# Patient Record
Sex: FEMALE | Race: WHITE | NOT HISPANIC OR LATINO | Employment: OTHER | URBAN - METROPOLITAN AREA
[De-identification: names, ages, dates, MRNs, and addresses within clinical notes are randomized per-mention and may not be internally consistent; named-entity substitution may affect disease eponyms.]

---

## 2021-07-02 ENCOUNTER — HOSPITAL ENCOUNTER (EMERGENCY)
Facility: HOSPITAL | Age: 86
Discharge: HOME | End: 2021-07-02
Attending: EMERGENCY MEDICINE | Admitting: EMERGENCY MEDICINE

## 2021-07-02 ENCOUNTER — APPOINTMENT (OUTPATIENT)
Dept: GENERAL RADIOLOGY | Facility: HOSPITAL | Age: 86
End: 2021-07-02

## 2021-07-02 VITALS
HEART RATE: 76 BPM | TEMPERATURE: 97.3 F | RESPIRATION RATE: 18 BRPM | SYSTOLIC BLOOD PRESSURE: 128 MMHG | DIASTOLIC BLOOD PRESSURE: 68 MMHG | OXYGEN SATURATION: 100 %

## 2021-07-02 DIAGNOSIS — Z79.01 CHRONIC ANTICOAGULATION: ICD-10-CM

## 2021-07-02 DIAGNOSIS — I48.91 ATRIAL FIBRILLATION, UNSPECIFIED TYPE (HCC): ICD-10-CM

## 2021-07-02 DIAGNOSIS — R07.89 ACUTE CHEST WALL PAIN: Primary | ICD-10-CM

## 2021-07-02 LAB
ALBUMIN SERPL-MCNC: 4.3 G/DL (ref 3.5–5.2)
ALBUMIN/GLOB SERPL: 1.2 G/DL
ALP SERPL-CCNC: 85 U/L (ref 39–117)
ALT SERPL W P-5'-P-CCNC: 26 U/L (ref 1–33)
ANION GAP SERPL CALCULATED.3IONS-SCNC: 13.3 MMOL/L (ref 5–15)
AST SERPL-CCNC: 26 U/L (ref 1–32)
BASOPHILS # BLD AUTO: 0.06 10*3/MM3 (ref 0–0.2)
BASOPHILS NFR BLD AUTO: 0.5 % (ref 0–1.5)
BILIRUB SERPL-MCNC: 0.4 MG/DL (ref 0–1.2)
BUN SERPL-MCNC: 20 MG/DL (ref 8–23)
BUN/CREAT SERPL: 18.2 (ref 7–25)
CALCIUM SPEC-SCNC: 10 MG/DL (ref 8.6–10.5)
CHLORIDE SERPL-SCNC: 99 MMOL/L (ref 98–107)
CO2 SERPL-SCNC: 28.7 MMOL/L (ref 22–29)
CREAT SERPL-MCNC: 1.1 MG/DL (ref 0.57–1)
DEPRECATED RDW RBC AUTO: 43.9 FL (ref 37–54)
DIGOXIN SERPL-MCNC: 1.3 NG/ML (ref 0.6–1.2)
EOSINOPHIL # BLD AUTO: 0.15 10*3/MM3 (ref 0–0.4)
EOSINOPHIL NFR BLD AUTO: 1.2 % (ref 0.3–6.2)
ERYTHROCYTE [DISTWIDTH] IN BLOOD BY AUTOMATED COUNT: 13 % (ref 12.3–15.4)
GFR SERPL CREATININE-BSD FRML MDRD: 47 ML/MIN/1.73
GLOBULIN UR ELPH-MCNC: 3.6 GM/DL
GLUCOSE SERPL-MCNC: 178 MG/DL (ref 65–99)
HCT VFR BLD AUTO: 42.7 % (ref 34–46.6)
HGB BLD-MCNC: 14.7 G/DL (ref 12–15.9)
IMM GRANULOCYTES # BLD AUTO: 0.09 10*3/MM3 (ref 0–0.05)
IMM GRANULOCYTES NFR BLD AUTO: 0.7 % (ref 0–0.5)
INR PPP: 2.33 (ref 0.9–1.1)
LYMPHOCYTES # BLD AUTO: 5.97 10*3/MM3 (ref 0.7–3.1)
LYMPHOCYTES NFR BLD AUTO: 49.3 % (ref 19.6–45.3)
MCH RBC QN AUTO: 32.3 PG (ref 26.6–33)
MCHC RBC AUTO-ENTMCNC: 34.4 G/DL (ref 31.5–35.7)
MCV RBC AUTO: 93.8 FL (ref 79–97)
MONOCYTES # BLD AUTO: 0.91 10*3/MM3 (ref 0.1–0.9)
MONOCYTES NFR BLD AUTO: 7.5 % (ref 5–12)
NEUTROPHILS NFR BLD AUTO: 4.94 10*3/MM3 (ref 1.7–7)
NEUTROPHILS NFR BLD AUTO: 40.8 % (ref 42.7–76)
NRBC BLD AUTO-RTO: 0 /100 WBC (ref 0–0.2)
NT-PROBNP SERPL-MCNC: 1504 PG/ML (ref 0–1800)
PLATELET # BLD AUTO: 270 10*3/MM3 (ref 140–450)
PMV BLD AUTO: 9.2 FL (ref 6–12)
POTASSIUM SERPL-SCNC: 4.1 MMOL/L (ref 3.5–5.2)
PROT SERPL-MCNC: 7.9 G/DL (ref 6–8.5)
PROTHROMBIN TIME: 25.3 SECONDS (ref 11.7–14.2)
RBC # BLD AUTO: 4.55 10*6/MM3 (ref 3.77–5.28)
SODIUM SERPL-SCNC: 141 MMOL/L (ref 136–145)
TROPONIN T SERPL-MCNC: <0.01 NG/ML (ref 0–0.03)
TROPONIN T SERPL-MCNC: <0.01 NG/ML (ref 0–0.03)
WBC # BLD AUTO: 12.12 10*3/MM3 (ref 3.4–10.8)

## 2021-07-02 PROCEDURE — 85610 PROTHROMBIN TIME: CPT | Performed by: EMERGENCY MEDICINE

## 2021-07-02 PROCEDURE — 85025 COMPLETE CBC W/AUTO DIFF WBC: CPT | Performed by: EMERGENCY MEDICINE

## 2021-07-02 PROCEDURE — 93005 ELECTROCARDIOGRAM TRACING: CPT

## 2021-07-02 PROCEDURE — 80053 COMPREHEN METABOLIC PANEL: CPT | Performed by: EMERGENCY MEDICINE

## 2021-07-02 PROCEDURE — 83880 ASSAY OF NATRIURETIC PEPTIDE: CPT | Performed by: EMERGENCY MEDICINE

## 2021-07-02 PROCEDURE — 80162 ASSAY OF DIGOXIN TOTAL: CPT | Performed by: EMERGENCY MEDICINE

## 2021-07-02 PROCEDURE — 71045 X-RAY EXAM CHEST 1 VIEW: CPT

## 2021-07-02 PROCEDURE — 93005 ELECTROCARDIOGRAM TRACING: CPT | Performed by: EMERGENCY MEDICINE

## 2021-07-02 PROCEDURE — 93010 ELECTROCARDIOGRAM REPORT: CPT | Performed by: INTERNAL MEDICINE

## 2021-07-02 PROCEDURE — 84484 ASSAY OF TROPONIN QUANT: CPT | Performed by: EMERGENCY MEDICINE

## 2021-07-02 PROCEDURE — 99284 EMERGENCY DEPT VISIT MOD MDM: CPT

## 2021-07-02 RX ORDER — LIDOCAINE 50 MG/G
1 PATCH TOPICAL EVERY 24 HOURS
Qty: 10 PATCH | Refills: 0 | Status: SHIPPED | OUTPATIENT
Start: 2021-07-02 | End: 2021-12-22 | Stop reason: HOSPADM

## 2021-07-02 RX ORDER — ACETAMINOPHEN 500 MG
1000 TABLET ORAL ONCE
Status: COMPLETED | OUTPATIENT
Start: 2021-07-02 | End: 2021-07-02

## 2021-07-02 RX ORDER — SODIUM CHLORIDE 0.9 % (FLUSH) 0.9 %
10 SYRINGE (ML) INJECTION AS NEEDED
Status: DISCONTINUED | OUTPATIENT
Start: 2021-07-02 | End: 2021-07-03 | Stop reason: HOSPADM

## 2021-07-02 RX ADMIN — ACETAMINOPHEN 1000 MG: 500 TABLET, FILM COATED ORAL at 21:14

## 2021-07-02 RX ADMIN — METOPROLOL TARTRATE 25 MG: 25 TABLET, FILM COATED ORAL at 21:14

## 2021-07-02 NOTE — ED NOTES
Pt arrived via EMS from home with c/o chest pain that started around 1800 tonight when pt was sitting outside. Pt denies any radiating pain, N&V, SOB , or diaphoresis. Pt took 2 nitro prior to EMS arrival, upon EMS arrival pt was denying pain but came back en route. EMS gave one nitro and 324 ASA. Pt states pain is a 5 right now. Pt has afib, CAD, SVT for hx.     Pt placed in mask, this RN in mask.       Tatum Dalal, RN  07/02/21 1914

## 2021-07-03 LAB — QT INTERVAL: 312 MS

## 2021-07-03 NOTE — ED PROVIDER NOTES
EMERGENCY DEPARTMENT ENCOUNTER    Room Number:  10/10  Date seen:  7/2/2021  PCP: Provider, No Known  Historian: Patient, family      HPI:  Chief Complaint: Chest pain  A complete HPI/ROS/PMH/PSH/SH/FH are unobtainable due to: Nothing  Context: Mi Rosales is a 89 y.o. female who presents to the ED c/o left-sided chest pain onset today around 1 PM.  Patient reportedly went outside for a period of time and was not complaining of anything and then she came back inside she was complaining again of intermittent left-sided chest pain.  Patient reports that it hits her from time to time in the left side of the chest.  She denies any radiation of the pain.  She denies associated nausea, vomiting, diaphoresis, shortness of air.  She does have a history of atrial fibrillation for which she takes digoxin, diltiazem, warfarin.  She has no reported history of coronary artery disease.  She has had no fever or chills.  Her left chest wall is tender to touch.  She denies any recent injury or trauma.            PAST MEDICAL HISTORY  Active Ambulatory Problems     Diagnosis Date Noted   • No Active Ambulatory Problems     Resolved Ambulatory Problems     Diagnosis Date Noted   • No Resolved Ambulatory Problems     Past Medical History:   Diagnosis Date   • Atrial fibrillation (CMS/HCC)    • Hypertension          PAST SURGICAL HISTORY  History reviewed. No pertinent surgical history.      FAMILY HISTORY  History reviewed. No pertinent family history.      SOCIAL HISTORY  Social History     Socioeconomic History   • Marital status:      Spouse name: Not on file   • Number of children: Not on file   • Years of education: Not on file   • Highest education level: Not on file         ALLERGIES  Bee venom, Atorvastatin, Gemfibrozil, Haemophilus influenzae, Influenza virus vaccine split, Isosorbide, Metformin, Propoxyphene, and Simvastatin        REVIEW OF SYSTEMS  Review of Systems   Review of all 14 systems is negative other  than stated in the HPI above.      PHYSICAL EXAM  ED Triage Vitals   Temp Heart Rate Resp BP SpO2   07/02/21 1915 07/02/21 1915 07/02/21 1915 07/02/21 1915 07/02/21 1915   97.3 °F (36.3 °C) (!) 136 25 169/71 94 %      Temp src Heart Rate Source Patient Position BP Location FiO2 (%)   07/02/21 1915 -- 07/02/21 1958 07/02/21 1958 --   Tympanic  Lying Right arm          GENERAL: Awake and alert, no acute distress, baseline hearing impairment.  HENT: nares patent  EYES: no scleral icterus, EOMI, pupils 3 mm reactive bilaterally  CV: irregular rhythm, mild tachycardia  RESPIRATORY: normal effort, lungs clear to auscultation bilaterally  ABDOMEN: soft, nondistended, nontender throughout  MUSCULOSKELETAL: no deformity.  The right chest wall is nontender to touch.  The left chest wall is tender to touch with no crepitance.  NEURO: alert, moves all extremities, follows commands  PSYCH:  calm, cooperative  SKIN: warm, dry.  The left chest wall and left side of the back are normal to inspection with no rash, no bruising or ecchymosis.    Vital signs and nursing notes reviewed.          LAB RESULTS  Recent Results (from the past 24 hour(s))   ECG 12 Lead    Collection Time: 07/02/21  7:23 PM   Result Value Ref Range    QT Interval 312 ms   Comprehensive Metabolic Panel    Collection Time: 07/02/21  7:51 PM    Specimen: Blood   Result Value Ref Range    Glucose 178 (H) 65 - 99 mg/dL    BUN 20 8 - 23 mg/dL    Creatinine 1.10 (H) 0.57 - 1.00 mg/dL    Sodium 141 136 - 145 mmol/L    Potassium 4.1 3.5 - 5.2 mmol/L    Chloride 99 98 - 107 mmol/L    CO2 28.7 22.0 - 29.0 mmol/L    Calcium 10.0 8.6 - 10.5 mg/dL    Total Protein 7.9 6.0 - 8.5 g/dL    Albumin 4.30 3.50 - 5.20 g/dL    ALT (SGPT) 26 1 - 33 U/L    AST (SGOT) 26 1 - 32 U/L    Alkaline Phosphatase 85 39 - 117 U/L    Total Bilirubin 0.4 0.0 - 1.2 mg/dL    eGFR Non African Amer 47 (L) >60 mL/min/1.73    Globulin 3.6 gm/dL    A/G Ratio 1.2 g/dL    BUN/Creatinine Ratio 18.2 7.0 -  25.0    Anion Gap 13.3 5.0 - 15.0 mmol/L   BNP    Collection Time: 07/02/21  7:51 PM    Specimen: Blood   Result Value Ref Range    proBNP 1,504.0 0.0-1,800.0 pg/mL   Troponin    Collection Time: 07/02/21  7:51 PM    Specimen: Blood   Result Value Ref Range    Troponin T <0.010 0.000 - 0.030 ng/mL   CBC Auto Differential    Collection Time: 07/02/21  7:51 PM    Specimen: Blood   Result Value Ref Range    WBC 12.12 (H) 3.40 - 10.80 10*3/mm3    RBC 4.55 3.77 - 5.28 10*6/mm3    Hemoglobin 14.7 12.0 - 15.9 g/dL    Hematocrit 42.7 34.0 - 46.6 %    MCV 93.8 79.0 - 97.0 fL    MCH 32.3 26.6 - 33.0 pg    MCHC 34.4 31.5 - 35.7 g/dL    RDW 13.0 12.3 - 15.4 %    RDW-SD 43.9 37.0 - 54.0 fl    MPV 9.2 6.0 - 12.0 fL    Platelets 270 140 - 450 10*3/mm3    Neutrophil % 40.8 (L) 42.7 - 76.0 %    Lymphocyte % 49.3 (H) 19.6 - 45.3 %    Monocyte % 7.5 5.0 - 12.0 %    Eosinophil % 1.2 0.3 - 6.2 %    Basophil % 0.5 0.0 - 1.5 %    Immature Grans % 0.7 (H) 0.0 - 0.5 %    Neutrophils, Absolute 4.94 1.70 - 7.00 10*3/mm3    Lymphocytes, Absolute 5.97 (H) 0.70 - 3.10 10*3/mm3    Monocytes, Absolute 0.91 (H) 0.10 - 0.90 10*3/mm3    Eosinophils, Absolute 0.15 0.00 - 0.40 10*3/mm3    Basophils, Absolute 0.06 0.00 - 0.20 10*3/mm3    Immature Grans, Absolute 0.09 (H) 0.00 - 0.05 10*3/mm3    nRBC 0.0 0.0 - 0.2 /100 WBC   Digoxin Level    Collection Time: 07/02/21  7:51 PM    Specimen: Blood   Result Value Ref Range    Digoxin 1.30 (H) 0.60 - 1.20 ng/mL   Protime-INR    Collection Time: 07/02/21  9:25 PM    Specimen: Blood   Result Value Ref Range    Protime 25.3 (H) 11.7 - 14.2 Seconds    INR 2.33 (H) 0.90 - 1.10   Troponin    Collection Time: 07/02/21  9:25 PM    Specimen: Blood   Result Value Ref Range    Troponin T <0.010 0.000 - 0.030 ng/mL       Ordered the above labs and reviewed the results.        RADIOLOGY  XR Chest 1 View    Result Date: 7/2/2021  XR CHEST 1 VW-  HISTORY: Female who is 89 years-old,  chest pain  TECHNIQUE: Frontal view of  the chest  COMPARISON: None available  FINDINGS: Heart, mediastinum and pulmonary vasculature are unremarkable. No focal pulmonary consolidation, pleural effusion, or pneumothorax. No acute osseous process.      No evidence for acute pulmonary process. Follow-up as clinical indications persist.  This report was finalized on 7/2/2021 8:13 PM by Dr. Jorge Frye M.D.        Ordered the above noted radiological studies. Reviewed by me in PACS.            PROCEDURES  Procedures            MEDICATIONS GIVEN IN ER  Medications   sodium chloride 0.9 % flush 10 mL (has no administration in time range)   acetaminophen (TYLENOL) tablet 1,000 mg (1,000 mg Oral Given 7/2/21 2114)   metoprolol tartrate (LOPRESSOR) tablet 25 mg (25 mg Oral Given 7/2/21 2114)                   MEDICAL DECISION MAKING, PROGRESS, and CONSULTS    All labs have been independently reviewed by me.  All radiology studies have been reviewed by me and discussed with radiologist dictating the report.   EKG's independently viewed and interpreted by me.  Discussion below represents my analysis of pertinent findings related to patient's condition, differential diagnosis, treatment plan and final disposition.      Differential diagnosis includes but is not limited to:  Costochondritis  Intercostal strain  Herpes zoster  Pericarditis  Acute coronary syndrome  Pneumothorax      ED Course as of Jul 02 2215 Fri Jul 02, 2021 2037 Creatinine(!): 1.10 [JR]   2037 Sodium: 141 [JR]   2037 Potassium: 4.1 [JR]   2037 Troponin T: <0.010 [JR]   2037 proBNP: 1,504.0 [JR]   2037 WBC(!): 12.12 [JR]   2037 Hemoglobin: 14.7 [JR]   2204 Troponin T: <0.010 [JR]   2204 Digoxin(!): 1.30 [JR]   2204 INR(!): 2.33 [JR]   2204 EKG          EKG time: 1923  Rhythm/Rate: A. fib, 98  P waves and CO: N/A  QRS, axis: Normal axis  ST and T waves: Slight inferior lateral ST depression    Interpreted Contemporaneously by me, independently viewed            [JR]   2205 Initial and  repeat troponin are both normal.  Pain is reproducible with palpation suggesting chest wall pathology.  There is no evidence of displaced rib fracture on chest x-ray, no pneumothorax.  There is no bruising of the anterior chest wall on examination nor is there rash to suggest herpes zoster.  She is anticoagulated due to history of atrial fibrillation therefore low risk for developing pulmonary embolus.  She was given Tylenol for pain.  She was mildly tachycardic here, given metoprolol for further rate reduction.  EKG with slight inferior lateral ST depressions that is likely secondary to digoxin therapy.  Patient appropriate for discharge home with continued Tylenol as needed for pain, trial of Lidoderm patches as well.  Follow-up with PCP.    [JR]   2215 Patient reassessed.  Heart rate has come down to the 70s, blood pressure has come down to the 120 systolic.  Her pain has resolved.  I discussed plan for discharge home with Tylenol, Lidoderm patches as needed for pain, PCP follow-up.    [JR]      ED Course User Index  [JR] Frantz Moralez MD              I wore a mask, face shield, and gloves during this patient encounter.  Patient also wearing a surgical mask.  Hand hygeine performed before and after seeing the patient.    DIAGNOSIS  Final diagnoses:   Acute chest wall pain   Atrial fibrillation, unspecified type (CMS/HCC)   Chronic anticoagulation         DISPOSITION  DISCHARGE    Patient discharged in stable condition.    Reviewed implications of results, diagnosis, meds, responsibility to follow up, warning signs and symptoms of possible worsening, potential complications and reasons to return to ER.    Patient/Family voiced understanding of above instructions.    Discussed plan for discharge, as there is no emergent indication for admission. Patient referred to primary care provider for BP management due to today's BP. Pt/family is agreeable and understands need for follow up and repeat testing.  Pt is  aware that discharge does not mean that nothing is wrong but it indicates no emergency is present that requires admission and they must continue care with follow-up as given below or physician of their choice.     FOLLOW-UP  PATIENT CONNECTION - [place] [address]  [phone]  Call in 1 day           Medication List      New Prescriptions    lidocaine 5 %  Commonly known as: LIDODERM  Place 1 patch on the skin as directed by provider Daily. Remove & Discard patch within 12 hours or as directed by MD           Where to Get Your Medications      These medications were sent to [address] AT [employer] & ([facility] - [phone] [facility] [phone]   [address], [facility] [address]    Phone: [phone]   · lidocaine 5 %                   Latest Documented Vital Signs:  As of 22:15 EDT  BP- (!) 186/91 HR- 103 Temp- 97.3 °F (36.3 °C) (Tympanic) O2 sat- 98%        --    Please note that portions of this were completed with a voice recognition program.          Frantz Moralez MD  07/02/21 8669

## 2021-12-19 ENCOUNTER — HOSPITAL ENCOUNTER (INPATIENT)
Facility: HOSPITAL | Age: 86
LOS: 1 days | Discharge: HOME | End: 2021-12-22
Attending: EMERGENCY MEDICINE | Admitting: HOSPITALIST

## 2021-12-19 ENCOUNTER — APPOINTMENT (OUTPATIENT)
Dept: GENERAL RADIOLOGY | Facility: HOSPITAL | Age: 86
End: 2021-12-19

## 2021-12-19 DIAGNOSIS — I48.91 ATRIAL FIBRILLATION WITH RVR (HCC): ICD-10-CM

## 2021-12-19 DIAGNOSIS — R07.9 CHEST PAIN, UNSPECIFIED TYPE: Primary | ICD-10-CM

## 2021-12-19 LAB
DEPRECATED RDW RBC AUTO: 46.1 FL (ref 37–54)
ERYTHROCYTE [DISTWIDTH] IN BLOOD BY AUTOMATED COUNT: 13 % (ref 12.3–15.4)
HCT VFR BLD AUTO: 45.2 % (ref 34–46.6)
HGB BLD-MCNC: 15.6 G/DL (ref 12–15.9)
MCH RBC QN AUTO: 33.4 PG (ref 26.6–33)
MCHC RBC AUTO-ENTMCNC: 34.5 G/DL (ref 31.5–35.7)
MCV RBC AUTO: 96.8 FL (ref 79–97)
PLATELET # BLD AUTO: 278 10*3/MM3 (ref 140–450)
PMV BLD AUTO: 9.3 FL (ref 6–12)
RBC # BLD AUTO: 4.67 10*6/MM3 (ref 3.77–5.28)
WBC NRBC COR # BLD: 13.23 10*3/MM3 (ref 3.4–10.8)

## 2021-12-19 PROCEDURE — 93010 ELECTROCARDIOGRAM REPORT: CPT | Performed by: INTERNAL MEDICINE

## 2021-12-19 PROCEDURE — 84484 ASSAY OF TROPONIN QUANT: CPT | Performed by: EMERGENCY MEDICINE

## 2021-12-19 PROCEDURE — 83880 ASSAY OF NATRIURETIC PEPTIDE: CPT | Performed by: EMERGENCY MEDICINE

## 2021-12-19 PROCEDURE — 93005 ELECTROCARDIOGRAM TRACING: CPT | Performed by: EMERGENCY MEDICINE

## 2021-12-19 PROCEDURE — 71045 X-RAY EXAM CHEST 1 VIEW: CPT

## 2021-12-19 PROCEDURE — 85007 BL SMEAR W/DIFF WBC COUNT: CPT | Performed by: EMERGENCY MEDICINE

## 2021-12-19 PROCEDURE — 80053 COMPREHEN METABOLIC PANEL: CPT | Performed by: EMERGENCY MEDICINE

## 2021-12-19 PROCEDURE — 85025 COMPLETE CBC W/AUTO DIFF WBC: CPT | Performed by: EMERGENCY MEDICINE

## 2021-12-19 PROCEDURE — 80162 ASSAY OF DIGOXIN TOTAL: CPT | Performed by: EMERGENCY MEDICINE

## 2021-12-19 PROCEDURE — 99285 EMERGENCY DEPT VISIT HI MDM: CPT

## 2021-12-19 PROCEDURE — 85610 PROTHROMBIN TIME: CPT | Performed by: EMERGENCY MEDICINE

## 2021-12-19 RX ADMIN — SODIUM CHLORIDE 1000 ML: 9 INJECTION, SOLUTION INTRAVENOUS at 23:45

## 2021-12-19 RX ADMIN — METOPROLOL TARTRATE 5 MG: 1 INJECTION, SOLUTION INTRAVENOUS at 23:52

## 2021-12-20 ENCOUNTER — APPOINTMENT (OUTPATIENT)
Dept: CARDIOLOGY | Facility: HOSPITAL | Age: 86
End: 2021-12-20

## 2021-12-20 PROBLEM — R07.9 CHEST PAIN: Status: ACTIVE | Noted: 2021-12-20

## 2021-12-20 LAB
ALBUMIN SERPL-MCNC: 4.3 G/DL (ref 3.5–5.2)
ALBUMIN/GLOB SERPL: 1.1 G/DL
ALP SERPL-CCNC: 85 U/L (ref 39–117)
ALT SERPL W P-5'-P-CCNC: 21 U/L (ref 1–33)
ANION GAP SERPL CALCULATED.3IONS-SCNC: 13.1 MMOL/L (ref 5–15)
ANION GAP SERPL CALCULATED.3IONS-SCNC: 8.9 MMOL/L (ref 5–15)
AORTIC DIMENSIONLESS INDEX: 0.4 (DI)
AST SERPL-CCNC: 23 U/L (ref 1–32)
BASOPHILS # BLD AUTO: 0.06 10*3/MM3 (ref 0–0.2)
BASOPHILS NFR BLD AUTO: 0.6 % (ref 0–1.5)
BH CV ECHO MEAS - AI DEC SLOPE: 556.4 CM/SEC^2
BH CV ECHO MEAS - AI MAX PG: 67.7 MMHG
BH CV ECHO MEAS - AI MAX VEL: 411.5 CM/SEC
BH CV ECHO MEAS - AI P1/2T: 216.6 MSEC
BH CV ECHO MEAS - AO MAX PG (FULL): 9.8 MMHG
BH CV ECHO MEAS - AO MAX PG: 11.3 MMHG
BH CV ECHO MEAS - AO MEAN PG (FULL): 4.6 MMHG
BH CV ECHO MEAS - AO MEAN PG: 5.5 MMHG
BH CV ECHO MEAS - AO V2 MAX: 167.7 CM/SEC
BH CV ECHO MEAS - AO V2 MEAN: 110.4 CM/SEC
BH CV ECHO MEAS - AO V2 VTI: 30.9 CM
BH CV ECHO MEAS - AVA(I,A): 0.97 CM^2
BH CV ECHO MEAS - AVA(I,D): 0.97 CM^2
BH CV ECHO MEAS - AVA(V,A): 0.85 CM^2
BH CV ECHO MEAS - AVA(V,D): 0.85 CM^2
BH CV ECHO MEAS - BSA(HAYCOCK): 1.6 M^2
BH CV ECHO MEAS - BSA: 1.6 M^2
BH CV ECHO MEAS - BZI_BMI: 22.7 KILOGRAMS/M^2
BH CV ECHO MEAS - BZI_METRIC_HEIGHT: 157.5 CM
BH CV ECHO MEAS - BZI_METRIC_WEIGHT: 56.2 KG
BH CV ECHO MEAS - EDV(CUBED): 59.3 ML
BH CV ECHO MEAS - EDV(MOD-SP2): 64 ML
BH CV ECHO MEAS - EDV(MOD-SP4): 61 ML
BH CV ECHO MEAS - EDV(TEICH): 65.9 ML
BH CV ECHO MEAS - EF(MOD-BP): 58.8 %
BH CV ECHO MEAS - EF(MOD-SP2): 60.9 %
BH CV ECHO MEAS - EF(MOD-SP4): 54.1 %
BH CV ECHO MEAS - ESV(MOD-SP2): 25 ML
BH CV ECHO MEAS - ESV(MOD-SP4): 28 ML
BH CV ECHO MEAS - IVS/LVPW: 0.92
BH CV ECHO MEAS - IVSD: 1.1 CM
BH CV ECHO MEAS - LAT PEAK E' VEL: 7.3 CM/SEC
BH CV ECHO MEAS - LV DIASTOLIC VOL/BSA (35-75): 39.1 ML/M^2
BH CV ECHO MEAS - LV MASS(C)D: 149.5 GRAMS
BH CV ECHO MEAS - LV MASS(C)DI: 95.9 GRAMS/M^2
BH CV ECHO MEAS - LV MAX PG: 1.4 MMHG
BH CV ECHO MEAS - LV MEAN PG: 0.91 MMHG
BH CV ECHO MEAS - LV SYSTOLIC VOL/BSA (12-30): 17.9 ML/M^2
BH CV ECHO MEAS - LV V1 MAX: 59.6 CM/SEC
BH CV ECHO MEAS - LV V1 MEAN: 45.7 CM/SEC
BH CV ECHO MEAS - LV V1 VTI: 12.6 CM
BH CV ECHO MEAS - LVIDD: 3.9 CM
BH CV ECHO MEAS - LVLD AP2: 6.5 CM
BH CV ECHO MEAS - LVLD AP4: 6.7 CM
BH CV ECHO MEAS - LVLS AP2: 5.3 CM
BH CV ECHO MEAS - LVLS AP4: 5.3 CM
BH CV ECHO MEAS - LVOT AREA (M): 2.3 CM^2
BH CV ECHO MEAS - LVOT AREA: 2.4 CM^2
BH CV ECHO MEAS - LVOT DIAM: 1.7 CM
BH CV ECHO MEAS - LVPWD: 1.2 CM
BH CV ECHO MEAS - MED PEAK E' VEL: 7.2 CM/SEC
BH CV ECHO MEAS - MR MAX PG: 83.2 MMHG
BH CV ECHO MEAS - MR MAX VEL: 456.1 CM/SEC
BH CV ECHO MEAS - MR MEAN PG: 60.4 MMHG
BH CV ECHO MEAS - MR MEAN VEL: 381.3 CM/SEC
BH CV ECHO MEAS - MR VTI: 147 CM
BH CV ECHO MEAS - MV DEC SLOPE: 541.1 CM/SEC^2
BH CV ECHO MEAS - MV DEC TIME: 130 SEC
BH CV ECHO MEAS - MV E MAX VEL: 94.8 CM/SEC
BH CV ECHO MEAS - MV MAX PG: 7.2 MMHG
BH CV ECHO MEAS - MV MEAN PG: 2.6 MMHG
BH CV ECHO MEAS - MV P1/2T MAX VEL: 139.8 CM/SEC
BH CV ECHO MEAS - MV P1/2T: 75.7 MSEC
BH CV ECHO MEAS - MV V2 MAX: 134.4 CM/SEC
BH CV ECHO MEAS - MV V2 MEAN: 74.4 CM/SEC
BH CV ECHO MEAS - MV V2 VTI: 25.3 CM
BH CV ECHO MEAS - MVA P1/2T LCG: 1.6 CM^2
BH CV ECHO MEAS - MVA(P1/2T): 2.9 CM^2
BH CV ECHO MEAS - MVA(VTI): 1.2 CM^2
BH CV ECHO MEAS - RAP SYSTOLE: 8 MMHG
BH CV ECHO MEAS - RVSP: 34.1 MMHG
BH CV ECHO MEAS - SI(LVOT): 19.2 ML/M^2
BH CV ECHO MEAS - SI(MOD-SP2): 25 ML/M^2
BH CV ECHO MEAS - SI(MOD-SP4): 21.2 ML/M^2
BH CV ECHO MEAS - SV(LVOT): 30 ML
BH CV ECHO MEAS - SV(MOD-SP2): 39 ML
BH CV ECHO MEAS - SV(MOD-SP4): 33 ML
BH CV ECHO MEAS - TAPSE (>1.6): 0.8 CM
BH CV ECHO MEAS - TR MAX VEL: 255.3 CM/SEC
BH CV ECHO MEASUREMENTS AVERAGE E/E' RATIO: 13.08
BH CV VAS BP RIGHT ARM: NORMAL MMHG
BH CV XLRA - RV BASE: 2.8 CM
BH CV XLRA - RV LENGTH: 6.6 CM
BH CV XLRA - RV MID: 1.7 CM
BH CV XLRA - TDI S': 6.1 CM/SEC
BILIRUB SERPL-MCNC: 0.2 MG/DL (ref 0–1.2)
BUN SERPL-MCNC: 17 MG/DL (ref 8–23)
BUN SERPL-MCNC: 19 MG/DL (ref 8–23)
BUN/CREAT SERPL: 18.1 (ref 7–25)
BUN/CREAT SERPL: 20.7 (ref 7–25)
CALCIUM SPEC-SCNC: 9.5 MG/DL (ref 8.6–10.5)
CALCIUM SPEC-SCNC: 9.7 MG/DL (ref 8.6–10.5)
CHLORIDE SERPL-SCNC: 102 MMOL/L (ref 98–107)
CHLORIDE SERPL-SCNC: 95 MMOL/L (ref 98–107)
CO2 SERPL-SCNC: 28.9 MMOL/L (ref 22–29)
CO2 SERPL-SCNC: 30.1 MMOL/L (ref 22–29)
CREAT SERPL-MCNC: 0.82 MG/DL (ref 0.57–1)
CREAT SERPL-MCNC: 1.05 MG/DL (ref 0.57–1)
DEPRECATED RDW RBC AUTO: 47.5 FL (ref 37–54)
DIGOXIN SERPL-MCNC: 0.9 NG/ML (ref 0.6–1.2)
EOSINOPHIL # BLD AUTO: 0.08 10*3/MM3 (ref 0–0.4)
EOSINOPHIL NFR BLD AUTO: 0.8 % (ref 0.3–6.2)
ERYTHROCYTE [DISTWIDTH] IN BLOOD BY AUTOMATED COUNT: 13.1 % (ref 12.3–15.4)
GFR SERPL CREATININE-BSD FRML MDRD: 49 ML/MIN/1.73
GFR SERPL CREATININE-BSD FRML MDRD: 66 ML/MIN/1.73
GLOBULIN UR ELPH-MCNC: 3.8 GM/DL
GLUCOSE BLDC GLUCOMTR-MCNC: 184 MG/DL (ref 70–130)
GLUCOSE BLDC GLUCOMTR-MCNC: 186 MG/DL (ref 70–130)
GLUCOSE BLDC GLUCOMTR-MCNC: 186 MG/DL (ref 70–130)
GLUCOSE BLDC GLUCOMTR-MCNC: 202 MG/DL (ref 70–130)
GLUCOSE BLDC GLUCOMTR-MCNC: 207 MG/DL (ref 70–130)
GLUCOSE SERPL-MCNC: 186 MG/DL (ref 65–99)
GLUCOSE SERPL-MCNC: 208 MG/DL (ref 65–99)
HCT VFR BLD AUTO: 44.4 % (ref 34–46.6)
HGB BLD-MCNC: 14.6 G/DL (ref 12–15.9)
IMM GRANULOCYTES # BLD AUTO: 0.09 10*3/MM3 (ref 0–0.05)
IMM GRANULOCYTES NFR BLD AUTO: 0.9 % (ref 0–0.5)
INR PPP: 2.14 (ref 0.9–1.1)
INR PPP: 2.17 (ref 0.9–1.1)
LEFT ATRIUM VOLUME INDEX: 36 ML/M2
LYMPHOCYTES # BLD AUTO: 3.63 10*3/MM3 (ref 0.7–3.1)
LYMPHOCYTES # BLD MANUAL: 7.41 10*3/MM3 (ref 0.7–3.1)
LYMPHOCYTES NFR BLD AUTO: 35.1 % (ref 19.6–45.3)
LYMPHOCYTES NFR BLD MANUAL: 6 % (ref 5–12)
MCH RBC QN AUTO: 32.6 PG (ref 26.6–33)
MCHC RBC AUTO-ENTMCNC: 32.9 G/DL (ref 31.5–35.7)
MCV RBC AUTO: 99.1 FL (ref 79–97)
MONOCYTES # BLD AUTO: 0.89 10*3/MM3 (ref 0.1–0.9)
MONOCYTES # BLD: 0.79 10*3/MM3 (ref 0.1–0.9)
MONOCYTES NFR BLD AUTO: 8.6 % (ref 5–12)
NEUTROPHILS # BLD AUTO: 5.03 10*3/MM3 (ref 1.7–7)
NEUTROPHILS NFR BLD AUTO: 5.59 10*3/MM3 (ref 1.7–7)
NEUTROPHILS NFR BLD AUTO: 54 % (ref 42.7–76)
NEUTROPHILS NFR BLD MANUAL: 38 % (ref 42.7–76)
NRBC BLD AUTO-RTO: 0 /100 WBC (ref 0–0.2)
NT-PROBNP SERPL-MCNC: 1309 PG/ML (ref 0–1800)
PLAT MORPH BLD: NORMAL
PLATELET # BLD AUTO: 248 10*3/MM3 (ref 140–450)
PMV BLD AUTO: 9.4 FL (ref 6–12)
POTASSIUM SERPL-SCNC: 3.9 MMOL/L (ref 3.5–5.2)
POTASSIUM SERPL-SCNC: 4.1 MMOL/L (ref 3.5–5.2)
PROT SERPL-MCNC: 8.1 G/DL (ref 6–8.5)
PROTHROMBIN TIME: 23.7 SECONDS (ref 11.7–14.2)
PROTHROMBIN TIME: 23.9 SECONDS (ref 11.7–14.2)
QT INTERVAL: 368 MS
RBC # BLD AUTO: 4.48 10*6/MM3 (ref 3.77–5.28)
RBC MORPH BLD: NORMAL
SARS-COV-2 ORF1AB RESP QL NAA+PROBE: NOT DETECTED
SMUDGE CELLS BLD QL SMEAR: ABNORMAL
SODIUM SERPL-SCNC: 137 MMOL/L (ref 136–145)
SODIUM SERPL-SCNC: 141 MMOL/L (ref 136–145)
TROPONIN T SERPL-MCNC: <0.01 NG/ML (ref 0–0.03)
VARIANT LYMPHS NFR BLD MANUAL: 56 % (ref 19.6–45.3)
WBC NRBC COR # BLD: 10.34 10*3/MM3 (ref 3.4–10.8)

## 2021-12-20 PROCEDURE — G0378 HOSPITAL OBSERVATION PER HR: HCPCS

## 2021-12-20 PROCEDURE — 93010 ELECTROCARDIOGRAM REPORT: CPT | Performed by: INTERNAL MEDICINE

## 2021-12-20 PROCEDURE — 85610 PROTHROMBIN TIME: CPT | Performed by: HOSPITALIST

## 2021-12-20 PROCEDURE — 85025 COMPLETE CBC W/AUTO DIFF WBC: CPT | Performed by: HOSPITALIST

## 2021-12-20 PROCEDURE — 82962 GLUCOSE BLOOD TEST: CPT

## 2021-12-20 PROCEDURE — U0004 COV-19 TEST NON-CDC HGH THRU: HCPCS | Performed by: EMERGENCY MEDICINE

## 2021-12-20 PROCEDURE — 93306 TTE W/DOPPLER COMPLETE: CPT | Performed by: INTERNAL MEDICINE

## 2021-12-20 PROCEDURE — 63710000001 INSULIN LISPRO (HUMAN) PER 5 UNITS: Performed by: HOSPITALIST

## 2021-12-20 PROCEDURE — 63710000001 INSULIN GLARGINE PER 5 UNITS: Performed by: HOSPITALIST

## 2021-12-20 PROCEDURE — 99204 OFFICE O/P NEW MOD 45 MIN: CPT | Performed by: INTERNAL MEDICINE

## 2021-12-20 PROCEDURE — U0005 INFEC AGEN DETEC AMPLI PROBE: HCPCS | Performed by: EMERGENCY MEDICINE

## 2021-12-20 PROCEDURE — 80048 BASIC METABOLIC PNL TOTAL CA: CPT | Performed by: HOSPITALIST

## 2021-12-20 PROCEDURE — 93306 TTE W/DOPPLER COMPLETE: CPT

## 2021-12-20 PROCEDURE — 93005 ELECTROCARDIOGRAM TRACING: CPT | Performed by: INTERNAL MEDICINE

## 2021-12-20 PROCEDURE — 93356 MYOCRD STRAIN IMG SPCKL TRCK: CPT | Performed by: INTERNAL MEDICINE

## 2021-12-20 PROCEDURE — 93356 MYOCRD STRAIN IMG SPCKL TRCK: CPT

## 2021-12-20 RX ORDER — EZETIMIBE 10 MG/1
1 TABLET ORAL
COMMUNITY
Start: 2021-11-12 | End: 2021-12-22 | Stop reason: HOSPADM

## 2021-12-20 RX ORDER — WARFARIN SODIUM 2.5 MG/1
5 TABLET ORAL ONCE
Status: DISCONTINUED | OUTPATIENT
Start: 2021-12-20 | End: 2021-12-20

## 2021-12-20 RX ORDER — LEVOTHYROXINE SODIUM 0.1 MG/1
1 TABLET ORAL
COMMUNITY
Start: 2021-08-05 | End: 2021-12-22 | Stop reason: HOSPADM

## 2021-12-20 RX ORDER — PANTOPRAZOLE SODIUM 40 MG/1
40 TABLET, DELAYED RELEASE ORAL DAILY
Status: DISCONTINUED | OUTPATIENT
Start: 2021-12-20 | End: 2021-12-22 | Stop reason: HOSPADM

## 2021-12-20 RX ORDER — WARFARIN SODIUM 3 MG/1
4.5 TABLET ORAL NIGHTLY
COMMUNITY
Start: 2021-07-07 | End: 2022-12-13

## 2021-12-20 RX ORDER — CASTOR OIL AND BALSAM, PERU 788; 87 MG/G; MG/G
1 OINTMENT TOPICAL EVERY 12 HOURS SCHEDULED
Status: DISCONTINUED | OUTPATIENT
Start: 2021-12-20 | End: 2021-12-22 | Stop reason: HOSPADM

## 2021-12-20 RX ORDER — DIGOXIN 125 MCG
1 TABLET ORAL DAILY
COMMUNITY
Start: 2021-11-12 | End: 2021-12-22 | Stop reason: HOSPADM

## 2021-12-20 RX ORDER — NITROGLYCERIN 0.4 MG/1
0.4 TABLET SUBLINGUAL
Status: DISCONTINUED | OUTPATIENT
Start: 2021-12-20 | End: 2021-12-22 | Stop reason: HOSPADM

## 2021-12-20 RX ORDER — ACETAMINOPHEN 500 MG
500 TABLET ORAL EVERY 6 HOURS PRN
COMMUNITY
End: 2022-07-27

## 2021-12-20 RX ORDER — DILTIAZEM HYDROCHLORIDE 120 MG/1
120 CAPSULE, COATED, EXTENDED RELEASE ORAL
Status: DISCONTINUED | OUTPATIENT
Start: 2021-12-20 | End: 2021-12-22 | Stop reason: HOSPADM

## 2021-12-20 RX ORDER — PREGABALIN 100 MG/1
100 CAPSULE ORAL EVERY EVENING
Status: DISCONTINUED | OUTPATIENT
Start: 2021-12-20 | End: 2021-12-22 | Stop reason: HOSPADM

## 2021-12-20 RX ORDER — NICOTINE POLACRILEX 4 MG
15 LOZENGE BUCCAL
Status: DISCONTINUED | OUTPATIENT
Start: 2021-12-20 | End: 2021-12-20

## 2021-12-20 RX ORDER — DILTIAZEM HYDROCHLORIDE 120 MG/1
1 CAPSULE, EXTENDED RELEASE ORAL DAILY
COMMUNITY
Start: 2021-11-12 | End: 2021-12-22 | Stop reason: HOSPADM

## 2021-12-20 RX ORDER — GLIPIZIDE 10 MG/1
1 TABLET, FILM COATED, EXTENDED RELEASE ORAL EVERY MORNING
COMMUNITY
Start: 2021-09-15 | End: 2022-12-20 | Stop reason: HOSPADM

## 2021-12-20 RX ORDER — DEXTROSE MONOHYDRATE 25 G/50ML
25 INJECTION, SOLUTION INTRAVENOUS
Status: DISCONTINUED | OUTPATIENT
Start: 2021-12-20 | End: 2021-12-20

## 2021-12-20 RX ORDER — INSULIN LISPRO 100 [IU]/ML
0-7 INJECTION, SOLUTION INTRAVENOUS; SUBCUTANEOUS
Status: DISCONTINUED | OUTPATIENT
Start: 2021-12-20 | End: 2021-12-22 | Stop reason: HOSPADM

## 2021-12-20 RX ORDER — ASPIRIN 81 MG/1
81 TABLET, CHEWABLE ORAL DAILY
Status: DISCONTINUED | OUTPATIENT
Start: 2021-12-20 | End: 2021-12-22 | Stop reason: HOSPADM

## 2021-12-20 RX ORDER — DIGOXIN 125 MCG
125 TABLET ORAL
Status: DISCONTINUED | OUTPATIENT
Start: 2021-12-20 | End: 2021-12-21

## 2021-12-20 RX ORDER — ICOSAPENT ETHYL 1000 MG/1
2 CAPSULE ORAL 2 TIMES DAILY
COMMUNITY
Start: 2021-11-12

## 2021-12-20 RX ORDER — PREGABALIN 100 MG/1
100 CAPSULE ORAL EVERY EVENING
COMMUNITY
Start: 2021-04-27 | End: 2022-12-20 | Stop reason: HOSPADM

## 2021-12-20 RX ORDER — WARFARIN SODIUM 5 MG/1
5 TABLET ORAL
Status: COMPLETED | OUTPATIENT
Start: 2021-12-20 | End: 2021-12-20

## 2021-12-20 RX ORDER — FUROSEMIDE 20 MG/1
1 TABLET ORAL DAILY
COMMUNITY
Start: 2021-11-12 | End: 2022-12-20 | Stop reason: HOSPADM

## 2021-12-20 RX ORDER — ICOSAPENT ETHYL 1000 MG/1
2 CAPSULE ORAL 2 TIMES DAILY
Status: DISCONTINUED | OUTPATIENT
Start: 2021-12-20 | End: 2021-12-22 | Stop reason: HOSPADM

## 2021-12-20 RX ORDER — LEVOTHYROXINE SODIUM 0.1 MG/1
100 TABLET ORAL
Status: DISCONTINUED | OUTPATIENT
Start: 2021-12-20 | End: 2021-12-21

## 2021-12-20 RX ORDER — INSULIN LISPRO 100 [IU]/ML
0-7 INJECTION, SOLUTION INTRAVENOUS; SUBCUTANEOUS
Status: DISCONTINUED | OUTPATIENT
Start: 2021-12-20 | End: 2021-12-20

## 2021-12-20 RX ADMIN — PANTOPRAZOLE SODIUM 40 MG: 40 TABLET, DELAYED RELEASE ORAL at 06:57

## 2021-12-20 RX ADMIN — WARFARIN 5 MG: 2.5 TABLET ORAL at 22:34

## 2021-12-20 RX ADMIN — METOPROLOL TARTRATE 12.5 MG: 25 TABLET, FILM COATED ORAL at 22:34

## 2021-12-20 RX ADMIN — CASTOR OIL AND BALSAM, PERU 1 APPLICATION: 788; 87 OINTMENT TOPICAL at 12:32

## 2021-12-20 RX ADMIN — METOPROLOL TARTRATE 12.5 MG: 25 TABLET, FILM COATED ORAL at 09:05

## 2021-12-20 RX ADMIN — DIGOXIN 125 MCG: 125 TABLET ORAL at 17:00

## 2021-12-20 RX ADMIN — PREGABALIN 100 MG: 100 CAPSULE ORAL at 16:59

## 2021-12-20 RX ADMIN — INSULIN LISPRO 3 UNITS: 100 INJECTION, SOLUTION INTRAVENOUS; SUBCUTANEOUS at 09:05

## 2021-12-20 RX ADMIN — LEVOTHYROXINE SODIUM 100 MCG: 0.1 TABLET ORAL at 12:32

## 2021-12-20 RX ADMIN — INSULIN LISPRO 2 UNITS: 100 INJECTION, SOLUTION INTRAVENOUS; SUBCUTANEOUS at 17:00

## 2021-12-20 RX ADMIN — DILTIAZEM HYDROCHLORIDE 120 MG: 120 CAPSULE, COATED, EXTENDED RELEASE ORAL at 09:05

## 2021-12-20 RX ADMIN — CASTOR OIL AND BALSAM, PERU 1 APPLICATION: 788; 87 OINTMENT TOPICAL at 22:34

## 2021-12-20 RX ADMIN — INSULIN LISPRO 3 UNITS: 100 INJECTION, SOLUTION INTRAVENOUS; SUBCUTANEOUS at 12:32

## 2021-12-20 RX ADMIN — METOPROLOL TARTRATE 25 MG: 25 TABLET, FILM COATED ORAL at 00:02

## 2021-12-20 RX ADMIN — ASPIRIN 81 MG: 81 TABLET, CHEWABLE ORAL at 09:05

## 2021-12-20 NOTE — PROGRESS NOTES
Clinical Pharmacy Consult: Warfarin Dosing/Monitoring    Bessie Molina is a 89 y.o. female, estimated creatinine clearance is 32.4 mL/min (A) (by C-G formula based on SCr of 1.05 mg/dL (H)). weighing 56.5 kg (124 lb 9 oz).    Results from last 7 days   Lab Units 12/19/21  2344   INR  2.14*   HEMOGLOBIN g/dL 15.6   HEMATOCRIT % 45.2   PLATELETS 10*3/mm3 278     Prior to admission anticoagulation: [facility] manages- last INR 11/2/21 with the schedule of 4.5mg daily.     Hospital Anticoagulation:  Consulting provider: Dr. Mcrae  Start date: 12/20/21  Indication:Afib  Target INR: 2-3  Expected duration: Indefinite  Bridge Therapy: No      Date 12/20/21            INR 2.14            Dose 5mg              Potential food or drug interactions:     Education complete?/Date: No; plan for follow up TBD    Assessment/Plan:  1. Dose: 5mg once today- has been subtherapeutic last couple of INR levels PTA according to care everywhere.   2. Monitor for any signs or symptoms of bleeding  3. Follow up daily INRs and dose adjustments    Pharmacy will continue to follow until discharge or discontinuation of warfarin.     Shanice Newell, [facility]  Clinical Pharmacist  12/20/2021

## 2021-12-20 NOTE — PLAN OF CARE
Goal Outcome Evaluation:  Plan of Care Reviewed With: patient        Progress: improving     VSS on RA. Oriented to self only. [place]. No chest pain this shift. Afib on monitor, rate controlled. Ambulating to bathroom with one assist. EKG and echo completed this shift, see results. Daughter at bedside most of the day. Will CTM.

## 2021-12-20 NOTE — ED NOTES
Nursing report ED to floor  Bessie Molina  89 y.o.  female    HPI :   Chief Complaint   Patient presents with   • Chest Pain       Admitting doctor:   zUiel Mcrae MD    Admitting diagnosis:   The primary encounter diagnosis was Chest pain, unspecified type. A diagnosis of Atrial fibrillation with RVR (HCC) was also pertinent to this visit.    Code status:   Current Code Status     Date Active Code Status Order ID Comments User Context       Not on file    Advance Care Planning Activity          Allergies:   Bee venom, Atorvastatin, Gemfibrozil, Haemophilus influenzae, Influenza virus vaccine split, Isosorbide, Metformin, Propoxyphene, and Simvastatin    Intake and Output    Intake/Output Summary (Last 24 hours) at 12/20/2021 0107  Last data filed at 12/19/2021 2345  Gross per 24 hour   Intake 1000 ml   Output --   Net 1000 ml       Weight:       12/19/21  2336   Weight: 58.1 kg (128 lb)       Most recent vitals:   Vitals:    12/20/21 0011 12/20/21 0015 12/20/21 0029 12/20/21 0041   BP: (!) 150/102   (!) 148/104   BP Location:       Patient Position:       Pulse: 101 106 93 94   Resp:    16   Temp:       TempSrc:       SpO2: 97% 94% 95% 96%   Weight:       Height:           Active LDAs/IV Access:   Lines, Drains & Airways     Active LDAs     Name Placement date Placement time Site Days    Peripheral IV 12/19/21 2335 Right Antecubital 12/19/21 2335  Antecubital  less than 1    External Urinary Catheter 12/19/21 2345  --  less than 1                Labs (abnormal labs have a star):   Labs Reviewed   PROTIME-INR - Abnormal; Notable for the following components:       Result Value    Protime 23.7 (*)     INR 2.14 (*)     All other components within normal limits   CBC WITH AUTO DIFFERENTIAL - Abnormal; Notable for the following components:    WBC 13.23 (*)     MCH 33.4 (*)     All other components within normal limits   MANUAL DIFFERENTIAL - Abnormal; Notable for the following components:    Neutrophil % 38.0 (*)      Lymphocyte % 56.0 (*)     Lymphocytes Absolute 7.41 (*)     All other components within normal limits    Narrative:     Manual diff performed with albumin slide due to smudge cells   TROPONIN (IN-HOUSE) - Normal    Narrative:     Troponin T Reference Range:  <= 0.03 ng/mL-   Negative for AMI  >0.03 ng/mL-     Abnormal for myocardial necrosis.  Clinicians would have to utilize clinical acumen, EKG, Troponin and serial changes to determine if it is an Acute Myocardial Infarction or myocardial injury due to an underlying chronic condition.       Results may be falsely decreased if patient taking Biotin.     BNP (IN-HOUSE) - Normal    Narrative:     Among patients with dyspnea, NT-proBNP is highly sensitive for the detection of acute congestive heart failure. In addition NT-proBNP of <300 pg/ml effectively rules out acute congestive heart failure with 99% negative predictive value.    Results may be falsely decreased if patient taking Biotin.     DIGOXIN LEVEL - Normal   COVID PRE-OP / PRE-PROCEDURE SCREENING ORDER (NO ISOLATION)    Narrative:     The following orders were created for panel order COVID PRE-OP / PRE-PROCEDURE SCREENING ORDER (NO ISOLATION) - Swab, Nasopharynx.  Procedure                               Abnormality         Status                     ---------                               -----------         ------                     COVID-19,APTIMA PANTHER(...[[id number]]                      In process                   Please view results for these tests on the individual orders.   COVID-19,APTIMA PANTHER (DERRELL) MATT/ ESTRELLITA, NP/OP SWAB IN UTM/VTM/SALINE TRANSPORT MEDIA,24 HR TAT   COMPREHENSIVE METABOLIC PANEL   CBC AND DIFFERENTIAL    Narrative:     The following orders were created for panel order CBC & Differential.  Procedure                               Abnormality         Status                     ---------                               -----------         ------                     CBC Auto  Differential[[id number]]        Abnormal            Final result                 Please view results for these tests on the individual orders.       EKG:   ECG 12 Lead    (Results Pending)       Meds given in ED:   Medications   sodium chloride 0.9 % bolus 1,000 mL (1,000 mL Intravenous New Bag 12/19/21 8115)   metoprolol tartrate (LOPRESSOR) injection 5 mg (5 mg Intravenous Given 12/19/21 2352)   metoprolol tartrate (LOPRESSOR) tablet 25 mg (25 mg Oral Given 12/20/21 0002)       Imaging results:  XR Chest 1 View    Result Date: 12/20/2021  Electronically signed by Erika Connell MD on 12-20-21 at 0058      Ambulatory status:   - bedrest    Social issues:   Social History     Socioeconomic History   • Marital status:        NIH Stroke Scale:        Nursing report ED to floor:     Jeimy Black RN  12/20/21 3929

## 2021-12-20 NOTE — ED PROVIDER NOTES
EMERGENCY DEPARTMENT ENCOUNTER    Room Number:  24/24  PCP: Provider, No Known  Historian: EMS  History Limited By: Confusion      HPI  Chief Complaint: Atrial fibrillation  Context: Bessie Molina is a 89 y.o. female who presents to the ED c/o atrial fibrillation. Patient reportedly complained of chest pain and EMS arrived to find patient in atrial fibrillation. Patient here has no complaints. Does not know why she is here. Denies chest pain or shortness of breath. No abdominal pain. Patient has been seen here 1 time before for chest pain and was in A. fib at that point. Patient has had no lower extremity edema. Patient cannot explain any of the chest pain she had prior to arrival        MEDICAL RECORD REVIEW    Patient was here for chest pain in the past          PAST MEDICAL HISTORY  Active Ambulatory Problems     Diagnosis Date Noted   • No Active Ambulatory Problems     Resolved Ambulatory Problems     Diagnosis Date Noted   • No Resolved Ambulatory Problems     Past Medical History:   Diagnosis Date   • Atrial fibrillation (CMS/HCC)    • Hypertension          PAST SURGICAL HISTORY  No past surgical history on file.      FAMILY HISTORY  No family history on file.      SOCIAL HISTORY  Social History     Socioeconomic History   • Marital status:          ALLERGIES  Bee venom, Atorvastatin, Gemfibrozil, Haemophilus influenzae, Influenza virus vaccine split, Isosorbide, Metformin, Propoxyphene, and Simvastatin        REVIEW OF SYSTEMS  Review of Systems   Unable to perform ROS: Dementia            PHYSICAL EXAM  ED Triage Vitals   Temp Heart Rate Resp BP SpO2   -- 12/19/21 2337 12/19/21 2337 12/19/21 2337 12/19/21 2336    (!) 181 18 (!) 188/132 96 %      Temp src Heart Rate Source Patient Position BP Location FiO2 (%)   -- 12/19/21 2337 12/19/21 2337 12/19/21 2337 --    Monitor Lying Right arm        Physical Exam  Vitals and nursing note reviewed.   Constitutional:       General: She is not in acute  distress.  HENT:      Head: Normocephalic and atraumatic.   Eyes:      Pupils: Pupils are equal, round, and reactive to light.   Cardiovascular:      Rate and Rhythm: Tachycardia present. Rhythm irregular.      Heart sounds: Normal heart sounds.   Pulmonary:      Effort: Pulmonary effort is normal. No respiratory distress.      Breath sounds: Normal breath sounds.   Abdominal:      Palpations: Abdomen is soft.      Tenderness: There is no abdominal tenderness. There is no guarding or rebound.   Musculoskeletal:         General: Normal range of motion.      Cervical back: Normal range of motion and neck supple.      Right lower leg: No edema.      Left lower leg: No edema.   Skin:     General: Skin is warm and dry.      Findings: No rash.   Neurological:      General: No focal deficit present.      Mental Status: She is alert. She is disoriented.      Sensory: Sensation is intact.      Motor: No weakness.   Psychiatric:         Mood and Affect: Mood and affect normal.       Patient was wearing a face mask when I entered the room and they continued to wear a mask throughout their stay in the ED.  I wore PPE, including gloves, face mask with shield or face mask with goggles whenever I was in the room with patient.       LAB RESULTS  Recent Results (from the past 24 hour(s))   Protime-INR    Collection Time: 12/19/21 11:44 PM    Specimen: Blood   Result Value Ref Range    Protime 23.7 (H) 11.7 - 14.2 Seconds    INR 2.14 (H) 0.90 - 1.10   Troponin    Collection Time: 12/19/21 11:44 PM    Specimen: Blood   Result Value Ref Range    Troponin T <0.010 0.000 - 0.030 ng/mL   BNP    Collection Time: 12/19/21 11:44 PM    Specimen: Blood   Result Value Ref Range    proBNP 1,309.0 0.0-1,800.0 pg/mL   Digoxin Level    Collection Time: 12/19/21 11:44 PM    Specimen: Blood   Result Value Ref Range    Digoxin 0.90 0.60 - 1.20 ng/mL   CBC Auto Differential    Collection Time: 12/19/21 11:44 PM    Specimen: Blood   Result Value Ref  Range    WBC 13.23 (H) 3.40 - 10.80 10*3/mm3    RBC 4.67 3.77 - 5.28 10*6/mm3    Hemoglobin 15.6 12.0 - 15.9 g/dL    Hematocrit 45.2 34.0 - 46.6 %    MCV 96.8 79.0 - 97.0 fL    MCH 33.4 (H) 26.6 - 33.0 pg    MCHC 34.5 31.5 - 35.7 g/dL    RDW 13.0 12.3 - 15.4 %    RDW-SD 46.1 37.0 - 54.0 fl    MPV 9.3 6.0 - 12.0 fL    Platelets 278 140 - 450 10*3/mm3   Manual Differential    Collection Time: 12/19/21 11:44 PM    Specimen: Blood   Result Value Ref Range    Neutrophil % 38.0 (L) 42.7 - 76.0 %    Lymphocyte % 56.0 (H) 19.6 - 45.3 %    Monocyte % 6.0 5.0 - 12.0 %    Neutrophils Absolute 5.03 1.70 - 7.00 10*3/mm3    Lymphocytes Absolute 7.41 (H) 0.70 - 3.10 10*3/mm3    Monocytes Absolute 0.79 0.10 - 0.90 10*3/mm3    RBC Morphology Normal Normal    Smudge Cells Mod/2+ None Seen    Platelet Morphology Normal Normal       Ordered the above labs and reviewed the results.        RADIOLOGY  XR Chest 1 View    (Results Pending)        Ordered the above noted radiological studies. Reviewed by me in PACS.         PROCEDURES  Critical Care  Performed by: Martin Franklin MD  Authorized by: Uziel Mcrae MD     Critical care provider statement:     Critical care time (minutes):  30    Critical care time was exclusive of:  Separately billable procedures and treating other patients    Critical care was necessary to treat or prevent imminent or life-threatening deterioration of the following conditions:  Circulatory failure and cardiac failure    Critical care was time spent personally by me on the following activities:  Discussions with primary provider, ordering and review of radiographic studies, ordering and review of laboratory studies and ordering and performing treatments and interventions          EKG:          EKG time: 2341  Rhythm/Rate: Atrial fibrillation rate 173  P waves and NH: No P waves   QRS, axis: Normal QRS  ST and T waves: Repolarization abnormality rate related    Interpreted Contemporaneously by me,  independently viewed  No prior        MEDICATIONS GIVEN IN ER  Medications   sodium chloride 0.9 % bolus 1,000 mL (1,000 mL Intravenous New Bag 12/19/21 3215)   metoprolol tartrate (LOPRESSOR) injection 5 mg (5 mg Intravenous Given 12/19/21 2352)   metoprolol tartrate (LOPRESSOR) tablet 25 mg (25 mg Oral Given 12/20/21 0002)             PROGRESS AND CONSULTS  ED Course as of 12/20/21 0052   Mon Dec 20, 2021   0036 00:37 EST  Patient presents for chest pain and rapid A. fib.  Patient was discussed with daughter who states patient has been on diltiazem and metoprolol in the past however they have taken her off this because of low blood pressures.  Patient developed chest pain and rapid heartbeat this evening.  Patient states she feels better now that her heart rate is improved.  Was given oral and IV metoprolol.  Patient is scheduled to see Dr. Brink.  Discussed with Dr. Mcrae who will admit patient to the hospital [SL]   0051 00:51 EST  Discussed with lab and patient's blood is lipemic however potassium sodium and creatinine are all normal [SL]      ED Course User Index  [SL] Martin Franklin MD           MEDICAL DECISION MAKING      MDM  Number of Diagnoses or Management Options     Amount and/or Complexity of Data Reviewed  Clinical lab tests: reviewed and ordered (Wbc 13.23)  Tests in the radiology section of CPT®: reviewed and ordered (negative)  Discuss the patient with other providers: yes (Discussed with Dr. Mcrae and will admit)               DIAGNOSIS  Final diagnoses:   Chest pain, unspecified type   Atrial fibrillation with RVR (HCC)           DISPOSITION  admit        Latest Documented Vital Signs:  As of 00:52 EST  BP- (!) 150/102 HR- 93 Temp- 98.6 °F (37 °C) (Oral) O2 sat- 95%                         Martin Franklin MD  12/20/21 0052

## 2021-12-20 NOTE — CONSULTS
Date of Hospital Visit: 2021  Date of consult: 21  Encounter Provider: Jag Farias MD  Place of Service: [facility] CARDIOLOGY  Patient Name: Bessie Molina  :1932  Referral Provider: Uziel Mcrae MD    Chief complaint: Chest pain/Afib    Reason for consult: atrial fibrillation    History of Present Illness:  This is an 89 year old female patient with a history of chronic atrial fibrillation anticoagulated on coumadin, hypertension, hyperlipidemia and mitral valve insufficiency. She follows with Dr. Griffin, cardiologist, at [facility] in [place], [place]. She was last seen in his office in 2020 and there were no changes made to her medications. At the time, she was rate controlled on digoxin and metoprolol.     She presented to the [facility] ED on 2021 with complaints of chest pain and atrial fibrillation. EKG performed in the ER (but not uploaded into Epic) indicated afib with a rate of 173. Troponin negative, proBNP 1309.0, INR 2.14, CXR unremarkable. She was given fluid bolus, 5 mg IV Lopressor and 25 mg PO metoprolol and rate improved and chest pain resolved. She has been admitted for further evaluation.     We are being asked to see the patient due to her atrial fibrillation.     No EKG/telemetry available for review; however, documentation from patient's cardiologist in [place] indicates history of atrial fibrillation. Last EKG on 2021 showed a rate of 104. Prior to that, she was seen in our ED on 2021 with afib, rate in the low 100's.     Patient's daughter reports that the patient was taken off her digoxin and metoprolol due to bradycardia.     This morning, she is rate controlled in the 80's. /77.         Previous cardiac testing:     Echocardiogram 10/13/2020 ([facility]):  1. Left ventricle: The cavity size was normal. Wall thickness was      normal. Systolic function was normal. The estimated  ejection      fraction was in the range of 55% to 60%. Wall motion was normal;      there were no regional wall motion abnormalities. Unable to      assess diastolic function.   2. Aortic valve: There was mild regurgitation.   3. Mitral valve: There was mild to moderate regurgitation.   4. Right ventricle: The cavity size was normal. Systolic function      was mildly to moderately reduced.   5. Pulmonary arteries: Estimated PA peak pressure is 17mm Hg (S).   6. Pericardium, extracardiac: There was no pericardial effusion.         Past Medical History:   Diagnosis Date   • Atrial fibrillation (HCC)    • Hypertension        History reviewed. No pertinent surgical history.    Medications Prior to Admission   Medication Sig Dispense Refill Last Dose   • acetaminophen (TYLENOL) 500 MG tablet Take 500 mg by mouth Every 6 (Six) Hours As Needed.      • digoxin (LANOXIN) 125 MCG tablet Take 1 tablet by mouth Daily.      • dilTIAZem (TIAZAC) 120 MG 24 hr capsule Take 1 capsule by mouth Daily.      • ezetimibe (ZETIA) 10 MG tablet Take 1 tablet by mouth every night at bedtime.      • furosemide (LASIX) 20 MG tablet Take 1 tablet by mouth Daily.      • glipizide (GLUCOTROL XL) 10 MG 24 hr tablet Take 1 tablet by mouth Every Morning.      • icosapent ethyl (Vascepa) 1 g capsule capsule Take 2 capsules by mouth 2 (Two) Times a Day.      • levothyroxine (SYNTHROID, LEVOTHROID) 100 MCG tablet Take 1 tablet by mouth Every Morning Before Breakfast.      • pregabalin (LYRICA) 100 MG capsule Take 100 mg by mouth Every Evening.      • SITagliptin (JANUVIA) 100 MG tablet Take 100 mg by mouth Every Evening.      • warfarin (COUMADIN) 3 MG tablet Take 4.5 mg by mouth Every Night.   12/19/2021 at 2200   • lidocaine (LIDODERM) 5 % Place 1 patch on the skin as directed by provider Daily. Remove & Discard patch within 12 hours or as directed by MD 10 patch 0        Current Meds  Scheduled Meds:aspirin, 81 mg, Oral, Daily  castor oil-balsam  "peru, 1 application, Topical, Q12H  digoxin, 125 mcg, Oral, Daily  dilTIAZem CD, 120 mg, Oral, Q24H  icosapent ethyl, 2 g, Oral, BID  insulin glargine, 10 Units, Subcutaneous, Nightly  insulin lispro, 0-7 Units, Subcutaneous, TID AC  levothyroxine, 100 mcg, Oral, QAM AC  metoprolol tartrate, 12.5 mg, Oral, BID  pantoprazole, 40 mg, Oral, Daily  pregabalin, 100 mg, Oral, Q PM  warfarin, 5 mg, Oral, Once      Continuous Infusions:Pharmacy to dose warfarin,       PRN Meds:.nitroglycerin  •  Pharmacy to dose warfarin    Allergies as of 12/19/2021 - Reviewed 12/19/2021   Allergen Reaction Noted   • Bee venom Anaphylaxis 07/02/2021   • Atorvastatin Nausea Only 07/02/2021   • Gemfibrozil Nausea Only 07/02/2021   • Haemophilus influenzae Unknown - High Severity 07/02/2021   • Influenza virus vaccine split Unknown - High Severity 07/02/2021   • Isosorbide Unknown - High Severity 07/02/2021   • Metformin Unknown - High Severity 07/02/2021   • Propoxyphene Unknown - High Severity 07/02/2021   • Simvastatin Nausea Only 07/02/2021       Social History     Socioeconomic History   • Marital status:    Tobacco Use   • Smoking status: Never Smoker   • Smokeless tobacco: Never Used   Vaping Use   • Vaping Use: Never used       History reviewed. No pertinent family history.    REVIEW OF SYSTEMS:   All systems reviewed and negative except as noted in HPI.       Objective:   Temp:  [97.4 °F (36.3 °C)-98.6 °F (37 °C)] 97.4 °F (36.3 °C)  Heart Rate:  [] 99  Resp:  [16-18] 16  BP: (148-203)/() 153/77  Body mass index is 22.78 kg/m².  Flowsheet Rows      First Filed Value   Admission Height 165.1 cm (65\") Documented at 12/19/2021 2336   Admission Weight 58.1 kg (128 lb) Documented at 12/19/2021 2336        Vitals:    12/20/21 0708   BP: 153/77   Pulse: 99   Resp: 16   Temp: 97.4 °F (36.3 °C)   SpO2: 95%       General Appearance:    Alert, cooperative, in no acute distress   Head:    Normocephalic, without obvious " abnormality, atraumatic   Eyes:            Lids and lashes normal, conjunctivae and sclerae normal, no   icterus, no pallor, corneas clear, PERRLA   Ears:    Ears appear intact with no abnormalities noted   Throat:   No oral lesions, no thrush, oral mucosa moist   Neck:   No adenopathy, supple, trachea midline, no thyromegaly, no   carotid bruit, no JVD   Back:     No kyphosis present, no scoliosis present, no skin lesions, erythema or scars, no tenderness to percussion or palpation, range of motion normal   Lungs:     Clear to auscultation,respirations regular, even and unlabored    Heart:   Irregularly irregular, normal S1 and S2, no murmur, no gallop, no rub, no click   Chest Wall:    No abnormalities observed   Abdomen:     Normal bowel sounds, no masses, no organomegaly, soft nontender, nondistended, no guarding, no rebound  tenderness   Extremities:   Moves all extremities well, no edema, no cyanosis, no redness   Pulses:   Pulses palpable and equal bilaterally. Normal radial, carotid, femoral, dorsalis pedis and posterior tibial pulses bilaterally. Normal abdominal aorta   Skin:  Neurology:   Psychiatric:   No bleeding, bruising or rash   Normal speech and cranial nerve exam, no focal deficit   Alert and oriented x 3, normal mood and affect                 Review of Data:      Results from last 7 days   Lab Units 12/19/21  2344   SODIUM mmol/L 137   POTASSIUM mmol/L 3.9   CHLORIDE mmol/L 95*   CO2 mmol/L 28.9   BUN mg/dL 19   CREATININE mg/dL 1.05*   CALCIUM mg/dL 9.7   BILIRUBIN mg/dL 0.2   ALK PHOS U/L 85   ALT (SGPT) U/L 21   AST (SGOT) U/L 23   GLUCOSE mg/dL 208*     Results from last 7 days   Lab Units 12/19/21  2344   TROPONIN T ng/mL <0.010     @LABRCNTbnp@  Results from last 7 days   Lab Units 12/19/21  2344   WBC 10*3/mm3 13.23*   HEMOGLOBIN g/dL 15.6   HEMATOCRIT % 45.2   PLATELETS 10*3/mm3 278     Results from last 7 days   Lab Units 12/19/21  2344   INR  2.14*          @LABRCNTIP(chol,trig,hdl,ldl)    Previous EKG 07/02/2021:        I personally viewed and interpreted the patient's EKG/Telemetry data  )  Patient Active Problem List   Diagnosis   • Chest pain     Assessment and Plan:    1.  Persistent atrial fibrillation with RVR-currently on digoxin, diltiazem and metoprolol.  Heart rate control is acceptable at rest  On Coumadin-INR therapeutic  2.  Chest pain likely related to A. fib with RVR/uncontrolled blood pressure-negative troponin and no definite ischemic changes reported  Prior history of intolerance to beta-blocker with bradycardia and her current home medications were digoxin and diltiazem.  She has been on metoprolol 12.5 mg p.o. twice daily  3.  Hypertension-BP on the higher side    Patient denies chest pain or palpitations at time of exam.  Repeat EKG and echocardiogram.  Not sure if her A. fib is paroxysmal persistent.  I will adjust her medications depending findings of echocardiogram.  Recommended PT/OT     Thank you for consulting with cardiology and I will follow patient along    Jag Farias MD  12/20/21  11:20 EST.  Time spent in reviewing chart, discussion and examination:

## 2021-12-20 NOTE — H&P
"History and physical    Primary care physician  Dr. CORONADO    Chief complaint  Chest pain    History of present illness  89-year-old white female with multiple medical problem including atrial fibrillation hypertension hyperlipidemia hypothyroidism diabetes mellitus restless leg syndrome and gastroesophageal disease brought to the emergency room with chest discomfort started yesterday.  Patient denies any shortness of breath palpitation.  Patient denies any fever cough abdominal pain nausea vomiting diarrhea.  Patient evaluated in ER found to be in rapid ventricular rate with history of atrial fibrillation admit for management.    PAST MEDICAL HISTORY  • Paroxysmal atrial fibrillation      • Hypertension  Hyperlipidemia  Hypothyroidism  Diabetes mellitus  Restless leg syndrome  Gastroesophageal disease        PAST SURGICAL HISTORY     No past surgical history on file.     FAMILY HISTORY  No family history on file.     SOCIAL HISTORY                Socioeconomic History   • Marital status:          ALLERGIES  Bee venom, Atorvastatin, Gemfibrozil, Haemophilus influenzae, Influenza virus vaccine split, Isosorbide, Metformin, Propoxyphene, and Simvastatin  Home medications reviewed     REVIEW OF SYSTEMS  Unable to perform      PHYSICAL EXAM  Blood pressure 153/77, pulse 99, temperature 97.4 °F (36.3 °C), temperature source Oral, resp. rate 16, height 157.5 cm (62\"), weight 56.5 kg (124 lb 9 oz), SpO2 95 %.    Constitutional:       General: She is not in acute distress.  HENT:      Head: Normocephalic and atraumatic.   Eyes:      Pupils: Pupils are equal, round, and reactive to light.   Cardiovascular:      Rate and Rhythm: Tachycardia present. Rhythm irregular.      Heart sounds: Normal heart sounds.   Pulmonary:      Effort: Pulmonary effort is normal. No respiratory distress.      Breath sounds: Normal breath sounds.   Abdominal:      Palpations: Abdomen is soft.      Tenderness: There is no abdominal tenderness. " There is no guarding or rebound.   Musculoskeletal:         General: Normal range of motion.      Cervical back: Normal range of motion and neck supple.      Right lower leg: No edema.      Left lower leg: No edema.   Skin:     General: Skin is warm and dry.      Findings: No rash.   Neurological:      General: No focal deficit present.      Mental Status: She is alert. She is disoriented.      Sensory: Sensation is intact.      Motor: No weakness.   Psychiatric:         Mood and Affect: Mood and affect normal.      LAB RESULTS  Lab Results (last 24 hours)     Procedure Component Value Units Date/Time    Basic Metabolic Panel [[id number]]  (Abnormal) Collected: 12/20/21 1106    Specimen: Blood Updated: 12/20/21 1154     Glucose 186 mg/dL      BUN 17 mg/dL      Creatinine 0.82 mg/dL      Sodium 141 mmol/L      Potassium 4.1 mmol/L      Chloride 102 mmol/L      CO2 30.1 mmol/L      Calcium 9.5 mg/dL      eGFR Non African Amer 66 mL/min/1.73      BUN/Creatinine Ratio 20.7     Anion Gap 8.9 mmol/L     Narrative:      GFR Normal >60  Chronic Kidney Disease <60  Kidney Failure <15      Protime-INR [[id number]]  (Abnormal) Collected: 12/20/21 1106    Specimen: Blood Updated: 12/20/21 1142     Protime 23.9 Seconds      INR 2.17    CBC & Differential [[id number]]  (Abnormal) Collected: 12/20/21 1106    Specimen: Blood Updated: 12/20/21 1128    Narrative:      The following orders were created for panel order CBC & Differential.  Procedure                               Abnormality         Status                     ---------                               -----------         ------                     CBC Auto Differential[[id number]]        Abnormal            Final result                 Please view results for these tests on the individual orders.    CBC Auto Differential [[id number]]  (Abnormal) Collected: 12/20/21 1106    Specimen: Blood Updated: 12/20/21 1128     WBC 10.34 10*3/mm3      RBC 4.48 10*6/mm3      Hemoglobin 14.6  g/dL      Hematocrit 44.4 %      MCV 99.1 fL      MCH 32.6 pg      MCHC 32.9 g/dL      RDW 13.1 %      RDW-SD 47.5 fl      MPV 9.4 fL      Platelets 248 10*3/mm3      Neutrophil % 54.0 %      Lymphocyte % 35.1 %      Monocyte % 8.6 %      Eosinophil % 0.8 %      Basophil % 0.6 %      Immature Grans % 0.9 %      Neutrophils, Absolute 5.59 10*3/mm3      Lymphocytes, Absolute 3.63 10*3/mm3      Monocytes, Absolute 0.89 10*3/mm3      Eosinophils, Absolute 0.08 10*3/mm3      Basophils, Absolute 0.06 10*3/mm3      Immature Grans, Absolute 0.09 10*3/mm3      nRBC 0.0 /100 WBC     POC Glucose Once [[id number]]  (Abnormal) Collected: 12/20/21 1050    Specimen: Blood Updated: 12/20/21 1057     Glucose 207 mg/dL      Comment: Meter: [id number] : [id number] Lora HER       POC Glucose Once [[id number]]  (Abnormal) Collected: 12/20/21 0303    Specimen: Blood Updated: 12/20/21 0308     Glucose 202 mg/dL      Comment: Meter: [id number] : [id number] Claudio Ortiz RN       Comprehensive Metabolic Panel [[id number]]  (Abnormal) Collected: 12/19/21 2344    Specimen: Blood Updated: 12/20/21 0133     Glucose 208 mg/dL      BUN 19 mg/dL      Creatinine 1.05 mg/dL      Sodium 137 mmol/L      Potassium 3.9 mmol/L      Comment: Slight hemolysis detected by analyzer. Results may be affected.        Chloride 95 mmol/L      CO2 28.9 mmol/L      Calcium 9.7 mg/dL      Total Protein 8.1 g/dL      Albumin 4.30 g/dL      ALT (SGPT) 21 U/L      Comment: Notified Dahlia Espinoza RN of corrected result.  Corrected result. Previous result was 26 U/L on 12/20/2021 at 0115 EST.        AST (SGOT) 23 U/L      Comment: Slight hemolysis detected by analyzer. Results may be affected. Notified Dahlia Espinoza RN of corrected result.  Corrected result. Previous result was 33 U/L on 12/20/2021 at 0115 EST.        Alkaline Phosphatase 85 U/L      Total Bilirubin 0.2 mg/dL      eGFR Non African Amer 49 mL/min/1.73      Globulin 3.8 gm/dL       A/G Ratio 1.1 g/dL      BUN/Creatinine Ratio 18.1     Anion Gap 13.1 mmol/L     Narrative:      GFR Normal >60  Chronic Kidney Disease <60  Kidney Failure <15      COVID PRE-OP / PRE-PROCEDURE SCREENING ORDER (NO ISOLATION) - Swab, Nasopharynx [[id number]] Collected: 12/20/21 0042    Specimen: Swab from Nasopharynx Updated: 12/20/21 0046    Narrative:      The following orders were created for panel order COVID PRE-OP / PRE-PROCEDURE SCREENING ORDER (NO ISOLATION) - Swab, Nasopharynx.  Procedure                               Abnormality         Status                     ---------                               -----------         ------                     COVID-19,APTIMA PANTHER(...[[id number]]                      In process                   Please view results for these tests on the individual orders.    COVID-19,APTIMA PANTHER(DERRELL),[facility], NP/OP SWAB IN UTM/VTM/SALINE TRANSPORT MEDIA,24 HR TAT - Swab, Nasopharynx [[id number]] Collected: 12/20/21 0042    Specimen: Swab from Nasopharynx Updated: 12/20/21 0046    Manual Differential [[id number]]  (Abnormal) Collected: 12/19/21 2344    Specimen: Blood Updated: 12/20/21 0035     Neutrophil % 38.0 %      Lymphocyte % 56.0 %      Monocyte % 6.0 %      Neutrophils Absolute 5.03 10*3/mm3      Lymphocytes Absolute 7.41 10*3/mm3      Monocytes Absolute 0.79 10*3/mm3      RBC Morphology Normal     Smudge Cells Mod/2+     Platelet Morphology Normal    Narrative:      Manual diff performed with albumin slide due to smudge cells    Digoxin Level [[id number]]  (Normal) Collected: 12/19/21 2344    Specimen: Blood Updated: 12/20/21 0025     Digoxin 0.90 ng/mL     Troponin [[id number]]  (Normal) Collected: 12/19/21 2344    Specimen: Blood Updated: 12/20/21 0014     Troponin T <0.010 ng/mL     Narrative:      Troponin T Reference Range:  <= 0.03 ng/mL-   Negative for AMI  >0.03 ng/mL-     Abnormal for myocardial necrosis.  Clinicians would have to utilize clinical acumen,  EKG, Troponin and serial changes to determine if it is an Acute Myocardial Infarction or myocardial injury due to an underlying chronic condition.       Results may be falsely decreased if patient taking Biotin.      BNP [[id number]]  (Normal) Collected: 12/19/21 2344    Specimen: Blood Updated: 12/20/21 0012     proBNP 1,309.0 pg/mL     Narrative:      Among patients with dyspnea, NT-proBNP is highly sensitive for the detection of acute congestive heart failure. In addition NT-proBNP of <300 pg/ml effectively rules out acute congestive heart failure with 99% negative predictive value.    Results may be falsely decreased if patient taking Biotin.      Protime-INR [[id number]]  (Abnormal) Collected: 12/19/21 2344    Specimen: Blood Updated: 12/20/21 0006     Protime 23.7 Seconds      INR 2.14    CBC & Differential [[id number]]  (Abnormal) Collected: 12/19/21 2344    Specimen: Blood Updated: 12/19/21 2358    Narrative:      The following orders were created for panel order CBC & Differential.  Procedure                               Abnormality         Status                     ---------                               -----------         ------                     CBC Auto Differential[[id number]]        Abnormal            Final result                 Please view results for these tests on the individual orders.    CBC Auto Differential [[id number]]  (Abnormal) Collected: 12/19/21 2344    Specimen: Blood Updated: 12/19/21 2358     WBC 13.23 10*3/mm3      RBC 4.67 10*6/mm3      Hemoglobin 15.6 g/dL      Hematocrit 45.2 %      MCV 96.8 fL      MCH 33.4 pg      MCHC 34.5 g/dL      RDW 13.0 %      RDW-SD 46.1 fl      MPV 9.3 fL      Platelets 278 10*3/mm3         Imaging Results (Last 24 Hours)     Procedure Component Value Units Date/Time    XR Chest 1 View [[id number]] Collected: 12/20/21 0058     Updated: 12/20/21 0058    Narrative:        Patient: RUKHSANA LUNA  Time Out: 00:58  Exam(s): FILM CXR 1 VIEW     EXAM:    XR  Chest, 1 View    CLINICAL HISTORY:     Reason for exam: short of breath.    TECHNIQUE:    Frontal view of the chest.    COMPARISON:    Chest x-ray of 7 2 2021    FINDINGS:    Lungs:  The lungs are slightly lower in volume.    Pleural space:  Unremarkable.  No pneumothorax.    Heart:  Heart and mediastinal contours remain unremarkable.    Mediastinum:  Unremarkable.    Bones joints:  No acute or aggressive bony lesions.    IMPRESSION:         Hypoventilatory chest showing no acute cardiopulmonary process.      Impression:          Electronically signed by Erika Connell MD on 12-20-21 at 0058        ECG 12 Lead  Component   Ref Range & Units 12/20/21 1257 7/2/21 1923   QT Interval   ms 368 P  312              HEART RATE= 95  bpm  RR Interval= 634  ms  MT Interval=   ms  P Horizontal Axis=   deg  P Front Axis=   deg  QRSD Interval= 74  ms  QT Interval= 368  ms  QRS Axis= 81  deg  T Wave Axis= -55  deg  - ABNORMAL ECG -  Atrial fibrillation  Probable anterior infarct, age indeterminate  Abnormal T, consider ischemia, inferior leads               Current Facility-Administered Medications:   •  aspirin chewable tablet 81 mg, 81 mg, Oral, Daily, Uziel Mcrae MD, 81 mg at 12/20/21 0905  •  castor oil-balsam peru (VENELEX) ointment 1 application, 1 application, Topical, Q12H, Uziel Mcrae MD, 1 application at 12/20/21 1232  •  digoxin (LANOXIN) tablet 125 mcg, 125 mcg, Oral, Daily, Uziel Mcrae MD  •  dilTIAZem CD (CARDIZEM CD) 24 hr capsule 120 mg, 120 mg, Oral, Q24H, Uziel Mcrae MD, 120 mg at 12/20/21 0905  •  icosapent ethyl (VASCEPA) capsule 2 g, 2 g, Oral, BID, Uziel Mcrae MD  •  insulin glargine (LANTUS, SEMGLEE) injection 10 Units, 10 Units, Subcutaneous, Nightly, Uziel Mcrae MD  •  insulin lispro (ADMELOG) injection 0-7 Units, 0-7 Units, Subcutaneous, TID Clementina COLINDRES Aftab, MD, 3 Units at 12/20/21 1232  •  levothyroxine (SYNTHROID, LEVOTHROID) tablet 100 mcg, 100 mcg, Oral, QAM AC, Uziel Mcrae MD, 100  mcg at 12/20/21 1232  •  metoprolol tartrate (LOPRESSOR) tablet 12.5 mg, 12.5 mg, Oral, BID, Daisha Mrcae MD, 12.5 mg at 12/20/21 0905  •  nitroglycerin (NITROSTAT) SL tablet 0.4 mg, 0.4 mg, Sublingual, Q5 Min PRN, Daisha Mcrae MD  •  pantoprazole (PROTONIX) EC tablet 40 mg, 40 mg, Oral, Daily, Daisha Mcrae MD, 40 mg at 12/20/21 0657  •  Pharmacy to dose warfarin, , Does not apply, Continuous PRN, Daisha Mcrae MD  •  pregabalin (LYRICA) capsule 100 mg, 100 mg, Oral, Q PM, Daisha Mcrae MD  •  warfarin (COUMADIN) tablet 5 mg, 5 mg, Oral, Once, Daisha Mcrae MD     ASSESSMENT  Chronic atrial fibrillation with rapid ventricular rate  Hypertension  Hyperlipidemia  Hypothyroidism  Diabetes mellitus  Restless leg syndrome  Gastroesophageal reflux disease    PLAN  Admit  Controlled heart rate  Anticoagulation  Accu-Chek with sliding scale insulin  Serial cardiac enzymes EKG  Check 2D echo  Cardiology consult  Adjust home medications  Stress ulcer DVT prophylaxis  Supportive care  DNR  Discussed with family and nursing staff  Follow closely further recommendation according to hospital course    DAISHA MCRAE MD

## 2021-12-20 NOTE — PROGRESS NOTES
"Adult Nutrition  Assessment/PES    Patient Name:  Bessie Molina  YOB: 1932  MRN: [id number]  Admit Date:  12/19/2021    Assessment Date:  12/20/2021    Comments:  Nutrition assessment completed for skin issues. Pt with Left great toe PI DTI. Diet Regular Consistent Carb diet. Will monitor intake and offer snacks/supplements as needed to meet nutritional needs.        Reason for Assessment     Row Name 12/20/21 1430          Reason for Assessment    Reason For Assessment identified at risk by screening criteria     Diagnosis --  atrial fibrillation hypertension hyperlipidemia hypothyroidism diabetes mellitus restless leg syndrome and gastroesophageal disease brought to the emergency room with chest discomfort     Identified At Risk by Screening Criteria large or nonhealing wound, burn or pressure injury                  Anthropometrics     Row Name 12/20/21 1431          Anthropometrics    Height 157.5 cm (62.01\")     Weight 56.5 kg (124 lb 9 oz)  not weighed by RD            Ideal Body Weight (IBW)    Ideal Body Weight (IBW) (kg) 50.45     % Ideal Body Weight 111.99            Body Mass Index (BMI)    BMI (kg/m2) 22.82     BMI Assessment BMI 18.5-24.9: normal                Labs/Tests/Procedures/Meds     Row Name 12/20/21 1432          Labs/Procedures/Meds    Lab Results Reviewed reviewed     Lab Results Comments glu            Diagnostic Tests/Procedures    Diagnostic Test/Procedure Reviewed reviewed            Medications    Pertinent Medications Reviewed reviewed     Pertinent Medications Comments insulin, synthroid, protonix                Physical Findings     Row Name 12/20/21 1432          Physical Findings    Skin pressure injury  Left great toe PI DTI                Estimated/Assessed Needs     Row Name 12/20/21 1433 12/20/21 1431       Calculation Measurements    Weight Used For Calculations 56.5 kg (124 lb 9 oz) --    Height -- 157.5 cm (62.01\")       Estimated/Assessed Needs    " Additional Documentation KCAL/KG (Group); Fluid Requirements (Group); Protein Requirements (Group) --       KCAL/KG    KCAL/KG 30 Kcal/Kg (kcal) --    30 Kcal/Kg (kcal) 1695 --       Protein Requirements    Weight Used For Protein Calculations 56.5 kg (124 lb 9 oz) --    Est Protein Requirement Amount (gms/kg) 1.2 gm protein --    Estimated Protein Requirements (gms/day) 67.8 --       Fluid Requirements    Fluid Requirements (mL/day) 1695 --    RDA Method (mL) 1695 --               Nutrition Prescription Ordered     Row Name 12/20/21 1433          Nutrition Prescription PO    Current PO Diet Regular     Common Modifiers Consistent Carbohydrate                       Problem/Interventions:   Problem 1     Row Name 12/20/21 1433          Nutrition Diagnoses Problem 1    Problem 1 Increased Nutrient Needs     Etiology (related to) Medical Diagnosis                      Intervention Goal     Row Name 12/20/21 1434          Intervention Goal    General Maintain nutrition; Disease management/therapy; Reduce/improve symptoms     PO PO intake (%)     PO Intake % 75 %     Weight Maintain weight                Nutrition Intervention     Row Name 12/20/21 1434          Nutrition Intervention    RD/Tech Action Follow Tx progress; Care plan reviewd                  Education/Evaluation     Row Name 12/20/21 1434          Education    Education Will Instruct as appropriate            Monitor/Evaluation    Monitor Per protocol                 Electronically signed by:  Sylvia Handy RD  12/20/21 14:34 EST

## 2021-12-20 NOTE — PLAN OF CARE
Goal Outcome Evaluation:  Plan of Care Reviewed With: patient        Progress: no change  Outcome Summary: Patient A&O to person and place.  Admitted to the floor due to patient reporting chest pain and upon ED visit found in Afib RVR.  MIRIS.  STAR

## 2021-12-21 LAB
ALBUMIN SERPL-MCNC: 3.9 G/DL (ref 3.5–5.2)
ALBUMIN/GLOB SERPL: 1.2 G/DL
ALP SERPL-CCNC: 77 U/L (ref 39–117)
ALT SERPL W P-5'-P-CCNC: 31 U/L (ref 1–33)
ANION GAP SERPL CALCULATED.3IONS-SCNC: 10.2 MMOL/L (ref 5–15)
AST SERPL-CCNC: 50 U/L (ref 1–32)
BILIRUB SERPL-MCNC: 0.4 MG/DL (ref 0–1.2)
BUN SERPL-MCNC: 21 MG/DL (ref 8–23)
BUN/CREAT SERPL: 17.6 (ref 7–25)
CALCIUM SPEC-SCNC: 9.7 MG/DL (ref 8.6–10.5)
CHLORIDE SERPL-SCNC: 99 MMOL/L (ref 98–107)
CHOLEST SERPL-MCNC: 195 MG/DL (ref 0–200)
CO2 SERPL-SCNC: 26.8 MMOL/L (ref 22–29)
CREAT SERPL-MCNC: 1.19 MG/DL (ref 0.57–1)
DEPRECATED RDW RBC AUTO: 45.5 FL (ref 37–54)
ERYTHROCYTE [DISTWIDTH] IN BLOOD BY AUTOMATED COUNT: 12.7 % (ref 12.3–15.4)
GFR SERPL CREATININE-BSD FRML MDRD: 43 ML/MIN/1.73
GLOBULIN UR ELPH-MCNC: 3.3 GM/DL
GLUCOSE BLDC GLUCOMTR-MCNC: 146 MG/DL (ref 70–130)
GLUCOSE BLDC GLUCOMTR-MCNC: 166 MG/DL (ref 70–130)
GLUCOSE BLDC GLUCOMTR-MCNC: 220 MG/DL (ref 70–130)
GLUCOSE BLDC GLUCOMTR-MCNC: 284 MG/DL (ref 70–130)
GLUCOSE SERPL-MCNC: 187 MG/DL (ref 65–99)
HBA1C MFR BLD: 8.2 % (ref 4.8–5.6)
HCT VFR BLD AUTO: 42.9 % (ref 34–46.6)
HDLC SERPL-MCNC: 27 MG/DL (ref 40–60)
HGB BLD-MCNC: 14.7 G/DL (ref 12–15.9)
INR PPP: 1.98 (ref 0.9–1.1)
LDLC SERPL CALC-MCNC: 77 MG/DL (ref 0–100)
LDLC/HDLC SERPL: 1.96 {RATIO}
LYMPHOCYTES # BLD MANUAL: 4.16 10*3/MM3 (ref 0.7–3.1)
LYMPHOCYTES NFR BLD MANUAL: 6.2 % (ref 5–12)
MCH RBC QN AUTO: 33.3 PG (ref 26.6–33)
MCHC RBC AUTO-ENTMCNC: 34.3 G/DL (ref 31.5–35.7)
MCV RBC AUTO: 97.1 FL (ref 79–97)
MONOCYTES # BLD: 0.83 10*3/MM3 (ref 0.1–0.9)
NEUTROPHILS # BLD AUTO: 8.46 10*3/MM3 (ref 1.7–7)
NEUTROPHILS NFR BLD MANUAL: 62.9 % (ref 42.7–76)
PLAT MORPH BLD: NORMAL
PLATELET # BLD AUTO: 264 10*3/MM3 (ref 140–450)
PMV BLD AUTO: 9.4 FL (ref 6–12)
POTASSIUM SERPL-SCNC: 3.6 MMOL/L (ref 3.5–5.2)
PROT SERPL-MCNC: 7.2 G/DL (ref 6–8.5)
PROTHROMBIN TIME: 22.2 SECONDS (ref 11.7–14.2)
QT INTERVAL: 436 MS
RBC # BLD AUTO: 4.42 10*6/MM3 (ref 3.77–5.28)
RBC MORPH BLD: NORMAL
SODIUM SERPL-SCNC: 136 MMOL/L (ref 136–145)
T4 FREE SERPL-MCNC: 1.09 NG/DL (ref 0.93–1.7)
TRIGL SERPL-MCNC: 576 MG/DL (ref 0–150)
TROPONIN T SERPL-MCNC: 0.44 NG/ML (ref 0–0.03)
TSH SERPL DL<=0.05 MIU/L-ACNC: 9.12 UIU/ML (ref 0.27–4.2)
VARIANT LYMPHS NFR BLD MANUAL: 30.9 % (ref 19.6–45.3)
VLDLC SERPL-MCNC: 91 MG/DL (ref 5–40)
WBC MORPH BLD: NORMAL
WBC NRBC COR # BLD: 13.45 10*3/MM3 (ref 3.4–10.8)

## 2021-12-21 PROCEDURE — 84443 ASSAY THYROID STIM HORMONE: CPT | Performed by: HOSPITALIST

## 2021-12-21 PROCEDURE — 63710000001 INSULIN GLARGINE PER 5 UNITS: Performed by: HOSPITALIST

## 2021-12-21 PROCEDURE — 85007 BL SMEAR W/DIFF WBC COUNT: CPT | Performed by: HOSPITALIST

## 2021-12-21 PROCEDURE — 85610 PROTHROMBIN TIME: CPT | Performed by: HOSPITALIST

## 2021-12-21 PROCEDURE — 99232 SBSQ HOSP IP/OBS MODERATE 35: CPT | Performed by: INTERNAL MEDICINE

## 2021-12-21 PROCEDURE — 97165 OT EVAL LOW COMPLEX 30 MIN: CPT | Performed by: OCCUPATIONAL THERAPIST

## 2021-12-21 PROCEDURE — 84484 ASSAY OF TROPONIN QUANT: CPT | Performed by: HOSPITALIST

## 2021-12-21 PROCEDURE — 84439 ASSAY OF FREE THYROXINE: CPT | Performed by: HOSPITALIST

## 2021-12-21 PROCEDURE — 97535 SELF CARE MNGMENT TRAINING: CPT | Performed by: OCCUPATIONAL THERAPIST

## 2021-12-21 PROCEDURE — 93005 ELECTROCARDIOGRAM TRACING: CPT | Performed by: INTERNAL MEDICINE

## 2021-12-21 PROCEDURE — 83036 HEMOGLOBIN GLYCOSYLATED A1C: CPT | Performed by: HOSPITALIST

## 2021-12-21 PROCEDURE — 80053 COMPREHEN METABOLIC PANEL: CPT | Performed by: HOSPITALIST

## 2021-12-21 PROCEDURE — 82962 GLUCOSE BLOOD TEST: CPT

## 2021-12-21 PROCEDURE — 63710000001 INSULIN LISPRO (HUMAN) PER 5 UNITS: Performed by: HOSPITALIST

## 2021-12-21 PROCEDURE — 93010 ELECTROCARDIOGRAM REPORT: CPT | Performed by: INTERNAL MEDICINE

## 2021-12-21 PROCEDURE — 85025 COMPLETE CBC W/AUTO DIFF WBC: CPT | Performed by: HOSPITALIST

## 2021-12-21 PROCEDURE — 80061 LIPID PANEL: CPT | Performed by: HOSPITALIST

## 2021-12-21 RX ORDER — LEVOTHYROXINE SODIUM 0.12 MG/1
125 TABLET ORAL
Status: DISCONTINUED | OUTPATIENT
Start: 2021-12-22 | End: 2021-12-22 | Stop reason: HOSPADM

## 2021-12-21 RX ORDER — FUROSEMIDE 20 MG/1
20 TABLET ORAL DAILY
Status: DISCONTINUED | OUTPATIENT
Start: 2021-12-21 | End: 2021-12-22 | Stop reason: HOSPADM

## 2021-12-21 RX ORDER — WARFARIN SODIUM 6 MG/1
6 TABLET ORAL
Status: COMPLETED | OUTPATIENT
Start: 2021-12-21 | End: 2021-12-21

## 2021-12-21 RX ORDER — SPIRONOLACTONE 25 MG/1
25 TABLET ORAL DAILY
Status: DISCONTINUED | OUTPATIENT
Start: 2021-12-21 | End: 2021-12-22 | Stop reason: HOSPADM

## 2021-12-21 RX ADMIN — SPIRONOLACTONE 25 MG: 25 TABLET ORAL at 14:00

## 2021-12-21 RX ADMIN — DILTIAZEM HYDROCHLORIDE 120 MG: 120 CAPSULE, COATED, EXTENDED RELEASE ORAL at 12:24

## 2021-12-21 RX ADMIN — CASTOR OIL AND BALSAM, PERU 1 APPLICATION: 788; 87 OINTMENT TOPICAL at 21:03

## 2021-12-21 RX ADMIN — ASPIRIN 81 MG: 81 TABLET, CHEWABLE ORAL at 12:25

## 2021-12-21 RX ADMIN — FUROSEMIDE 20 MG: 20 TABLET ORAL at 14:00

## 2021-12-21 RX ADMIN — INSULIN GLARGINE 15 UNITS: 100 INJECTION, SOLUTION SUBCUTANEOUS at 21:03

## 2021-12-21 RX ADMIN — WARFARIN 6 MG: 6 TABLET ORAL at 17:15

## 2021-12-21 RX ADMIN — INSULIN LISPRO 4 UNITS: 100 INJECTION, SOLUTION INTRAVENOUS; SUBCUTANEOUS at 12:26

## 2021-12-21 RX ADMIN — PANTOPRAZOLE SODIUM 40 MG: 40 TABLET, DELAYED RELEASE ORAL at 12:24

## 2021-12-21 RX ADMIN — LEVOTHYROXINE SODIUM 100 MCG: 0.1 TABLET ORAL at 12:24

## 2021-12-21 RX ADMIN — PREGABALIN 100 MG: 100 CAPSULE ORAL at 17:15

## 2021-12-21 RX ADMIN — INSULIN LISPRO 3 UNITS: 100 INJECTION, SOLUTION INTRAVENOUS; SUBCUTANEOUS at 21:02

## 2021-12-21 RX ADMIN — CASTOR OIL AND BALSAM, PERU 1 APPLICATION: 788; 87 OINTMENT TOPICAL at 12:25

## 2021-12-21 NOTE — PROGRESS NOTES
"Daily progress note    Chief complaint  Doing same  Denies chest pain shortness of breath palpitation  Feeling very weak  No other complaints  Family at bedside    History of present illness  89-year-old white female with multiple medical problem including atrial fibrillation hypertension hyperlipidemia hypothyroidism diabetes mellitus restless leg syndrome and gastroesophageal disease brought to the emergency room with chest discomfort started yesterday.  Patient denies any shortness of breath palpitation.  Patient denies any fever cough abdominal pain nausea vomiting diarrhea.  Patient evaluated in ER found to be in rapid ventricular rate with history of atrial fibrillation admit for management.     REVIEW OF SYSTEMS  Unable to perform      PHYSICAL EXAM  Blood pressure 147/99, pulse 66, temperature 97.8 °F (36.6 °C), temperature source Oral, resp. rate 18, height 157.5 cm (62\"), weight 56.2 kg (124 lb), SpO2 92 %.    Constitutional:       General: She is not in acute distress.  HENT:      Head: Normocephalic and atraumatic.   Eyes:      Pupils: Pupils are equal, round, and reactive to light.   Cardiovascular:      Rate and Rhythm: Tachycardia present. Rhythm irregular.      Heart sounds: Normal heart sounds.   Pulmonary:      Effort: Pulmonary effort is normal. No respiratory distress.      Breath sounds: Normal breath sounds.   Abdominal:      Palpations: Abdomen is soft.      Tenderness: There is no abdominal tenderness. There is no guarding or rebound.   Musculoskeletal:         General: Normal range of motion.      Cervical back: Normal range of motion and neck supple.      Right lower leg: No edema.      Left lower leg: No edema.   Skin:     General: Skin is warm and dry.      Findings: No rash.   Neurological:      General: No focal deficit present.      Mental Status: She is alert. She is disoriented.      Sensory: Sensation is intact.      Motor: No weakness.   Psychiatric:         Mood and Affect: Mood " and affect normal.      LAB RESULTS  Lab Results (last 24 hours)     Procedure Component Value Units Date/Time    POC Glucose Once [[id number]]  (Abnormal) Collected: 12/21/21 1059    Specimen: Blood Updated: 12/21/21 1115     Glucose 284 mg/dL      Comment: Meter: [id number] : [id number] Lora HER       Troponin [[id number]]  (Abnormal) Collected: 12/21/21 0655    Specimen: Blood Updated: 12/21/21 0843     Troponin T 0.436 ng/mL     Narrative:      Troponin T Reference Range:  <= 0.03 ng/mL-   Negative for AMI  >0.03 ng/mL-     Abnormal for myocardial necrosis.  Clinicians would have to utilize clinical acumen, EKG, Troponin and serial changes to determine if it is an Acute Myocardial Infarction or myocardial injury due to an underlying chronic condition.       Results may be falsely decreased if patient taking Biotin.      Manual Differential [[id number]]  (Abnormal) Collected: 12/21/21 0655    Specimen: Blood Updated: 12/21/21 0832     Neutrophil % 62.9 %      Lymphocyte % 30.9 %      Monocyte % 6.2 %      Neutrophils Absolute 8.46 10*3/mm3      Lymphocytes Absolute 4.16 10*3/mm3      Monocytes Absolute 0.83 10*3/mm3      RBC Morphology Normal     WBC Morphology Normal     Platelet Morphology Normal    CBC & Differential [[id number]]  (Abnormal) Collected: 12/21/21 0655    Specimen: Blood Updated: 12/21/21 0832    Narrative:      The following orders were created for panel order CBC & Differential.  Procedure                               Abnormality         Status                     ---------                               -----------         ------                     CBC Auto Differential[[id number]]        Abnormal            Final result                 Please view results for these tests on the individual orders.    CBC Auto Differential [[id number]]  (Abnormal) Collected: 12/21/21 0655    Specimen: Blood Updated: 12/21/21 0832     WBC 13.45 10*3/mm3      RBC 4.42 10*6/mm3      Hemoglobin 14.7  g/dL      Hematocrit 42.9 %      MCV 97.1 fL      MCH 33.3 pg      MCHC 34.3 g/dL      RDW 12.7 %      RDW-SD 45.5 fl      MPV 9.4 fL      Platelets 264 10*3/mm3     TSH [[id number]]  (Abnormal) Collected: 12/21/21 0655    Specimen: Blood Updated: 12/21/21 0826     TSH 9.120 uIU/mL     Comprehensive Metabolic Panel [[id number]]  (Abnormal) Collected: 12/21/21 0655    Specimen: Blood Updated: 12/21/21 0822     Glucose 187 mg/dL      BUN 21 mg/dL      Creatinine 1.19 mg/dL      Sodium 136 mmol/L      Potassium 3.6 mmol/L      Chloride 99 mmol/L      CO2 26.8 mmol/L      Calcium 9.7 mg/dL      Total Protein 7.2 g/dL      Albumin 3.90 g/dL      ALT (SGPT) 31 U/L      AST (SGOT) 50 U/L      Alkaline Phosphatase 77 U/L      Total Bilirubin 0.4 mg/dL      eGFR Non African Amer 43 mL/min/1.73      Globulin 3.3 gm/dL      A/G Ratio 1.2 g/dL      BUN/Creatinine Ratio 17.6     Anion Gap 10.2 mmol/L     Narrative:      GFR Normal >60  Chronic Kidney Disease <60  Kidney Failure <15      Lipid Panel [[id number]]  (Abnormal) Collected: 12/21/21 0655    Specimen: Blood Updated: 12/21/21 0822     Total Cholesterol 195 mg/dL      Triglycerides 576 mg/dL      HDL Cholesterol 27 mg/dL      LDL Cholesterol  77 mg/dL      VLDL Cholesterol 91 mg/dL      LDL/HDL Ratio 1.96    Narrative:      Cholesterol Reference Ranges  (U.S. Department of Health and Human Services ATP III Classifications)    Desirable          <200 mg/dL  Borderline High    200-239 mg/dL  High Risk          >240 mg/dL      Triglyceride Reference Ranges  (U.S. Department of Health and Human Services ATP III Classifications)    Normal           <150 mg/dL  Borderline High  150-199 mg/dL  High             200-499 mg/dL  Very High        >500 mg/dL    HDL Reference Ranges  (U.S. Department of Health and Human Services ATP III Classifcations)    Low     <40 mg/dl (major risk factor for CHD)  High    >60 mg/dl ('negative' risk factor for CHD)        LDL Reference Ranges  (U.S.  Department of Health and Human Services ATP III Classifcations)    Optimal          <100 mg/dL  Near Optimal     100-129 mg/dL  Borderline High  130-159 mg/dL  High             160-189 mg/dL  Very High        >189 mg/dL    Hemoglobin A1c [[id number]]  (Abnormal) Collected: 12/21/21 0655    Specimen: Blood Updated: 12/21/21 0817     Hemoglobin A1C 8.20 %     Narrative:      Hemoglobin A1C Ranges:    Increased Risk for Diabetes  5.7% to 6.4%  Diabetes                     >= 6.5%  Diabetic Goal                < 7.0%    Protime-INR [[id number]]  (Abnormal) Collected: 12/21/21 0655    Specimen: Blood Updated: 12/21/21 0754     Protime 22.2 Seconds      INR 1.98    POC Glucose Once [[id number]]  (Abnormal) Collected: 12/21/21 0544    Specimen: Blood Updated: 12/21/21 0545     Glucose 166 mg/dL      Comment: Meter: [id number] : [id number] Claire Zarate RN       POC Glucose Once [[id number]]  (Abnormal) Collected: 12/20/21 2220    Specimen: Blood Updated: 12/20/21 2221     Glucose 186 mg/dL      Comment: Meter: [id number] : [id number] Mike RUANOA       POC Glucose Once [[id number]]  (Abnormal) Collected: 12/20/21 2045    Specimen: Blood Updated: 12/20/21 2046     Glucose 186 mg/dL      Comment: Meter: [id number] : [id number] Mike Montoya CNA           Imaging Results (Last 24 Hours)     ** No results found for the last 24 hours. **        ECG 12 Lead  Component   Ref Range & Units 12/20/21 1257 7/2/21 1923   QT Interval   ms 368 P  312              HEART RATE= 95  bpm  RR Interval= 634  ms  NV Interval=   ms  P Horizontal Axis=   deg  P Front Axis=   deg  QRSD Interval= 74  ms  QT Interval= 368  ms  QRS Axis= 81  deg  T Wave Axis= -55  deg  - ABNORMAL ECG -  Atrial fibrillation  Probable anterior infarct, age indeterminate  Abnormal T, consider ischemia, inferior leads               Current Facility-Administered Medications:   •  aspirin chewable tablet 81 mg, 81 mg, Oral, Daily, Uziel Mcrae MD, 81 mg at  12/21/21 1225  •  castor oil-balsam peru (VENELEX) ointment 1 application, 1 application, Topical, Q12H, Uziel Mcrae MD, 1 application at 12/21/21 1225  •  dilTIAZem CD (CARDIZEM CD) 24 hr capsule 120 mg, 120 mg, Oral, Q24H, Uziel Mcrae MD, 120 mg at 12/21/21 1224  •  furosemide (LASIX) tablet 20 mg, 20 mg, Oral, Daily, Jag Farias MD, 20 mg at 12/21/21 1400  •  icosapent ethyl (VASCEPA) capsule 2 g, 2 g, Oral, BID, Uziel Mcrae MD  •  insulin glargine (LANTUS, SEMGLEE) injection 10 Units, 10 Units, Subcutaneous, Nightly, Uziel Mcrae MD  •  insulin lispro (ADMELOG) injection 0-7 Units, 0-7 Units, Subcutaneous, 4x Daily With Meals & Nightly, Uziel Mcrae MD, 4 Units at 12/21/21 1226  •  levothyroxine (SYNTHROID, LEVOTHROID) tablet 100 mcg, 100 mcg, Oral, QAM AC, Uziel Mcrae MD, 100 mcg at 12/21/21 1224  •  nitroglycerin (NITROSTAT) SL tablet 0.4 mg, 0.4 mg, Sublingual, Q5 Min PRN, Uziel Mcrae MD  •  pantoprazole (PROTONIX) EC tablet 40 mg, 40 mg, Oral, Daily, Uziel Mcrae MD, 40 mg at 12/21/21 1224  •  Pharmacy to dose warfarin, , Does not apply, Continuous PRN, Uziel Mcrae MD  •  pregabalin (LYRICA) capsule 100 mg, 100 mg, Oral, Q PM, Uziel Mcrae MD, 100 mg at 12/20/21 1659  •  spironolactone (ALDACTONE) tablet 25 mg, 25 mg, Oral, Daily, Jag Farias MD, 25 mg at 12/21/21 1400  •  warfarin (COUMADIN) tablet 6 mg, 6 mg, Oral, Once, Uziel Mcrae MD     ASSESSMENT  Elevated troponin per cardiology  Chronic atrial fibrillation with rapid ventricular rate  Hypertension  Hyperlipidemia  Hypothyroidism with elevated TSH  Diabetes mellitus  Restless leg syndrome  Gastroesophageal reflux disease    PLAN  CPM  Control heart rate  Anticoagulation  Adjust Synthroid dose and check free T4  Accu-Chek with sliding scale insulin  Cardiology consult appreciated  Adjust home medications  Stress ulcer DVT prophylaxis  Supportive care  DNR  Discussed with family and nursing staff  Follow closely  further recommendation according to hospital course    DAISHA CARLSON MD

## 2021-12-21 NOTE — PLAN OF CARE
Goal Outcome Evaluation:  Plan of Care Reviewed With: patient, daughter        Progress: improving  Outcome Summary: pt is an 89 yr old female admitted from her [facility] home where she has been living, but has her own place in [place]. Per [facility] report she is independent for the most part, but has someone with her at all times or within view of her, also has a monitor when they are in the garage or another room to get something but can still see pt. Pt was independent this date w bed mobility, SBA w tsf and uses grab bar w tsf to toilet, toileting skills SBA, grooming skills SBA. pt has good ROM and normal weakness for her age. Pt appears to be at baseline and per [facility] report is dc tomorrow. OT signing off.  OT wore all PPE, washed hands before/after. Pt not wearing a mask.

## 2021-12-21 NOTE — DISCHARGE PLACEMENT REQUEST
"Rukhsana Luna (89 y.o. Female)             Date of Birth Social Security Number Address Home Phone MRN    01/22/1932  [address] [facility] [phone] [id number]    [employer] Marital Status             None        Admission Date Admission Type Admitting Provider Attending Provider Department, Room/Bed    12/19/21 Emergency Uziel Mcrae MD Ahmed, Aftab, MD [address], [address]    Discharge Date Discharge Disposition Discharge Destination                         Attending Provider: Uziel Mcrae MD    Allergies: Bee Venom, Atorvastatin, Gemfibrozil, Haemophilus Influenzae, Influenza Virus Vaccine Split, Isosorbide, Metformin, Propoxyphene, Simvastatin    Isolation: None   Infection: None   Code Status: No CPR   Advance Care Planning Activity    Ht: 157.5 cm (62\")   Wt: 56.2 kg (124 lb)    Admission Cmt: None   Principal Problem: None                Active Insurance as of 12/19/2021     Primary Coverage     Payor Plan Insurance Group Employer/Plan Group    MEDICARE MEDICARE A & B      Payor Plan Address Payor Plan Phone Number Payor Plan Fax Number Effective Dates    [address] [phone]  1/1/1997 - None Entered    [place] [id number]       Subscriber Name Subscriber Birth Date Member ID       RUKHSANA LUNA 1/22/1932 [id number]                 Emergency Contacts      (Rel.) Home Phone Work Phone Mobile Phone    RADHAMES (POA)ELI (Daughter) [phone] -- [phone]    RADHAMES(INLAW) VALENCIA (Relative) [phone] -- [phone]            "

## 2021-12-21 NOTE — THERAPY DISCHARGE NOTE
Inpatient Rehabilitation - Occupational Therapy Discharge  [facility]    Patient Name: Bessie Molina  : 1932    MRN: [id number]                              Today's Date: 2021       Admit Date: 2021    Visit Dx:     ICD-10-CM ICD-9-CM   1. Chest pain, unspecified type  R07.9 786.50   2. Atrial fibrillation with RVR (HCC)  I48.91 427.31     Patient Active Problem List   Diagnosis   • Chest pain     Past Medical History:   Diagnosis Date   • Atrial fibrillation (HCC)    • Hypertension      History reviewed. No pertinent surgical history.   General Information     Row Name 21 1525          OT Time and Intention    Document Type discharge evaluation/summary  -     Mode of Treatment individual therapy; occupational therapy  -     Row Name 21 1525          General Information    Patient Profile Reviewed yes  -     Prior Level of Function independent:  -     Existing Precautions/Restrictions fall  -     Barriers to Rehab none identified  -     Row Name 21 1525          Living Environment    Lives With child(veena), adult  -     Row Name 21 1525          Cognition    Orientation Status (Cognition) oriented x 3  -     Row Name 21 1525          Safety Issues, Functional Mobility    Impairments Affecting Function (Mobility) strength  -           User Key  (r) = Recorded By, (t) = Taken By, (c) = Cosigned By    Initials Name Provider Type     Liz Peng, OTR Occupational Therapist               Mobility/ADL's     Row Name 21 1525          Bed Mobility    Bed Mobility bed mobility (all) activities  -     All Activities, [place] (Bed Mobility) independent  -     Row Name 21 1525          Transfers    Transfers sit-stand transfer; toilet transfer  -     Sit-Stand [place] (Transfers) set up; standby assist  -     [place] Level (Toilet Transfer) set up; standby assist  -     Assistive Device (Toilet Transfer)  grab bars/safety frame  -     Row Name 12/21/21 1525          Toilet Transfer    Type (Toilet Transfer) sit-stand; stand-sit  -[facility] Name 12/21/21 1525          Functional Mobility    Functional Mobility- Ind. Level supervision required; contact guard assist  -     Functional Mobility- Comment holds onto OT hand, uses a cane at home. if her cane was available she would be at baseline it appears. and dght agrees  -     Row Name 12/21/21 1525          Activities of Daily Living    BADL Assessment/Intervention toileting; grooming  -     Row Name 12/21/21 1525          Toileting Assessment/Training    [place] Level (Toileting) toileting skills; standby assist  -     Position (Toileting) supported standing; supported sitting  -[facility] Name 12/21/21 1525          Grooming Assessment/Training    [place] Level (Grooming) grooming skills; wash face, hands; standby assist; set up  -     Position (Grooming) sink side  -           User Key  (r) = Recorded By, (t) = Taken By, (c) = Cosigned By    Initials Name Provider Type    Liz Pritchard OTR Occupational Therapist               Obj/Interventions     Row Name 12/21/21 1526          Sensory Assessment (Somatosensory)    Sensory Assessment (Somatosensory) sensation intact  -[facility] Name 12/21/21 1526          Vision Assessment/Intervention    Visual Impairment/Limitations WFL  -     Row Name 12/21/21 1526          Range of Motion Comprehensive    General Range of Motion no range of motion deficits identified  -     Comment, General Range of Motion B UE 8/8  -[facility] Name 12/21/21 1526          Strength Comprehensive (MMT)    General Manual Muscle Testing (MMT) Assessment upper extremity strength deficits identified  -     Comment, General Manual Muscle Testing (MMT) Assessment generalized weakness  -     Row Name 12/21/21 1526          Motor Skills    Motor Skills coordination; functional endurance  -     Coordination  WFL  -KP     Functional Endurance fair +, tired this pm  -KP     Row Name 12/21/21 1526          Balance    Balance Assessment sitting static balance; standing static balance  -KP     Static Sitting Balance WFL  -KP     Static Standing Balance WFL  -KP     Balance Interventions sitting; standing; sit to stand; supported; static; occupation based/functional task  -KP           User Key  (r) = Recorded By, (t) = Taken By, (c) = Cosigned By    Initials Name Provider Type    Liz Pritchard OTR Occupational Therapist               Goals/Plan     Row Name 12/21/21 1529          Transfer Goal 1 (OT)    Activity/Assistive Device (Transfer Goal 1, OT) sit-to-stand/stand-to-sit; toilet  -KP     [place] Level/Cues Needed (Transfer Goal 1, OT) set-up required; standby assist  -KP     Time Frame (Transfer Goal 1, OT) short term goal (STG); 1 day  -KP     Progress/Outcome (Transfer Goal 1, OT) goal met  -KP           User Key  (r) = Recorded By, (t) = Taken By, (c) = Cosigned By    Initials Name Provider Type    Liz Pritchard OTR Occupational Therapist               Clinical Impression     Row Name 12/21/21 1527          Pain Assessment    Additional Documentation Pain Scale: Numbers Pre/Post-Treatment (Group)  -KP     Row Name 12/21/21 1527          Pain Scale: Numbers Pre/Post-Treatment    Pretreatment Pain Rating 0/10 - no pain  -KP     Posttreatment Pain Rating 0/10 - no pain  -KP     Row Name 12/21/21 1527          Plan of Care Review    Plan of Care Reviewed With patient; daughter  -     Progress improving  -     Outcome Summary pt is an 89 yr old female admitted from her [facility] home where she has been living, but has her own place in [place]. Per [facility] report she is independent for the most part, but has someone with her at all times or within view of her, also has a monitor when they are in the garage or another room to get something but can still see pt. Pt was independent this date w bed  mobility, SBA w tsf and uses grab bar w tsf to toilet, toileting skills SBA, grooming skills SBA. pt has good ROM and normal weakness for her age. Pt appears to be at baseline and per [facility] report is dc tomorrow. OT signing off.  -     Row Name 12/21/21 1527          Therapy Assessment/Plan (OT)    Criteria for Skilled Therapeutic Interventions Met (OT) no problems identified which require skilled intervention  -     Therapy Frequency (OT) evaluation only  -     Row Name 12/21/21 1527          Therapy Plan Review/Discharge Plan (OT)    Anticipated Discharge Disposition (OT) home with assist  -     Row Name 12/21/21 1527          Positioning and Restraints    Pre-Treatment Position in bed  -     Post Treatment Position bed  -KP     In Bed supine; call light within reach; encouraged to call for assist; exit alarm on; with family/caregiver  -           User Key  (r) = Recorded By, (t) = Taken By, (c) = Cosigned By    Initials Name Provider Type    Liz Pritchard OTR Occupational Therapist               Outcome Measures     Row Name 12/21/21 1529          How much help from another is currently needed...    Putting on and taking off regular lower body clothing? 3  -KP     Bathing (including washing, rinsing, and drying) 3  -KP     Toileting (which includes using toilet bed pan or urinal) 3  -KP     Putting on and taking off regular upper body clothing 4  -KP     Taking care of personal grooming (such as brushing teeth) 4  -KP     Eating meals 4  -KP     AM-PAC 6 Clicks Score (OT) 21  -     Row Name 12/21/21 1529          Functional Assessment    Outcome Measure Options AM-PAC 6 Clicks Daily Activity (OT)  -           User Key  (r) = Recorded By, (t) = Taken By, (c) = Cosigned By    Initials Name Provider Type    Liz Pritchard OTR Occupational Therapist              Occupational Therapy Education                 Title: PT OT SLP Therapies (Resolved)     Topic: Occupational Therapy  (Resolved)     Point: ADL training (Resolved)     Description:   Instruct learner(s) on proper safety adaptation and remediation techniques during self care or transfers.   Instruct in proper use of assistive devices.              Learning Progress Summary           Patient Acceptance, TB,E,D, DU,VU by  at 12/21/2021 1530    Comment: ed pt and family on role of OT. benefit of therapy, POC w. OT. ed on safety w ADL and tsf. pt appears to be at baseline, family agrees and can help her at home if needed. Pt has AE at home for ADL and tsf. pt has visual defecit on R peripheral   Family Acceptance, TB,E,D, DU,VU by  at 12/21/2021 1530    Comment: ed pt and family on role of OT. benefit of therapy, POC w. OT. ed on safety w ADL and tsf. pt appears to be at baseline, family agrees and can help her at home if needed. Pt has AE at home for ADL and tsf. pt has visual defecit on R peripheral                   Point: Home exercise program (Resolved)     Description:   Instruct learner(s) on appropriate technique for monitoring, assisting and/or progressing therapeutic exercises/activities.              Learning Progress Summary           Patient Acceptance, TB,E,D, DU,VU by  at 12/21/2021 1530    Comment: ed pt and family on role of OT. benefit of therapy, POC w. OT. ed on safety w ADL and tsf. pt appears to be at baseline, family agrees and can help her at home if needed. Pt has AE at home for ADL and tsf. pt has visual defecit on R peripheral   Family Acceptance, TB,E,D, DU,VU by  at 12/21/2021 1530    Comment: ed pt and family on role of OT. benefit of therapy, POC w. OT. ed on safety w ADL and tsf. pt appears to be at baseline, family agrees and can help her at home if needed. Pt has AE at home for ADL and tsf. pt has visual defecit on R peripheral                   Point: Precautions (Resolved)     Description:   Instruct learner(s) on prescribed precautions during self-care and functional transfers.               Learning Progress Summary           Patient Acceptance, TB,E,D, DU,VU by  at 12/21/2021 1530    Comment: ed pt and family on role of OT. benefit of therapy, POC w. OT. ed on safety w ADL and tsf. pt appears to be at baseline, family agrees and can help her at home if needed. Pt has AE at home for ADL and tsf. pt has visual defecit on R peripheral   Family Acceptance, TB,E,D, DU,VU by  at 12/21/2021 1530    Comment: ed pt and family on role of OT. benefit of therapy, POC w. OT. ed on safety w ADL and tsf. pt appears to be at baseline, family agrees and can help her at home if needed. Pt has AE at home for ADL and tsf. pt has visual defecit on R peripheral                   Point: Body mechanics (Resolved)     Description:   Instruct learner(s) on proper positioning and spine alignment during self-care, functional mobility activities and/or exercises.              Learning Progress Summary           Patient Acceptance, TB,E,D, DU,VU by  at 12/21/2021 1530    Comment: ed pt and family on role of OT. benefit of therapy, POC w. OT. ed on safety w ADL and tsf. pt appears to be at baseline, family agrees and can help her at home if needed. Pt has AE at home for ADL and tsf. pt has visual defecit on R peripheral   Family Acceptance, TB,E,D, DU,VU by  at 12/21/2021 1530    Comment: ed pt and family on role of OT. benefit of therapy, POC w. OT. ed on safety w ADL and tsf. pt appears to be at baseline, family agrees and can help her at home if needed. Pt has AE at home for ADL and tsf. pt has visual defecit on R peripheral                               User Key     Initials Effective Dates Name Provider Type Discipline     06/16/21 -  Liz Peng, OTR Occupational Therapist OT              OT Recommendation and Plan  Retired Outcome Summary/Treatment Plan (OT)  Anticipated Discharge Disposition (OT): home with assist  Therapy Frequency (OT): evaluation only  Plan of Care Review  Plan of Care Reviewed With:  patient, daughter  Progress: improving  Outcome Summary: pt is an 89 yr old female admitted from her [facility] home where she has been living, but has her own place in [place]. Per dght report she is independent for the most part, but has someone with her at all times or within view of her, also has a monitor when they are in the garage or another room to get something but can still see pt. Pt was independent this date w bed mobility, SBA w tsf and uses grab bar w tsf to toilet, toileting skills SBA, grooming skills SBA. pt has good ROM and normal weakness for her age. Pt appears to be at baseline and per [facility] report is dc tomorrow. OT signing off.  Plan of Care Reviewed With: patient, daughter  Outcome Summary: pt is an 89 yr old female admitted from her [facility] home where she has been living, but has her own place in [place]. Per dght report she is independent for the most part, but has someone with her at all times or within view of her, also has a monitor when they are in the garage or another room to get something but can still see pt. Pt was independent this date w bed mobility, SBA w tsf and uses grab bar w tsf to toilet, toileting skills SBA, grooming skills SBA. pt has good ROM and normal weakness for her age. Pt appears to be at baseline and per ht report is dc tomorrow. OT signing off.     Time Calculation:    Time Calculation- OT     Row Name 12/21/21 1531             Time Calculation- OT    OT Start Time 1446  -KP      OT Stop Time 1502  -KP      OT Time Calculation (min) 16 min  -KP      Total Timed Code Minutes- OT 8 minute(s)  -KP      OT Received On 12/21/21  -KP              Timed Charges    67513 - OT Self Care/Mgmt Minutes 8  -KP              Untimed Charges    OT Eval/Re-eval Minutes 8  -KP              Total Minutes    Timed Charges Total Minutes 8  -KP      Untimed Charges Total Minutes 8  -KP       Total Minutes 16  -KP            User Key  (r) = Recorded By, (t) = Taken By, (c) = Cosigned By    Initials  Name Provider Type     Liz Peng, RENETTA Occupational Therapist              Therapy Charges for Today     Code Description Service Date Service Provider Modifiers Qty    79402746260 HC OT SELF CARE/MGMT/TRAIN EA 15 MIN 12/21/2021 Liz Peng OTR GO 1    [id number]  OT EVAL LOW COMPLEXITY 2 12/21/2021 Liz Peng OTR GO 1               RENETTA Patton  12/21/2021

## 2021-12-21 NOTE — CASE MANAGEMENT/SOCIAL WORK
Continued Stay Note  [facility]     Patient Name: Bessie Molina  MRN: [id number]  Today's Date: 12/21/2021    Admit Date: 12/19/2021     Discharge Plan     Row Name 12/21/21 1434       Plan    Plan Home with dtr and family with referral to [facility]    Provided Post Acute Provider List? Yes    Post Acute Provider List DME Supplier; Home Health    Provided Post Acute Provider Quality & Resource List? Refused    Delivered To Support Person    Method of Delivery Telephone    Patient/Family in Agreement with Plan yes    Plan Comments CCP spoke with pts daughter Tuyet Wallis (POA) [phone] as pt is documented confused. Introduced self and explained CCP role. Verified facesheet and confirmed local pharmacy is [facility]. They deny any problems with cost of medications. Offered meds to beds and she is agreeable.  Pt has POA but not on file, encouraged dtr to bring a copy to hospital. Pt is not current with PCP in [place], was to have a new pt appointment today but pt in the hospital. Dtr states they rescheduled appointment for Monday 12/27/21. Pt mailing address is [employer] [place] address listed but pt staying at daughters home [address] [place] [address]. At that residence pt has 3 steps to enter. Pt staying with daughter and and son in law. Pt has a cane and rollator. No RT devices. Pt has been to SNF in [place] but none in [place]. CCP discussed dc planning and they plan on pt returning back home with them. Dtr is interested in HH, however aware care may not start until pt sees new PCP Dr. Myers on Monday. CCP made referral to [facility]. CCP to continue to follow for additional dc needs. Zelda AVALOS/CCP               Discharge Codes    No documentation.               Expected Discharge Date and Time     Expected Discharge Date Expected Discharge Time    Dec 21, 2021             Aylin Lopez RN

## 2021-12-21 NOTE — PROGRESS NOTES
Clinical Pharmacy Consult: Warfarin Dosing/Monitoring    Bessie Molina is a 89 y.o. female, estimated creatinine clearance is 41.3 mL/min (by C-G formula based on SCr of 0.82 mg/dL). weighing 56.2 kg (124 lb).    Results from last 7 days   Lab Units 12/21/21  0655 12/20/21  1106 12/19/21  2344   INR  1.98* 2.17* 2.14*   HEMOGLOBIN g/dL  --  14.6 15.6   HEMATOCRIT %  --  44.4 45.2   PLATELETS 10*3/mm3  --  248 278     Prior to admission anticoagulation: [facility] manages- last INR 11/2/21 with the schedule of 4.5mg daily.     Hospital Anticoagulation:  Consulting provider: Dr. Mcrae  Start date: 12/20/21  Indication: Afib  Target INR: 2-3  Expected duration: TBD   Bridge Therapy: No      Date 12/20/21 12/21           INR 2.14 1.98           Dose 5mg 6mg             Potential food or drug interactions:     Education complete?/Date: No; plan for follow up TBD    Assessment/Plan:  Dose: 6mg once today- slightly subtherapeutic. May require increased weekly dose at discharge according to recent PTA INRs.   Monitor for any signs or symptoms of bleeding  Follow up daily INRs and dose adjustments    Pharmacy will continue to follow until discharge or discontinuation of warfarin.     Shanice Newell, [facility]  Clinical Pharmacist  12/21/2021

## 2021-12-21 NOTE — NURSING NOTE
Disoriented to time and situation, [place], room air, controlled a-fib on monitor, daughter at bedside most of shift, VSS, will CTM

## 2021-12-21 NOTE — PROGRESS NOTES
"CC: Atrial fibrillation    Interval History: No new acute events overnight      Vital Signs  Temp:  [97.8 °F (36.6 °C)-98.6 °F (37 °C)] 97.8 °F (36.6 °C)  Heart Rate:  [65-90] 65  Resp:  [18] 18  BP: (145-155)/(69-93) 145/69  No intake or output data in the 24 hours ending 12/21/21 0914  Flowsheet Rows      First Filed Value   Admission Height 165.1 cm (65\") Documented at 12/19/2021 2336   Admission Weight 58.1 kg (128 lb) Documented at 12/19/2021 2336          PHYSICAL EXAM:  General: No acute distress, frail looking elderly  Resp:NL Rate, symmetric chest expansion,unlabored, clear  CV: Irregularly irregular, NL PMI, NL S1 and S2, no Murmur, no gallop, no rub, No JVD.   ABD:Nl sounds, no masses or tenderness, nondistended, no guarding or rebound  Neuro: alert,cooperative and oriented  Extr:Normal pedal pulses, No edema or cyanosis, moves all extremities      Results Review:    Results from last 7 days   Lab Units 12/21/21  0655   SODIUM mmol/L 136   POTASSIUM mmol/L 3.6   CHLORIDE mmol/L 99   CO2 mmol/L 26.8   BUN mg/dL 21   CREATININE mg/dL 1.19*   GLUCOSE mg/dL 187*   CALCIUM mg/dL 9.7     Results from last 7 days   Lab Units 12/21/21  0655 12/19/21  2344   TROPONIN T ng/mL 0.436* <0.010     Results from last 7 days   Lab Units 12/21/21  0655   WBC 10*3/mm3 13.45*   HEMOGLOBIN g/dL 14.7   HEMATOCRIT % 42.9   PLATELETS 10*3/mm3 264     Results from last 7 days   Lab Units 12/21/21  0655 12/20/21  1106 12/19/21  2344   INR  1.98* 2.17* 2.14*     Results from last 7 days   Lab Units 12/21/21  0655   CHOLESTEROL mg/dL 195         Results from last 7 days   Lab Units 12/21/21  0655   CHOLESTEROL mg/dL 195   TRIGLYCERIDES mg/dL 576*   HDL CHOL mg/dL 27*   LDL CHOL mg/dL 77     I reviewed the patient's new clinical results.  I personally viewed and interpreted the patient's EKG/Telemetry data        Medication Review:   Meds reviewed    Pharmacy to dose warfarin,     · Left ventricular ejection fraction appears to be 51 " - 55%. Left ventricular systolic function is low normal.  · Left ventricular diastolic function is consistent with (grade II w/high LAP) pseudonormalization. Elevated left atrial filling pressure.  · Mildly reduced right ventricular systolic function noted.  · Moderate mitral valve regurgitation is present.  · Estimated right ventricular systolic pressure from tricuspid regurgitation is mildly elevated (35-45 mmHg).  · Mild pulmonary hypertension is present.  · Atrial fibrillation was the predominant rhythm observed during the procedure.      Assessment/Plan    1.  Persistent atrial fibrillation with RVR-currently on digoxin, diltiazem and metoprolol.  On Coumadin-INR therapeutic  Echocardiogram on 12/20/2021 revealed preserved ventricular ejection fraction, moderate mitral valve regurgitation and mildly reduced right ventricular function with left ventricular grade 2 diastolic dysfunction with elevated left atrial filling pressure.  Mild pulmonary hypertension noted.    2.  Chest pain likely related to A. fib with RVR/uncontrolled blood pressure-  Negative initial troponin but the one check this morning positive-likely related to tachycardia and uncontrolled blood pressure-patient denies any significant chest pain.  3.  Hypertension-BP on the higher side  4.  Elevated troponin-EKG with repolarization abnormalities-patient denies any significant chest pain  5.  Hypercholesterolemia with history of intolerance to statin-currently on Vascepa and Zetia  6.  Type II DM-2 per primary team    I have talked to her daughter who was at bedside today.  Patient has history of sensitivity to beta-blockers that were discontinued.  Her daughter stopped diltiazem few weeks ago because of worsening fatigue and few days later she started to have tachycardia.  I think patient is well responsive to diltiazem and agreed to continue and adjust dose as needed.  She would probably not need digoxin or beta-blocker.  Restart home Lasix with  aldactone- she has diastolic dysfunction with elevated left atrial filling pressure.  Encourage PT/OT  Continue 24-hour telemetry monitoring  Consider DC tomorrow if stable.       Jag Farias MD  12/21/21  09:14 EST

## 2021-12-21 NOTE — PLAN OF CARE
Goal Outcome Evaluation:  Plan of Care Reviewed With: patient        Progress: improving  Outcome Summary: No change. Will CTM

## 2021-12-22 ENCOUNTER — APPOINTMENT (OUTPATIENT)
Dept: CARDIOLOGY | Facility: HOSPITAL | Age: 86
End: 2021-12-22

## 2021-12-22 VITALS
RESPIRATION RATE: 16 BRPM | HEIGHT: 62 IN | DIASTOLIC BLOOD PRESSURE: 79 MMHG | TEMPERATURE: 97.5 F | HEART RATE: 63 BPM | SYSTOLIC BLOOD PRESSURE: 114 MMHG | BODY MASS INDEX: 20.87 KG/M2 | OXYGEN SATURATION: 98 % | WEIGHT: 113.4 LBS

## 2021-12-22 LAB
ANION GAP SERPL CALCULATED.3IONS-SCNC: 13.1 MMOL/L (ref 5–15)
BASOPHILS # BLD AUTO: 0.06 10*3/MM3 (ref 0–0.2)
BASOPHILS NFR BLD AUTO: 0.5 % (ref 0–1.5)
BUN SERPL-MCNC: 27 MG/DL (ref 8–23)
BUN/CREAT SERPL: 22.1 (ref 7–25)
CALCIUM SPEC-SCNC: 9.4 MG/DL (ref 8.6–10.5)
CHLORIDE SERPL-SCNC: 102 MMOL/L (ref 98–107)
CO2 SERPL-SCNC: 23.9 MMOL/L (ref 22–29)
CREAT SERPL-MCNC: 1.22 MG/DL (ref 0.57–1)
DEPRECATED RDW RBC AUTO: 45.1 FL (ref 37–54)
EOSINOPHIL # BLD AUTO: 0.14 10*3/MM3 (ref 0–0.4)
EOSINOPHIL NFR BLD AUTO: 1.1 % (ref 0.3–6.2)
ERYTHROCYTE [DISTWIDTH] IN BLOOD BY AUTOMATED COUNT: 12.8 % (ref 12.3–15.4)
GFR SERPL CREATININE-BSD FRML MDRD: 42 ML/MIN/1.73
GLUCOSE BLDC GLUCOMTR-MCNC: 145 MG/DL (ref 70–130)
GLUCOSE BLDC GLUCOMTR-MCNC: 152 MG/DL (ref 70–130)
GLUCOSE SERPL-MCNC: 140 MG/DL (ref 65–99)
HCT VFR BLD AUTO: 40.9 % (ref 34–46.6)
HGB BLD-MCNC: 13.7 G/DL (ref 12–15.9)
IMM GRANULOCYTES # BLD AUTO: 0.14 10*3/MM3 (ref 0–0.05)
IMM GRANULOCYTES NFR BLD AUTO: 1.1 % (ref 0–0.5)
INR PPP: 2.33 (ref 0.9–1.1)
LYMPHOCYTES # BLD AUTO: 5.33 10*3/MM3 (ref 0.7–3.1)
LYMPHOCYTES NFR BLD AUTO: 40.1 % (ref 19.6–45.3)
MCH RBC QN AUTO: 32.6 PG (ref 26.6–33)
MCHC RBC AUTO-ENTMCNC: 33.5 G/DL (ref 31.5–35.7)
MCV RBC AUTO: 97.4 FL (ref 79–97)
MONOCYTES # BLD AUTO: 1.1 10*3/MM3 (ref 0.1–0.9)
MONOCYTES NFR BLD AUTO: 8.3 % (ref 5–12)
NEUTROPHILS NFR BLD AUTO: 48.9 % (ref 42.7–76)
NEUTROPHILS NFR BLD AUTO: 6.51 10*3/MM3 (ref 1.7–7)
NRBC BLD AUTO-RTO: 0 /100 WBC (ref 0–0.2)
PLATELET # BLD AUTO: 254 10*3/MM3 (ref 140–450)
PMV BLD AUTO: 9.5 FL (ref 6–12)
POTASSIUM SERPL-SCNC: 3.8 MMOL/L (ref 3.5–5.2)
PROTHROMBIN TIME: 25.3 SECONDS (ref 11.7–14.2)
RBC # BLD AUTO: 4.2 10*6/MM3 (ref 3.77–5.28)
SODIUM SERPL-SCNC: 139 MMOL/L (ref 136–145)
WBC NRBC COR # BLD: 13.28 10*3/MM3 (ref 3.4–10.8)

## 2021-12-22 PROCEDURE — 93246 EXT ECG>7D<15D RECORDING: CPT

## 2021-12-22 PROCEDURE — 82962 GLUCOSE BLOOD TEST: CPT

## 2021-12-22 PROCEDURE — 80048 BASIC METABOLIC PNL TOTAL CA: CPT | Performed by: HOSPITALIST

## 2021-12-22 PROCEDURE — 99232 SBSQ HOSP IP/OBS MODERATE 35: CPT | Performed by: INTERNAL MEDICINE

## 2021-12-22 PROCEDURE — 85610 PROTHROMBIN TIME: CPT | Performed by: HOSPITALIST

## 2021-12-22 PROCEDURE — 85025 COMPLETE CBC W/AUTO DIFF WBC: CPT | Performed by: HOSPITALIST

## 2021-12-22 RX ORDER — WARFARIN SODIUM 5 MG/1
5 TABLET ORAL
Status: DISCONTINUED | OUTPATIENT
Start: 2021-12-22 | End: 2021-12-22 | Stop reason: HOSPADM

## 2021-12-22 RX ORDER — PANTOPRAZOLE SODIUM 40 MG/1
40 TABLET, DELAYED RELEASE ORAL DAILY
Qty: 30 TABLET | Refills: 0 | Status: SHIPPED | OUTPATIENT
Start: 2021-12-23 | End: 2022-01-22

## 2021-12-22 RX ORDER — ASPIRIN 81 MG/1
81 TABLET, CHEWABLE ORAL DAILY
Qty: 5 TABLET | Refills: 0 | Status: SHIPPED | OUTPATIENT
Start: 2021-12-23 | End: 2021-12-28

## 2021-12-22 RX ORDER — LEVOTHYROXINE SODIUM 0.12 MG/1
125 TABLET ORAL
Qty: 30 TABLET | Refills: 0 | Status: SHIPPED | OUTPATIENT
Start: 2021-12-23 | End: 2022-12-13

## 2021-12-22 RX ORDER — DILTIAZEM HYDROCHLORIDE 120 MG/1
120 CAPSULE, COATED, EXTENDED RELEASE ORAL
Qty: 30 CAPSULE | Refills: 0 | Status: SHIPPED | OUTPATIENT
Start: 2021-12-23 | End: 2022-07-27

## 2021-12-22 RX ORDER — SPIRONOLACTONE 25 MG/1
25 TABLET ORAL DAILY
Qty: 30 TABLET | Refills: 0 | Status: SHIPPED | OUTPATIENT
Start: 2021-12-23 | End: 2022-01-25

## 2021-12-22 RX ADMIN — SPIRONOLACTONE 25 MG: 25 TABLET ORAL at 13:15

## 2021-12-22 RX ADMIN — PANTOPRAZOLE SODIUM 40 MG: 40 TABLET, DELAYED RELEASE ORAL at 13:15

## 2021-12-22 RX ADMIN — FUROSEMIDE 20 MG: 20 TABLET ORAL at 13:14

## 2021-12-22 RX ADMIN — ASPIRIN 81 MG: 81 TABLET, CHEWABLE ORAL at 13:13

## 2021-12-22 RX ADMIN — CASTOR OIL AND BALSAM, PERU 1 APPLICATION: 788; 87 OINTMENT TOPICAL at 13:14

## 2021-12-22 RX ADMIN — LEVOTHYROXINE SODIUM 125 MCG: 0.12 TABLET ORAL at 06:37

## 2021-12-22 RX ADMIN — DILTIAZEM HYDROCHLORIDE 120 MG: 120 CAPSULE, COATED, EXTENDED RELEASE ORAL at 13:14

## 2021-12-22 NOTE — PROGRESS NOTES
Clinical Pharmacy Consult: Warfarin Dosing/Monitoring    Bessie Molina is a 89 y.o. female, estimated creatinine clearance is 25.4 mL/min (A) (by C-G formula based on SCr of 1.22 mg/dL (H)). weighing 51.4 kg (113 lb 6.4 oz).    Results from last 7 days   Lab Units 12/22/21  0604 12/21/21  0655 12/20/21  1106 12/19/21  2344   INR  2.33* 1.98* 2.17* 2.14*   HEMOGLOBIN g/dL 13.7 14.7 14.6 15.6   HEMATOCRIT % 40.9 42.9 44.4 45.2   PLATELETS 10*3/mm3 254 264 248 278     Prior to admission anticoagulation: [facility] manages- last INR 11/2/21 with the schedule of 4.5mg daily.     Hospital Anticoagulation:  Consulting provider: Dr. Mcrae  Start date: 12/20/21  Indication: Afib  Target INR: 2-3  Expected duration: TBD   Bridge Therapy: No      Date 12/20/21 12/21 12/22          INR 2.14 1.98 2.33          Dose 5mg 6mg 5mg            Potential food or drug interactions:     Education complete?/Date: No; plan for follow up TBD    Assessment/Plan:  Dose: 5mg once today. May require increased weekly dose at discharge according to recent PTA INRs.   Monitor for any signs or symptoms of bleeding  Follow up daily INRs and dose adjustments    Pharmacy will continue to follow until discharge or discontinuation of warfarin.     Shanice Newell, [facility]  Clinical Pharmacist  12/22/2021

## 2021-12-22 NOTE — PROGRESS NOTES
"Daily progress note    Chief complaint  Doing better  No new complaints  Denies chest pain shortness of breath palpitation  Wants to go home  Family at bedside    History of present illness  89-year-old white female with multiple medical problem including atrial fibrillation hypertension hyperlipidemia hypothyroidism diabetes mellitus restless leg syndrome and gastroesophageal disease brought to the emergency room with chest discomfort started yesterday.  Patient denies any shortness of breath palpitation.  Patient denies any fever cough abdominal pain nausea vomiting diarrhea.  Patient evaluated in ER found to be in rapid ventricular rate with history of atrial fibrillation admit for management.     REVIEW OF SYSTEMS  Unremarkable     PHYSICAL EXAM  Blood pressure 114/79, pulse 63, temperature 97.5 °F (36.4 °C), temperature source Oral, resp. rate 16, height 157.5 cm (62\"), weight 51.4 kg (113 lb 6.4 oz), SpO2 98 %.    Constitutional:       General: She is not in acute distress.  HENT:      Head: Normocephalic and atraumatic.   Eyes:      Pupils: Pupils are equal, round, and reactive to light.   Cardiovascular:      Rate and Rhythm: Tachycardia present. Rhythm irregular.      Heart sounds: Normal heart sounds.   Pulmonary:      Effort: Pulmonary effort is normal. No respiratory distress.      Breath sounds: Normal breath sounds.   Abdominal:      Palpations: Abdomen is soft.      Tenderness: There is no abdominal tenderness. There is no guarding or rebound.   Musculoskeletal:         General: Normal range of motion.      Cervical back: Normal range of motion and neck supple.      Right lower leg: No edema.      Left lower leg: No edema.   Skin:     General: Skin is warm and dry.      Findings: No rash.   Neurological:      General: No focal deficit present.      Mental Status: She is alert. She is disoriented.      Sensory: Sensation is intact.      Motor: No weakness.   Psychiatric:         Mood and Affect: Mood " and affect normal.      LAB RESULTS  Lab Results (last 24 hours)     Procedure Component Value Units Date/Time    POC Glucose Once [[id number]]  (Abnormal) Collected: 12/22/21 1053    Specimen: Blood Updated: 12/22/21 1054     Glucose 145 mg/dL      Comment: Meter: [id number] : [id number] Gabe HER       Basic Metabolic Panel [[id number]]  (Abnormal) Collected: 12/22/21 0604    Specimen: Blood Updated: 12/22/21 0745     Glucose 140 mg/dL      BUN 27 mg/dL      Creatinine 1.22 mg/dL      Sodium 139 mmol/L      Potassium 3.8 mmol/L      Comment: Slight hemolysis detected by analyzer. Results may be affected.        Chloride 102 mmol/L      CO2 23.9 mmol/L      Calcium 9.4 mg/dL      eGFR Non African Amer 42 mL/min/1.73      BUN/Creatinine Ratio 22.1     Anion Gap 13.1 mmol/L     Narrative:      GFR Normal >60  Chronic Kidney Disease <60  Kidney Failure <15      Protime-INR [[id number]]  (Abnormal) Collected: 12/22/21 0604    Specimen: Blood Updated: 12/22/21 0733     Protime 25.3 Seconds      INR 2.33    CBC & Differential [[id number]]  (Abnormal) Collected: 12/22/21 0604    Specimen: Blood Updated: 12/22/21 0716    Narrative:      The following orders were created for panel order CBC & Differential.  Procedure                               Abnormality         Status                     ---------                               -----------         ------                     CBC Auto Differential[[id number]]        Abnormal            Final result                 Please view results for these tests on the individual orders.    CBC Auto Differential [[id number]]  (Abnormal) Collected: 12/22/21 0604    Specimen: Blood Updated: 12/22/21 0716     WBC 13.28 10*3/mm3      RBC 4.20 10*6/mm3      Hemoglobin 13.7 g/dL      Hematocrit 40.9 %      MCV 97.4 fL      MCH 32.6 pg      MCHC 33.5 g/dL      RDW 12.8 %      RDW-SD 45.1 fl      MPV 9.5 fL      Platelets 254 10*3/mm3      Neutrophil % 48.9 %      Lymphocyte % 40.1 %       Monocyte % 8.3 %      Eosinophil % 1.1 %      Basophil % 0.5 %      Immature Grans % 1.1 %      Neutrophils, Absolute 6.51 10*3/mm3      Lymphocytes, Absolute 5.33 10*3/mm3      Monocytes, Absolute 1.10 10*3/mm3      Eosinophils, Absolute 0.14 10*3/mm3      Basophils, Absolute 0.06 10*3/mm3      Immature Grans, Absolute 0.14 10*3/mm3      nRBC 0.0 /100 WBC     POC Glucose Once [[id number]]  (Abnormal) Collected: 12/22/21 0644    Specimen: Blood Updated: 12/22/21 0645     Glucose 152 mg/dL      Comment: Meter: [id number] : [id number] Hang Ritasneem NA       POC Glucose Once [[id number]]  (Abnormal) Collected: 12/21/21 2047    Specimen: Blood Updated: 12/21/21 2049     Glucose 220 mg/dL      Comment: Meter: [id number] : [id number] Hang Kirkpatrick NA       T4, Free [[id number]]  (Normal) Collected: 12/21/21 1652    Specimen: Blood Updated: 12/21/21 1746     Free T4 1.09 ng/dL     Narrative:      Results may be falsely increased if patient taking Biotin.      POC Glucose Once [[id number]]  (Abnormal) Collected: 12/21/21 1600    Specimen: Blood Updated: 12/21/21 1602     Glucose 146 mg/dL      Comment: Meter: [id number] : [id number] Anoopimtiaz Hooper ARDEN           Imaging Results (Last 24 Hours)     ** No results found for the last 24 hours. **        ECG 12 Lead  Component   Ref Range & Units 12/20/21 1257 7/2/21 1923   QT Interval   ms 368 P  312              HEART RATE= 95  bpm  RR Interval= 634  ms  TN Interval=   ms  P Horizontal Axis=   deg  P Front Axis=   deg  QRSD Interval= 74  ms  QT Interval= 368  ms  QRS Axis= 81  deg  T Wave Axis= -55  deg  - ABNORMAL ECG -  Atrial fibrillation  Probable anterior infarct, age indeterminate  Abnormal T, consider ischemia, inferior leads               Current Facility-Administered Medications:   •  aspirin chewable tablet 81 mg, 81 mg, Oral, Daily, Uziel Mcrae MD, 81 mg at 12/22/21 1313  •  castor oil-balsam peru (VENELEX) ointment 1 application, 1 application,  Topical, Q12H, Daisha Mcrae MD, 1 application at 12/22/21 1314  •  dilTIAZem CD (CARDIZEM CD) 24 hr capsule 120 mg, 120 mg, Oral, Q24H, Daisha Mcrae MD, 120 mg at 12/22/21 1314  •  furosemide (LASIX) tablet 20 mg, 20 mg, Oral, Daily, Jag Farias MD, 20 mg at 12/22/21 1314  •  icosapent ethyl (VASCEPA) capsule 2 g, 2 g, Oral, BID, Daisha Mcrae MD  •  insulin glargine (LANTUS, SEMGLEE) injection 15 Units, 15 Units, Subcutaneous, Nightly, Daisha Mcrae MD, 15 Units at 12/21/21 2103  •  insulin lispro (ADMELOG) injection 0-7 Units, 0-7 Units, Subcutaneous, 4x Daily With Meals & Nightly, Daisha Mcrae MD, 3 Units at 12/21/21 2102  •  levothyroxine (SYNTHROID, LEVOTHROID) tablet 125 mcg, 125 mcg, Oral, QAM AC, Daisha Mcrae MD, 125 mcg at 12/22/21 0637  •  nitroglycerin (NITROSTAT) SL tablet 0.4 mg, 0.4 mg, Sublingual, Q5 Min PRN, Daisha Mcrae MD  •  pantoprazole (PROTONIX) EC tablet 40 mg, 40 mg, Oral, Daily, Daisha Mcrae MD, 40 mg at 12/22/21 1315  •  Pharmacy to dose warfarin, , Does not apply, Continuous PRN, Daisha Mcrae MD  •  pregabalin (LYRICA) capsule 100 mg, 100 mg, Oral, Q PM, Daisha Mcrae MD, 100 mg at 12/21/21 1715  •  spironolactone (ALDACTONE) tablet 25 mg, 25 mg, Oral, Daily, aJg Farias MD, 25 mg at 12/22/21 1315  •  warfarin (COUMADIN) tablet 5 mg, 5 mg, Oral, Once, Daisha Mcrae MD     ASSESSMENT  Elevated troponin per cardiology  Chronic atrial fibrillation with rapid ventricular rate  Hypertension  Hyperlipidemia  Hypothyroidism   Diabetes mellitus  Restless leg syndrome  Gastroesophageal reflux disease    PLAN  Discharge home   Discharge summary dictated    DAISHA MCRAE MD

## 2021-12-22 NOTE — CASE MANAGEMENT/SOCIAL WORK
Continued Stay Note  [facility]     Patient Name: Bessie Molina  MRN: [id number]  Today's Date: 12/22/2021    Admit Date: 12/19/2021     Discharge Plan     Row Name 12/22/21 1308       Plan    Plan Home with daughter    Plan Comments CCP spoke with [facility]; unable to accept due to patient not being established with a PCP yet. CCP spoke with patient's daughter, Tuyet [phone]; discussed home health not being able to accept due to patient not being established with her PCP yet. Patient's daughter verbalized understanding. Patient's daughter states they have an appointment on Monday. CCP advised patient's daughter if they feel like home health is still needed at the time, her PCP can arrange home health. Patient's daughter inquired about shower chair. CCP discussed insurance does not cover shower chair. CCP offered to arrange for shower chair but patient's daughter declined. CCP discussed utilizing [employer] or [employer] for possible lower prices. Patient's daughter confirmed plan is to return home and she will provide transportation. Ileana DUCKWORTH               Discharge Codes    No documentation.               Expected Discharge Date and Time     Expected Discharge Date Expected Discharge Time    Dec 21, 2021             DONITA Fuentes

## 2021-12-22 NOTE — DISCHARGE SUMMARY
Discharge summary    Date of admission 12/19/2021  Date of discharge 12/22/2021    Final diagnosis  Elevated troponin likely demand induced myocardial injury from tachycardia and hypertension  Persistent atrial fibrillation with rapid ventricular rate  Hypertension  Hyperlipidemia  Hypothyroidism   Diabetes mellitus  Restless leg syndrome  Gastroesophageal reflux disease    Discharge medications    Current Facility-Administered Medications:   •  aspirin chewable tablet 81 mg, 81 mg, Oral, Daily, Uziel Mcrae MD, 81 mg at 12/22/21 1313  •  castor oil-balsam peru (VENELEX) ointment 1 application, 1 application, Topical, Q12H, Uziel Mcrae MD, 1 application at 12/22/21 1314  •  dilTIAZem CD (CARDIZEM CD) 24 hr capsule 120 mg, 120 mg, Oral, Q24H, Uziel Mcrae MD, 120 mg at 12/22/21 1314  •  furosemide (LASIX) tablet 20 mg, 20 mg, Oral, Daily, Jag Farias MD, 20 mg at 12/22/21 1314  •  icosapent ethyl (VASCEPA) capsule 2 g, 2 g, Oral, BID, Uziel Mcrae MD  •  insulin glargine (LANTUS, SEMGLEE) injection 15 Units, 15 Units, Subcutaneous, Nightly, Uziel Mcrae MD, 15 Units at 12/21/21 2103  •  insulin lispro (ADMELOG) injection 0-7 Units, 0-7 Units, Subcutaneous, 4x Daily With Meals & Nightly, Uziel Mcrae MD, 3 Units at 12/21/21 2102  •  levothyroxine (SYNTHROID, LEVOTHROID) tablet 125 mcg, 125 mcg, Oral, QAM AC, Uziel Mcrae MD, 125 mcg at 12/22/21 0637  •  nitroglycerin (NITROSTAT) SL tablet 0.4 mg, 0.4 mg, Sublingual, Q5 Min PRN, Uziel Mcrae MD  •  pantoprazole (PROTONIX) EC tablet 40 mg, 40 mg, Oral, Daily, Uziel Mcrae MD, 40 mg at 12/22/21 1315  •  Pharmacy to dose warfarin, , Does not apply, Continuous PRN, Uziel Mcrae MD  •  pregabalin (LYRICA) capsule 100 mg, 100 mg, Oral, Q PM, Uziel Mcrae MD, 100 mg at 12/21/21 1715  •  spironolactone (ALDACTONE) tablet 25 mg, 25 mg, Oral, Daily, Jag Farias MD, 25 mg at 12/22/21 1315  •  warfarin (COUMADIN) tablet 5 mg, 5 mg, Oral, Once,  Daisha Mcrae MD     Consults obtained  Cardiology    Procedures  2D echo showed ejection fraction 55%    Hospital course  89-year white female with history of persistent atrial fibrillation hypertension hyperlipidemia hypothyroidism and diabetes mellitus admit to emergency room with chest pain.  Patient evaluated in ER found to be in rapid ventricular rate with history of atrial fibrillation admit for management.  Patient initial troponin was negative and repeated troponin was high and cardiology recommend no further work-up as this is demand induced myocardial injury secondary to tachycardia and hypertension.  Patient medications adjusted by cardiology.  Patient Synthroid dose also adjusted and she is pain-free clear for discharge.  Patient remain on anticoagulation and her INR is therapeutic.  Patient going home with family support.  Patient remained DNR throughout hospital    Discharge diet regular    Activity as tolerated    Medication as above    Follow-up with primary doctor in 1 week and follow with cardiology per the instruction and take medication as directed    DAISHA MCRAE MD

## 2021-12-22 NOTE — PROGRESS NOTES
"CC: afib    Interval History: No new acute events overnight      Vital Signs  Temp:  [97.5 °F (36.4 °C)-98.8 °F (37.1 °C)] 97.5 °F (36.4 °C)  Heart Rate:  [44-93] 44  Resp:  [16-18] 16  BP: (118-162)/(61-99) 118/61    Intake/Output Summary (Last 24 hours) at 12/22/2021 0750  Last data filed at 12/21/2021 0900  Gross per 24 hour   Intake 120 ml   Output --   Net 120 ml     Flowsheet Rows      First Filed Value   Admission Height 165.1 cm (65\") Documented at 12/19/2021 2336   Admission Weight 58.1 kg (128 lb) Documented at 12/19/2021 2336          PHYSICAL EXAM:  General: No acute distress  Resp:NL Rate, symmetric chest expansion,unlabored, clear  CV: Irregularly irregular, NL PMI, NL S1 and S2, no Murmur, no gallop, no rub, No JVD.   ABD:Nl sounds, no masses or tenderness, nondistended, no guarding or rebound  Neuro: alert,cooperative and oriented  Extr:Normal pedal pulses, No edema or cyanosis, moves all extremities      Results Review:    Results from last 7 days   Lab Units 12/22/21  0604   SODIUM mmol/L 139   POTASSIUM mmol/L 3.8   CHLORIDE mmol/L 102   CO2 mmol/L 23.9   BUN mg/dL 27*   CREATININE mg/dL 1.22*   GLUCOSE mg/dL 140*   CALCIUM mg/dL 9.4     Results from last 7 days   Lab Units 12/21/21  0655 12/19/21  2344   TROPONIN T ng/mL 0.436* <0.010     Results from last 7 days   Lab Units 12/22/21  0604   WBC 10*3/mm3 13.28*   HEMOGLOBIN g/dL 13.7   HEMATOCRIT % 40.9   PLATELETS 10*3/mm3 254     Results from last 7 days   Lab Units 12/22/21  0604 12/21/21  0655 12/20/21  1106   INR  2.33* 1.98* 2.17*     Results from last 7 days   Lab Units 12/21/21  0655   CHOLESTEROL mg/dL 195         Results from last 7 days   Lab Units 12/21/21  0655   CHOLESTEROL mg/dL 195   TRIGLYCERIDES mg/dL 576*   HDL CHOL mg/dL 27*   LDL CHOL mg/dL 77     I reviewed the patient's new clinical results.  I personally viewed and interpreted the patient's EKG/Telemetry data        Medication Review:   Meds reviewed    Pharmacy to dose " warfarin,         Assessment/Plan    1.  Persistent atrial fibrillation with RVR- improved HR control   Echocardiogram on 12/20/2021 revealed preserved left ventricular ejection fraction, moderate mitral valve regurgitation and mildly reduced right ventricular function with left ventricular grade 2 diastolic dysfunction with elevated left atrial filling pressure.  Mild pulmonary hypertension noted.    2.  Chest pain likely related to A. fib with RVR/uncontrolled blood pressure-  Negative initial troponin but the one check this morning positive-likely related to tachycardia and uncontrolled blood pressure-patient denies any significant chest pain.  3.  Hypertension-  4.  Elevated troponin-EKG with repolarization abnormalities-patient denies any significant chest pain  Likely related demand induced myocardial injury from tachycardia and hypertension   5.  Hypercholesterolemia with history of intolerance to statin-currently on Vascepa and Zetia  6.  Type II DM-2 per primary team]  7. RODRICK- mild       Digoxin and BB dc yesterday. Pt noted to be bradycardic with HR in the 30's and 40's during sleep. BP is holding. Continue diltiazem and pt will go home on Zio.   OK to dc pt from cardiology standpoint.   I will see her in clinic in 1 month   Thank you for consulting with cardiology       Jag Farias MD  12/22/21  07:50 EST

## 2021-12-22 NOTE — PLAN OF CARE
Alert x 1. Plans to DC home with daughter today. Assist x 1 with ambulation. [place]. Afib per monitor.     Problem: Fall Injury Risk  Goal: Absence of Fall and Fall-Related Injury  Intervention: Promote Injury-Free Environment  Recent Flowsheet Documentation  Taken 12/22/2021 0347 by Sarah Goldsmith RN  Safety Promotion/Fall Prevention: safety round/check completed  Taken 12/22/2021 0151 by Sarah Goldsmith RN  Safety Promotion/Fall Prevention: safety round/check completed  Taken 12/21/2021 2348 by Sarah Goldsmith RN  Safety Promotion/Fall Prevention: safety round/check completed  Taken 12/21/2021 2215 by Sarah Goldsmith RN  Safety Promotion/Fall Prevention: safety round/check completed  Taken 12/21/2021 1954 by Sarah Goldsmith RN  Safety Promotion/Fall Prevention: safety round/check completed   Goal Outcome Evaluation:  Plan of Care Reviewed With: patient

## 2021-12-23 NOTE — CASE MANAGEMENT/SOCIAL WORK
Case Management Discharge Note      Final Note: Home with family and PCP f/u on Monday. peter rn/ccp    Provided Post Acute Provider List?: Yes  Post Acute Provider List: DME Supplier, Home Health  Provided Post Acute Provider Quality & Resource List?: Refused  Delivered To: Support Person  Method of Delivery: Telephone    Selected Continued Care - Discharged on 12/22/2021 Admission date: 12/19/2021 - Discharge disposition: Home or Self Care    Destination    No services have been selected for the patient.              Durable Medical Equipment    No services have been selected for the patient.              Dialysis/Infusion    No services have been selected for the patient.              Home Medical Care    No services have been selected for the patient.              Therapy    No services have been selected for the patient.              Community Resources    No services have been selected for the patient.              Community & DME    No services have been selected for the patient.                       Final Discharge Disposition Code: 01 - home or self-care

## 2021-12-25 LAB — QT INTERVAL: 269 MS

## 2021-12-27 ENCOUNTER — TELEPHONE (OUTPATIENT)
Dept: CARDIOLOGY | Facility: CLINIC | Age: 86
End: 2021-12-27

## 2021-12-27 NOTE — TELEPHONE ENCOUNTER
Dr. Olivas Pt:  Out of office: post call   Returnin/28  Please advise.  Covering APC: 'Judith Guadalupe     Pt's daughter called and would like parameters for pt's bp.  Pt was recently d/c from the hospital with an added medication (Aldactone 25 mg daily).  She states that pt's bp has been ranging 100 - 135 sys and 50-70 edith hr 60-80 (mostly in the 100/50 range)  She reports that pt is tired most of the time with all of her current bp medications (Diltiazem 120 mg daily and furosemide 20 mg daily).   She has not given the Aldactone since being discharged due to the low bp.  She would like to know if she needs to start pt on the Aldactone with the low bp.

## 2021-12-27 NOTE — TELEPHONE ENCOUNTER
I would not start spironolactone due to low blood pressure.  However, would continue diltiazem and furosemide.  Have them call if her blood pressure starts to reach 90 systolic too often otherwise this range is stable.  Have them bring a blood pressure log to the upcoming hospital follow-up visit as well.

## 2022-01-12 LAB
MAXIMAL PREDICTED HEART RATE: 131 BPM
STRESS TARGET HR: 111 BPM

## 2022-01-12 PROCEDURE — 93248 EXT ECG>7D<15D REV&INTERPJ: CPT | Performed by: INTERNAL MEDICINE

## 2022-01-13 ENCOUNTER — TELEPHONE (OUTPATIENT)
Dept: CARDIOLOGY | Facility: CLINIC | Age: 87
End: 2022-01-13

## 2022-01-13 NOTE — TELEPHONE ENCOUNTER
----- Message from Jag Farias MD sent at 1/13/2022  9:18 AM EST -----  Please notify patient that she continues to be in A. fib but good news is her heart rate is well controlled.  Continue same care and I will see her in clinic at the end of this month.  Thank you

## 2022-01-14 NOTE — TELEPHONE ENCOUNTER
Patient notified of results and recommendations and verbalized understanding  Sharona Marcelino RN  Triage nurse

## 2022-01-25 ENCOUNTER — OFFICE VISIT (OUTPATIENT)
Dept: CARDIOLOGY | Facility: CLINIC | Age: 87
End: 2022-01-25

## 2022-01-25 ENCOUNTER — HOSPITAL ENCOUNTER (OUTPATIENT)
Dept: CARDIOLOGY | Facility: HOSPITAL | Age: 87
Discharge: HOME | End: 2022-01-25
Admitting: INTERNAL MEDICINE

## 2022-01-25 VITALS
HEART RATE: 115 BPM | SYSTOLIC BLOOD PRESSURE: 140 MMHG | BODY MASS INDEX: 22.67 KG/M2 | HEIGHT: 62 IN | DIASTOLIC BLOOD PRESSURE: 64 MMHG | WEIGHT: 123.2 LBS

## 2022-01-25 DIAGNOSIS — I48.19 ATRIAL FIBRILLATION, PERSISTENT: Primary | ICD-10-CM

## 2022-01-25 DIAGNOSIS — E78.1 HYPERTRIGLYCERIDEMIA: ICD-10-CM

## 2022-01-25 DIAGNOSIS — I48.19 ATRIAL FIBRILLATION, PERSISTENT: ICD-10-CM

## 2022-01-25 DIAGNOSIS — Z79.01 CHRONIC ANTICOAGULATION: ICD-10-CM

## 2022-01-25 LAB
ANION GAP SERPL CALCULATED.3IONS-SCNC: 13 MMOL/L (ref 5–15)
BUN SERPL-MCNC: 38 MG/DL (ref 8–23)
BUN/CREAT SERPL: 25.3 (ref 7–25)
CALCIUM SPEC-SCNC: 9.6 MG/DL (ref 8.2–9.6)
CHLORIDE SERPL-SCNC: 101 MMOL/L (ref 98–107)
CO2 SERPL-SCNC: 27 MMOL/L (ref 22–29)
CREAT SERPL-MCNC: 1.5 MG/DL (ref 0.57–1)
GFR SERPL CREATININE-BSD FRML MDRD: 33 ML/MIN/1.73
GLUCOSE SERPL-MCNC: 134 MG/DL (ref 65–99)
INR PPP: 1.9 (ref 0.9–1.1)
POTASSIUM SERPL-SCNC: 3.5 MMOL/L (ref 3.5–5.2)
PROTHROMBIN TIME: 21.5 SECONDS (ref 11.7–14.2)
SODIUM SERPL-SCNC: 141 MMOL/L (ref 136–145)

## 2022-01-25 PROCEDURE — 99214 OFFICE O/P EST MOD 30 MIN: CPT | Performed by: INTERNAL MEDICINE

## 2022-01-25 PROCEDURE — 80048 BASIC METABOLIC PNL TOTAL CA: CPT | Performed by: INTERNAL MEDICINE

## 2022-01-25 PROCEDURE — 93000 ELECTROCARDIOGRAM COMPLETE: CPT | Performed by: INTERNAL MEDICINE

## 2022-01-25 PROCEDURE — 85610 PROTHROMBIN TIME: CPT | Performed by: INTERNAL MEDICINE

## 2022-01-25 PROCEDURE — 36415 COLL VENOUS BLD VENIPUNCTURE: CPT

## 2022-01-25 RX ORDER — MULTIVIT-MIN/IRON/FOLIC ACID/K 18-600-40
1 CAPSULE ORAL DAILY
COMMUNITY

## 2022-01-25 NOTE — PROGRESS NOTES
PATIENTINFORMATION    Date of Office Visit: 2022  Encounter Provider: Jag Farias MD  Place of Service: [facility] CARDIOLOGY  Patient Name: Bessie Molina  : 1932    Subjective:     Encounter Date:2022      Patient ID: Bessie Molina is a 89 y.o. female.    Chief Complaint   Patient presents with   • 1 month BHE f/u     HPI  Ms. Molina is a pleasant 89 years old very came to cardiology clinic for post hospital discharge follow-up.  She was accompanied by her daughter.  She has past medical history significant for persistent atrial fibrillation and diastolic dysfunction.  She was admitted in 2021 with chest pain/elevated troponin and was also noted to be in A. fib with RVR.  Echocardiogram revealed preserved left ventricular ejection fraction.  Rate control medications were adjusted.  She could not tolerate addition of digoxin and beta-blocker and released on diltiazem p.o. daily.  Subsequent Holter monitor revealed satisfactory heart rate control at home.  She denies any significant shortness of breath or heart failure symptoms today.  She does not exercise regularly but she does some house chores with her daughter.  Ambulates using a walker and cane.  No bleeding complications on Coumadin.      ROS   All systems reviewed and negative except as noted in HPI.    Past Medical History:   Diagnosis Date   • Atrial fibrillation (HCC)    • Hypertension        History reviewed. No pertinent surgical history.    Social History     Socioeconomic History   • Marital status:    Tobacco Use   • Smoking status: Never Smoker   • Smokeless tobacco: Never Used   Vaping Use   • Vaping Use: Never used   Substance and Sexual Activity   • Alcohol use: Yes     Comment: rare/caffeine use        History reviewed. No pertinent family history.        ECG 12 Lead    Date/Time: 2022 2:26 PM  Performed by: Jag Farias MD  Authorized by: Jag Farias,  "MD   Comparison: compared with previous ECG from 12/21/2021  Comparison to previous ECG: HR not controlled on this tracing   Rhythm: atrial fibrillation  Rate: normal  Conduction: conduction normal  ST Segments: ST segments normal  T Waves: T waves normal  QRS axis: normal  Other: no other findings    Clinical impression: abnormal EKG               Objective:     /64 (BP Location: Left arm, Patient Position: Sitting)   Pulse 115   Ht 157.5 cm (62\")   Wt 55.9 kg (123 lb 3.2 oz)   BMI 22.53 kg/m²  Body mass index is 22.53 kg/m².     Constitutional:       General: Not in acute distress.     Appearance: Well-developed. Not diaphoretic.   Eyes:      Pupils: Pupils are equal, round, and reactive to light.   HENT:      Head: Normocephalic and atraumatic.   Neck:      Thyroid: No thyromegaly.   Pulmonary:      Effort: Pulmonary effort is normal. No respiratory distress.      Breath sounds: Normal breath sounds. No wheezing. No rales.   Chest:      Chest wall: Not tender to palpatation.   Cardiovascular:      Normal rate. Irregularly irregular rhythm.      No gallop.   Pulses:     Intact distal pulses.   Edema:     Peripheral edema absent.   Abdominal:      General: Bowel sounds are normal. There is no distension.      Palpations: Abdomen is soft.      Tenderness: There is no guarding.   Musculoskeletal: Normal range of motion.         General: No deformity.      Cervical back: Normal range of motion and neck supple. Skin:     General: Skin is warm and dry.      Findings: No rash.   Neurological:      Mental Status: Alert and oriented to person, place, and time.      Cranial Nerves: No cranial nerve deficit.      Deep Tendon Reflexes: Reflexes are normal and symmetric.   Psychiatric:         Judgment: Judgment normal.         Review Of Data:       Assessment/Plan:       1.  Persistent atrial fibrillation with RVR-  Holter with controlled heart rate.  Her daughter reports reasonable heart rate control at home  Today " in office was 115 on arrival but improved with resting   Echocardiogram on 12/20/2021 revealed preserved left ventricular ejection fraction, moderate mitral valve regurgitation and mildly reduced right ventricular function with left ventricular grade 2 diastolic dysfunction with elevated left atrial filling pressure.  Mild pulmonary hypertension noted.    2.  Chest pain likely related to A. fib with RVR/uncontrolled blood pressure-  No recurrence  3.  Hypertension-well-controlled during office visit today  5. Mixed hyperlipidemia/hypertriglyceridemia-significantly abnormal triglyceride level-on Vascepa and Zetia   History of intolerance with a statin  She follows up with her PCP.  6.  Type II DM-2 per primary team]  7. RODRICK- mild -check BMP today    Check BMP and mag level today as she is on Lasix.  Check INR  Referred to Coumadin clinic    Return to clinic in 6 months or sooner with any concerning symptoms    Diagnosis and plan of care discussed with patient and verbalized understanding.           Jag Farias MD  01/25/22  14:44 EST

## 2022-01-26 ENCOUNTER — ANTICOAGULATION VISIT (OUTPATIENT)
Dept: PHARMACY | Facility: HOSPITAL | Age: 87
End: 2022-01-26

## 2022-01-26 DIAGNOSIS — Z79.01 CHRONIC ANTICOAGULATION: Primary | ICD-10-CM

## 2022-03-16 ENCOUNTER — TELEPHONE (OUTPATIENT)
Dept: PHARMACY | Facility: HOSPITAL | Age: 87
End: 2022-03-16

## 2022-03-16 NOTE — TELEPHONE ENCOUNTER
Called patients daughter (Tuyet) and left a voicemail to determine need to continue to monitor INR. Left voicemail to see if the physician in [place] had taken over or if we needed to schedule the patient within clinic

## 2022-04-01 ENCOUNTER — TELEPHONE (OUTPATIENT)
Dept: PHARMACY | Facility: HOSPITAL | Age: 87
End: 2022-04-01

## 2022-04-01 NOTE — TELEPHONE ENCOUNTER
Called Tuyet (patients daughter), to clarify if patient is being managed by Dr. La, since we were under the impression that Dr. La had taken over management. Per Tuyet, patient had her INR checked this week and Dr. La provided recommendations for dose adjustment and scheduled a follow up. Tuyet states she will double check next Friday, at time of follow up, to confirm Dr. La is taking over management of warfarin.   Explained that we are happy to continue to manage warfarin if needed, however, we do not want to cause confusion by having multiple providers providing recommendations.   Tuyet expressed understanding and had no further questions at this time.     Will plan to follow up next Friday.    Meliza Brush, PharmD  PGY2 Ambulatory Care Pharmacy Resident

## 2022-04-11 ENCOUNTER — TELEPHONE (OUTPATIENT)
Dept: PHARMACY | Facility: HOSPITAL | Age: 87
End: 2022-04-11

## 2022-04-11 NOTE — TELEPHONE ENCOUNTER
LVM to confirm PCP has taken over management of warfarin. Will continue to follow up. Per previous phone call, PCP had provided dosing instructions and asked for INR check on 4/8

## 2022-04-15 ENCOUNTER — TELEPHONE (OUTPATIENT)
Dept: PHARMACY | Facility: HOSPITAL | Age: 87
End: 2022-04-15

## 2022-04-19 ENCOUNTER — ANTICOAGULATION VISIT (OUTPATIENT)
Dept: PHARMACY | Facility: HOSPITAL | Age: 87
End: 2022-04-19

## 2022-04-19 DIAGNOSIS — Z79.01 CHRONIC ANTICOAGULATION: Primary | ICD-10-CM

## 2022-04-19 NOTE — PROGRESS NOTES
Spoke with patients Tuyet degroot(POA).  Confirmed patient is now being managed by PCP Dr. Myers.   No longer following in the [facility]. It has been a pleasure being part of her care.

## 2022-07-27 ENCOUNTER — HOSPITAL ENCOUNTER (OUTPATIENT)
Dept: CARDIOLOGY | Facility: HOSPITAL | Age: 87
Discharge: HOME | End: 2022-07-27
Admitting: NURSE PRACTITIONER

## 2022-07-27 ENCOUNTER — OFFICE VISIT (OUTPATIENT)
Dept: CARDIOLOGY | Facility: CLINIC | Age: 87
End: 2022-07-27

## 2022-07-27 VITALS
DIASTOLIC BLOOD PRESSURE: 80 MMHG | BODY MASS INDEX: 24.66 KG/M2 | OXYGEN SATURATION: 100 % | WEIGHT: 134 LBS | HEIGHT: 62 IN | HEART RATE: 103 BPM | SYSTOLIC BLOOD PRESSURE: 137 MMHG

## 2022-07-27 DIAGNOSIS — Z79.01 CHRONIC ANTICOAGULATION: ICD-10-CM

## 2022-07-27 DIAGNOSIS — I48.19 ATRIAL FIBRILLATION, PERSISTENT: ICD-10-CM

## 2022-07-27 DIAGNOSIS — I48.19 ATRIAL FIBRILLATION, PERSISTENT: Primary | ICD-10-CM

## 2022-07-27 DIAGNOSIS — R06.02 SHORTNESS OF BREATH: ICD-10-CM

## 2022-07-27 DIAGNOSIS — E78.1 HYPERTRIGLYCERIDEMIA: ICD-10-CM

## 2022-07-27 LAB
ANION GAP SERPL CALCULATED.3IONS-SCNC: 14.3 MMOL/L (ref 5–15)
BUN SERPL-MCNC: 17 MG/DL (ref 8–23)
BUN/CREAT SERPL: 14.7 (ref 7–25)
CALCIUM SPEC-SCNC: 9.6 MG/DL (ref 8.2–9.6)
CHLORIDE SERPL-SCNC: 101 MMOL/L (ref 98–107)
CO2 SERPL-SCNC: 27.7 MMOL/L (ref 22–29)
CREAT SERPL-MCNC: 1.16 MG/DL (ref 0.57–1)
EGFRCR SERPLBLD CKD-EPI 2021: 44.9 ML/MIN/1.73
GLUCOSE SERPL-MCNC: 121 MG/DL (ref 65–99)
NT-PROBNP SERPL-MCNC: 1225 PG/ML (ref 0–1800)
POTASSIUM SERPL-SCNC: 3.7 MMOL/L (ref 3.5–5.2)
SODIUM SERPL-SCNC: 143 MMOL/L (ref 136–145)

## 2022-07-27 PROCEDURE — 80048 BASIC METABOLIC PNL TOTAL CA: CPT | Performed by: NURSE PRACTITIONER

## 2022-07-27 PROCEDURE — 99214 OFFICE O/P EST MOD 30 MIN: CPT | Performed by: NURSE PRACTITIONER

## 2022-07-27 PROCEDURE — 36415 COLL VENOUS BLD VENIPUNCTURE: CPT

## 2022-07-27 PROCEDURE — 93000 ELECTROCARDIOGRAM COMPLETE: CPT | Performed by: NURSE PRACTITIONER

## 2022-07-27 PROCEDURE — 83880 ASSAY OF NATRIURETIC PEPTIDE: CPT | Performed by: NURSE PRACTITIONER

## 2022-07-27 RX ORDER — OMEGA-3/DHA/EPA/FISH OIL 300-1000MG
1 CAPSULE ORAL DAILY
COMMUNITY

## 2022-07-27 RX ORDER — EZETIMIBE 10 MG/1
10 TABLET ORAL DAILY
COMMUNITY
Start: 2022-05-23

## 2022-07-27 RX ORDER — LEVOTHYROXINE SODIUM 175 UG/1
125 TABLET ORAL DAILY
COMMUNITY
End: 2022-12-13

## 2022-07-27 RX ORDER — ACETAMINOPHEN 500 MG
1000 TABLET ORAL EVERY 4 HOURS PRN
COMMUNITY
End: 2022-12-20 | Stop reason: HOSPADM

## 2022-07-27 RX ORDER — PREGABALIN 75 MG/1
75 CAPSULE ORAL
COMMUNITY
End: 2022-12-13

## 2022-07-27 RX ORDER — DILTIAZEM HYDROCHLORIDE 180 MG/1
180 CAPSULE, COATED, EXTENDED RELEASE ORAL
Qty: 30 CAPSULE | Refills: 11 | Status: SHIPPED | OUTPATIENT
Start: 2022-07-27 | End: 2022-12-13

## 2022-07-27 RX ORDER — BLOOD-GLUCOSE METER
KIT MISCELLANEOUS
COMMUNITY
Start: 2022-07-11 | End: 2022-12-13

## 2022-07-27 RX ORDER — DIPHENOXYLATE HYDROCHLORIDE AND ATROPINE SULFATE 2.5; .025 MG/1; MG/1
1 TABLET ORAL DAILY
COMMUNITY

## 2022-07-27 NOTE — PROGRESS NOTES
Date of Office Visit: 2022  Encounter Provider: CLAUDETTE Dawson  Place of Service: [facility] CARDIOLOGY  Patient Name: Bessie Molina  :1932    Chief Complaint   Patient presents with   • Atrial Fibrillation   :     HPI: Bessie Molina is a 89 y.o. female who is a patient of  Dr. Farias and is new to me today.  We first saw her in the hospital in December she had chest pain and elevated troponin as well as A. fib with RVR echo revealed preserved LV function she was put on rate control.  She ended up on diltiazem p.o. daily.  She had good heart rate control on the monitor.  She was last in the office in January and was doing well.  She was put on Coumadin for anticoagulation.  She is here for routine follow-up.    Her daughter comes in with her today.  She is her caregiver.  She said that she has been a little weak and fatigued and has seemed short of breath with exertional activity.  She also thinks her abdomen is enlarging.  She has had about a 10 pound weight gain since January and 20 pounds since December.  She however is not complaining of anything but is a little confused from some baseline dementia.  She has had a couple episodes where her heart rate was in the 60s but for the most part 90s to 100s.  Previous testing and notes have been reviewed by me.   Past Medical History:   Diagnosis Date   • Atrial fibrillation (HCC)    • Hypertension        History reviewed. No pertinent surgical history.    Social History     Socioeconomic History   • Marital status:    Tobacco Use   • Smoking status: Never Smoker   • Smokeless tobacco: Never Used   Vaping Use   • Vaping Use: Never used   Substance and Sexual Activity   • Alcohol use: Yes     Comment: rare/caffeine use        History reviewed. No pertinent family history.    Review of Systems   Constitutional: Negative for diaphoresis and malaise/fatigue.   Cardiovascular: Positive for dyspnea on  exertion. Negative for chest pain, claudication, irregular heartbeat, leg swelling, near-syncope, orthopnea, palpitations, paroxysmal nocturnal dyspnea and syncope.   Respiratory: Negative for cough, shortness of breath and sleep disturbances due to breathing.    Musculoskeletal: Negative for falls.   Neurological: Negative for dizziness and weakness.   Psychiatric/Behavioral: Negative for altered mental status and substance abuse.       Allergies   Allergen Reactions   • Bee Venom Anaphylaxis   • Atorvastatin Nausea Only   • Gemfibrozil Nausea Only   • Haemophilus Influenzae Unknown - High Severity   • Influenza Virus Vaccine Split Unknown - High Severity   • Isosorbide Unknown - High Severity   • Metformin Unknown - High Severity   • Propoxyphene Unknown - High Severity   • Simvastatin Nausea Only         Current Outpatient Medications:   •  acetaminophen (TYLENOL) 500 MG tablet, Take  by mouth., Disp: , Rfl:   •  Ascorbic Acid (Vitamin C) 500 MG capsule, , Disp: , Rfl:   •  B Complex Vitamins (VITAMIN B COMPLEX PO), , Disp: , Rfl:   •  Calcium-Magnesium (ELI-MAG PO), , Disp: , Rfl:   •  empagliflozin (JARDIANCE) 10 MG tablet tablet, , Disp: , Rfl:   •  ezetimibe (ZETIA) 10 MG tablet, , Disp: , Rfl:   •  fish oil-omega-3 fatty acids 1000 MG capsule, Take  by mouth., Disp: , Rfl:   •  FREESTYLE LITE test strip, , Disp: , Rfl:   •  furosemide (LASIX) 20 MG tablet, Take 1 tablet by mouth Daily., Disp: , Rfl:   •  glipizide (GLUCOTROL XL) 10 MG 24 hr tablet, Take 1 tablet by mouth Every Morning., Disp: , Rfl:   •  icosapent ethyl (VASCEPA) 1 g capsule capsule, Take 2 capsules by mouth 2 (Two) Times a Day., Disp: , Rfl:   •  levothyroxine (SYNTHROID, LEVOTHROID) 175 MCG tablet, Take 125 mcg by mouth Daily., Disp: , Rfl:   •  multivitamin (THERAGRAN) tablet tablet, Take  by mouth., Disp: , Rfl:   •  PEDIATRIC MULTIVIT-MINERALS-C PO, Take  by mouth., Disp: , Rfl:   •  pregabalin (LYRICA) 75 MG capsule, Take 75 mg by  mouth., Disp: , Rfl:   •  VITAMIN D PO, , Disp: , Rfl:   •  warfarin (COUMADIN) 3 MG tablet, Take 4.5 mg by mouth Every Night., Disp: , Rfl:   •  dilTIAZem CD (CARDIZEM CD) 120 MG 24 hr capsule, Take 1 capsule by mouth Daily for 30 days., Disp: 30 capsule, Rfl: 0  •  levothyroxine (SYNTHROID, LEVOTHROID) 125 MCG tablet, Take 1 tablet by mouth Every Morning Before Breakfast for 30 days., Disp: 30 tablet, Rfl: 0  •  pregabalin (LYRICA) 100 MG capsule, Take 100 mg by mouth Every Evening., Disp: , Rfl:   •  SITagliptin (JANUVIA) 100 MG tablet, Take 100 mg by mouth Every Evening., Disp: , Rfl:       Objective:     There were no vitals filed for this visit.  There is no height or weight on file to calculate BMI.    PHYSICAL EXAM:    Constitutional:       General: Not in acute distress.     Appearance: Normal appearance. Well-developed.   Eyes:      Pupils: Pupils are equal, round, and reactive to light.   HENT:      Head: Normocephalic.   Neck:      Vascular: No carotid bruit or JVD.   Pulmonary:      Effort: Pulmonary effort is normal. No tachypnea.      Breath sounds: Normal breath sounds. No wheezing. No rales.   Cardiovascular:      Normal rate. Irregularly irregular rhythm.      No gallop.   Pulses:     Intact distal pulses.   Edema:     Peripheral edema absent.   Abdominal:      General: Bowel sounds are normal.      Palpations: Abdomen is soft.      Tenderness: There is no abdominal tenderness.   Musculoskeletal: Normal range of motion.      Cervical back: Normal range of motion and neck supple. No edema. Skin:     General: Skin is warm and dry.   Neurological:      Mental Status: Alert and oriented to person, place, and time.           ECG 12 Lead    Date/Time: 7/27/2022 1:19 PM  Performed by: Nicole Landry APRN  Authorized by: Nicole Landry APRN   Comparison: compared with previous ECG   Rhythm: atrial fibrillation  Rate: tachycardic  BPM: 101  QRS axis: normal  Other findings: non-specific ST-T wave  changes    Clinical impression: abnormal EKG              Assessment:       Diagnosis Plan   1. Atrial fibrillation, persistent (HCC)     2. Hypertriglyceridemia     3. Chronic anticoagulation       No orders of the defined types were placed in this encounter.         Plan:       I think her heart rate could be running a little high with activities some good increase of Cardizem 280 mg a day.  Will have her come back in 3 months.  Clinically she does not look fluid overloaded but I am getting get some labs to follow-up.         Your medication list          Accurate as of July 27, 2022  1:12 PM. If you have any questions, ask your nurse or doctor.            CHANGE how you take these medications      Instructions Last Dose Given Next Dose Due   acetaminophen 500 MG tablet  Commonly known as: TYLENOL  What changed: Another medication with the same name was removed. Continue taking this medication, and follow the directions you see here.  Changed by: CLAUDETTE Dawson      Take  by mouth.          CONTINUE taking these medications      Instructions Last Dose Given Next Dose Due   ELI-MAG PO           dilTIAZem  MG 24 hr capsule  Commonly known as: CARDIZEM CD      Take 1 capsule by mouth Daily for 30 days.       empagliflozin 10 MG tablet tablet  Commonly known as: JARDIANCE           ezetimibe 10 MG tablet  Commonly known as: ZETIA           fish oil-omega-3 fatty acids 1000 MG capsule      Take  by mouth.       FREESTYLE LITE test strip  Generic drug: glucose blood           furosemide 20 MG tablet  Commonly known as: LASIX      Take 1 tablet by mouth Daily.       glipizide 10 MG 24 hr tablet  Commonly known as: GLUCOTROL XL      Take 1 tablet by mouth Every Morning.       icosapent ethyl 1 g capsule capsule  Commonly known as: VASCEPA      Take 2 capsules by mouth 2 (Two) Times a Day.       levothyroxine 175 MCG tablet  Commonly known as: SYNTHROID, LEVOTHROID      Take 125 mcg by mouth Daily.        levothyroxine 125 MCG tablet  Commonly known as: SYNTHROID, LEVOTHROID      Take 1 tablet by mouth Every Morning Before Breakfast for 30 days.       multivitamin tablet tablet      Take  by mouth.       PEDIATRIC MULTIVIT-MINERALS-C PO      Take  by mouth.       pregabalin 75 MG capsule  Commonly known as: LYRICA      Take 75 mg by mouth.       pregabalin 100 MG capsule  Commonly known as: LYRICA      Take 100 mg by mouth Every Evening.       SITagliptin 100 MG tablet  Commonly known as: JANUVIA      Take 100 mg by mouth Every Evening.       VITAMIN B COMPLEX PO           Vitamin C 500 MG capsule           VITAMIN D PO           warfarin 3 MG tablet  Commonly known as: COUMADIN      Take 4.5 mg by mouth Every Night.                As always, it has been a pleasure to participate in your patient's care.      Sincerely,     Nicole WILKINS

## 2022-07-28 ENCOUNTER — TELEPHONE (OUTPATIENT)
Dept: CARDIOLOGY | Facility: CLINIC | Age: 87
End: 2022-07-28

## 2022-11-01 ENCOUNTER — OFFICE VISIT (OUTPATIENT)
Dept: CARDIOLOGY | Facility: CLINIC | Age: 87
End: 2022-11-01

## 2022-11-01 ENCOUNTER — HOSPITAL ENCOUNTER (OUTPATIENT)
Dept: CARDIOLOGY | Facility: HOSPITAL | Age: 87
Discharge: HOME | End: 2022-11-01
Admitting: INTERNAL MEDICINE

## 2022-11-01 VITALS
SYSTOLIC BLOOD PRESSURE: 118 MMHG | HEART RATE: 103 BPM | WEIGHT: 140 LBS | HEIGHT: 62 IN | DIASTOLIC BLOOD PRESSURE: 70 MMHG | BODY MASS INDEX: 25.76 KG/M2

## 2022-11-01 DIAGNOSIS — R06.02 SHORTNESS OF BREATH: ICD-10-CM

## 2022-11-01 DIAGNOSIS — I48.19 ATRIAL FIBRILLATION, PERSISTENT: Primary | ICD-10-CM

## 2022-11-01 LAB
ANION GAP SERPL CALCULATED.3IONS-SCNC: 12.3 MMOL/L (ref 5–15)
BUN SERPL-MCNC: 16 MG/DL (ref 8–23)
BUN/CREAT SERPL: 13.2 (ref 7–25)
CALCIUM SPEC-SCNC: 9.1 MG/DL (ref 8.2–9.6)
CHLORIDE SERPL-SCNC: 101 MMOL/L (ref 98–107)
CO2 SERPL-SCNC: 27.7 MMOL/L (ref 22–29)
CREAT SERPL-MCNC: 1.21 MG/DL (ref 0.57–1)
EGFRCR SERPLBLD CKD-EPI 2021: 42.7 ML/MIN/1.73
GLUCOSE SERPL-MCNC: 129 MG/DL (ref 65–99)
NT-PROBNP SERPL-MCNC: 1327 PG/ML (ref 0–1800)
POTASSIUM SERPL-SCNC: 3.4 MMOL/L (ref 3.5–5.2)
SODIUM SERPL-SCNC: 141 MMOL/L (ref 136–145)

## 2022-11-01 PROCEDURE — 80048 BASIC METABOLIC PNL TOTAL CA: CPT | Performed by: INTERNAL MEDICINE

## 2022-11-01 PROCEDURE — 93000 ELECTROCARDIOGRAM COMPLETE: CPT | Performed by: INTERNAL MEDICINE

## 2022-11-01 PROCEDURE — 83880 ASSAY OF NATRIURETIC PEPTIDE: CPT | Performed by: INTERNAL MEDICINE

## 2022-11-01 PROCEDURE — 36415 COLL VENOUS BLD VENIPUNCTURE: CPT

## 2022-11-01 PROCEDURE — 99214 OFFICE O/P EST MOD 30 MIN: CPT | Performed by: INTERNAL MEDICINE

## 2022-11-01 RX ORDER — WARFARIN SODIUM 3 MG/1
6 TABLET ORAL SEE ADMIN INSTRUCTIONS
COMMUNITY
End: 2023-02-01 | Stop reason: SDUPTHER

## 2022-11-01 RX ORDER — LEVOTHYROXINE SODIUM 0.1 MG/1
100 TABLET ORAL DAILY
COMMUNITY
End: 2023-02-01 | Stop reason: SDUPTHER

## 2022-11-01 RX ORDER — PANTOPRAZOLE SODIUM 20 MG/1
40 TABLET, DELAYED RELEASE ORAL DAILY
Qty: 90 TABLET | Refills: 3 | Status: SHIPPED | OUTPATIENT
Start: 2022-11-01 | End: 2023-02-01 | Stop reason: SDUPTHER

## 2022-11-01 NOTE — PROGRESS NOTES
PATIENTINFORMATION    Date of Office Visit: 2022  Encounter Provider: Jag Farias MD  Place of Service: [facility] CARDIOLOGY  Patient Name: Bessie Molina  : 1932    Subjective:     Encounter Date:2022      Patient ID: Bessie Molina is a 89 y.o. female.    Chief Complaint   Patient presents with   • Follow-up   • Palpitations     HPI  Ms. Molina is a pleasant 89 years old lady who came to cardiology clinic for follow-up visit.  She was accompanied by her daughter Tuyet.  She had left lower extremity arterial intervention with angioplasty and stent placement for nonhealing fourth toe wound on 2022 by Dr. Combs.  No perioperative complications.  She has a started to walk carefully with plan to make it 30 minutes every day.  She denies any significant change in her breathing, orthopnea, PND, palpitations, presyncope or syncope or significant lower extremity swelling.  She denies chest pain.  No bleeding complications on Coumadin and aspirin.  She takes Lasix 20 mg p.o. daily with good urine output.  No ER visits or hospitalization for cardiac related issues.      ROS  All systems reviewed and negative except as noted in HPI.    Past Medical History:   Diagnosis Date   • Atrial fibrillation (HCC)    • Hypertension        No past surgical history on file.    Social History     Socioeconomic History   • Marital status:    Tobacco Use   • Smoking status: Never   • Smokeless tobacco: Never   Vaping Use   • Vaping Use: Never used   Substance and Sexual Activity   • Alcohol use: Yes     Comment: rare/caffeine use        History reviewed. No pertinent family history.        ECG 12 Lead    Date/Time: 2022 2:44 PM  Performed by: Jag Farias MD  Authorized by: Jag Farias MD   Comparison: compared with previous ECG from 2022  Similar to previous ECG  Rhythm: atrial fibrillation  Rate: normal  Conduction: conduction  "normal  ST Segments: ST segments normal  T Waves: T waves normal  QRS axis: normal  Other: no other findings    Clinical impression: normal ECG               Objective:     /70 (BP Location: Left arm, Patient Position: Sitting)   Pulse 103   Ht 157.5 cm (62\")   Wt 63.5 kg (140 lb)   BMI 25.61 kg/m²  Body mass index is 25.61 kg/m².     Constitutional:       General: Not in acute distress.     Appearance: Well-developed. Not diaphoretic.   Eyes:      Pupils: Pupils are equal, round, and reactive to light.   HENT:      Head: Normocephalic and atraumatic.   Neck:      Thyroid: No thyromegaly.   Pulmonary:      Effort: Pulmonary effort is normal. No respiratory distress.      Breath sounds: Normal breath sounds. No wheezing. No rales.   Chest:      Chest wall: Not tender to palpatation.   Cardiovascular:      Normal rate. Irregularly irregular rhythm.      No gallop.   Pulses:     Intact distal pulses.   Edema:     Peripheral edema absent.   Abdominal:      General: Bowel sounds are normal. There is no distension.      Palpations: Abdomen is soft.      Tenderness: There is no guarding.   Musculoskeletal: Normal range of motion.         General: No deformity.      Cervical back: Normal range of motion and neck supple. Skin:     General: Skin is warm and dry.      Findings: No rash.   Neurological:      Mental Status: Alert and oriented to person, place, and time.      Cranial Nerves: No cranial nerve deficit.      Deep Tendon Reflexes: Reflexes are normal and symmetric.   Psychiatric:         Judgment: Judgment normal.         Review Of Data: I have reviewed pertinent recent labs, images and documents and pertinent findings included in HPI or assessment below.    Lipid Panel    Lipid Panel 12/21/21   Total Cholesterol 195   Triglycerides 576 (A)   HDL Cholesterol 27 (A)   VLDL Cholesterol 91 (A)   LDL Cholesterol  77   LDL/HDL Ratio 1.96   (A) Abnormal value                Assessment/Plan:         1.  Persistent " atrial fibrillation with RVR-EKG today with heart rate of 103 and home readings show most heart rates above 100 bpm.  Diltiazem increased to 180 mg p.o. daily during recent visit in clinic.  Blood pressures running normal  Echocardiogram on 12/20/2021 revealed preserved left ventricular ejection fraction, moderate mitral valve regurgitation and mildly reduced right ventricular function with left ventricular grade 2 diastolic dysfunction with elevated left atrial filling pressure.  Mild pulmonary hypertension noted.    2.  History of chest pain likely related to A. fib with RVR/uncontrolled blood pressure-  No recurrence  3.  Hypertension-well-controlled   5. Mixed hyperlipidemia/hypertriglyceridemia-significantly abnormal triglyceride level-on Vascepa and Zetia   History of intolerance with a statin    6.  Type II DM-  7.  CKD: Relatively stable creatinine before recent left lower extremity intervention.  Follows up with nephrologist    Get 24-hour Holter to see average heart rate before I increase diltiazem dose.  Check BMP  Prior history of intolerance to digoxin and beta-blockers  I will add Protonix to decrease risk of GI bleed.  She will start to walk more regularly.  Return to clinic in 3 months or sooner with any concerning symptoms.    Diagnosis and plan of care discussed with patient and verbalized understanding.           No diagnosis found.    Diagnosis and plan of care discussed with patient and verbalized understanding.            Your medication list          Accurate as of November 1, 2022  2:43 PM. If you have any questions, ask your nurse or doctor.            CONTINUE taking these medications      Instructions Last Dose Given Next Dose Due   acetaminophen 500 MG tablet  Commonly known as: TYLENOL      Take  by mouth.       ELI-MAG PO           dilTIAZem  MG 24 hr capsule  Commonly known as: CARDIZEM CD      Take 1 capsule by mouth Daily for 60 days.       empagliflozin 10 MG tablet  tablet  Commonly known as: JARDIANCE           ezetimibe 10 MG tablet  Commonly known as: ZETIA           fish oil-omega-3 fatty acids 1000 MG capsule      Take  by mouth.       FREESTYLE LITE test strip  Generic drug: glucose blood           furosemide 20 MG tablet  Commonly known as: LASIX      Take 1 tablet by mouth Daily.       glipizide 10 MG 24 hr tablet  Commonly known as: GLUCOTROL XL      Take 1 tablet by mouth Every Morning.       icosapent ethyl 1 g capsule capsule  Commonly known as: VASCEPA      Take 2 capsules by mouth 2 (Two) Times a Day.       levothyroxine 175 MCG tablet  Commonly known as: SYNTHROID, LEVOTHROID      Take 125 mcg by mouth Daily.       levothyroxine 100 MCG tablet  Commonly known as: SYNTHROID, LEVOTHROID      Take 1 tablet by mouth Daily.       levothyroxine 125 MCG tablet  Commonly known as: SYNTHROID, LEVOTHROID      Take 1 tablet by mouth Every Morning Before Breakfast for 30 days.       multivitamin tablet tablet      Take  by mouth.       PEDIATRIC MULTIVIT-MINERALS-C PO      Take  by mouth.       pregabalin 75 MG capsule  Commonly known as: LYRICA      Take 75 mg by mouth.       pregabalin 100 MG capsule  Commonly known as: LYRICA      Take 100 mg by mouth Every Evening.       SITagliptin 100 MG tablet  Commonly known as: JANUVIA      Take 100 mg by mouth Every Evening.       VITAMIN B COMPLEX PO           Vitamin C 500 MG capsule           VITAMIN D PO           warfarin 3 MG tablet  Commonly known as: COUMADIN      3mg,QD 4.5mg except 6mg Mon/Fri       warfarin 3 MG tablet  Commonly known as: COUMADIN      Take 4.5 mg by mouth Every Night.                  Jag Farias MD  11/01/22  14:43 EDT

## 2022-11-02 RX ORDER — POTASSIUM CHLORIDE 750 MG/1
10 TABLET, FILM COATED, EXTENDED RELEASE ORAL DAILY
Qty: 90 TABLET | Refills: 3 | Status: SHIPPED | OUTPATIENT
Start: 2022-11-02

## 2022-11-02 NOTE — PROGRESS NOTES
Please advise Ms. Molina  or daughter that her kidney function is stable.  She has mildly low potassium levels and I have called in prescription for potassium pills that she needs to take daily with Lasix.  Let me know if she or her daughter has  questions.  She can have follow-up kidney function and electrolyte check when she visits with her PCP.  Thank you

## 2022-12-12 ENCOUNTER — TELEPHONE (OUTPATIENT)
Dept: CARDIOLOGY | Facility: CLINIC | Age: 87
End: 2022-12-12

## 2022-12-12 DIAGNOSIS — I48.19 ATRIAL FIBRILLATION, PERSISTENT: Primary | ICD-10-CM

## 2022-12-12 NOTE — TELEPHONE ENCOUNTER
Pt's daughter called reporting uncontrolled HR with her AF.    Per daughter, pt has PTCA on her RLE on Friday.  She's been taking her 180 mg diltiazem as prescribed.  Today, pt's HR has been very labile, jumping from 60-70s, up to the 110s, 130s, and sometimes 160s.  Some recent VS as below:    /85   /103     Per daughter, they're using a pulse oximeter to watch her HR.  Pt experiencing associated symptoms of SOA and palpitations.    I called Dr. Farias to further discuss patient status.  He ordered metoprolol 50 mg BID and wants to have pt wear a holter monitor for 24 hours on Thursday or Friday of this week.    I encouraged daughter to call us if pt continues to feel poorly later tomorrow after being started on the new medication so that we can fit her in for an appt to see MD this week.  Otherwise, if she tolerates the medication and her symptoms improve, we can follow up once we get holter monitor results.  She verbalized understanding and is agreeable to plan of care.    Scheduling,  Can you please contact pt to get this holter monitor scheduled for Thursday or Friday?    Thank you,    Mirta Henao RN  Triage [facility]  12/12/22 16:43 EST

## 2022-12-13 ENCOUNTER — APPOINTMENT (OUTPATIENT)
Dept: GENERAL RADIOLOGY | Facility: HOSPITAL | Age: 87
DRG: 308 | End: 2022-12-13
Payer: MEDICARE

## 2022-12-13 ENCOUNTER — HOSPITAL ENCOUNTER (INPATIENT)
Facility: HOSPITAL | Age: 87
LOS: 7 days | Discharge: NURSING, REHAB OR LONG-TERM CARE | DRG: 308 | End: 2022-12-20
Attending: EMERGENCY MEDICINE | Admitting: INTERNAL MEDICINE
Payer: MEDICARE

## 2022-12-13 DIAGNOSIS — R09.02 HYPOXIA: ICD-10-CM

## 2022-12-13 DIAGNOSIS — I50.9 ACUTE ON CHRONIC CONGESTIVE HEART FAILURE, UNSPECIFIED HEART FAILURE TYPE: ICD-10-CM

## 2022-12-13 DIAGNOSIS — E87.20 LACTIC ACIDOSIS: ICD-10-CM

## 2022-12-13 DIAGNOSIS — J18.9 PNEUMONIA OF BOTH LUNGS DUE TO INFECTIOUS ORGANISM, UNSPECIFIED PART OF LUNG: Primary | ICD-10-CM

## 2022-12-13 DIAGNOSIS — R77.8 ELEVATED TROPONIN: ICD-10-CM

## 2022-12-13 DIAGNOSIS — I48.91 ATRIAL FIBRILLATION WITH RVR: ICD-10-CM

## 2022-12-13 LAB
ALBUMIN SERPL-MCNC: 3.9 G/DL (ref 3.5–5.2)
ALBUMIN/GLOB SERPL: 1.1 G/DL
ALP SERPL-CCNC: 106 U/L (ref 39–117)
ALT SERPL W P-5'-P-CCNC: 39 U/L (ref 1–33)
ANION GAP SERPL CALCULATED.3IONS-SCNC: 13.6 MMOL/L (ref 5–15)
AST SERPL-CCNC: 38 U/L (ref 1–32)
B PARAPERT DNA SPEC QL NAA+PROBE: NOT DETECTED
B PERT DNA SPEC QL NAA+PROBE: NOT DETECTED
BASOPHILS # BLD AUTO: 0.05 10*3/MM3 (ref 0–0.2)
BASOPHILS NFR BLD AUTO: 0.4 % (ref 0–1.5)
BILIRUB SERPL-MCNC: 0.8 MG/DL (ref 0–1.2)
BUN SERPL-MCNC: 28 MG/DL (ref 8–23)
BUN/CREAT SERPL: 19.4 (ref 7–25)
C PNEUM DNA NPH QL NAA+NON-PROBE: NOT DETECTED
CALCIUM SPEC-SCNC: 9.4 MG/DL (ref 8.2–9.6)
CHLORIDE SERPL-SCNC: 100 MMOL/L (ref 98–107)
CO2 SERPL-SCNC: 23.4 MMOL/L (ref 22–29)
CREAT SERPL-MCNC: 1.44 MG/DL (ref 0.57–1)
D-LACTATE SERPL-SCNC: 2.8 MMOL/L (ref 0.5–2)
DEPRECATED RDW RBC AUTO: 46.6 FL (ref 37–54)
EGFRCR SERPLBLD CKD-EPI 2021: 34.6 ML/MIN/1.73
EOSINOPHIL # BLD AUTO: 0 10*3/MM3 (ref 0–0.4)
EOSINOPHIL NFR BLD AUTO: 0 % (ref 0.3–6.2)
ERYTHROCYTE [DISTWIDTH] IN BLOOD BY AUTOMATED COUNT: 12.3 % (ref 12.3–15.4)
FLUAV SUBTYP SPEC NAA+PROBE: NOT DETECTED
FLUBV RNA ISLT QL NAA+PROBE: NOT DETECTED
GLOBULIN UR ELPH-MCNC: 3.4 GM/DL
GLUCOSE BLDC GLUCOMTR-MCNC: 251 MG/DL (ref 70–130)
GLUCOSE BLDC GLUCOMTR-MCNC: 321 MG/DL (ref 70–130)
GLUCOSE SERPL-MCNC: 263 MG/DL (ref 65–99)
HADV DNA SPEC NAA+PROBE: NOT DETECTED
HCOV 229E RNA SPEC QL NAA+PROBE: NOT DETECTED
HCOV HKU1 RNA SPEC QL NAA+PROBE: NOT DETECTED
HCOV NL63 RNA SPEC QL NAA+PROBE: NOT DETECTED
HCOV OC43 RNA SPEC QL NAA+PROBE: NOT DETECTED
HCT VFR BLD AUTO: 37.5 % (ref 34–46.6)
HGB BLD-MCNC: 12.2 G/DL (ref 12–15.9)
HMPV RNA NPH QL NAA+NON-PROBE: NOT DETECTED
HPIV1 RNA ISLT QL NAA+PROBE: NOT DETECTED
HPIV2 RNA SPEC QL NAA+PROBE: NOT DETECTED
HPIV3 RNA NPH QL NAA+PROBE: NOT DETECTED
HPIV4 P GENE NPH QL NAA+PROBE: NOT DETECTED
IMM GRANULOCYTES # BLD AUTO: 0.07 10*3/MM3 (ref 0–0.05)
IMM GRANULOCYTES NFR BLD AUTO: 0.5 % (ref 0–0.5)
INR PPP: 1.86 (ref 0.9–1.1)
LYMPHOCYTES # BLD AUTO: 2 10*3/MM3 (ref 0.7–3.1)
LYMPHOCYTES NFR BLD AUTO: 15.3 % (ref 19.6–45.3)
M PNEUMO IGG SER IA-ACNC: NOT DETECTED
MCH RBC QN AUTO: 33.3 PG (ref 26.6–33)
MCHC RBC AUTO-ENTMCNC: 32.5 G/DL (ref 31.5–35.7)
MCV RBC AUTO: 102.5 FL (ref 79–97)
MONOCYTES # BLD AUTO: 0.84 10*3/MM3 (ref 0.1–0.9)
MONOCYTES NFR BLD AUTO: 6.4 % (ref 5–12)
NEUTROPHILS NFR BLD AUTO: 10.09 10*3/MM3 (ref 1.7–7)
NEUTROPHILS NFR BLD AUTO: 77.4 % (ref 42.7–76)
NRBC BLD AUTO-RTO: 0 /100 WBC (ref 0–0.2)
NT-PROBNP SERPL-MCNC: 5065 PG/ML (ref 0–1800)
PLATELET # BLD AUTO: 236 10*3/MM3 (ref 140–450)
PMV BLD AUTO: 9.7 FL (ref 6–12)
POTASSIUM SERPL-SCNC: 4.4 MMOL/L (ref 3.5–5.2)
PROCALCITONIN SERPL-MCNC: 0.08 NG/ML (ref 0–0.25)
PROT SERPL-MCNC: 7.3 G/DL (ref 6–8.5)
PROTHROMBIN TIME: 21.7 SECONDS (ref 11.7–14.2)
RBC # BLD AUTO: 3.66 10*6/MM3 (ref 3.77–5.28)
RHINOVIRUS RNA SPEC NAA+PROBE: NOT DETECTED
RSV RNA NPH QL NAA+NON-PROBE: NOT DETECTED
SARS-COV-2 RNA NPH QL NAA+NON-PROBE: NOT DETECTED
SODIUM SERPL-SCNC: 137 MMOL/L (ref 136–145)
TROPONIN T SERPL-MCNC: 0.1 NG/ML (ref 0–0.03)
WBC NRBC COR # BLD: 13.05 10*3/MM3 (ref 3.4–10.8)

## 2022-12-13 PROCEDURE — 94799 UNLISTED PULMONARY SVC/PX: CPT

## 2022-12-13 PROCEDURE — 71045 X-RAY EXAM CHEST 1 VIEW: CPT

## 2022-12-13 PROCEDURE — 85610 PROTHROMBIN TIME: CPT | Performed by: PHYSICIAN ASSISTANT

## 2022-12-13 PROCEDURE — 94664 DEMO&/EVAL PT USE INHALER: CPT

## 2022-12-13 PROCEDURE — 93010 ELECTROCARDIOGRAM REPORT: CPT | Performed by: INTERNAL MEDICINE

## 2022-12-13 PROCEDURE — 93005 ELECTROCARDIOGRAM TRACING: CPT | Performed by: HOSPITALIST

## 2022-12-13 PROCEDURE — 25010000002 CEFTRIAXONE PER 250 MG: Performed by: PHYSICIAN ASSISTANT

## 2022-12-13 PROCEDURE — 85025 COMPLETE CBC W/AUTO DIFF WBC: CPT | Performed by: PHYSICIAN ASSISTANT

## 2022-12-13 PROCEDURE — 87040 BLOOD CULTURE FOR BACTERIA: CPT | Performed by: PHYSICIAN ASSISTANT

## 2022-12-13 PROCEDURE — 94761 N-INVAS EAR/PLS OXIMETRY MLT: CPT

## 2022-12-13 PROCEDURE — 83605 ASSAY OF LACTIC ACID: CPT | Performed by: PHYSICIAN ASSISTANT

## 2022-12-13 PROCEDURE — 25010000002 ONDANSETRON PER 1 MG: Performed by: INTERNAL MEDICINE

## 2022-12-13 PROCEDURE — 84484 ASSAY OF TROPONIN QUANT: CPT | Performed by: PHYSICIAN ASSISTANT

## 2022-12-13 PROCEDURE — 25010000002 AZITHROMYCIN PER 500 MG: Performed by: PHYSICIAN ASSISTANT

## 2022-12-13 PROCEDURE — 83880 ASSAY OF NATRIURETIC PEPTIDE: CPT | Performed by: PHYSICIAN ASSISTANT

## 2022-12-13 PROCEDURE — 94640 AIRWAY INHALATION TREATMENT: CPT

## 2022-12-13 PROCEDURE — 99285 EMERGENCY DEPT VISIT HI MDM: CPT

## 2022-12-13 PROCEDURE — 82962 GLUCOSE BLOOD TEST: CPT

## 2022-12-13 PROCEDURE — 99284 EMERGENCY DEPT VISIT MOD MDM: CPT

## 2022-12-13 PROCEDURE — 25010000002 FUROSEMIDE PER 20 MG: Performed by: PHYSICIAN ASSISTANT

## 2022-12-13 PROCEDURE — 0202U NFCT DS 22 TRGT SARS-COV-2: CPT | Performed by: PHYSICIAN ASSISTANT

## 2022-12-13 PROCEDURE — 80053 COMPREHEN METABOLIC PANEL: CPT | Performed by: PHYSICIAN ASSISTANT

## 2022-12-13 PROCEDURE — 36415 COLL VENOUS BLD VENIPUNCTURE: CPT

## 2022-12-13 PROCEDURE — 84145 PROCALCITONIN (PCT): CPT | Performed by: PHYSICIAN ASSISTANT

## 2022-12-13 RX ORDER — POTASSIUM CHLORIDE 750 MG/1
10 TABLET, FILM COATED, EXTENDED RELEASE ORAL DAILY
Status: DISCONTINUED | OUTPATIENT
Start: 2022-12-14 | End: 2022-12-18

## 2022-12-13 RX ORDER — NITROGLYCERIN 0.4 MG/1
0.4 TABLET SUBLINGUAL
Status: DISCONTINUED | OUTPATIENT
Start: 2022-12-13 | End: 2022-12-20 | Stop reason: HOSPADM

## 2022-12-13 RX ORDER — ACETAMINOPHEN 500 MG
1000 TABLET ORAL ONCE
Status: COMPLETED | OUTPATIENT
Start: 2022-12-13 | End: 2022-12-13

## 2022-12-13 RX ORDER — ACETAMINOPHEN 325 MG/1
650 TABLET ORAL EVERY 4 HOURS PRN
Status: DISCONTINUED | OUTPATIENT
Start: 2022-12-13 | End: 2022-12-20 | Stop reason: HOSPADM

## 2022-12-13 RX ORDER — FUROSEMIDE 20 MG/1
20 TABLET ORAL DAILY
Status: DISCONTINUED | OUTPATIENT
Start: 2022-12-14 | End: 2022-12-15

## 2022-12-13 RX ORDER — SODIUM CHLORIDE 0.9 % (FLUSH) 0.9 %
10 SYRINGE (ML) INJECTION AS NEEDED
Status: DISCONTINUED | OUTPATIENT
Start: 2022-12-13 | End: 2022-12-20 | Stop reason: HOSPADM

## 2022-12-13 RX ORDER — PANTOPRAZOLE SODIUM 40 MG/1
40 TABLET, DELAYED RELEASE ORAL
Status: DISCONTINUED | OUTPATIENT
Start: 2022-12-14 | End: 2022-12-20 | Stop reason: HOSPADM

## 2022-12-13 RX ORDER — ACETAMINOPHEN 160 MG
2000 TABLET,DISINTEGRATING ORAL DAILY
Status: DISCONTINUED | OUTPATIENT
Start: 2022-12-14 | End: 2022-12-13 | Stop reason: CLARIF

## 2022-12-13 RX ORDER — DILTIAZEM HYDROCHLORIDE 180 MG/1
180 CAPSULE, COATED, EXTENDED RELEASE ORAL
Status: DISCONTINUED | OUTPATIENT
Start: 2022-12-14 | End: 2022-12-20 | Stop reason: HOSPADM

## 2022-12-13 RX ORDER — ASCORBIC ACID 500 MG
500 TABLET ORAL DAILY
Status: DISCONTINUED | OUTPATIENT
Start: 2022-12-14 | End: 2022-12-20 | Stop reason: HOSPADM

## 2022-12-13 RX ORDER — ONDANSETRON 2 MG/ML
4 INJECTION INTRAMUSCULAR; INTRAVENOUS EVERY 6 HOURS PRN
Status: DISCONTINUED | OUTPATIENT
Start: 2022-12-13 | End: 2022-12-20 | Stop reason: HOSPADM

## 2022-12-13 RX ORDER — IPRATROPIUM BROMIDE AND ALBUTEROL SULFATE 2.5; .5 MG/3ML; MG/3ML
3 SOLUTION RESPIRATORY (INHALATION) ONCE
Status: COMPLETED | OUTPATIENT
Start: 2022-12-13 | End: 2022-12-13

## 2022-12-13 RX ORDER — DEXTROSE MONOHYDRATE 25 G/50ML
25 INJECTION, SOLUTION INTRAVENOUS
Status: DISCONTINUED | OUTPATIENT
Start: 2022-12-13 | End: 2022-12-20 | Stop reason: HOSPADM

## 2022-12-13 RX ORDER — ACETAMINOPHEN 160 MG
2000 TABLET,DISINTEGRATING ORAL DAILY
COMMUNITY

## 2022-12-13 RX ORDER — MECLIZINE HCL 12.5 MG/1
12.5 TABLET ORAL 3 TIMES DAILY PRN
COMMUNITY
End: 2022-12-20 | Stop reason: HOSPADM

## 2022-12-13 RX ORDER — TRAMADOL HYDROCHLORIDE 50 MG/1
50 TABLET ORAL EVERY 6 HOURS PRN
COMMUNITY
End: 2022-12-20 | Stop reason: HOSPADM

## 2022-12-13 RX ORDER — ASPIRIN 81 MG/1
81 TABLET, CHEWABLE ORAL DAILY
Status: DISCONTINUED | OUTPATIENT
Start: 2022-12-14 | End: 2022-12-20 | Stop reason: HOSPADM

## 2022-12-13 RX ORDER — LEVOTHYROXINE SODIUM 0.1 MG/1
100 TABLET ORAL EVERY MORNING
Status: DISCONTINUED | OUTPATIENT
Start: 2022-12-14 | End: 2022-12-20 | Stop reason: HOSPADM

## 2022-12-13 RX ORDER — NICOTINE POLACRILEX 4 MG
15 LOZENGE BUCCAL
Status: DISCONTINUED | OUTPATIENT
Start: 2022-12-13 | End: 2022-12-20 | Stop reason: HOSPADM

## 2022-12-13 RX ORDER — ONDANSETRON 4 MG/1
4 TABLET, FILM COATED ORAL EVERY 6 HOURS PRN
Status: DISCONTINUED | OUTPATIENT
Start: 2022-12-13 | End: 2022-12-20 | Stop reason: HOSPADM

## 2022-12-13 RX ORDER — DIPHENOXYLATE HYDROCHLORIDE AND ATROPINE SULFATE 2.5; .025 MG/1; MG/1
1 TABLET ORAL DAILY
Status: DISCONTINUED | OUTPATIENT
Start: 2022-12-14 | End: 2022-12-20 | Stop reason: HOSPADM

## 2022-12-13 RX ORDER — ASPIRIN 81 MG/1
81 TABLET, CHEWABLE ORAL DAILY
COMMUNITY

## 2022-12-13 RX ORDER — FUROSEMIDE 10 MG/ML
80 INJECTION INTRAMUSCULAR; INTRAVENOUS ONCE
Status: COMPLETED | OUTPATIENT
Start: 2022-12-13 | End: 2022-12-13

## 2022-12-13 RX ORDER — WARFARIN SODIUM 6 MG/1
6 TABLET ORAL SEE ADMIN INSTRUCTIONS
Status: DISCONTINUED | OUTPATIENT
Start: 2022-12-13 | End: 2022-12-13 | Stop reason: DRUGHIGH

## 2022-12-13 RX ORDER — HYDROMORPHONE HYDROCHLORIDE 1 MG/ML
0.25 INJECTION, SOLUTION INTRAMUSCULAR; INTRAVENOUS; SUBCUTANEOUS EVERY 4 HOURS PRN
Status: DISCONTINUED | OUTPATIENT
Start: 2022-12-13 | End: 2022-12-20 | Stop reason: HOSPADM

## 2022-12-13 RX ORDER — PREGABALIN 100 MG/1
100 CAPSULE ORAL NIGHTLY
Status: DISCONTINUED | OUTPATIENT
Start: 2022-12-13 | End: 2022-12-20 | Stop reason: HOSPADM

## 2022-12-13 RX ORDER — WARFARIN SODIUM 3 MG/1
4.5 TABLET ORAL SEE ADMIN INSTRUCTIONS
COMMUNITY
End: 2023-02-01 | Stop reason: SDUPTHER

## 2022-12-13 RX ORDER — ICOSAPENT ETHYL 1000 MG/1
2 CAPSULE ORAL 2 TIMES DAILY
Status: DISCONTINUED | OUTPATIENT
Start: 2022-12-14 | End: 2022-12-20 | Stop reason: HOSPADM

## 2022-12-13 RX ORDER — WARFARIN SODIUM 6 MG/1
6 TABLET ORAL
Status: COMPLETED | OUTPATIENT
Start: 2022-12-13 | End: 2022-12-14

## 2022-12-13 RX ORDER — MELATONIN
2000 DAILY
Status: DISCONTINUED | OUTPATIENT
Start: 2022-12-14 | End: 2022-12-20 | Stop reason: HOSPADM

## 2022-12-13 RX ORDER — INSULIN LISPRO 100 [IU]/ML
0-9 INJECTION, SOLUTION INTRAVENOUS; SUBCUTANEOUS
Status: DISCONTINUED | OUTPATIENT
Start: 2022-12-14 | End: 2022-12-20 | Stop reason: HOSPADM

## 2022-12-13 RX ORDER — UREA 10 %
3 LOTION (ML) TOPICAL NIGHTLY PRN
Status: DISCONTINUED | OUTPATIENT
Start: 2022-12-13 | End: 2022-12-20 | Stop reason: HOSPADM

## 2022-12-13 RX ADMIN — FUROSEMIDE 80 MG: 10 INJECTION, SOLUTION INTRAMUSCULAR; INTRAVENOUS at 17:41

## 2022-12-13 RX ADMIN — PREGABALIN 100 MG: 100 CAPSULE ORAL at 22:59

## 2022-12-13 RX ADMIN — CEFTRIAXONE SODIUM 1 G: 1 INJECTION, POWDER, FOR SOLUTION INTRAMUSCULAR; INTRAVENOUS at 14:22

## 2022-12-13 RX ADMIN — METOPROLOL TARTRATE 25 MG: 25 TABLET, FILM COATED ORAL at 12:59

## 2022-12-13 RX ADMIN — SODIUM CHLORIDE 500 MG: 9 INJECTION, SOLUTION INTRAVENOUS at 16:54

## 2022-12-13 RX ADMIN — IPRATROPIUM BROMIDE AND ALBUTEROL SULFATE 3 ML: .5; 3 SOLUTION RESPIRATORY (INHALATION) at 14:50

## 2022-12-13 RX ADMIN — ONDANSETRON 4 MG: 2 INJECTION INTRAMUSCULAR; INTRAVENOUS at 17:45

## 2022-12-13 RX ADMIN — METOPROLOL TARTRATE 5 MG: 1 INJECTION, SOLUTION INTRAVENOUS at 13:02

## 2022-12-13 RX ADMIN — SODIUM CHLORIDE 500 ML: 9 INJECTION, SOLUTION INTRAVENOUS at 14:22

## 2022-12-13 RX ADMIN — ACETAMINOPHEN 1000 MG: 500 TABLET ORAL at 12:59

## 2022-12-13 RX ADMIN — METOPROLOL TARTRATE 25 MG: 25 TABLET, FILM COATED ORAL at 22:58

## 2022-12-13 NOTE — ED PROVIDER NOTES
MD ATTESTATION NOTE    The [facility] and I have discussed this patient's history, physical exam, and treatment plan.  I have reviewed the documentation and personally had a face to face interaction with the patient. I affirm the documentation and agree with the treatment and plan.  The attached note describes my personal findings.      I provided a substantive portion of the care of the patient.  I personally performed the physical exam in its entirety, and below are my findings.  For this patient encounter, the patient wore surgical mask, I wore full protective PPE including N95 and eye protection.      Brief HPI: Patient presents for evaluation of shortness of breath.  Patient has had increasing shortness of breath for 3 days.  Has a history of atrial fibrillation.  Patient has had some cough as well.    PHYSICAL EXAM  ED Triage Vitals   Temp Heart Rate Resp BP SpO2   12/13/22 1212 12/13/22 1151 12/13/22 1210 12/13/22 1206 12/13/22 1151   99.9 °F (37.7 °C) (!) 126 26 145/89 (!) 89 %      Temp src Heart Rate Source Patient Position BP Location FiO2 (%)   12/13/22 1212 12/13/22 1151 -- -- --   Oral Monitor            GENERAL: no acute distress  HENT: nares patent  EYES: no scleral icterus  CV: Rapid and irregular  RESPIRATORY: normal effort  ABDOMEN: soft  MUSCULOSKELETAL: no deformity  NEURO: alert, moves all extremities, follows commands  PSYCH:  calm, cooperative  SKIN: warm, dry    Vital signs and nursing notes reviewed.        Plan: Patient with elements of congestive heart failure and pneumonia.  Patient will be admitted.       Martin Franklin MD  12/13/22 0334

## 2022-12-13 NOTE — ED PROVIDER NOTES
EMERGENCY DEPARTMENT ENCOUNTER    Room Number:  01/01  Date of encounter:  12/13/2022  PCP: Mireille La MD  Historian: Patient, family      I used full protective equipment while examining this patient.  This includes face mask, gloves and protective eyewear.  I washed my hands before entering the room and immediately upon leaving the room      HPI:  Chief Complaint: Weakness, shortness of breath  A complete HPI/ROS/PMH/PSH/SH/FH are unobtainable due to: Nothing    Context: Bessie Molina is a 89 y.o. female who presents to the ED c/o Deja day history of weakness and shortness of breath.  Patient symptoms started approximately 3 days ago.  Patient has had exertional shortness of breath, weakness, audible wheezing.  Patient does have a history of atrial fibrillation, diabetes.  They deny any overt fever.  She denies any abdominal pain or chest pain.  She denies any pedal edema.  Patient is a diabetic patient had an outpatient arthroplasty of her right lower extremity done last week and was doing well at that time.    Review of Medical Records  I reviewed echocardiogram from 12/20/2021.  This showed an EF of 51-55%.    PAST MEDICAL HISTORY  Active Ambulatory Problems     Diagnosis Date Noted   • Chest pain 12/20/2021   • Atrial fibrillation, persistent (HCC) 01/25/2022   • Chronic anticoagulation 01/25/2022   • Hypertriglyceridemia 01/25/2022     Resolved Ambulatory Problems     Diagnosis Date Noted   • No Resolved Ambulatory Problems     Past Medical History:   Diagnosis Date   • Atrial fibrillation (HCC)    • Hypertension          PAST SURGICAL HISTORY  No past surgical history on file.      FAMILY HISTORY  No family history on file.      SOCIAL HISTORY  Social History     Socioeconomic History   • Marital status:    Tobacco Use   • Smoking status: Never   • Smokeless tobacco: Never   Vaping Use   • Vaping Use: Never used   Substance and Sexual Activity   • Alcohol use: Yes     Comment:  rare/caffeine use          ALLERGIES  Bee venom, Atorvastatin, Gemfibrozil, Haemophilus influenzae, Influenza vac split quad, Isosorbide, Metformin, Propoxyphene, and Simvastatin        REVIEW OF SYSTEMS  All systems reviewed and negative except for those discussed in HPI.       PHYSICAL EXAM    I have reviewed the triage vital signs and nursing notes.    ED Triage Vitals   Temp Heart Rate Resp BP SpO2   12/13/22 1212 12/13/22 1151 12/13/22 1210 12/13/22 1206 12/13/22 1151   99.9 °F (37.7 °C) (!) 126 26 145/89 (!) 89 %      Temp src Heart Rate Source Patient Position BP Location FiO2 (%)   12/13/22 1212 12/13/22 1151 -- -- --   Oral Monitor          Physical Exam  GENERAL: Alert, oriented, elderly, mild respiratory distress   HENT: head atraumatic, no nuchal rigidity  EYES: no scleral icterus, EOMI  CV: Irregular rhythm, tachycardic rate, no murmur  RESPIRATORY: Mild distress, audible rhonchi and wheezing  ABDOMEN: soft, nontender  MUSCULOSKELETAL: no deformity, FROM, no calf swelling or tenderness  NEURO: alert, moves all extremities, follows commands  SKIN: warm, dry        LAB RESULTS  Recent Results (from the past 24 hour(s))   Comprehensive Metabolic Panel    Collection Time: 12/13/22 12:16 PM    Specimen: Blood   Result Value Ref Range    Glucose 263 (H) 65 - 99 mg/dL    BUN 28 (H) 8 - 23 mg/dL    Creatinine 1.44 (H) 0.57 - 1.00 mg/dL    Sodium 137 136 - 145 mmol/L    Potassium 4.4 3.5 - 5.2 mmol/L    Chloride 100 98 - 107 mmol/L    CO2 23.4 22.0 - 29.0 mmol/L    Calcium 9.4 8.2 - 9.6 mg/dL    Total Protein 7.3 6.0 - 8.5 g/dL    Albumin 3.90 3.50 - 5.20 g/dL    ALT (SGPT) 39 (H) 1 - 33 U/L    AST (SGOT) 38 (H) 1 - 32 U/L    Alkaline Phosphatase 106 39 - 117 U/L    Total Bilirubin 0.8 0.0 - 1.2 mg/dL    Globulin 3.4 gm/dL    A/G Ratio 1.1 g/dL    BUN/Creatinine Ratio 19.4 7.0 - 25.0    Anion Gap 13.6 5.0 - 15.0 mmol/L    eGFR 34.6 (L) >60.0 mL/min/1.73   BNP    Collection Time: 12/13/22 12:16 PM    Specimen:  Blood   Result Value Ref Range    proBNP 5,065.0 (H) 0.0 - 1,800.0 pg/mL   Troponin    Collection Time: 12/13/22 12:16 PM    Specimen: Blood   Result Value Ref Range    Troponin T 0.098 (C) 0.000 - 0.030 ng/mL   Protime-INR    Collection Time: 12/13/22 12:16 PM    Specimen: Blood   Result Value Ref Range    Protime 21.7 (H) 11.7 - 14.2 Seconds    INR 1.86 (H) 0.90 - 1.10   CBC Auto Differential    Collection Time: 12/13/22 12:16 PM    Specimen: Blood   Result Value Ref Range    WBC 13.05 (H) 3.40 - 10.80 10*3/mm3    RBC 3.66 (L) 3.77 - 5.28 10*6/mm3    Hemoglobin 12.2 12.0 - 15.9 g/dL    Hematocrit 37.5 34.0 - 46.6 %    .5 (H) 79.0 - 97.0 fL    MCH 33.3 (H) 26.6 - 33.0 pg    MCHC 32.5 31.5 - 35.7 g/dL    RDW 12.3 12.3 - 15.4 %    RDW-SD 46.6 37.0 - 54.0 fl    MPV 9.7 6.0 - 12.0 fL    Platelets 236 140 - 450 10*3/mm3    Neutrophil % 77.4 (H) 42.7 - 76.0 %    Lymphocyte % 15.3 (L) 19.6 - 45.3 %    Monocyte % 6.4 5.0 - 12.0 %    Eosinophil % 0.0 (L) 0.3 - 6.2 %    Basophil % 0.4 0.0 - 1.5 %    Immature Grans % 0.5 0.0 - 0.5 %    Neutrophils, Absolute 10.09 (H) 1.70 - 7.00 10*3/mm3    Lymphocytes, Absolute 2.00 0.70 - 3.10 10*3/mm3    Monocytes, Absolute 0.84 0.10 - 0.90 10*3/mm3    Eosinophils, Absolute 0.00 0.00 - 0.40 10*3/mm3    Basophils, Absolute 0.05 0.00 - 0.20 10*3/mm3    Immature Grans, Absolute 0.07 (H) 0.00 - 0.05 10*3/mm3    nRBC 0.0 0.0 - 0.2 /100 WBC   Procalcitonin    Collection Time: 12/13/22 12:16 PM    Specimen: Blood   Result Value Ref Range    Procalcitonin 0.08 0.00 - 0.25 ng/mL   Respiratory Panel PCR w/COVID-19(SARS-CoV-2) MATT/ROBYN/ROBIN/PAD/COR/MAD/MARK In-House, NP Swab in UTM/VTM, 3-4 HR TAT - Swab, Nasopharynx    Collection Time: 12/13/22  1:03 PM    Specimen: Nasopharynx; Swab   Result Value Ref Range    ADENOVIRUS, PCR Not Detected Not Detected    Coronavirus 229E Not Detected Not Detected    Coronavirus HKU1 Not Detected Not Detected    Coronavirus NL63 Not Detected Not Detected     Coronavirus OC43 Not Detected Not Detected    COVID19 Not Detected Not Detected - Ref. Range    Human Metapneumovirus Not Detected Not Detected    Human Rhinovirus/Enterovirus Not Detected Not Detected    Influenza A PCR Not Detected Not Detected    Influenza B PCR Not Detected Not Detected    Parainfluenza Virus 1 Not Detected Not Detected    Parainfluenza Virus 2 Not Detected Not Detected    Parainfluenza Virus 3 Not Detected Not Detected    Parainfluenza Virus 4 Not Detected Not Detected    RSV, PCR Not Detected Not Detected    Bordetella pertussis pcr Not Detected Not Detected    Bordetella parapertussis PCR Not Detected Not Detected    Chlamydophila pneumoniae PCR Not Detected Not Detected    Mycoplasma pneumo by PCR Not Detected Not Detected   Lactic Acid, Plasma    Collection Time: 12/13/22  1:16 PM    Specimen: Blood   Result Value Ref Range    Lactate 2.8 (C) 0.5 - 2.0 mmol/L       Ordered the above labs and independently reviewed the results.        RADIOLOGY  XR Chest 1 View    Result Date: 12/13/2022  XR CHEST 1 VW-  HISTORY: Female who is 89 years-old,  short of breath  TECHNIQUE: Frontal view of the chest  COMPARISON: 12/20/2021  FINDINGS: The heart size is borderline. Aorta is calcified. Pulmonary vasculature is unremarkable. Patchy densities in the lower lungs may be developing edema or pneumonia, follow-up suggested. No pleural effusion, or pneumothorax. No acute osseous process.      Patchy densities in the lower lungs, follow-up suggested.  This report was finalized on 12/13/2022 12:44 PM by Dr. Jorge Frye M.D.        I ordered the above noted radiological studies. Reviewed by me and discussed with radiologist.  See dictation for official radiology interpretation.      MEDICATIONS GIVEN IN ER    Medications   sodium chloride 0.9 % flush 10 mL (has no administration in time range)   azithromycin (ZITHROMAX) 500 mg in sodium chloride 0.9 % 250 mL IVPB-VTB (has no administration in time  range)   furosemide (LASIX) injection 80 mg (has no administration in time range)   metoprolol tartrate (LOPRESSOR) injection 5 mg (5 mg Intravenous Given 12/13/22 1302)   metoprolol tartrate (LOPRESSOR) tablet 25 mg (25 mg Oral Given 12/13/22 1259)   acetaminophen (TYLENOL) tablet 1,000 mg (1,000 mg Oral Given 12/13/22 1259)   sodium chloride 0.9 % bolus 500 mL (500 mL Intravenous New Bag 12/13/22 1422)   cefTRIAXone (ROCEPHIN) 1 g in sodium chloride 0.9 % 100 mL IVPB-VTB (0 g Intravenous Stopped 12/13/22 1456)   ipratropium-albuterol (DUO-NEB) nebulizer solution 3 mL (3 mL Nebulization Given 12/13/22 1450)     Critical Care  Performed by: Matthew Olmstead PA  Authorized by: Martin Franklin MD     Critical care provider statement:     Critical care time (minutes):  32    Critical care time was exclusive of:  Separately billable procedures and treating other patients    Critical care was necessary to treat or prevent imminent or life-threatening deterioration of the following conditions:  Respiratory failure, sepsis and cardiac failure    Critical care was time spent personally by me on the following activities:  Blood draw for specimens, development of treatment plan with patient or surrogate, discussions with primary provider, evaluation of patient's response to treatment, examination of patient, interpretation of cardiac output measurements, obtaining history from patient or surrogate, ordering and performing treatments and interventions, ordering and review of laboratory studies, ordering and review of radiographic studies, pulse oximetry, re-evaluation of patient's condition and review of old charts          PROGRESS, DATA ANALYSIS, CONSULTS, AND MEDICAL DECISION MAKING    All labs have been independently reviewed by me.  All radiology studies have been reviewed by me and discussed with radiologist dictating the report.   EKG's independently viewed and interpreted by me.  Discussion below represents my  analysis of pertinent findings related to patient's condition, differential diagnosis, treatment plan and final disposition.    I have discussed case with Dr. Franklin, emergency room physician.  He has performed his own bedside examination and agrees with treatment plan.    ED Course as of 12/13/22 1531   Tue Dec 13, 2022   1338 Patient presents with several day history of shortness of breath, wheezing, weakness.  Differential diagnoses include but not limited to pneumonia, viral URI, CHF exacerbation. [EE]   1411 WBC(!): 13.05 [EE]   1411 Creatinine(!): 1.44 [EE]   1411 proBNP(!): 5,065.0 [EE]   1411 Troponin T(!!): 0.098  Patient has no chest pain.  I suspect this is secondary to renal impairment and CHF. [EE]   1411 Lactate(!!): 2.8 [EE]   1412 Chest x-ray interpreted myself shows bilateral patchy infiltrates [EE]   1412 Patient appears to have pneumonia in combination with CHF.  We will treat with diuretics, antibiotics and admit.  Patient and family in agreement with treatment plan. [EE]   1500 I discussed case with Dr. Leyva, [facility].  She agrees to admit. [EE]   1514 EKG interpreted myself.  Time is 1206.  Atrial fibrillation, rate 126.  QRS normal with normal axis.  No significant ST abnormalities.  EKG similar comparison prior EKG from 12/21/2021. [EE]   1522 Updated patient and family on work-up.  They understand plan for admission.  Patient's heart rate in the 90s, breathing improved after breathing treatment [EE]      ED Course User Index  [EE] Matthew Olmstead PA       AS OF 15:30 EST VITALS:    BP - 130/82  HR - 95  TEMP - 99.9 °F (37.7 °C) (Oral)  O2 SATS - 91%        DIAGNOSIS  Final diagnoses:   Pneumonia of both lungs due to infectious organism, unspecified part of lung   Acute on chronic congestive heart failure, unspecified heart failure type (HCC)   Atrial fibrillation with RVR (HCC)   Lactic acidosis   Elevated troponin   Hypoxia         DISPOSITION  Admitted      Dictated utilizing Dragon  Matthew Shelby PA  12/13/22 1535

## 2022-12-13 NOTE — ED NOTES
Nursing report ED to floor  Bessie Molina  89 y.o.  female    HPI :   Chief Complaint   Patient presents with    Shortness of Breath       Admitting doctor:   Cristiane Leyva MD    Admitting diagnosis:   The primary encounter diagnosis was Pneumonia of both lungs due to infectious organism, unspecified part of lung. Diagnoses of Acute on chronic congestive heart failure, unspecified heart failure type (HCC), Atrial fibrillation with RVR (HCC), Lactic acidosis, and Elevated troponin were also pertinent to this visit.    Code status:   Current Code Status       Date Active Code Status Order ID Comments User Context       Prior            Allergies:   Bee venom, Atorvastatin, Gemfibrozil, Haemophilus influenzae, Influenza vac split quad, Isosorbide, Metformin, Propoxyphene, and Simvastatin    Isolation:   No active isolations    Intake and Output  No intake or output data in the 24 hours ending 12/13/22 1525    Weight:       12/13/22  1213   Weight: 63.5 kg (140 lb)       Most recent vitals:   Vitals:    12/13/22 1213 12/13/22 1235 12/13/22 1406 12/13/22 1450   BP:   130/82    Pulse:  (!) 130 103 95   Resp:    20   Temp:       TempSrc:       SpO2:  93% 95% 91%   Weight: 63.5 kg (140 lb)      Height: 157.5 cm (62\")          Active LDAs/IV Access:   Lines, Drains & Airways       Active LDAs       Name Placement date Placement time Site Days    Peripheral IV 12/13/22 1245 Right Antecubital 12/13/22  1245  Antecubital  less than 1    External Urinary Catheter 12/19/21  2345  --  358                    Labs (abnormal labs have a star):   Labs Reviewed   COMPREHENSIVE METABOLIC PANEL - Abnormal; Notable for the following components:       Result Value    Glucose 263 (*)     BUN 28 (*)     Creatinine 1.44 (*)     ALT (SGPT) 39 (*)     AST (SGOT) 38 (*)     eGFR 34.6 (*)     All other components within normal limits    Narrative:     GFR Normal >60  Chronic Kidney Disease <60  Kidney Failure <15    The GFR formula is  only valid for adults with stable renal function between ages 18 and 70.   BNP (IN-HOUSE) - Abnormal; Notable for the following components:    proBNP 5,065.0 (*)     All other components within normal limits    Narrative:     Among patients with dyspnea, NT-proBNP is highly sensitive for the detection of acute congestive heart failure. In addition NT-proBNP of <300 pg/ml effectively rules out acute congestive heart failure with 99% negative predictive value.    Results may be falsely decreased if patient taking Biotin.     TROPONIN (IN-HOUSE) - Abnormal; Notable for the following components:    Troponin T 0.098 (*)     All other components within normal limits    Narrative:     Troponin T Reference Range:  <= 0.03 ng/mL-   Negative for AMI  >0.03 ng/mL-     Abnormal for myocardial necrosis.  Clinicians would have to utilize clinical acumen, EKG, Troponin and serial changes to determine if it is an Acute Myocardial Infarction or myocardial injury due to an underlying chronic condition.       Results may be falsely decreased if patient taking Biotin.     PROTIME-INR - Abnormal; Notable for the following components:    Protime 21.7 (*)     INR 1.86 (*)     All other components within normal limits   CBC WITH AUTO DIFFERENTIAL - Abnormal; Notable for the following components:    WBC 13.05 (*)     RBC 3.66 (*)     .5 (*)     MCH 33.3 (*)     Neutrophil % 77.4 (*)     Lymphocyte % 15.3 (*)     Eosinophil % 0.0 (*)     Neutrophils, Absolute 10.09 (*)     Immature Grans, Absolute 0.07 (*)     All other components within normal limits   LACTIC ACID, PLASMA - Abnormal; Notable for the following components:    Lactate 2.8 (*)     All other components within normal limits   RESPIRATORY PANEL PCR W/ COVID-19 (SARS-COV-2) MATT/ROBYN/ROBIN/PAD/COR/MAD/MARK IN-HOUSE, NP SWAB IN [facility], 3-4 HR TAT - Normal    Narrative:     In the setting of a positive respiratory panel with a viral infection PLUS a negative procalcitonin without  other underlying concern for bacterial infection, consider observing off antibiotics or discontinuation of antibiotics and continue supportive care. If the respiratory panel is positive for atypical bacterial infection (Bordetella pertussis, Chlamydophila pneumoniae, or Mycoplasma pneumoniae), consider antibiotic de-escalation to target atypical bacterial infection.   PROCALCITONIN - Normal    Narrative:     As a Marker for Sepsis (Non-Neonates):    1. <0.5 ng/mL represents a low risk of severe sepsis and/or septic shock.  2. >2 ng/mL represents a high risk of severe sepsis and/or septic shock.    As a Marker for Lower Respiratory Tract Infections that require antibiotic therapy:    PCT on Admission    Antibiotic Therapy       6-12 Hrs later    >0.5                Strongly Recommended  >0.25 - <0.5        Recommended   0.1 - 0.25          Discouraged              Remeasure/reassess PCT  <0.1                Strongly Discouraged     Remeasure/reassess PCT    As 28 day mortality risk marker: \"Change in Procalcitonin Result\" (>80% or <=80%) if Day 0 (or Day 1) and Day 4 values are available. Refer to http://www.PROSimitys-pct-calculator.com    Change in PCT <=80%  A decrease of PCT levels below or equal to 80% defines a positive change in PCT test result representing a higher risk for 28-day all-cause mortality of patients diagnosed with severe sepsis for septic shock.    Change in PCT >80%  A decrease of PCT levels of more than 80% defines a negative change in PCT result representing a lower risk for 28-day all-cause mortality of patients diagnosed with severe sepsis or septic shock.      BLOOD CULTURE   BLOOD CULTURE   LACTIC ACID, REFLEX   CBC AND DIFFERENTIAL    Narrative:     The following orders were created for panel order CBC & Differential.  Procedure                               Abnormality         Status                     ---------                               -----------         ------                      CBC Auto Differential[[id number]]        Abnormal            Final result                 Please view results for these tests on the individual orders.       EKG:   ECG 12 Lead Chest Pain    (Results Pending)       Meds given in ED:   Medications   sodium chloride 0.9 % flush 10 mL (has no administration in time range)   azithromycin (ZITHROMAX) 500 mg in sodium chloride 0.9 % 250 mL IVPB-VTB (has no administration in time range)   furosemide (LASIX) injection 80 mg (has no administration in time range)   metoprolol tartrate (LOPRESSOR) injection 5 mg (5 mg Intravenous Given 12/13/22 1302)   metoprolol tartrate (LOPRESSOR) tablet 25 mg (25 mg Oral Given 12/13/22 1259)   acetaminophen (TYLENOL) tablet 1,000 mg (1,000 mg Oral Given 12/13/22 1259)   sodium chloride 0.9 % bolus 500 mL (500 mL Intravenous New Bag 12/13/22 1422)   cefTRIAXone (ROCEPHIN) 1 g in sodium chloride 0.9 % 100 mL IVPB-VTB (0 g Intravenous Stopped 12/13/22 1456)   ipratropium-albuterol (DUO-NEB) nebulizer solution 3 mL (3 mL Nebulization Given 12/13/22 1450)       Imaging results:  XR Chest 1 View    Result Date: 12/13/2022  Patchy densities in the lower lungs, follow-up suggested.  This report was finalized on 12/13/2022 12:44 PM by Dr. Jorge Frye M.D.       Ambulatory status:   - bed rest    Social issues:   Social History     Socioeconomic History    Marital status:    Tobacco Use    Smoking status: Never    Smokeless tobacco: Never   Vaping Use    Vaping Use: Never used   Substance and Sexual Activity    Alcohol use: Yes     Comment: rare/caffeine use        NIH Stroke Scale:         Moira Kilgore RN  12/13/22 15:25 EST

## 2022-12-13 NOTE — PROGRESS NOTES
Clinical Pharmacy Services: Medication History    Bessie Molina is a 89 y.o. female presenting to [facility] for   Chief Complaint   Patient presents with   • Shortness of Breath       She  has a past medical history of Atrial fibrillation (HCC) and Hypertension.    Allergies as of 12/13/2022 - Reviewed 12/13/2022   Allergen Reaction Noted   • Bee venom Anaphylaxis 07/02/2021   • Atorvastatin Nausea Only 07/02/2021   • Gemfibrozil Nausea Only 07/02/2021   • Haemophilus influenzae Unknown - High Severity 07/02/2021   • Influenza vac split quad Unknown - High Severity 07/02/2021   • Isosorbide Unknown - High Severity 07/02/2021   • Metformin Unknown - High Severity 07/02/2021   • Propoxyphene Unknown - High Severity 07/02/2021   • Simvastatin Nausea Only 07/02/2021       Medication information was obtained from: Daughter  Pharmacy and Phone Number:     Prior to Admission Medications     Prescriptions Last Dose Informant Patient Reported? Taking?    acetaminophen (TYLENOL) 500 MG tablet  Child Yes Yes    Take 1,000 mg by mouth Every 4 (Four) Hours As Needed.    Ascorbic Acid (Vitamin C) 500 MG capsule 12/12/2022 Self Yes Yes    Take 1 tablet by mouth Daily.    aspirin 81 MG chewable tablet 12/12/2022 Child Yes Yes    Chew 81 mg Daily.    B Complex Vitamins (VITAMIN B COMPLEX PO) 12/12/2022 Self Yes Yes    Take 1 tablet by mouth Daily.    Cholecalciferol (Vitamin D3) 50 MCG (2000 UT) capsule 12/12/2022 Child Yes Yes    Take 2,000 Units by mouth Daily.    dilTIAZem CD (CARDIZEM CD) 180 MG 24 hr capsule 12/12/2022 Child No Yes    Take 1 capsule by mouth Daily for 60 days.    ezetimibe (ZETIA) 10 MG tablet 12/12/2022 Child Yes Yes    Take 10 mg by mouth Daily.    fish oil-omega-3 fatty acids 1000 MG capsule 12/12/2022 Child Yes Yes    Take 1 g by mouth Daily.    furosemide (LASIX) 20 MG tablet 12/13/2022 Child Yes Yes    Take 1 tablet by mouth Daily.    glipizide (GLUCOTROL XL) 10 MG 24 hr tablet  12/12/2022 Child Yes Yes    Take 1 tablet by mouth Every Morning.    icosapent ethyl (VASCEPA) 1 g capsule capsule 12/12/2022 Child Yes Yes    Take 2 capsules by mouth 2 (Two) Times a Day.    levothyroxine (SYNTHROID, LEVOTHROID) 100 MCG tablet 12/13/2022 Child Yes Yes    Take 1 tablet by mouth Daily.    meclizine (ANTIVERT) 12.5 MG tablet  Child Yes Yes    Take 12.5 mg by mouth 3 (Three) Times a Day As Needed for Dizziness.    metoprolol tartrate (LOPRESSOR) 25 MG tablet 12/13/2022 Child No Yes    Take 1 tablet by mouth 2 (Two) Times a Day.    multivitamin (THERAGRAN) tablet tablet 12/12/2022 Child Yes Yes    Take 1 tablet by mouth Daily.    pantoprazole (PROTONIX) 20 MG EC tablet 12/12/2022 Child No Yes    Take 2 tablets by mouth Daily.    potassium chloride 10 MEQ CR tablet 12/12/2022 Child No Yes    Take 1 tablet by mouth Daily.    pregabalin (LYRICA) 100 MG capsule 12/12/2022 Child Yes Yes    Take 100 mg by mouth Every Evening.    SITagliptin (JANUVIA) 100 MG tablet 12/12/2022 Child Yes Yes    Take 100 mg by mouth Every Evening.    traMADol (ULTRAM) 50 MG tablet  Child Yes Yes    Take 50 mg by mouth Every 6 (Six) Hours As Needed for Moderate Pain.    warfarin (COUMADIN) 3 MG tablet 12/12/2022 Child Yes Yes    Take 6 mg by mouth See Admin Instructions. 6 mg on Monday and Friday    warfarin (COUMADIN) 3 MG tablet Past Week Child Yes Yes    Take 4.5 mg by mouth See Admin Instructions. 4.5 mg on Tuesday, Wednesday, Thursday, Saturday and Sunday            Medication notes:     This medication list is complete to the best of my knowledge as of 12/13/2022    Please call if questions.    Leonardo Juan  Medication History Technician  [phone]     12/13/2022 15:34 EST

## 2022-12-14 ENCOUNTER — APPOINTMENT (OUTPATIENT)
Dept: CARDIOLOGY | Facility: HOSPITAL | Age: 87
DRG: 308 | End: 2022-12-14
Payer: MEDICARE

## 2022-12-14 ENCOUNTER — APPOINTMENT (OUTPATIENT)
Dept: CT IMAGING | Facility: HOSPITAL | Age: 87
DRG: 308 | End: 2022-12-14
Payer: MEDICARE

## 2022-12-14 PROBLEM — F03.90 DEMENTIA: Status: ACTIVE | Noted: 2022-12-14

## 2022-12-14 PROBLEM — I50.33 ACUTE ON CHRONIC DIASTOLIC HEART FAILURE: Status: ACTIVE | Noted: 2022-12-14

## 2022-12-14 PROBLEM — E78.1 HYPERTRIGLYCERIDEMIA: Status: RESOLVED | Noted: 2022-01-25 | Resolved: 2022-12-14

## 2022-12-14 PROBLEM — R07.9 CHEST PAIN: Status: RESOLVED | Noted: 2021-12-20 | Resolved: 2022-12-14

## 2022-12-14 PROBLEM — R10.9 ABDOMINAL PAIN: Status: ACTIVE | Noted: 2022-12-14

## 2022-12-14 PROBLEM — J96.01 ACUTE RESPIRATORY FAILURE WITH HYPOXIA: Status: ACTIVE | Noted: 2022-12-14

## 2022-12-14 LAB
ANION GAP SERPL CALCULATED.3IONS-SCNC: 12.5 MMOL/L (ref 5–15)
BH CV ECHO MEAS - ACS: 1.5 CM
BH CV ECHO MEAS - AI P1/2T: 249.7 MSEC
BH CV ECHO MEAS - AO MAX PG: 10.8 MMHG
BH CV ECHO MEAS - AO MEAN PG: 7 MMHG
BH CV ECHO MEAS - AO ROOT DIAM: 2.8 CM
BH CV ECHO MEAS - AO V2 MAX: 164 CM/SEC
BH CV ECHO MEAS - AO V2 VTI: 38.3 CM
BH CV ECHO MEAS - AVA(I,D): 1.18 CM2
BH CV ECHO MEAS - EDV(CUBED): 50.7 ML
BH CV ECHO MEAS - EDV(MOD-SP2): 47 ML
BH CV ECHO MEAS - EDV(MOD-SP4): 75 ML
BH CV ECHO MEAS - EF(MOD-BP): 58.1 %
BH CV ECHO MEAS - EF(MOD-SP2): 59.6 %
BH CV ECHO MEAS - EF(MOD-SP4): 54.7 %
BH CV ECHO MEAS - ESV(CUBED): 35.9 ML
BH CV ECHO MEAS - ESV(MOD-SP2): 19 ML
BH CV ECHO MEAS - ESV(MOD-SP4): 34 ML
BH CV ECHO MEAS - FS: 10.8 %
BH CV ECHO MEAS - IVS/LVPW: 0.91 CM
BH CV ECHO MEAS - IVSD: 1 CM
BH CV ECHO MEAS - LA DIMENSION: 2.8 CM
BH CV ECHO MEAS - LV DIASTOLIC VOL/BSA (35-75): 45.4 CM2
BH CV ECHO MEAS - LV MASS(C)D: 120.8 GRAMS
BH CV ECHO MEAS - LV MAX PG: 1.77 MMHG
BH CV ECHO MEAS - LV MEAN PG: 1 MMHG
BH CV ECHO MEAS - LV SYSTOLIC VOL/BSA (12-30): 20.6 CM2
BH CV ECHO MEAS - LV V1 MAX: 66.6 CM/SEC
BH CV ECHO MEAS - LV V1 VTI: 15.9 CM
BH CV ECHO MEAS - LVIDD: 3.7 CM
BH CV ECHO MEAS - LVIDS: 3.3 CM
BH CV ECHO MEAS - LVOT AREA: 2.8 CM2
BH CV ECHO MEAS - LVOT DIAM: 1.9 CM
BH CV ECHO MEAS - LVPWD: 1.1 CM
BH CV ECHO MEAS - MR MAX PG: 100 MMHG
BH CV ECHO MEAS - MR MAX VEL: 500 CM/SEC
BH CV ECHO MEAS - MR MEAN PG: 60 MMHG
BH CV ECHO MEAS - MR MEAN VEL: 363 CM/SEC
BH CV ECHO MEAS - MR VTI: 137 CM
BH CV ECHO MEAS - MV DEC SLOPE: 469 CM/SEC2
BH CV ECHO MEAS - MV DEC TIME: 158 MSEC
BH CV ECHO MEAS - MV E MAX VEL: 116 CM/SEC
BH CV ECHO MEAS - MV MAX PG: 5.4 MMHG
BH CV ECHO MEAS - MV MEAN PG: 2 MMHG
BH CV ECHO MEAS - MV P1/2T: 81.2 MSEC
BH CV ECHO MEAS - MV V2 VTI: 21.7 CM
BH CV ECHO MEAS - MVA(P1/2T): 2.7 CM2
BH CV ECHO MEAS - MVA(VTI): 2.08 CM2
BH CV ECHO MEAS - PA ACC TIME: 0.11 SEC
BH CV ECHO MEAS - PA PR(ACCEL): 31.3 MMHG
BH CV ECHO MEAS - PA V2 MAX: 80.1 CM/SEC
BH CV ECHO MEAS - PI END-D VEL: 103 CM/SEC
BH CV ECHO MEAS - QP/QS: 0.72
BH CV ECHO MEAS - RAP SYSTOLE: 3 MMHG
BH CV ECHO MEAS - RV MAX PG: 0.91 MMHG
BH CV ECHO MEAS - RV V1 MAX: 47.6 CM/SEC
BH CV ECHO MEAS - RV V1 VTI: 8.5 CM
BH CV ECHO MEAS - RVOT DIAM: 2.2 CM
BH CV ECHO MEAS - RVSP: 38.5 MMHG
BH CV ECHO MEAS - SI(MOD-SP2): 16.9 ML/M2
BH CV ECHO MEAS - SI(MOD-SP4): 24.8 ML/M2
BH CV ECHO MEAS - SV(LVOT): 45.1 ML
BH CV ECHO MEAS - SV(MOD-SP2): 28 ML
BH CV ECHO MEAS - SV(MOD-SP4): 41 ML
BH CV ECHO MEAS - SV(RVOT): 32.3 ML
BH CV ECHO MEAS - TAPSE (>1.6): 1.34 CM
BH CV ECHO MEAS - TR MAX PG: 35.5 MMHG
BH CV ECHO MEAS - TR MAX VEL: 298 CM/SEC
BH CV XLRA - RV BASE: 2.39 CM
BH CV XLRA - RV LENGTH: 6.3 CM
BH CV XLRA - RV MID: 1.68 CM
BUN SERPL-MCNC: 30 MG/DL (ref 8–23)
BUN/CREAT SERPL: 20.4 (ref 7–25)
CALCIUM SPEC-SCNC: 8.9 MG/DL (ref 8.2–9.6)
CHLORIDE SERPL-SCNC: 104 MMOL/L (ref 98–107)
CO2 SERPL-SCNC: 26.5 MMOL/L (ref 22–29)
CREAT SERPL-MCNC: 1.47 MG/DL (ref 0.57–1)
DEPRECATED RDW RBC AUTO: 44.7 FL (ref 37–54)
EGFRCR SERPLBLD CKD-EPI 2021: 33.8 ML/MIN/1.73
ERYTHROCYTE [DISTWIDTH] IN BLOOD BY AUTOMATED COUNT: 12.2 % (ref 12.3–15.4)
GLUCOSE BLDC GLUCOMTR-MCNC: 109 MG/DL (ref 70–130)
GLUCOSE BLDC GLUCOMTR-MCNC: 155 MG/DL (ref 70–130)
GLUCOSE BLDC GLUCOMTR-MCNC: 160 MG/DL (ref 70–130)
GLUCOSE BLDC GLUCOMTR-MCNC: 238 MG/DL (ref 70–130)
GLUCOSE SERPL-MCNC: 125 MG/DL (ref 65–99)
HBA1C MFR BLD: 7.2 % (ref 4.8–5.6)
HCT VFR BLD AUTO: 31.9 % (ref 34–46.6)
HGB BLD-MCNC: 10.4 G/DL (ref 12–15.9)
INR PPP: 2.29 (ref 0.9–1.1)
LEFT ATRIUM VOLUME INDEX: 32.3 ML/M2
MAXIMAL PREDICTED HEART RATE: 130 BPM
MCH RBC QN AUTO: 32.6 PG (ref 26.6–33)
MCHC RBC AUTO-ENTMCNC: 32.6 G/DL (ref 31.5–35.7)
MCV RBC AUTO: 100 FL (ref 79–97)
PLATELET # BLD AUTO: 211 10*3/MM3 (ref 140–450)
PMV BLD AUTO: 9.6 FL (ref 6–12)
POTASSIUM SERPL-SCNC: 4 MMOL/L (ref 3.5–5.2)
PROTHROMBIN TIME: 25.5 SECONDS (ref 11.7–14.2)
RBC # BLD AUTO: 3.19 10*6/MM3 (ref 3.77–5.28)
SINUS: 3.1 CM
SODIUM SERPL-SCNC: 143 MMOL/L (ref 136–145)
STJ: 2.37 CM
STRESS TARGET HR: 111 BPM
WBC NRBC COR # BLD: 10.74 10*3/MM3 (ref 3.4–10.8)

## 2022-12-14 PROCEDURE — 93306 TTE W/DOPPLER COMPLETE: CPT | Performed by: INTERNAL MEDICINE

## 2022-12-14 PROCEDURE — 63710000001 INSULIN LISPRO (HUMAN) PER 5 UNITS: Performed by: INTERNAL MEDICINE

## 2022-12-14 PROCEDURE — 85610 PROTHROMBIN TIME: CPT | Performed by: INTERNAL MEDICINE

## 2022-12-14 PROCEDURE — 93306 TTE W/DOPPLER COMPLETE: CPT

## 2022-12-14 PROCEDURE — 25010000002 CEFTRIAXONE PER 250 MG: Performed by: INTERNAL MEDICINE

## 2022-12-14 PROCEDURE — 80048 BASIC METABOLIC PNL TOTAL CA: CPT | Performed by: INTERNAL MEDICINE

## 2022-12-14 PROCEDURE — 74176 CT ABD & PELVIS W/O CONTRAST: CPT

## 2022-12-14 PROCEDURE — 83036 HEMOGLOBIN GLYCOSYLATED A1C: CPT | Performed by: INTERNAL MEDICINE

## 2022-12-14 PROCEDURE — 71250 CT THORAX DX C-: CPT

## 2022-12-14 PROCEDURE — 97161 PT EVAL LOW COMPLEX 20 MIN: CPT

## 2022-12-14 PROCEDURE — 85027 COMPLETE CBC AUTOMATED: CPT | Performed by: INTERNAL MEDICINE

## 2022-12-14 PROCEDURE — 25010000002 PERFLUTREN (DEFINITY) 8.476 MG IN SODIUM CHLORIDE (PF) 0.9 % 10 ML INJECTION: Performed by: HOSPITALIST

## 2022-12-14 PROCEDURE — 25010000002 ONDANSETRON PER 1 MG: Performed by: INTERNAL MEDICINE

## 2022-12-14 PROCEDURE — 82962 GLUCOSE BLOOD TEST: CPT

## 2022-12-14 RX ADMIN — PERFLUTREN 3 ML: 6.52 INJECTION, SUSPENSION INTRAVENOUS at 15:35

## 2022-12-14 RX ADMIN — LINAGLIPTIN 5 MG: 5 TABLET, FILM COATED ORAL at 08:49

## 2022-12-14 RX ADMIN — ASPIRIN 81 MG: 81 TABLET, CHEWABLE ORAL at 08:50

## 2022-12-14 RX ADMIN — ICOSAPENT ETHYL 2 G: 1000 CAPSULE ORAL at 14:17

## 2022-12-14 RX ADMIN — INSULIN LISPRO 2 UNITS: 100 INJECTION, SOLUTION INTRAVENOUS; SUBCUTANEOUS at 17:08

## 2022-12-14 RX ADMIN — POTASSIUM CHLORIDE 10 MEQ: 750 TABLET, EXTENDED RELEASE ORAL at 08:49

## 2022-12-14 RX ADMIN — Medication 2000 UNITS: at 08:49

## 2022-12-14 RX ADMIN — PANTOPRAZOLE SODIUM 40 MG: 40 TABLET, DELAYED RELEASE ORAL at 06:45

## 2022-12-14 RX ADMIN — PREGABALIN 100 MG: 100 CAPSULE ORAL at 20:24

## 2022-12-14 RX ADMIN — OXYCODONE HYDROCHLORIDE AND ACETAMINOPHEN 500 MG: 500 TABLET ORAL at 08:49

## 2022-12-14 RX ADMIN — FUROSEMIDE 20 MG: 20 TABLET ORAL at 08:49

## 2022-12-14 RX ADMIN — DILTIAZEM HYDROCHLORIDE 180 MG: 180 CAPSULE, EXTENDED RELEASE ORAL at 08:49

## 2022-12-14 RX ADMIN — Medication 1 TABLET: at 08:49

## 2022-12-14 RX ADMIN — LEVOTHYROXINE SODIUM 100 MCG: 0.1 TABLET ORAL at 06:45

## 2022-12-14 RX ADMIN — METOPROLOL TARTRATE 25 MG: 25 TABLET, FILM COATED ORAL at 08:50

## 2022-12-14 RX ADMIN — CEFTRIAXONE SODIUM 1 G: 1 INJECTION, POWDER, FOR SOLUTION INTRAMUSCULAR; INTRAVENOUS at 14:02

## 2022-12-14 RX ADMIN — WARFARIN 6 MG: 6 TABLET ORAL at 00:04

## 2022-12-14 RX ADMIN — ONDANSETRON 4 MG: 2 INJECTION INTRAMUSCULAR; INTRAVENOUS at 16:04

## 2022-12-14 RX ADMIN — INSULIN LISPRO 4 UNITS: 100 INJECTION, SOLUTION INTRAVENOUS; SUBCUTANEOUS at 12:03

## 2022-12-14 RX ADMIN — METOPROLOL TARTRATE 25 MG: 25 TABLET, FILM COATED ORAL at 20:24

## 2022-12-14 RX ADMIN — WARFARIN 4.5 MG: 2.5 TABLET ORAL at 17:09

## 2022-12-14 NOTE — NURSING NOTE
VSS.Pt continues on 2L 02 via N/C,sats stable. Alert to self , denies any SOB,no complaints of N/V, abd pain this shift .Continues on IV ABT for PNA .CT abd & pelvis pending. Q2H T&R.WCTM.

## 2022-12-14 NOTE — PROGRESS NOTES
[facility] Clinical Pharmacy Services: Warfarin Dosing/Monitoring Consult    Bessie Molina is a 89 y.o. female, estimated creatinine clearance is 22.8 mL/min (A) (by C-G formula based on SCr of 1.44 mg/dL (H)). weighing 63.5 kg (140 lb).    Results from last 7 days   Lab Units 12/13/22  1216   INR  1.86*   HEMOGLOBIN g/dL 12.2   HEMATOCRIT % 37.5   PLATELETS 10*3/mm3 236     Prior to admission anticoagulation: Per medical reconciliation, patient's home warfarin dose is 6 mg on Monday/Friday and 4.5 mg on all the other days of the week (34.5 mg/week).    Hospital Anticoagulation:  Consulting provider: Dr. Leyva  Start date: 12/13/22  Indication: Atrial fibrillation  Target INR: 2 - 3  Expected duration: Lifelong  Bridge Therapy: No    Potential food or drug interactions:   Azithromycin (moderate-new med in hospital)-concurrent use may increase the serum concentration of warfarin, increasing risk of bleeding.  Ceftriaxone (moderate-new med in hospital)-concurrent use may enhance the anticoagulant effect of warfarin by platelet inhibition which can lead to irreversible platelet dysfunction.    Protonix (moderate-home med)-concurrent use inhibits [id number] warfarin metabolism, resulting in an increase in INR and an increased risk of bleeding.   Levothyroxine (minor-home med)-concurrent use increases metabolism of vitamin K-dependent clotting factors, resulting in an increased risk of bleeding.      Education complete?/Date: No; plan for follow up TBD    INR Assessment:  Date INR Dose   12/13 1.86                Assessment/Plan:  INR is slightly subtherapeutic today at 1.86. Will give one dose of 6 mg tonight, then assess INR tomorrow morning to determine further dosing.   Monitor for any signs or symptoms of bleeding. H/H is stable today at 12.2/37.5.  Follow up daily INRs and dose adjustments.    Pharmacy will continue to follow until discharge or discontinuation of warfarin.     Yoli Fitzgerald PharmPremD.,  BCPS   Clinical Pharmacist

## 2022-12-14 NOTE — PROGRESS NOTES
Nutrition Services    Patient Name:  Bessie Molina  YOB: 1932  MRN: [id number]  Admit Date:  12/13/2022    Assessment Date:  12/14/22    NUTRITION SCREENING      Reason for Encounter Screening triggered 2/2 documented pressure injury - last documented pressure injury from 12/2021.    Diagnosis/Problem Acute respiratory failure with hypoxia  Pneumonia of both lungs  Dementia       PO Diet Diet: Cardiac Diets, Diabetic Diets; Healthy Heart (2-3 Na+); Consistent Carbohydrate; Texture: Regular Texture (IDDSI 7); Fluid Consistency: Thin (IDDSI 0)   PO Supplements/Snacks Add Boost Glucose Control TID with meals for additional nutrition support   PO Intake % 25-50% x 2 meals today       Labs  Listed below, reviewed   Physical Findings Periorbital swelling, disoriented, dentures present, SOB, on O2 support   GI Function Last documented BM on 12/13   Skin Status Bruised, 2-3+ edema BLE        Height:  Weight:  BMI:    Weight Trend Height: 157.5 cm (62\")  Weight: 64.4 kg (142 lb) (12/14/22 1457)  Body mass index is 25.97 kg/m².    Stable       Intervention/Plan 1. Monitor and encourage adequate PO intake PRN.     2. Add Boost Glucose Control TID with meals for additional nutrition support    RD will continue to follow course for PO intake and tolerance.          Results from last 7 days   Lab Units 12/14/22  0501 12/13/22  1216   SODIUM mmol/L 143 137   POTASSIUM mmol/L 4.0 4.4   CHLORIDE mmol/L 104 100   CO2 mmol/L 26.5 23.4   BUN mg/dL 30* 28*   CREATININE mg/dL 1.47* 1.44*   CALCIUM mg/dL 8.9 9.4   BILIRUBIN mg/dL  --  0.8   ALK PHOS U/L  --  106   ALT (SGPT) U/L  --  39*   AST (SGOT) U/L  --  38*   GLUCOSE mg/dL 125* 263*     Results from last 7 days   Lab Units 12/14/22  0502   HEMOGLOBIN g/dL 10.4*   HEMATOCRIT % 31.9*     COVID19   Date Value Ref Range Status   12/13/2022 Not Detected Not Detected - Ref. Range Final     Lab Results   Component Value Date    HGBA1C 7.20 (H) 12/14/2022       RD to  follow up per protocol.    Electronically signed by:  Nicole Shetty RD  12/14/22 17:12 EST

## 2022-12-14 NOTE — THERAPY EVALUATION
Patient Name: Bessie Molina  : 1932    MRN: [id number]                              Today's Date: 2022       Admit Date: 2022    Visit Dx:     ICD-10-CM ICD-9-CM   1. Pneumonia of both lungs due to infectious organism, unspecified part of lung  J18.9 483.8   2. Acute on chronic congestive heart failure, unspecified heart failure type (HCC)  I50.9 428.0   3. Atrial fibrillation with RVR (HCC)  I48.91 427.31   4. Lactic acidosis  E87.20 276.2   5. Elevated troponin  R77.8 790.6   6. Hypoxia  R09.02 799.02     Patient Active Problem List   Diagnosis   • Atrial fibrillation, persistent (HCC)   • Chronic anticoagulation   • Pneumonia of both lungs due to infectious organism, unspecified part of lung   • Dementia (HCC)   • Acute on chronic diastolic heart failure (HCC)   • Abdominal pain   • Acute respiratory failure with hypoxia (HCC)     Past Medical History:   Diagnosis Date   • Atrial fibrillation (HCC)    • Hypertension      No past surgical history on file.   General Information     Row Name 22 1524          Physical Therapy Time and Intention    Document Type evaluation  -     Mode of Treatment physical therapy;individual therapy  -     Row Name 22 1524          General Information    Patient Profile Reviewed yes  -     Prior Level of Function independent:  -     Existing Precautions/Restrictions fall  -     Row Name 22 1524          Living Environment    People in Home child(veena), adult  -     Row Name 22 1524          Home Main Entrance    Number of Stairs, Main Entrance three  -     Row Name 22 1524          Cognition    Orientation Status (Cognition) oriented x 3  -     Row Name 22 1524          Safety Issues, Functional Mobility    Safety Issues Affecting Function (Mobility) awareness of need for assistance;problem-solving;sequencing abilities  -     Impairments Affecting Function (Mobility) balance;strength;endurance/activity  tolerance  -           User Key  (r) = Recorded By, (t) = Taken By, (c) = Cosigned By    Initials Name Provider Type    Marie Lepe PT Physical Therapist               Mobility     Row Name 12/14/22 1530          Bed Mobility    Bed Mobility supine-sit;sit-supine  -     Supine-Sit [place] (Bed Mobility) contact guard;verbal cues  -     Sit-Supine [place] (Bed Mobility) minimum assist (75% patient effort)  -     Assistive Device (Bed Mobility) bed rails;head of bed elevated  -     Row Name 12/14/22 1530          Sit-Stand Transfer    Sit-Stand [place] (Transfers) minimum assist (75% patient effort)  -     Assistive Device (Sit-Stand Transfers) walker, front-wheeled  -     Row Name 12/14/22 1530          Gait/Stairs (Locomotion)    [place] Level (Gait) minimum assist (75% patient effort)  -     Assistive Device (Gait) walker, front-wheeled  -     Distance in Feet (Gait) 20ft  -     Deviations/Abnormal Patterns (Gait) festinating/shuffling;gait speed decreased  -     Bilateral Gait Deviations forward flexed posture  -     [place] Level (Stairs) not tested  -     Comment, (Gait/Stairs) Gait slow and mildly unsteady with distance limited by fatigue.  -           User Key  (r) = Recorded By, (t) = Taken By, (c) = Cosigned By    Initials Name Provider Type     Marie Terrazas PT Physical Therapist               Obj/Interventions     Row Name 12/14/22 1541          Range of Motion Comprehensive    General Range of Motion no range of motion deficits identified  -[facility] Name 12/14/22 1541          Strength Comprehensive (MMT)    General Manual Muscle Testing (MMT) Assessment lower extremity strength deficits identified  -     Comment, General Manual Muscle Testing (MMT) Assessment Generalized LE weakness  -     Row Name 12/14/22 1541          Balance    Balance Assessment sitting static balance;sitting dynamic balance;sit to stand dynamic  balance;standing static balance;standing dynamic balance  -SM     Static Sitting Balance standby assist  -SM     Dynamic Sitting Balance contact guard  -SM     Position, Sitting Balance sitting edge of bed  -SM     Sit to Stand Dynamic Balance minimal assist  -SM     Static Standing Balance contact guard  -SM     Dynamic Standing Balance contact guard;minimal assist  -SM     Position/Device Used, Standing Balance supported;walker, front-wheeled  -SM     Balance Interventions sitting;standing;sit to stand;supported;static;dynamic  -SM           User Key  (r) = Recorded By, (t) = Taken By, (c) = Cosigned By    Initials Name Provider Type     Marie Terrazas, ELAINE Physical Therapist               Goals/Plan     Row Name 12/14/22 1546          Bed Mobility Goal 1 (PT)    Activity/Assistive Device (Bed Mobility Goal 1, PT) bed mobility activities, all  -SM     [place] Level/Cues Needed (Bed Mobility Goal 1, PT) modified independence  -SM     Time Frame (Bed Mobility Goal 1, PT) 1 week  -     Row Name 12/14/22 1546          Transfer Goal 1 (PT)    Activity/Assistive Device (Transfer Goal 1, PT) bed-to-chair/chair-to-bed;sit-to-stand/stand-to-sit  -SM     [place] Level/Cues Needed (Transfer Goal 1, PT) modified independence  -SM     Time Frame (Transfer Goal 1, PT) 1 week  -     Row Name 12/14/22 1546          Gait Training Goal 1 (PT)    Activity/Assistive Device (Gait Training Goal 1, PT) gait (walking locomotion);walker, rolling  -SM     [place] Level (Gait Training Goal 1, PT) standby assist  -SM     Distance (Gait Training Goal 1, PT) 100ft  -SM     Time Frame (Gait Training Goal 1, PT) 1 week  -           User Key  (r) = Recorded By, (t) = Taken By, (c) = Cosigned By    Initials Name Provider Type     Marie Terrazas, ELAINE Physical Therapist               Clinical Impression     Row Name 12/14/22 1549          Pain    Pretreatment Pain Rating 0/10 - no pain  -SM     Posttreatment Pain Rating  0/10 - no pain  -     Row Name 12/14/22 1542          Plan of Care Review    Plan of Care Reviewed With patient  -     Outcome Evaluation Patient is a 89 y.o female who presents to [facility] feeling SOA. Patient lives at home with her daughter with 3 MATTHEW. Patient is mostly independent with ADLs at baseline. Patient uses cane in the house and rwx when out in community. Patient required CGA to sit up to EOB today. Patient performed STS from EOB with Jose David. Patient ambulated 20ft with rwx and CGA.Gait slow and mildly unsteady with distance limited by fatigue. Patient required Jose David to return to supine. Patient would continue to benefit from skilled PT intervention to address deficits in strength and activity endurance. PT recommends SNF at d/c. Will continue to monitor.  -     Row Name 12/14/22 1542          Therapy Assessment/Plan (PT)    Rehab Potential (PT) good, to achieve stated therapy goals  -     Criteria for Skilled Interventions Met (PT) yes  -SM     Therapy Frequency (PT) 6 times/wk  -     Row Name 12/14/22 1542          Vital Signs    O2 Delivery Pre Treatment supplemental O2  -SM     O2 Delivery Intra Treatment supplemental O2  -SM     O2 Delivery Post Treatment supplemental O2  -SM     Pre Patient Position Supine  -SM     Intra Patient Position Standing  -     Post Patient Position Supine  -SM     Row Name 12/14/22 1542          Positioning and Restraints    Pre-Treatment Position in bed  -     Post Treatment Position bed  -SM     In Bed encouraged to call for assist;call light within reach;with nsg;with family/caregiver  -           User Key  (r) = Recorded By, (t) = Taken By, (c) = Cosigned By    Initials Name Provider Type     Marie Terrazas, PT Physical Therapist               Outcome Measures     Row Name 12/14/22 1547 12/14/22 0849       How much help from another person do you currently need...    Turning from your back to your side while in flat bed without using bedrails? 3  -SM 4   -LK    Moving from lying on back to sitting on the side of a flat bed without bedrails? 3  -SM 3  -LK    Moving to and from a bed to a chair (including a wheelchair)? 3  -SM 3  -LK    Standing up from a chair using your arms (e.g., wheelchair, bedside chair)? 3  -SM 3  -LK    Climbing 3-5 steps with a railing? 2  -SM 3  -LK    To walk in hospital room? 3  -SM 3  -LK    AM-PAC 6 Clicks Score (PT) 17  -SM 19  -LK    Highest level of mobility 5 --> Static standing  - 6 --> Walked 10 steps or more  -LK    Row Name 12/14/22 1547          Functional Assessment    Outcome Measure Options AM-PAC 6 Clicks Basic Mobility (PT)  -           User Key  (r) = Recorded By, (t) = Taken By, (c) = Cosigned By    Initials Name Provider Type    Eden Ramirez, RN Registered Nurse    Marie Lepe, ELAINE Physical Therapist                             Physical Therapy Education     Title: PT OT SLP Therapies (In Progress)     Topic: Physical Therapy (In Progress)     Point: Mobility training (Done)     Learning Progress Summary           Patient Acceptance, E, VU by  at 12/14/2022 1547                   Point: Home exercise program (Not Started)     Learner Progress:  Not documented in this visit.          Point: Body mechanics (Done)     Learning Progress Summary           Patient Acceptance, E, VU by  at 12/14/2022 1547                   Point: Precautions (Done)     Learning Progress Summary           Patient Acceptance, E, VU by  at 12/14/2022 1547                               User Key     Initials Effective Dates Name Provider Type Discipline     05/02/22 -  Marie Terrazas PT Physical Therapist PT              PT Recommendation and Plan     Plan of Care Reviewed With: patient  Outcome Evaluation: Patient is a 89 y.o female who presents to [facility] SOA. Patient lives at home with her daughter with 3 MATTHEW. Patient is mostly independent with ADLs at baseline. Patient uses cane in the house and rwx when out in  community. Patient required CGA to sit up to EOB today. Patient performed STS from EOB with Jose David. Patient ambulated 20ft with rwx and CGA.Gait slow and mildly unsteady with distance limited by fatigue. Patient required Jose David to return to supine. Patient would continue to benefit from skilled PT intervention to address deficits in strength and activity endurance. PT recommends SNF at d/c. Will continue to monitor.     Time Calculation:    PT Charges     Row Name 12/14/22 1548             Time Calculation    Start Time 1403  -SM      Stop Time 1415  -SM      Time Calculation (min) 12 min  -SM      PT Received On 12/14/22  -      PT - Next Appointment 12/15/22  -      PT Goal Re-Cert Due Date 12/21/22  -            User Key  (r) = Recorded By, (t) = Taken By, (c) = Cosigned By    Initials Name Provider Type    Marie Lepe PT Physical Therapist              Therapy Charges for Today     Code Description Service Date Service Provider Modifiers Qty    [id number] HC PT EVAL LOW COMPLEXITY 3 12/14/2022 Marie Terrazas PT GP 1          PT G-Codes  Outcome Measure Options: AM-PAC 6 Clicks Basic Mobility (PT)  AM-PAC 6 Clicks Score (PT): 17  PT Discharge Summary  Anticipated Discharge Disposition (PT): skilled nursing facility  Patient was not wearing a face mask during this therapy encounter. Therapist used appropriate personal protective equipment including mask and gloves.  Mask used was standard procedure mask. Appropriate PPE was worn during the entire therapy session. Hand hygiene was completed before and after therapy session. Patient is not in enhanced droplet precautions.     Marie Terrazas PT  12/14/2022

## 2022-12-14 NOTE — CASE MANAGEMENT/SOCIAL WORK
Discharge Planning Assessment  [facility]     Patient Name: Bessie Molina  MRN: [id number]  Today's Date: 12/14/2022    Admit Date: 12/13/2022    Plan: SNF (PT recommending) vs home with daughter and HH. Daughter to give choices of SNF and HH for referrals to be placed.   Discharge Needs Assessment     Row Name 12/14/22 1713       Living Environment    People in Home child(veena), adult    Name(s) of People in Home Daughter, son in law, and adult granddaughter    Current Living Arrangements home    Primary Care Provided by child(veena)    Provides Primary Care For no one, unable/limited ability to care for self    Family Caregiver if Needed child(veena), adult    Quality of Family Relationships supportive;involved    Able to Return to Prior Arrangements yes       Resource/Environmental Concerns    Resource/Environmental Concerns none    Transportation Concerns none       Transition Planning    Patient/Family Anticipates Transition to home with family;inpatient rehabilitation facility    Patient/Family Anticipated Services at Transition home health care;skilled nursing    Transportation Anticipated other (see comments)       Discharge Needs Assessment    Readmission Within the Last 30 Days no previous admission in last 30 days    Equipment Currently Used at Home scales;cane, straight;rollator;bp cuff;pulse ox;shower chair;grab bar;other (see comments)  Adjustable bed.    Concerns to be Addressed care coordination/care conferences;discharge planning    Anticipated Changes Related to Illness inability to care for someone else    Equipment Needed After Discharge oxygen  Follow for possible O2 need at D/C.    Discharge Facility/Level of Care Needs home with home health;nursing facility, skilled    Provided Post Acute Provider List? Yes    Post Acute Provider List Home Health;Nursing Home;Inpatient Rehab    Provided Post Acute Provider Quality & Resource List? Yes    Post Acute Provider Quality and Resource List Home  Health;Nursing Home;Inpatient Rehab    Delivered To Patient;Support Person    Support Person higinio Benz    Method of Delivery In person    Current Discharge Risk chronically ill;cognitively impaired;dependent with mobility/activities of daily living               Discharge Plan     Row Name 12/14/22 8717       Plan    Plan SNF (PT recommending) vs home with daughter and HH. Daughter to give choices of SNF and HH for referrals to be placed.    Plan Comments Met with pt.’s daughterTuyet at bedside. Pt off floor for testing. Explained roll of . Face sheet and pharmacy verified. Pt lives with her daughter, son in law, and adult granddaughter. There are 3 steps to enter home.  DME equipment includes a cane, rollator, B/P cuff, scales, pulse ox, glucometer, shower chair, and grab bars in bathroom.  Pt requires assist of daughter to complete ADLs. Pt has been to Rehab and used HH in [place]. Discussed need for HH or SNF at D/C. Provided Road to Recovery, List of SNFs and HH, and information on Medicare Compare Website. Daughter to review options and call CCP with choices. PT currently recommending SNF at D/C. Pt’s PCP is Mireille La MD. Pt enrolled with Meds to Bed. Currently requiring O2@2LNC.  Follow for possible O2 need at D/C. Explained that CCP would follow to assess for discharge needs.  Leonel Manzano RN-BC              Continued Care and Services - Admitted Since 12/13/2022    Coordination has not been started for this encounter.          Demographic Summary     Row Name 12/14/22 1713       General Information    Admission Type inpatient    Arrived From emergency department    Required Notices Provided Important Message from Medicare    Reason for Consult discharge planning;care coordination/care conference    Preferred Language English               Functional Status     Row Name 12/14/22 1711       Functional Status    Usual Activity Tolerance moderate    Current Activity Tolerance fair        Functional Status, IADL    Medications assistive equipment and person    Meal Preparation assistive equipment and person    Housekeeping assistive equipment and person    Laundry assistive equipment and person    Shopping assistive equipment and person       Mental Status    General Appearance WDL WDL       Mental Status Summary    Recent Changes in Mental Status/Cognitive Functioning no changes                Leonel Manzano, RN

## 2022-12-14 NOTE — CASE MANAGEMENT/SOCIAL WORK
Continued Stay Note  [facility]     Patient Name: Bessie Molina  MRN: [id number]  Today's Date: 12/14/2022    Admit Date: 12/13/2022    Plan: SNF (PT recommending) vs home with daughter and HH. Daughter to give choices of SNF and HH for referrals to be placed.   Discharge Plan     Row Name 12/14/22 1724       Plan    Plan SNF (PT recommending) vs home with daughter and HH. Daughter to give choices of SNF and HH for referrals to be placed.    Plan Comments Met with pt.’s daughterTuyet at bedside. Pt off floor for testing. Explained roll of . Face sheet and pharmacy verified. Pt lives with her daughter, son in law, and adult granddaughter. There are 3 steps to enter home.  DME equipment includes a cane, rollator, B/P cuff, scales, pulse ox, glucometer, shower chair, and grab bars in bathroom.  Pt requires assist of daughter to complete ADLs. Pt has been to Rehab and used HH in [place]. Discussed need for HH or SNF at D/C. Provided Road to Recovery, List of SNFs and HH, and information on Medicare Compare Website. Daughter to review options and call CCP with choices. PT currently recommending SNF at D/C. Pt’s PCP is Mireille La MD. Pt enrolled with Meds to Bed. Currently requiring O2@2LNC.  Follow for possible O2 need at D/C. Explained that CCP would follow to assess for discharge needs.  Leonel Manzano RN-BC               Discharge Codes    No documentation.                     Leonel Manzano RN

## 2022-12-14 NOTE — PLAN OF CARE
Goal Outcome Evaluation:   Pt arrive to 4E with pneumonia of both lungs. VSS. 2L O2 per NC. RA baseline. Pt very nauseous and with abdominal pain, Dr. Leyva aware, CT ab/pelvis ordered. PRN Zofran given per MAR. IV lasix given. Family at bedside. Pt stable and needs met at this time.

## 2022-12-14 NOTE — PLAN OF CARE
Goal Outcome Evaluation:  Plan of Care Reviewed With: patient           Outcome Evaluation: Patient is a 89 y.o female who presents to [facility] feeling SOA. Patient lives at home with her daughter with 3 MATTHEW. Patient is mostly independent with ADLs at baseline. Patient uses cane in the house and rwx when out in community. Patient required CGA to sit up to EOB today. Patient performed STS from EOB with Jose David. Patient ambulated 20ft with rwx and CGA.Gait slow and mildly unsteady with distance limited by fatigue. Patient required Jose David to return to supine. Patient would continue to benefit from skilled PT intervention to address deficits in strength and activity endurance. PT recommends SNF at d/c. Will continue to monitor.

## 2022-12-14 NOTE — H&P
HISTORY AND PHYSICAL   [facility]        Date of Admission: 2022  Patient Identification:  Name: Bessie Molina  Age: 89 y.o.  Sex: female  :  1932  MRN: [id number]                     Primary Care Physician: Mireille La MD    Chief Complaint:  89 year old female who presented to the emergency room with shortness of breath, cough and congestion for the last three days; she has had wheezing and weakness; no fever; she also has a history of atrial fibrillation; she started complaining of abdominal pain after she arrived on the floor    History of Present Illness:   As above    Past Medical History:  Past Medical History:   Diagnosis Date   • Atrial fibrillation (HCC)    • Hypertension      Past Surgical History:  No past surgical history on file.   Home Meds:  Medications Prior to Admission   Medication Sig Dispense Refill Last Dose   • acetaminophen (TYLENOL) 500 MG tablet Take 1,000 mg by mouth Every 4 (Four) Hours As Needed.      • Ascorbic Acid (Vitamin C) 500 MG capsule Take 1 tablet by mouth Daily.   2022   • aspirin 81 MG chewable tablet Chew 81 mg Daily.   2022   • B Complex Vitamins (VITAMIN B COMPLEX PO) Take 1 tablet by mouth Daily.   2022   • Cholecalciferol (Vitamin D3) 50 MCG (2000 UT) capsule Take 2,000 Units by mouth Daily.   2022   • dilTIAZem CD (CARDIZEM CD) 180 MG 24 hr capsule Take 1 capsule by mouth Daily for 60 days. 30 capsule 11 2022   • ezetimibe (ZETIA) 10 MG tablet Take 10 mg by mouth Daily.   2022   • fish oil-omega-3 fatty acids 1000 MG capsule Take 1 g by mouth Daily.   2022   • furosemide (LASIX) 20 MG tablet Take 1 tablet by mouth Daily.   2022   • glipizide (GLUCOTROL XL) 10 MG 24 hr tablet Take 1 tablet by mouth Every Morning.   2022   • icosapent ethyl (VASCEPA) 1 g capsule capsule Take 2 capsules by mouth 2 (Two) Times a Day.   2022   • levothyroxine (SYNTHROID, LEVOTHROID) 100 MCG  tablet Take 1 tablet by mouth Daily.   12/13/2022   • meclizine (ANTIVERT) 12.5 MG tablet Take 12.5 mg by mouth 3 (Three) Times a Day As Needed for Dizziness.      • metoprolol tartrate (LOPRESSOR) 25 MG tablet Take 1 tablet by mouth 2 (Two) Times a Day. 180 tablet 3 12/13/2022   • multivitamin (THERAGRAN) tablet tablet Take 1 tablet by mouth Daily.   12/12/2022   • pantoprazole (PROTONIX) 20 MG EC tablet Take 2 tablets by mouth Daily. 90 tablet 3 12/12/2022   • potassium chloride 10 MEQ CR tablet Take 1 tablet by mouth Daily. 90 tablet 3 12/12/2022   • pregabalin (LYRICA) 100 MG capsule Take 100 mg by mouth Every Evening.   12/12/2022   • SITagliptin (JANUVIA) 100 MG tablet Take 100 mg by mouth Every Evening.   12/12/2022   • traMADol (ULTRAM) 50 MG tablet Take 50 mg by mouth Every 6 (Six) Hours As Needed for Moderate Pain.      • warfarin (COUMADIN) 3 MG tablet Take 6 mg by mouth See Admin Instructions. 6 mg on Monday and Friday 12/12/2022   • warfarin (COUMADIN) 3 MG tablet Take 4.5 mg by mouth See Admin Instructions. 4.5 mg on Tuesday, Wednesday, Thursday, Saturday and Sunday   Past Week       Allergies:  Allergies   Allergen Reactions   • Bee Venom Anaphylaxis   • Atorvastatin Nausea Only   • Gemfibrozil Nausea Only   • Haemophilus Influenzae Unknown - High Severity   • Influenza Vac Split Quad Unknown - High Severity   • Isosorbide Unknown - High Severity   • Metformin Unknown - High Severity   • Propoxyphene Unknown - High Severity   • Simvastatin Nausea Only     Immunizations:    There is no immunization history on file for this patient.  Social History:   Social History     Social History Narrative   • Not on file     Social History     Socioeconomic History   • Marital status:    Tobacco Use   • Smoking status: Never   • Smokeless tobacco: Never   Vaping Use   • Vaping Use: Never used   Substance and Sexual Activity   • Alcohol use: Yes     Comment: rare/caffeine use        Family History:  No  family history on file.     Review of Systems  See history of present illness and past medical history.  Patient denies headache, dizziness, syncope, falls, trauma, change in vision, change in hearing, change in taste, changes in weight, changes in appetite, focal weakness, numbness, or paresthesia.  Patient denies chest pain, palpitations, dyspnea, orthopnea, PND, cough, sinus pressure, rhinorrhea, epistaxis, hemoptysis, nausea, vomiting,hematemesis, diarrhea, constipation or hematchezia.  Denies cold or heat intolerance, polydipsia, polyuria, polyphagia. Denies hematuria, pyuria, dysuria, hesitancy, frequency or urgency. Denies consumption of raw and under cooked meats foods or change in water source.  Denies fever, chills, sweats, night sweats.  Denies missing any routine medications. Remainder of ROS is negative.    Objective:  T Max 24 hrs: Temp (24hrs), Av.8 °F (37.1 °C), Min:97.5 °F (36.4 °C), Max:99.9 °F (37.7 °C)    Vitals Ranges:   Temp:  [97.5 °F (36.4 °C)-99.9 °F (37.7 °C)] 97.5 °F (36.4 °C)  Heart Rate:  [] 97  Resp:  [18-26] 18  BP: (103-145)/(75-90) 120/78      Exam:  /78 (BP Location: Right arm, Patient Position: Lying)   Pulse 97   Temp 97.5 °F (36.4 °C) (Oral)   Resp 18   Ht 157.5 cm (62\")   Wt 63.5 kg (140 lb)   SpO2 96%   BMI 25.61 kg/m²     General Appearance:    Alert, cooperative, no distress, appears stated age   Head:    Normocephalic, without obvious abnormality, atraumatic   Eyes:    PERRL, conjunctivae/corneas clear, EOM's intact, both eyes   Ears:    Normal external ear canals, both ears   Nose:   Nares normal, septum midline, mucosa normal, no drainage    or sinus tenderness   Throat:   Lips, mucosa, and tongue normal   Neck:   Supple, symmetrical, trachea midline, no adenopathy;     thyroid:  no enlargement/tenderness/nodules; no carotid    bruit or JVD   Back:     Symmetric, no curvature, ROM normal, no CVA tenderness   Lungs:     Clear to auscultation  bilaterally, respirations unlabored   Chest Wall:    No tenderness or deformity    Heart:    Regular rate and rhythm, S1 and S2 normal, no murmur, rub   or gallop   Abdomen:     Soft, nontender, bowel sounds active all four quadrants,     no masses, no hepatomegaly, no splenomegaly   Extremities:   Extremities normal, atraumatic, no cyanosis or edema   Pulses:   2+ and symmetric all extremities   Skin:   Skin color, texture, turgor normal, no rashes or lesions   Lymph nodes:   Cervical, supraclavicular, and axillary nodes normal   Neurologic:   CNII-XII intact, normal strength, sensation intact throughout      .    Data Review:  Labs in chart were reviewed.  WBC   Date Value Ref Range Status   12/13/2022 13.05 (H) 3.40 - 10.80 10*3/mm3 Final     Hemoglobin   Date Value Ref Range Status   12/13/2022 12.2 12.0 - 15.9 g/dL Final     Hematocrit   Date Value Ref Range Status   12/13/2022 37.5 34.0 - 46.6 % Final     Platelets   Date Value Ref Range Status   12/13/2022 236 140 - 450 10*3/mm3 Final     Sodium   Date Value Ref Range Status   12/13/2022 137 136 - 145 mmol/L Final     Potassium   Date Value Ref Range Status   12/13/2022 4.4 3.5 - 5.2 mmol/L Final     Chloride   Date Value Ref Range Status   12/13/2022 100 98 - 107 mmol/L Final     CO2   Date Value Ref Range Status   12/13/2022 23.4 22.0 - 29.0 mmol/L Final     BUN   Date Value Ref Range Status   12/13/2022 28 (H) 8 - 23 mg/dL Final     Creatinine   Date Value Ref Range Status   12/13/2022 1.44 (H) 0.57 - 1.00 mg/dL Final     Glucose   Date Value Ref Range Status   12/13/2022 263 (H) 65 - 99 mg/dL Final     Calcium   Date Value Ref Range Status   12/13/2022 9.4 8.2 - 9.6 mg/dL Final     AST (SGOT)   Date Value Ref Range Status   12/13/2022 38 (H) 1 - 32 U/L Final     ALT (SGPT)   Date Value Ref Range Status   12/13/2022 39 (H) 1 - 33 U/L Final     Alkaline Phosphatase   Date Value Ref Range Status   12/13/2022 106 39 - 117 U/L Final                Imaging  Results (All)     Procedure Component Value Units Date/Time    XR Chest 1 View [[id number]] Collected: 12/13/22 1240     Updated: 12/13/22 1247    Narrative:      XR CHEST 1 VW-     HISTORY: Female who is 89 years-old,  short of breath     TECHNIQUE: Frontal view of the chest     COMPARISON: 12/20/2021     FINDINGS: The heart size is borderline. Aorta is calcified. Pulmonary  vasculature is unremarkable. Patchy densities in the lower lungs may be  developing edema or pneumonia, follow-up suggested. No pleural effusion,  or pneumothorax. No acute osseous process.       Impression:      Patchy densities in the lower lungs, follow-up suggested.     This report was finalized on 12/13/2022 12:44 PM by Dr. Jorge Frye M.D.               Assessment:  Active Hospital Problems    Diagnosis  POA   • **Pneumonia of both lungs due to infectious organism, unspecified part of lung [J18.9]  Yes      Resolved Hospital Problems   No resolved problems to display.   acute hypoxic respiratory failure  Abdominal pain  Atrial fibrillation with rvr  Hypertension  diabetes    Plan:  accu checks, insulin sliding scale  Check ct abdomen  Continue antibiotics  Monitor on telemetry  Heart rate has improved  Trend labs  ANDREw patient and nurse, family and ED provider    Cristiane Leyva MD  12/13/2022  20:45 EST

## 2022-12-14 NOTE — PROGRESS NOTES
Name: Bessie Molina ADMIT: 2022   : 1932  PCP: Mireille La MD    MRN: [id number] LOS: 1 days   AGE/SEX: 89 y.o. female  ROOM: [facility]     Subjective   Subjective   Patient states her breathing is improved. Poor historian due to dementia however. D/W nursing staff patient had some abdominal pain this morning but denies any pain at the moment. Family reports she has complained of off-and-on abdominal pain but bowels are still working and she has been tolerating PO.    Review of Systems   Unable to perform ROS: Dementia      Objective   Objective   Vital Signs  Temp:  [97.5 °F (36.4 °C)-99.9 °F (37.7 °C)] 98.3 °F (36.8 °C)  Heart Rate:  [] 100  Resp:  [16-26] 16  BP: (103-132)/(63-90) 118/63  SpO2:  [88 %-96 %] 92 %  on  Flow (L/min):  [2-3] 2;   Device (Oxygen Therapy): nasal cannula  Body mass index is 25.97 kg/m².    Physical Exam  Constitutional:       General: She is not in acute distress.     Appearance: Normal appearance. She is not toxic-appearing.   HENT:      Head: Normocephalic and atraumatic.   Eyes:      Conjunctiva/sclera: Conjunctivae normal.   Cardiovascular:      Rate and Rhythm: Normal rate and regular rhythm.   Pulmonary:      Effort: Pulmonary effort is normal. No respiratory distress.      Breath sounds: Decreased breath sounds present. No wheezing or rhonchi.   Abdominal:      General: Bowel sounds are normal.      Palpations: Abdomen is soft.      Tenderness: There is no abdominal tenderness. There is no guarding or rebound.   Musculoskeletal:         General: No swelling.      Cervical back: Normal range of motion.      Comments: Trace lower extremity edema   Skin:     General: Skin is warm and dry.   Neurological:      General: No focal deficit present.      Mental Status: She is alert.   Psychiatric:         Mood and Affect: Mood normal.         Behavior: Behavior normal.         Thought Content: Thought content normal.         Cognition and Memory: Memory is  impaired.     Results Review  I reviewed the patient's new clinical results.  Results from last 7 days   Lab Units 12/14/22  0502 12/13/22  1216   WBC 10*3/mm3 10.74 13.05*   HEMOGLOBIN g/dL 10.4* 12.2   PLATELETS 10*3/mm3 211 236     Results from last 7 days   Lab Units 12/14/22  0501 12/13/22  1216   SODIUM mmol/L 143 137   POTASSIUM mmol/L 4.0 4.4   CHLORIDE mmol/L 104 100   CO2 mmol/L 26.5 23.4   BUN mg/dL 30* 28*   CREATININE mg/dL 1.47* 1.44*   GLUCOSE mg/dL 125* 263*     Lab Results   Component Value Date    ANIONGAP 12.5 12/14/2022     Estimated Creatinine Clearance: 22.4 mL/min (A) (by C-G formula based on SCr of 1.47 mg/dL (H)).    Results from last 7 days   Lab Units 12/13/22  1216   ALBUMIN g/dL 3.90   BILIRUBIN mg/dL 0.8   ALK PHOS U/L 106   AST (SGOT) U/L 38*   ALT (SGPT) U/L 39*     Results from last 7 days   Lab Units 12/14/22  0501 12/13/22  1216   CALCIUM mg/dL 8.9 9.4   ALBUMIN g/dL  --  3.90     Results from last 7 days   Lab Units 12/13/22  1316 12/13/22  1216   PROCALCITONIN ng/mL  --  0.08   LACTATE mmol/L 2.8*  --      Results from last 7 days   Lab Units 12/14/22  0501 12/13/22  1216   INR  2.29* 1.86*      Hemoglobin A1C   Date/Time Value Ref Range Status   12/14/2022 0502 7.20 (H) 4.80 - 5.60 % Final     Glucose   Date/Time Value Ref Range Status   12/14/2022 1116 238 (H) 70 - 130 mg/dL Final     Comment:     Meter: [id number] : [id number] Archie Trejo NA   12/14/2022 0622 109 70 - 130 mg/dL Final     Comment:     Meter: [id number] : [id number] Karla Taniya NA   12/13/2022 2059 251 (H) 70 - 130 mg/dL Final     Comment:     Meter: [id number] : [id number] Karla HER   12/13/2022 1750 321 (H) 70 - 130 mg/dL Final     Comment:     Meter: [id number] : [id number] Chantal Harmon RN       XR Chest 1 View    Result Date: 12/13/2022  Patchy densities in the lower lungs, follow-up suggested.  This report was finalized on 12/13/2022 12:44 PM by Dr. Jorge Frye M.D.         Scheduled Meds  vitamin C, 500 mg, Oral, Daily  aspirin, 81 mg, Oral, Daily  cefTRIAXone, 1 g, Intravenous, Q24H  cholecalciferol, 2,000 Units, Oral, Daily  dilTIAZem CD, 180 mg, Oral, Q24H  furosemide, 20 mg, Oral, Daily  icosapent ethyl, 2 g, Oral, BID  insulin lispro, 0-9 Units, Subcutaneous, TID With Meals  levothyroxine, 100 mcg, Oral, QAM  linagliptin, 5 mg, Oral, Daily  metoprolol tartrate, 25 mg, Oral, BID  multivitamin, 1 tablet, Oral, Daily  pantoprazole, 40 mg, Oral, Q AM  potassium chloride, 10 mEq, Oral, Daily  pregabalin, 100 mg, Oral, Nightly    Continuous Infusions  Pharmacy to dose warfarin,     PRN Meds  •  acetaminophen  •  dextrose  •  dextrose  •  glucagon (human recombinant)  •  HYDROmorphone  •  melatonin  •  nitroglycerin  •  ondansetron **OR** ondansetron  •  Pharmacy to dose warfarin  •  [COMPLETED] Insert Peripheral IV **AND** sodium chloride    Pharmacy to dose warfarin,     Diet  Diet: Cardiac Diets, Diabetic Diets; Healthy Heart (2-3 Na+); Consistent Carbohydrate; Texture: Regular Texture (IDDSI 7); Fluid Consistency: Thin (IDDSI 0)    I have personally reviewed:  [x]  Laboratory   [x]  Microbiology   [x]  Radiology    [x]  EKG/Telemetry A. fib on telemetry  []  Cardiology/Vascular   []  Pathology   []  Records    Results for orders placed during the hospital encounter of 12/19/21    Adult Transthoracic Echo Complete W/ Cont if Necessary Per Protocol    Interpretation Summary  · Left ventricular ejection fraction appears to be 51 - 55%. Left ventricular systolic function is low normal.  · Left ventricular diastolic function is consistent with (grade II w/high LAP) pseudonormalization. Elevated left atrial filling pressure.  · Mildly reduced right ventricular systolic function noted.  · Moderate mitral valve regurgitation is present.  · Estimated right ventricular systolic pressure from tricuspid regurgitation is mildly elevated (35-45 mmHg).  · Mild pulmonary hypertension is  present.  · Atrial fibrillation was the predominant rhythm observed during the procedure.       Intake/Output Summary (Last 24 hours) at 12/14/2022 1211  Last data filed at 12/14/2022 0911  Gross per 24 hour   Intake 740 ml   Output 800 ml   Net -60 ml         Assessment/Plan     Active Hospital Problems    Diagnosis  POA   • **Acute respiratory failure with hypoxia (HCC) [J96.01]  Unknown   • Dementia (HCC) [F03.90]  Unknown   • Acute on chronic diastolic heart failure (HCC) [I50.33]  Unknown   • Abdominal pain [R10.9]  Unknown   • Pneumonia of both lungs due to infectious organism, unspecified part of lung [J18.9]  Yes   • Atrial fibrillation, persistent (HCC) [I48.19]  Yes   • Chronic anticoagulation [Z79.01]  Not Applicable      Resolved Hospital Problems   No resolved problems to display.     89 y.o. female presented with shortness of breath, cough, congestion, wheezing. In ED she had A. fib RVR    Acute hypoxic respiratory failure: acute diastolic CHF, pneumonia  -Flow (L/min):  [2-3] 2   -Started on antibiotics for possible pneumonia though procalcitonin not elevated. Lactate elevation probably from low perfusion. Respiratory panel negative  -Also received IV furosemide to diurese  -Symptoms seem to have improved  -Cardiology to see  -Echocardiogram, CT of the chest pending    A. fib RVR  -Warfarin per pharmacy  -Rate improved. Currently on Cardizem and metoprolol    Abdominal pain   -Currently she denies abdominal pain and did not have any abdominal pain  -CT scan pending of the abdomen    Dementia  -Delirium precautions    Discussed with patient, family, nursing staff, CCP and care team on multidisciplinary rounds    Discharge: AYANA Castillo MD  [facility]  12/14/22

## 2022-12-14 NOTE — PROGRESS NOTES
[facility] Clinical Pharmacy Services: Warfarin Dosing/Monitoring Consult    Bessie Molina is a 89 y.o. female, estimated creatinine clearance is 22.4 mL/min (A) (by C-G formula based on SCr of 1.47 mg/dL (H)). weighing 64.4 kg (142 lb).    Results from last 7 days   Lab Units 12/14/22  0502 12/14/22  0501 12/13/22  1216   INR   --  2.29* 1.86*   HEMOGLOBIN g/dL 10.4*  --  12.2   HEMATOCRIT % 31.9*  --  37.5   PLATELETS 10*3/mm3 211  --  236     Prior to admission anticoagulation: 6 mg MF 4.5 mg AOD, she does not know her doses herself    Hospital Anticoagulation:  Consulting provider: Dr Leyva  Start date: 12/13/22  Indication: A Fib - requiring full anticoagulation  Target INR: 2 - 3  Expected duration: lifelong   Bridge Therapy: No      Potential food or drug interactions: Azithromycin given in ER - effect from dose give may last days, ceftriaxone. Protonix, levothryroxine    Education complete?/Date: No; plan for follow up TBD - dementia    Assessment/Plan:  Dose: Will give 4.5 mg today. If remains in goal tomorrow can resume home dose  Monitor for any signs or symptoms of bleeding  Follow up daily INRs and dose adjustments    Pharmacy will continue to follow until discharge or discontinuation of warfarin.     Pascual Rosario [facility]  Clinical Pharmacist

## 2022-12-14 NOTE — PLAN OF CARE
Goal Outcome Evaluation:  VSS. Remains on 2L O2 per NC, SOLOMON.  CT of chest.ab/pelvis completed. Echo complete today. Pt nauseous after echo, PRN Zofran given per MAR, effective per pt. IV rocephin continued. Worked with PT. Family visited. Pt stable and needs met at this time.

## 2022-12-15 PROBLEM — K86.9 PANCREATIC LESION: Status: ACTIVE | Noted: 2022-12-15

## 2022-12-15 LAB
GLUCOSE BLDC GLUCOMTR-MCNC: 115 MG/DL (ref 70–130)
GLUCOSE BLDC GLUCOMTR-MCNC: 146 MG/DL (ref 70–130)
GLUCOSE BLDC GLUCOMTR-MCNC: 210 MG/DL (ref 70–130)
GLUCOSE BLDC GLUCOMTR-MCNC: 274 MG/DL (ref 70–130)
INR PPP: 2.79 (ref 0.9–1.1)
PROTHROMBIN TIME: 29.8 SECONDS (ref 11.7–14.2)

## 2022-12-15 PROCEDURE — 85610 PROTHROMBIN TIME: CPT | Performed by: INTERNAL MEDICINE

## 2022-12-15 PROCEDURE — 25010000002 FUROSEMIDE PER 20 MG: Performed by: INTERNAL MEDICINE

## 2022-12-15 PROCEDURE — 82962 GLUCOSE BLOOD TEST: CPT

## 2022-12-15 PROCEDURE — 97116 GAIT TRAINING THERAPY: CPT

## 2022-12-15 PROCEDURE — 99222 1ST HOSP IP/OBS MODERATE 55: CPT | Performed by: INTERNAL MEDICINE

## 2022-12-15 PROCEDURE — 63710000001 INSULIN LISPRO (HUMAN) PER 5 UNITS: Performed by: INTERNAL MEDICINE

## 2022-12-15 RX ORDER — FUROSEMIDE 10 MG/ML
80 INJECTION INTRAMUSCULAR; INTRAVENOUS ONCE
Status: COMPLETED | OUTPATIENT
Start: 2022-12-15 | End: 2022-12-15

## 2022-12-15 RX ORDER — FUROSEMIDE 10 MG/ML
40 INJECTION INTRAMUSCULAR; INTRAVENOUS DAILY
Status: DISCONTINUED | OUTPATIENT
Start: 2022-12-15 | End: 2022-12-15

## 2022-12-15 RX ADMIN — DILTIAZEM HYDROCHLORIDE 180 MG: 180 CAPSULE, EXTENDED RELEASE ORAL at 09:46

## 2022-12-15 RX ADMIN — Medication 10 ML: at 09:46

## 2022-12-15 RX ADMIN — ICOSAPENT ETHYL 2 G: 1000 CAPSULE ORAL at 09:51

## 2022-12-15 RX ADMIN — ICOSAPENT ETHYL 2 G: 1000 CAPSULE ORAL at 20:22

## 2022-12-15 RX ADMIN — LEVOTHYROXINE SODIUM 100 MCG: 0.1 TABLET ORAL at 06:16

## 2022-12-15 RX ADMIN — Medication 2000 UNITS: at 09:46

## 2022-12-15 RX ADMIN — EMPAGLIFLOZIN 10 MG: 10 TABLET, FILM COATED ORAL at 18:17

## 2022-12-15 RX ADMIN — Medication 1 TABLET: at 09:46

## 2022-12-15 RX ADMIN — PANTOPRAZOLE SODIUM 40 MG: 40 TABLET, DELAYED RELEASE ORAL at 06:16

## 2022-12-15 RX ADMIN — FUROSEMIDE 20 MG: 20 TABLET ORAL at 09:46

## 2022-12-15 RX ADMIN — METOPROLOL TARTRATE 25 MG: 25 TABLET, FILM COATED ORAL at 20:12

## 2022-12-15 RX ADMIN — PREGABALIN 100 MG: 100 CAPSULE ORAL at 20:12

## 2022-12-15 RX ADMIN — INSULIN LISPRO 6 UNITS: 100 INJECTION, SOLUTION INTRAVENOUS; SUBCUTANEOUS at 12:31

## 2022-12-15 RX ADMIN — FUROSEMIDE 80 MG: 10 INJECTION, SOLUTION INTRAMUSCULAR; INTRAVENOUS at 15:48

## 2022-12-15 RX ADMIN — POTASSIUM CHLORIDE 10 MEQ: 750 TABLET, EXTENDED RELEASE ORAL at 09:46

## 2022-12-15 RX ADMIN — WARFARIN 4.5 MG: 2.5 TABLET ORAL at 20:22

## 2022-12-15 RX ADMIN — INSULIN LISPRO 4 UNITS: 100 INJECTION, SOLUTION INTRAVENOUS; SUBCUTANEOUS at 18:17

## 2022-12-15 RX ADMIN — METOPROLOL TARTRATE 25 MG: 25 TABLET, FILM COATED ORAL at 09:46

## 2022-12-15 RX ADMIN — OXYCODONE HYDROCHLORIDE AND ACETAMINOPHEN 500 MG: 500 TABLET ORAL at 09:46

## 2022-12-15 RX ADMIN — ASPIRIN 81 MG: 81 TABLET, CHEWABLE ORAL at 09:46

## 2022-12-15 RX ADMIN — LINAGLIPTIN 5 MG: 5 TABLET, FILM COATED ORAL at 09:46

## 2022-12-15 NOTE — CASE MANAGEMENT/SOCIAL WORK
Continued Stay Note  [facility]     Patient Name: Bessie Molina  MRN: [id number]  Today's Date: 12/15/2022    Admit Date: 12/13/2022    Plan: SNF - referrals pending, vs. home with daughter and HH   Discharge Plan     Row Name 12/15/22 1619       Plan    Plan SNF - referrals pending, vs. home with daughter and HH    Patient/Family in Agreement with Plan yes    Plan Comments Spoke with daughter Tuyet at bedside.  She would like referrals to [facility], [facility] (email to Diana) and to [facility] (email to Maylin).  CCP will continue to follow.  Tyra AVALOS                   Expected Discharge Date and Time     Expected Discharge Date Expected Discharge Time    Dec 16, 2022             Becky S. [place], RN

## 2022-12-15 NOTE — PROGRESS NOTES
[facility] Clinical Pharmacy Services: Warfarin Dosing/Monitoring Consult    Bessie Molina is a 89 y.o. female, estimated creatinine clearance is 22.3 mL/min (A) (by C-G formula based on SCr of 1.47 mg/dL (H)). weighing 63.9 kg (140 lb 12.8 oz).    Results from last 7 days   Lab Units 12/15/22  0510 12/14/22  0502 12/14/22  0501 12/13/22  1216   INR  2.79*  --  2.29* 1.86*   HEMOGLOBIN g/dL  --  10.4*  --  12.2   HEMATOCRIT %  --  31.9*  --  37.5   PLATELETS 10*3/mm3  --  211  --  236     Prior to admission anticoagulation: 6 mg MF 4.5 mg AOD, she does not know her doses herself    Hospital Anticoagulation:  Consulting provider: Dr Leyav  Start date: 12/13/22  Indication: A Fib - requiring full anticoagulation  Target INR: 2 - 3  Expected duration: lifelong   Bridge Therapy: No      Potential food or drug interactions: Azithromycin given in ER - effect from dose give may last days, ceftriaxone. Protonix, levothryroxine    Education complete?/Date: No; plan for follow up TBD - dementia    Assessment/Plan:  Dose: Will give 4.5 mg again today. INR may continue to be elevated from the azithromycin dose, may have to skip the higher dose tomorrow depending on the INR trend  Monitor for any signs or symptoms of bleeding  Follow up daily INRs and dose adjustments    Pharmacy will continue to follow until discharge or discontinuation of warfarin.     Pascual Rosario [facility]  Clinical Pharmacist

## 2022-12-15 NOTE — CONSULTS
Date of Hospital Visit: 2022  Date of consult: 12/15/2022  Encounter Provider: Jag Farias MD  Place of Service: [facility] CARDIOLOGY  Patient Name: Bessie Molina  :1932  Referral Provider: Cristiane Leyva, *    Chief complaint: weakness and shortness of breath    Reason for consult: Congestive heart failure    History of Present Illness   Bessie Molina is a 89 year old pt whom I seen in clinic with a history of Afib, diabetes,  An ECHO in 2021 showed EF 51-55%.       I saw pt in the office on 22 and she had a left lower extremity arterial intervention with angioplasty and stent placement for nonhealing fourth toe wound on 2022.  No perioperative complications.She had started walking. She denied any significant change in her breathing, orthopnea, PND, palpitations,chest pain, bleeding presyncope, syncope or significant lower extremity edema. EKG showed persistent Afib. A 24 holter was orderd before increasing her diltiazem.     Pt presented to ER on 22 with complaints of weakness and shortness of breath for about 3 days. Pt denied any abd pain or chest pain or edema. IN ER, BUN/CR 28/1.44, ALT/AST 39/38, pro BNP 5065, troponin 0.098, INR 1.86, WBC 13.05, HGB 12.2, PLTs 236, procal 0.08, respiratory panel negative, Lactate 2.8, CXR showed Patchy densities in the lower lungs, EKG showed Afib 126,         ECHO 22    Left ventricular ejection fraction appears to be 56 - 60%.  •  Left ventricular diastolic function was indeterminate.  •  There is calcification of the aortic valve.  •  Moderate mitral valve regurgitation is present.  •  Estimated right ventricular systolic pressure from tricuspid regurgitation is mildly elevated (35-45 mmHg). Calculated right ventricular systolic pressure from tricuspid regurgitation is 39 mmHg.    Past Medical History:   Diagnosis Date   • Atrial fibrillation (HCC)    • Hypertension        No  past surgical history on file.    Medications Prior to Admission   Medication Sig Dispense Refill Last Dose   • acetaminophen (TYLENOL) 500 MG tablet Take 1,000 mg by mouth Every 4 (Four) Hours As Needed.      • Ascorbic Acid (Vitamin C) 500 MG capsule Take 1 tablet by mouth Daily.   12/12/2022   • aspirin 81 MG chewable tablet Chew 81 mg Daily.   12/12/2022   • B Complex Vitamins (VITAMIN B COMPLEX PO) Take 1 tablet by mouth Daily.   12/12/2022   • Cholecalciferol (Vitamin D3) 50 MCG (2000 UT) capsule Take 2,000 Units by mouth Daily.   12/12/2022   • dilTIAZem CD (CARDIZEM CD) 180 MG 24 hr capsule Take 1 capsule by mouth Daily for 60 days. 30 capsule 11 12/12/2022   • ezetimibe (ZETIA) 10 MG tablet Take 10 mg by mouth Daily.   12/12/2022   • fish oil-omega-3 fatty acids 1000 MG capsule Take 1 g by mouth Daily.   12/12/2022   • furosemide (LASIX) 20 MG tablet Take 1 tablet by mouth Daily.   12/13/2022   • glipizide (GLUCOTROL XL) 10 MG 24 hr tablet Take 1 tablet by mouth Every Morning.   12/12/2022   • icosapent ethyl (VASCEPA) 1 g capsule capsule Take 2 capsules by mouth 2 (Two) Times a Day.   12/12/2022   • levothyroxine (SYNTHROID, LEVOTHROID) 100 MCG tablet Take 1 tablet by mouth Daily.   12/13/2022   • meclizine (ANTIVERT) 12.5 MG tablet Take 12.5 mg by mouth 3 (Three) Times a Day As Needed for Dizziness.      • metoprolol tartrate (LOPRESSOR) 25 MG tablet Take 1 tablet by mouth 2 (Two) Times a Day. 180 tablet 3 12/13/2022   • multivitamin (THERAGRAN) tablet tablet Take 1 tablet by mouth Daily.   12/12/2022   • pantoprazole (PROTONIX) 20 MG EC tablet Take 2 tablets by mouth Daily. 90 tablet 3 12/12/2022   • potassium chloride 10 MEQ CR tablet Take 1 tablet by mouth Daily. 90 tablet 3 12/12/2022   • pregabalin (LYRICA) 100 MG capsule Take 100 mg by mouth Every Evening.   12/12/2022   • SITagliptin (JANUVIA) 100 MG tablet Take 100 mg by mouth Every Evening.   12/12/2022   • traMADol (ULTRAM) 50 MG tablet Take 50  mg by mouth Every 6 (Six) Hours As Needed for Moderate Pain.      • warfarin (COUMADIN) 3 MG tablet Take 6 mg by mouth See Admin Instructions. 6 mg on Monday and Friday 12/12/2022   • warfarin (COUMADIN) 3 MG tablet Take 4.5 mg by mouth See Admin Instructions. 4.5 mg on Tuesday, Wednesday, Thursday, Saturday and Sunday   Past Week       Current Meds  Scheduled Meds:vitamin C, 500 mg, Oral, Daily  aspirin, 81 mg, Oral, Daily  cholecalciferol, 2,000 Units, Oral, Daily  dilTIAZem CD, 180 mg, Oral, Q24H  furosemide, 40 mg, Intravenous, Daily  icosapent ethyl, 2 g, Oral, BID  insulin lispro, 0-9 Units, Subcutaneous, TID With Meals  levothyroxine, 100 mcg, Oral, QAM  linagliptin, 5 mg, Oral, Daily  metoprolol tartrate, 25 mg, Oral, BID  multivitamin, 1 tablet, Oral, Daily  pantoprazole, 40 mg, Oral, Q AM  potassium chloride, 10 mEq, Oral, Daily  pregabalin, 100 mg, Oral, Nightly      Continuous Infusions:Pharmacy to dose warfarin,       PRN Meds:.•  acetaminophen  •  dextrose  •  dextrose  •  glucagon (human recombinant)  •  HYDROmorphone  •  melatonin  •  nitroglycerin  •  ondansetron **OR** ondansetron  •  Pharmacy to dose warfarin  •  [COMPLETED] Insert Peripheral IV **AND** sodium chloride    Allergies as of 12/13/2022 - Reviewed 12/13/2022   Allergen Reaction Noted   • Bee venom Anaphylaxis 07/02/2021   • Atorvastatin Nausea Only 07/02/2021   • Gemfibrozil Nausea Only 07/02/2021   • Haemophilus influenzae Unknown - High Severity 07/02/2021   • Influenza vac split quad Unknown - High Severity 07/02/2021   • Isosorbide Unknown - High Severity 07/02/2021   • Metformin Unknown - High Severity 07/02/2021   • Propoxyphene Unknown - High Severity 07/02/2021   • Simvastatin Nausea Only 07/02/2021       Social History     Socioeconomic History   • Marital status:    Tobacco Use   • Smoking status: Never   • Smokeless tobacco: Never   Vaping Use   • Vaping Use: Never used   Substance and Sexual Activity   • Alcohol  use: Yes     Comment: rare/caffeine use        History reviewed. No pertinent family history.    REVIEW OF SYSTEMS:   All systems reviewed and pertinent positives include in HPI otherwise negative review of systems.       Objective:   Temp:  [97.4 °F (36.3 °C)-98.5 °F (36.9 °C)] 97.4 °F (36.3 °C)  Heart Rate:  [] 83  Resp:  [16-18] 17  BP: (113-129)/(69-87) 123/79  Body mass index is 25.75 kg/m².  Flowsheet Rows    Flowsheet Row First Filed Value   Admission Height 157.5 cm (62\") Documented at 12/13/2022 1213   Admission Weight 63.5 kg (140 lb) Documented at 12/13/2022 1213        Vitals:    12/15/22 1328   BP: 123/79   Pulse: 83   Resp: 17   Temp: 97.4 °F (36.3 °C)   SpO2:        General Appearance:    Alert, cooperative, in no acute distress   Head:    Normocephalic, without obvious abnormality, atraumatic   Eyes:            Lids and lashes normal, conjunctivae and sclerae normal, no   icterus, no pallor, corneas clear, PERRLA   Ears:    Ears appear intact with no abnormalities noted   Throat:   No oral lesions, no thrush, oral mucosa moist   Neck:   No adenopathy, supple, trachea midline, no thyromegaly, no   carotid bruit, no JVD   Back:     No kyphosis present, no scoliosis present, no skin lesions, erythema or scars, no tenderness to percussion or palpation, range of motion normal   Lungs:     Clear to auscultation,respirations regular, even and unlabored    Heart:   Irregularly irregular e, normal S1 and S2, no murmur, no gallop, no rub, no click   Chest Wall:    No abnormalities observed   Abdomen:     Normal bowel sounds, no masses, no organomegaly, soft nontender, nondistended, no guarding, no rebound  tenderness   Extremities:   Moves all extremities well, no edema, no cyanosis, no redness   Pulses:   Pulses palpable and equal bilaterally. Normal radial, carotid, femoral, dorsalis pedis and posterior tibial pulses bilaterally. Normal abdominal aorta   Skin:  Neurology:   Psychiatric:   No bleeding,  bruising or rash   Normal speech and cranial nerve exam, no focal deficit   Alert and oriented x 3, normal mood and affect                 Review of Data:      Results from last 7 days   Lab Units 12/14/22  0501 12/13/22  1216   SODIUM mmol/L 143 137   POTASSIUM mmol/L 4.0 4.4   CHLORIDE mmol/L 104 100   CO2 mmol/L 26.5 23.4   BUN mg/dL 30* 28*   CREATININE mg/dL 1.47* 1.44*   CALCIUM mg/dL 8.9 9.4   BILIRUBIN mg/dL  --  0.8   ALK PHOS U/L  --  106   ALT (SGPT) U/L  --  39*   AST (SGOT) U/L  --  38*   GLUCOSE mg/dL 125* 263*     Results from last 7 days   Lab Units 12/13/22  1216   TROPONIN T ng/mL 0.098*     @LABRCNTbnp@  Results from last 7 days   Lab Units 12/14/22  0502 12/13/22  1216   WBC 10*3/mm3 10.74 13.05*   HEMOGLOBIN g/dL 10.4* 12.2   HEMATOCRIT % 31.9* 37.5   PLATELETS 10*3/mm3 211 236     Results from last 7 days   Lab Units 12/15/22  0510 12/14/22  0501 12/13/22  1216   INR  2.79* 2.29* 1.86*         @LABNT(chol,trig,hdl,ldl)                  I personally viewed and interpreted the patient's EKG/Telemetry data  )  Patient Active Problem List   Diagnosis   • Atrial fibrillation, persistent (HCC)   • Chronic anticoagulation   • Dementia (HCC)   • Acute on chronic diastolic heart failure (HCC)   • Abdominal pain   • Acute respiratory failure with hypoxia (HCC)   • Pancreatic lesion         Assessment and Plan:    Ms. mostly admitted for work-up and treatment of worsening shortness of breath.  No evidence for pneumonia.  Symptoms more consistent with heart failure exacerbation.  She is known to be compliant with all current treatment regimen including oral diuretic at home.  About a week ago she had right lower extremity intervention.  She had intervention on the left side few months ago.  She denies any chest pain, palpitations, presyncope syncope.  Reports some response to IV diuretic.    1.  Acute on chronic diastolic congestive heart failure.  Echo on 12/14/2022 with grade 2 diastolic dysfunction  with elevated left atrial filling pressure and mild pulmonary hypertension.  Moderate to severe MR, sclerotic aortic valve with mild AI.  2.  Persistent atrial fibrillation with RVR  Currently on diltiazem, metoprolol- prior history of intolerance to metoprolol and digoxin.  On Coumadin for anticoagulation  3.  Chronic kidney disease  4.  Type 2 diabetes mellitus  5.  Essential hypertension  6.  Mixed hyperlipidemia  7.  Peripheral arterial disease with prior interventions.    I will give her one-time dose of Lasix 80 mg IV.  Check BMP/proBNP in a.m.  Chest x-ray in a.m.  Continue other current cardiac medications with monitoring.  Recommend PT/incentive spirometry  Currently on supplemental oxygen through nasal cannula-wean off as tolerated    Jag Farias MD  12/15/22  13:29 EST.  Time spent in reviewing chart, discussion and examination:

## 2022-12-15 NOTE — PROGRESS NOTES
Name: Bessie Molina ADMIT: 2022   : 1932  PCP: Mireille La MD    MRN: [id number] LOS: 2 days   AGE/SEX: 89 y.o. female  ROOM: [facility]     Subjective   Subjective    Currently without complaint. Denies shortness of breath, chest pain, abdominal pain. No family at bedside.    Review of Systems   Unable to perform ROS: Dementia      Objective   Objective   Vital Signs  Temp:  [97.4 °F (36.3 °C)-98.5 °F (36.9 °C)] 98 °F (36.7 °C)  Heart Rate:  [] 93  Resp:  [16-18] 17  BP: (113-131)/(69-87) 121/76  SpO2:  [93 %-97 %] 93 %  on  Flow (L/min):  [2] 2;   Device (Oxygen Therapy): humidified;nasal cannula  Body mass index is 25.75 kg/m².    Physical Exam  Constitutional:       General: She is not in acute distress.     Appearance: Normal appearance. She is not toxic-appearing.   HENT:      Head: Normocephalic and atraumatic.   Eyes:      Conjunctiva/sclera: Conjunctivae normal.   Cardiovascular:      Rate and Rhythm: Normal rate. Rhythm irregular.   Pulmonary:      Effort: Pulmonary effort is normal. No respiratory distress.      Breath sounds: Decreased breath sounds present. No wheezing or rhonchi.   Abdominal:      General: Bowel sounds are normal.      Palpations: Abdomen is soft.      Tenderness: There is no abdominal tenderness. There is no guarding or rebound.   Musculoskeletal:         General: No swelling.      Cervical back: Normal range of motion.      Comments: Trace lower extremity edema   Skin:     General: Skin is warm and dry.   Neurological:      General: No focal deficit present.      Mental Status: She is alert.   Psychiatric:         Mood and Affect: Mood normal.         Behavior: Behavior normal.         Thought Content: Thought content normal.         Cognition and Memory: Memory is impaired.     Results Review  I reviewed the patient's new clinical results.  Results from last 7 days   Lab Units 22  0502 22  1216   WBC 10*3/mm3 10.74 13.05*   HEMOGLOBIN g/dL  10.4* 12.2   PLATELETS 10*3/mm3 211 236     Results from last 7 days   Lab Units 12/14/22  0501 12/13/22  1216   SODIUM mmol/L 143 137   POTASSIUM mmol/L 4.0 4.4   CHLORIDE mmol/L 104 100   CO2 mmol/L 26.5 23.4   BUN mg/dL 30* 28*   CREATININE mg/dL 1.47* 1.44*   GLUCOSE mg/dL 125* 263*     Lab Results   Component Value Date    ANIONGAP 12.5 12/14/2022     Estimated Creatinine Clearance: 22.3 mL/min (A) (by C-G formula based on SCr of 1.47 mg/dL (H)).    Results from last 7 days   Lab Units 12/13/22  1216   ALBUMIN g/dL 3.90   BILIRUBIN mg/dL 0.8   ALK PHOS U/L 106   AST (SGOT) U/L 38*   ALT (SGPT) U/L 39*     Results from last 7 days   Lab Units 12/14/22  0501 12/13/22  1216   CALCIUM mg/dL 8.9 9.4   ALBUMIN g/dL  --  3.90     Results from last 7 days   Lab Units 12/13/22  1316 12/13/22  1216   PROCALCITONIN ng/mL  --  0.08   LACTATE mmol/L 2.8*  --      Results from last 7 days   Lab Units 12/15/22  0510 12/14/22  0501 12/13/22  1216   INR  2.79* 2.29* 1.86*      Hemoglobin A1C   Date/Time Value Ref Range Status   12/14/2022 0502 7.20 (H) 4.80 - 5.60 % Final     Glucose   Date/Time Value Ref Range Status   12/15/2022 1126 274 (H) 70 - 130 mg/dL Final     Comment:     Meter: [id number] : [id number] Archie Trejo NA   12/15/2022 0615 115 70 - 130 mg/dL Final     Comment:     Meter: [id number] : [id number] Leigh Sylvia NA   12/14/2022 2035 155 (H) 70 - 130 mg/dL Final     Comment:     Meter: [id number] : [id number] Leigh Sylvia NA   12/14/2022 1604 160 (H) 70 - 130 mg/dL Final     Comment:     Meter: [id number] : [id number] Archie HER   12/14/2022 1116 238 (H) 70 - 130 mg/dL Final     Comment:     Meter: [id number] : [id number] Archie HER   12/14/2022 0622 109 70 - 130 mg/dL Final     Comment:     Meter: [id number] : [id number] Karla HER   12/13/2022 2059 251 (H) 70 - 130 mg/dL Final     Comment:     Meter: [id number] : [id number] Karla HER       CT Abdomen Pelvis  Without Contrast    Result Date: 12/14/2022  1. Small bilateral pleural effusions. Bilateral interstitial septal thickening in the lungs with patchy groundglass opacities. Overall constellation of findings suggests pulmonary edema 2. Cholelithiasis 3. Rounded density projecting anteriorly from the body of the pancreas measures 1.5 cm. This is indeterminate and may simply reflect normal pancreatic tissue although a pancreatic lesion cannot be excluded. Suggest either evaluation with a contrast-enhanced CT or comparison with any prior outside CT abdomen studies if available   Radiation dose reduction techniques were utilized, including automated exposure control and exposure modulation based on body size.  This report was finalized on 12/14/2022 1:39 PM by Dr. Noah Almanza M.D.      CT Chest Without Contrast Diagnostic    Result Date: 12/14/2022  1. Small bilateral pleural effusions. Bilateral interstitial septal thickening in the lungs with patchy groundglass opacities. Overall constellation of findings suggests pulmonary edema 2. Cholelithiasis 3. Rounded density projecting anteriorly from the body of the pancreas measures 1.5 cm. This is indeterminate and may simply reflect normal pancreatic tissue although a pancreatic lesion cannot be excluded. Suggest either evaluation with a contrast-enhanced CT or comparison with any prior outside CT abdomen studies if available   Radiation dose reduction techniques were utilized, including automated exposure control and exposure modulation based on body size.  This report was finalized on 12/14/2022 1:39 PM by Dr. Noah Almanza M.D.        Scheduled Meds  vitamin C, 500 mg, Oral, Daily  aspirin, 81 mg, Oral, Daily  cefTRIAXone, 1 g, Intravenous, Q24H  cholecalciferol, 2,000 Units, Oral, Daily  dilTIAZem CD, 180 mg, Oral, Q24H  furosemide, 20 mg, Oral, Daily  icosapent ethyl, 2 g, Oral, BID  insulin lispro, 0-9 Units, Subcutaneous, TID With Meals  levothyroxine, 100 mcg,  Oral, QAM  linagliptin, 5 mg, Oral, Daily  metoprolol tartrate, 25 mg, Oral, BID  multivitamin, 1 tablet, Oral, Daily  pantoprazole, 40 mg, Oral, Q AM  potassium chloride, 10 mEq, Oral, Daily  pregabalin, 100 mg, Oral, Nightly    Continuous Infusions  Pharmacy to dose warfarin,     PRN Meds  •  acetaminophen  •  dextrose  •  dextrose  •  glucagon (human recombinant)  •  HYDROmorphone  •  melatonin  •  nitroglycerin  •  ondansetron **OR** ondansetron  •  Pharmacy to dose warfarin  •  [COMPLETED] Insert Peripheral IV **AND** sodium chloride    Pharmacy to dose warfarin,     Diet  Diet: Cardiac Diets, Diabetic Diets; Healthy Heart (2-3 Na+); Consistent Carbohydrate; Texture: Regular Texture (IDDSI 7); Fluid Consistency: Thin (IDDSI 0)    I have personally reviewed:  [x]  Laboratory   []  Microbiology   [x]  Radiology    [x]  EKG/Telemetry A. fib on telemetry  []  Cardiology/Vascular   []  Pathology   []  Records    Results for orders placed during the hospital encounter of 12/13/22    Adult Transthoracic Echo Complete W/ Cont if Necessary Per Protocol    Interpretation Summary  •  Left ventricular ejection fraction appears to be 56 - 60%.  •  Left ventricular diastolic function was indeterminate.  •  There is calcification of the aortic valve.  •  Moderate mitral valve regurgitation is present.  •  Estimated right ventricular systolic pressure from tricuspid regurgitation is mildly elevated (35-45 mmHg). Calculated right ventricular systolic pressure from tricuspid regurgitation is 39 mmHg.       Intake/Output Summary (Last 24 hours) at 12/15/2022 1320  Last data filed at 12/15/2022 1128  Gross per 24 hour   Intake 476 ml   Output 800 ml   Net -324 ml         Assessment/Plan     Active Hospital Problems    Diagnosis  POA   • **Acute respiratory failure with hypoxia (HCC) [J96.01]  Unknown   • Pancreatic lesion [K86.9]  Unknown   • Dementia (HCC) [F03.90]  Unknown   • Acute on chronic diastolic heart failure (HCC)  [I50.33]  Unknown   • Abdominal pain [R10.9]  Unknown   • Atrial fibrillation, persistent (HCC) [I48.19]  Yes   • Chronic anticoagulation [Z79.01]  Not Applicable      Resolved Hospital Problems   No resolved problems to display.     89 y.o. female presented with shortness of breath, cough, congestion, wheezing. In ED she had A. fib RVR    Acute hypoxic respiratory failure: acute on chronic diastolic CHF  -Flow (L/min):  [2] 2   -Started on antibiotics for possible pneumonia though procalcitonin not elevated. Lactate elevation probably from low perfusion. Respiratory panel negative. CT of the chest more consistent with pulmonary edema. Will stop antibiotics and observe  -Continue diuresis while monitoring renal function. -324 mL last 24 hours  -Symptoms seem to have improved  -Cardiology to see  -Echocardiogram above    A. fib RVR  -Warfarin per pharmacy  -Rate improved. Currently on Cardizem and metoprolol    Abdominal pain?  -Currently she denies abdominal pain   -CT with indeterminate lesion body of pancreas, lesion not excluded. Will discuss with family if they wish any further evaluation    Dementia  -Delirium precautions    Discussed with patient, nursing staff, CCP and care team on multidisciplinary rounds. Left message with daughter asking her to call back 13:20 EST     Discharge: PT recommends SNF at [place] versus home with home health    Giovanny Castillo MD  [facility]  12/15/22

## 2022-12-15 NOTE — THERAPY TREATMENT NOTE
Patient Name: Bessie Molina  : 1932    MRN: [id number]                              Today's Date: 12/15/2022       Admit Date: 2022    Visit Dx:     ICD-10-CM ICD-9-CM   1. Pneumonia of both lungs due to infectious organism, unspecified part of lung  J18.9 483.8   2. Acute on chronic congestive heart failure, unspecified heart failure type (HCC)  I50.9 428.0   3. Atrial fibrillation with RVR (HCC)  I48.91 427.31   4. Lactic acidosis  E87.20 276.2   5. Elevated troponin  R77.8 790.6   6. Hypoxia  R09.02 799.02     Patient Active Problem List   Diagnosis   • Atrial fibrillation, persistent (HCC)   • Chronic anticoagulation   • Dementia (HCC)   • Acute on chronic diastolic heart failure (HCC)   • Abdominal pain   • Acute respiratory failure with hypoxia (HCC)   • Pancreatic lesion     Past Medical History:   Diagnosis Date   • Atrial fibrillation (HCC)    • Hypertension      No past surgical history on file.   General Information     Row Name 12/15/22 1603          Physical Therapy Time and Intention    Document Type therapy note (daily note)  -EB     Mode of Treatment physical therapy;individual therapy  -EB     Row Name 12/15/22 1603          General Information    Existing Precautions/Restrictions fall  -EB     Row Name 12/15/22 1603          Cognition    Orientation Status (Cognition) oriented x 3  -EB     Row Name 12/15/22 1603          Safety Issues, Functional Mobility    Impairments Affecting Function (Mobility) strength;endurance/activity tolerance;shortness of breath;balance  -EB           User Key  (r) = Recorded By, (t) = Taken By, (c) = Cosigned By    Initials Name Provider Type    EB Ophelia Callejas PTA Physical Therapist Assistant               Mobility     Row Name 12/15/22 1603          Bed Mobility    Supine-Sit [place] (Bed Mobility) contact guard  -EB     Sit-Supine [place] (Bed Mobility) not tested  -EB     Assistive Device (Bed Mobility) bed rails;head of bed elevated   -EB     Comment, (Bed Mobility) increased time  -EB     Row Name 12/15/22 1603          Sit-Stand Transfer    Sit-Stand [place] (Transfers) contact guard  -EB     Assistive Device (Sit-Stand Transfers) walker, front-wheeled  -EB     Row Name 12/15/22 1603          Gait/Stairs (Locomotion)    [place] Level (Gait) contact guard  -EB     Assistive Device (Gait) walker, front-wheeled  -EB     Distance in Feet (Gait) 25ft  -EB     Deviations/Abnormal Patterns (Gait) gait speed decreased;ele decreased;stride length decreased  -EB     Bilateral Gait Deviations forward flexed posture  -EB     Comment, (Gait/Stairs) slow gait but fairly steady. A couple of short rest breaks due to feeling SOA and fatigued.  -EB           User Key  (r) = Recorded By, (t) = Taken By, (c) = Cosigned By    Initials Name Provider Type    EB Ophelia Callejas PTA Physical Therapist Assistant               Obj/Interventions    No documentation.                Goals/Plan    No documentation.                Clinical Impression     Row Name 12/15/22 1602          Plan of Care Review    Plan of Care Reviewed With patient  -EB     Progress improving  -EB     Outcome Evaluation Pt tolerated treatment with no acute complaints. Pt fatigued and feeling a little SOA with mobility. Pt reqired CGA with bed mobility and with sit<->stand transfers. Pt ambulated 25ft with rwx, CGA. pt slow with mobility but fairly steady. Pt needed a couple of short rest breaks due to feeling SOA. Will continue to progress pt as able.  -     Row Name 12/15/22 1609          Therapy Assessment/Plan (PT)    Therapy Frequency (PT) 6 times/wk  -     Row Name 12/15/22 160          Positioning and Restraints    Pre-Treatment Position in bed  -EB     Post Treatment Position chair  -EB     In Chair reclined;call light within reach;encouraged to call for assist;exit alarm on;with family/caregiver  -EB           User Key  (r) = Recorded By, (t) = Taken By, (c) = Cosigned By     Initials Name Provider Type     Ophelia Callejas PTA Physical Therapist Assistant               Outcome Measures     Row Name 12/15/22 1605          How much help from another person do you currently need...    Turning from your back to your side while in flat bed without using bedrails? 3  -EB     Moving from lying on back to sitting on the side of a flat bed without bedrails? 3  -EB     Moving to and from a bed to a chair (including a wheelchair)? 3  -EB     Standing up from a chair using your arms (e.g., wheelchair, bedside chair)? 3  -EB     Climbing 3-5 steps with a railing? 2  -EB     To walk in hospital room? 3  -EB     AM-PAC 6 Clicks Score (PT) 17  -EB     Highest level of mobility 5 --> Static standing  -           User Key  (r) = Recorded By, (t) = Taken By, (c) = Cosigned By    Initials Name Provider Type     Ophelia Callejas PTA Physical Therapist Assistant                             Physical Therapy Education     Title: PT OT SLP Therapies (Done)     Topic: Physical Therapy (Done)     Point: Mobility training (Done)     Learning Progress Summary           Patient Acceptance, E, VU by  at 12/15/2022 1606    Acceptance, E, VU by  at 12/14/2022 1547                   Point: Home exercise program (Done)     Learning Progress Summary           Patient Acceptance, E, VU by  at 12/15/2022 1606                   Point: Body mechanics (Done)     Learning Progress Summary           Patient Acceptance, E, VU by  at 12/15/2022 1606    Acceptance, E, VU by  at 12/14/2022 1547                   Point: Precautions (Done)     Learning Progress Summary           Patient Acceptance, E, VU by  at 12/15/2022 1606    Acceptance, E, VU by  at 12/14/2022 1547                               User Key     Initials Effective Dates Name Provider Type Discipline     06/16/21 -  Ophelia Callejas PTA Physical Therapist Assistant PT     05/02/22 -  Marie Terrazas PT Physical Therapist PT              PT  Recommendation and Plan     Plan of Care Reviewed With: patient  Progress: improving  Outcome Evaluation: Pt tolerated treatment with no acute complaints. Pt fatigued and feeling a little SOA with mobility. Pt reqired CGA with bed mobility and with sit<->stand transfers. Pt ambulated 25ft with rwx, CGA. pt slow with mobility but fairly steady. Pt needed a couple of short rest breaks due to feeling SOA. Will continue to progress pt as able.     Time Calculation:    PT Charges     Row Name 12/15/22 1602             Time Calculation    Start Time 1456  -EB      Stop Time 1513  -EB      Time Calculation (min) 17 min  -EB      PT Received On 12/15/22  -EB      PT - Next Appointment 12/16/22  -         Time Calculation- PT    Total Timed Code Minutes- PT 17 minute(s)  -EB            User Key  (r) = Recorded By, (t) = Taken By, (c) = Cosigned By    Initials Name Provider Type    EB Ophelia Callejas PTA Physical Therapist Assistant              Therapy Charges for Today     Code Description Service Date Service Provider Modifiers Qty    [id number] HC GAIT TRAINING EA 15 MIN 12/15/2022 Ophelia Callejas PTA GP 1          PT G-Codes  Outcome Measure Options: AM-PAC 6 Clicks Basic Mobility (PT)  AM-PAC 6 Clicks Score (PT): 17       Ophelia Callejas PTA  12/15/2022

## 2022-12-15 NOTE — PLAN OF CARE
Goal Outcome Evaluation:VS stable ,no complaints of nausea or vomiting . Slept the whole night. Tolerating O2 at 2 lpm. Will continue to monitor her.

## 2022-12-15 NOTE — DISCHARGE PLACEMENT REQUEST
Rukhsana Molina (89 y.o. Female)     Date of Birth   01/22/1932    Social Security Number       Address   [address] [place] [address]    Home Phone   [phone]    MRN   [id number]       Religious   None    Marital Status                               Admission Date   12/13/22    Admission Type   Emergency    Admitting Provider   Cristiane Leyva MD    Attending Provider   Giovanny Casitllo MD    Department, Room/Bed   [address], [address]       Discharge Date       Discharge Disposition       Discharge Destination                               Attending Provider: Giovanny Castillo MD    Allergies: Bee Venom, Atorvastatin, Gemfibrozil, Haemophilus Influenzae, Influenza Vac Split Quad, Isosorbide, Metformin, Propoxyphene, Simvastatin    Isolation: None   Infection: None   Code Status: No CPR    Ht: 157.5 cm (62\")   Wt: 63.9 kg (140 lb 12.8 oz)    Admission Cmt: None   Principal Problem: Acute respiratory failure with hypoxia (HCC) [J96.01]                 Active Insurance as of 12/13/2022     Primary Coverage     Payor Plan Insurance Group Employer/Plan Group    MEDICARE MEDICARE A & B      Payor Plan Address Payor Plan Phone Number Payor Plan Fax Number Effective Dates    [address] [phone]  1/1/1997 - None Entered    [place] [id number]       Subscriber Name Subscriber Birth Date Member ID       RUKHASNA MOLINA 1/22/1932 [id number]           Secondary Coverage     Payor Plan Insurance Group Employer/Plan Group     FOR LIFE  FOR LIFE  SUP       Payor Plan Address Payor Plan Phone Number Payor Plan Fax Number Effective Dates    [address] [phone]  7/2/2021 - None Entered    [facility] [phone]       Subscriber Name Subscriber Birth Date Member ID       RUKHSANA MOLINA 1/22/1932 [id number]                 Emergency Contacts      (Rel.) Home Phone Work Phone Mobile Phone    FAMILIA RICCIET [facility] (Daughter) [phone]  -- [phone]    RADHAMESVALENCIA [employer] (Relative) [phone] -- [phone]

## 2022-12-15 NOTE — PLAN OF CARE
Goal Outcome Evaluation:  Plan of Care Reviewed With: patient        Progress: improving  Outcome Evaluation: Pt tolerated treatment with no acute complaints. Pt fatigued and feeling a little SOA with mobility. Pt reqired CGA with bed mobility and with sit<->stand transfers. Pt ambulated 25ft with rwx, CGA. pt slow with mobility but fairly steady. Pt needed a couple of short rest breaks due to feeling SOA. Will continue to progress pt as able.

## 2022-12-16 ENCOUNTER — APPOINTMENT (OUTPATIENT)
Dept: GENERAL RADIOLOGY | Facility: HOSPITAL | Age: 87
DRG: 308 | End: 2022-12-16
Payer: MEDICARE

## 2022-12-16 LAB
ANION GAP SERPL CALCULATED.3IONS-SCNC: 8.1 MMOL/L (ref 5–15)
BUN SERPL-MCNC: 44 MG/DL (ref 8–23)
BUN/CREAT SERPL: 25.4 (ref 7–25)
CALCIUM SPEC-SCNC: 8.9 MG/DL (ref 8.2–9.6)
CHLORIDE SERPL-SCNC: 100 MMOL/L (ref 98–107)
CO2 SERPL-SCNC: 27.9 MMOL/L (ref 22–29)
CREAT SERPL-MCNC: 1.73 MG/DL (ref 0.57–1)
EGFRCR SERPLBLD CKD-EPI 2021: 27.8 ML/MIN/1.73
GLUCOSE BLDC GLUCOMTR-MCNC: 149 MG/DL (ref 70–130)
GLUCOSE BLDC GLUCOMTR-MCNC: 193 MG/DL (ref 70–130)
GLUCOSE BLDC GLUCOMTR-MCNC: 198 MG/DL (ref 70–130)
GLUCOSE BLDC GLUCOMTR-MCNC: 204 MG/DL (ref 70–130)
GLUCOSE SERPL-MCNC: 136 MG/DL (ref 65–99)
INR PPP: 2.86 (ref 0.9–1.1)
NT-PROBNP SERPL-MCNC: 7005 PG/ML (ref 0–1800)
POTASSIUM SERPL-SCNC: 3.8 MMOL/L (ref 3.5–5.2)
PROTHROMBIN TIME: 30.4 SECONDS (ref 11.7–14.2)
SODIUM SERPL-SCNC: 136 MMOL/L (ref 136–145)

## 2022-12-16 PROCEDURE — 85610 PROTHROMBIN TIME: CPT | Performed by: INTERNAL MEDICINE

## 2022-12-16 PROCEDURE — 99232 SBSQ HOSP IP/OBS MODERATE 35: CPT | Performed by: INTERNAL MEDICINE

## 2022-12-16 PROCEDURE — 82962 GLUCOSE BLOOD TEST: CPT

## 2022-12-16 PROCEDURE — 63710000001 INSULIN LISPRO (HUMAN) PER 5 UNITS: Performed by: INTERNAL MEDICINE

## 2022-12-16 PROCEDURE — 83880 ASSAY OF NATRIURETIC PEPTIDE: CPT | Performed by: INTERNAL MEDICINE

## 2022-12-16 PROCEDURE — 80048 BASIC METABOLIC PNL TOTAL CA: CPT | Performed by: INTERNAL MEDICINE

## 2022-12-16 PROCEDURE — 71045 X-RAY EXAM CHEST 1 VIEW: CPT

## 2022-12-16 PROCEDURE — 94618 PULMONARY STRESS TESTING: CPT

## 2022-12-16 RX ORDER — AMLODIPINE BESYLATE 2.5 MG/1
2.5 TABLET ORAL
Status: DISCONTINUED | OUTPATIENT
Start: 2022-12-16 | End: 2022-12-20 | Stop reason: HOSPADM

## 2022-12-16 RX ORDER — FUROSEMIDE 40 MG/1
40 TABLET ORAL DAILY
Status: DISCONTINUED | OUTPATIENT
Start: 2022-12-16 | End: 2022-12-17

## 2022-12-16 RX ADMIN — PANTOPRAZOLE SODIUM 40 MG: 40 TABLET, DELAYED RELEASE ORAL at 06:09

## 2022-12-16 RX ADMIN — EMPAGLIFLOZIN 10 MG: 10 TABLET, FILM COATED ORAL at 09:01

## 2022-12-16 RX ADMIN — FUROSEMIDE 40 MG: 40 TABLET ORAL at 12:21

## 2022-12-16 RX ADMIN — Medication 10 ML: at 09:01

## 2022-12-16 RX ADMIN — WARFARIN 4.5 MG: 2.5 TABLET ORAL at 18:33

## 2022-12-16 RX ADMIN — PREGABALIN 100 MG: 100 CAPSULE ORAL at 21:03

## 2022-12-16 RX ADMIN — ICOSAPENT ETHYL 2 G: 1000 CAPSULE ORAL at 21:03

## 2022-12-16 RX ADMIN — LEVOTHYROXINE SODIUM 100 MCG: 0.1 TABLET ORAL at 06:08

## 2022-12-16 RX ADMIN — METOPROLOL TARTRATE 25 MG: 25 TABLET, FILM COATED ORAL at 21:03

## 2022-12-16 RX ADMIN — AMLODIPINE BESYLATE 2.5 MG: 2.5 TABLET ORAL at 12:21

## 2022-12-16 RX ADMIN — INSULIN LISPRO 2 UNITS: 100 INJECTION, SOLUTION INTRAVENOUS; SUBCUTANEOUS at 18:34

## 2022-12-16 RX ADMIN — DILTIAZEM HYDROCHLORIDE 180 MG: 180 CAPSULE, EXTENDED RELEASE ORAL at 09:01

## 2022-12-16 RX ADMIN — OXYCODONE HYDROCHLORIDE AND ACETAMINOPHEN 500 MG: 500 TABLET ORAL at 09:01

## 2022-12-16 RX ADMIN — LINAGLIPTIN 5 MG: 5 TABLET, FILM COATED ORAL at 09:01

## 2022-12-16 RX ADMIN — Medication 2000 UNITS: at 09:01

## 2022-12-16 RX ADMIN — Medication 1 TABLET: at 09:01

## 2022-12-16 RX ADMIN — ICOSAPENT ETHYL 2 G: 1000 CAPSULE ORAL at 09:05

## 2022-12-16 RX ADMIN — POTASSIUM CHLORIDE 10 MEQ: 750 TABLET, EXTENDED RELEASE ORAL at 09:01

## 2022-12-16 RX ADMIN — METOPROLOL TARTRATE 25 MG: 25 TABLET, FILM COATED ORAL at 09:01

## 2022-12-16 RX ADMIN — INSULIN LISPRO 2 UNITS: 100 INJECTION, SOLUTION INTRAVENOUS; SUBCUTANEOUS at 12:56

## 2022-12-16 RX ADMIN — ASPIRIN 81 MG: 81 TABLET, CHEWABLE ORAL at 09:01

## 2022-12-16 NOTE — PROGRESS NOTES
Exercise Oximetry    Patient Name:Bessie Molina   MRN: [id number]   Date: 12/16/22             ROOM AIR BASELINE   SpO2% 87   Heart Rate 76   Blood Pressure      EXERCISE ON ROOM AIR SpO2% EXERCISE ON O2 @  LPM SpO2%   1 MINUTE 90 1 MINUTE    2 MINUTES 92 2 MINUTES    3 MINUTES 94 3 MINUTES    4 MINUTES  4 MINUTES    5 MINUTES  5 MINUTES    6 MINUTES  6 MINUTES               Distance Walked   Distance Walked   Dyspnea (Montrell Scale)   Dyspnea (Montrell Scale)   Fatigue (Montrell Scale)   Fatigue (Montrell Scale)   SpO2% Post Exercise   SpO2% Post Exercise   HR Post Exercise   HR Post Exercise   Time to Recovery   Time to Recovery     Comments: Pt walked from bed to door and back and did not require any oxygen with exertion.  Will place 2LPM while resting due to 87 %

## 2022-12-16 NOTE — PROGRESS NOTES
Name: Bessie Molina ADMIT: 2022   : 1932  PCP: Mireille La MD    MRN: [id number] LOS: 3 days   AGE/SEX: 89 y.o. female  ROOM: [facility]     Subjective   Subjective    Currently without complaint. Denies shortness of breath, chest pain, abdominal pain.     Review of Systems   Unable to perform ROS: Dementia      Objective   Objective   Vital Signs  Temp:  [97.4 °F (36.3 °C)-98.5 °F (36.9 °C)] 97.8 °F (36.6 °C)  Heart Rate:  [] 75  Resp:  [17-20] 18  BP: (123-153)/(78-89) 125/89  SpO2:  [95 %-100 %] 96 %  on  Flow (L/min):  [2-3] 2;   Device (Oxygen Therapy): nasal cannula  Body mass index is 25.09 kg/m².    Physical Exam  Constitutional:       General: She is not in acute distress.     Appearance: Normal appearance. She is not toxic-appearing.   HENT:      Head: Normocephalic and atraumatic.   Eyes:      Conjunctiva/sclera: Conjunctivae normal.   Cardiovascular:      Rate and Rhythm: Normal rate. Rhythm irregular.   Pulmonary:      Effort: Pulmonary effort is normal. No respiratory distress.      Breath sounds: Decreased breath sounds present. No wheezing or rhonchi.   Abdominal:      General: Bowel sounds are normal.      Palpations: Abdomen is soft.      Tenderness: There is no abdominal tenderness. There is no guarding or rebound.   Musculoskeletal:         General: No swelling.      Cervical back: Normal range of motion.      Comments: Trace lower extremity edema   Skin:     General: Skin is warm and dry.   Neurological:      General: No focal deficit present.      Mental Status: She is alert.   Psychiatric:         Mood and Affect: Mood normal.         Behavior: Behavior normal.         Thought Content: Thought content normal.         Cognition and Memory: Memory is impaired.     Results Review  I reviewed the patient's new clinical results.  Results from last 7 days   Lab Units 22  0502 22  1216   WBC 10*3/mm3 10.74 13.05*   HEMOGLOBIN g/dL 10.4* 12.2   PLATELETS  10*3/mm3 211 236     Results from last 7 days   Lab Units 12/16/22  0435 12/14/22  0501 12/13/22  1216   SODIUM mmol/L 136 143 137   POTASSIUM mmol/L 3.8 4.0 4.4   CHLORIDE mmol/L 100 104 100   CO2 mmol/L 27.9 26.5 23.4   BUN mg/dL 44* 30* 28*   CREATININE mg/dL 1.73* 1.47* 1.44*   GLUCOSE mg/dL 136* 125* 263*     Lab Results   Component Value Date    ANIONGAP 8.1 12/16/2022     Estimated Creatinine Clearance: 18.7 mL/min (A) (by C-G formula based on SCr of 1.73 mg/dL (H)).    Results from last 7 days   Lab Units 12/13/22  1216   ALBUMIN g/dL 3.90   BILIRUBIN mg/dL 0.8   ALK PHOS U/L 106   AST (SGOT) U/L 38*   ALT (SGPT) U/L 39*     Results from last 7 days   Lab Units 12/16/22  0435 12/14/22  0501 12/13/22  1216   CALCIUM mg/dL 8.9 8.9 9.4   ALBUMIN g/dL  --   --  3.90     Results from last 7 days   Lab Units 12/13/22  1316 12/13/22  1216   PROCALCITONIN ng/mL  --  0.08   LACTATE mmol/L 2.8*  --      Results from last 7 days   Lab Units 12/16/22  0435 12/15/22  0510 12/14/22  0501   INR  2.86* 2.79* 2.29*      Hemoglobin A1C   Date/Time Value Ref Range Status   12/14/2022 0502 7.20 (H) 4.80 - 5.60 % Final     Glucose   Date/Time Value Ref Range Status   12/16/2022 0554 149 (H) 70 - 130 mg/dL Final     Comment:     Meter: [id number] : [id number] Anastacio Graves NA   12/15/2022 2122 146 (H) 70 - 130 mg/dL Final     Comment:     Meter: [id number] : [id number] Anastacio Graves NA   12/15/2022 1625 210 (H) 70 - 130 mg/dL Final     Comment:     Meter: [id number] : [id number] Archie Trejo    12/15/2022 1126 274 (H) 70 - 130 mg/dL Final     Comment:     Meter: [id number] : [id number] Archie [facility]   12/15/2022 0615 115 70 - 130 mg/dL Final     Comment:     Meter: [id number] : [id number] [facility]   12/14/2022 2035 155 (H) 70 - 130 mg/dL Final     Comment:     Meter: [id number] : [id number] [facility]   12/14/2022 1604 160 (H) 70 - 130 mg/dL Final     Comment:     Meter: [id number]  : [id number] Almanza Late NA       CT Abdomen Pelvis Without Contrast    Result Date: 12/14/2022  1. Small bilateral pleural effusions. Bilateral interstitial septal thickening in the lungs with patchy groundglass opacities. Overall constellation of findings suggests pulmonary edema 2. Cholelithiasis 3. Rounded density projecting anteriorly from the body of the pancreas measures 1.5 cm. This is indeterminate and may simply reflect normal pancreatic tissue although a pancreatic lesion cannot be excluded. Suggest either evaluation with a contrast-enhanced CT or comparison with any prior outside CT abdomen studies if available   Radiation dose reduction techniques were utilized, including automated exposure control and exposure modulation based on body size.  This report was finalized on 12/14/2022 1:39 PM by Dr. Noah Almanza M.D.      CT Chest Without Contrast Diagnostic    Result Date: 12/14/2022  1. Small bilateral pleural effusions. Bilateral interstitial septal thickening in the lungs with patchy groundglass opacities. Overall constellation of findings suggests pulmonary edema 2. Cholelithiasis 3. Rounded density projecting anteriorly from the body of the pancreas measures 1.5 cm. This is indeterminate and may simply reflect normal pancreatic tissue although a pancreatic lesion cannot be excluded. Suggest either evaluation with a contrast-enhanced CT or comparison with any prior outside CT abdomen studies if available   Radiation dose reduction techniques were utilized, including automated exposure control and exposure modulation based on body size.  This report was finalized on 12/14/2022 1:39 PM by Dr. Noah Almanza M.D.      XR Chest 1 View    Result Date: 12/16/2022  Findings of CHF with cardiomegaly and mild hydrostatic interstitial pulmonary edema that appears similar to the previous exam. Moderate bilateral pleural effusions.  This report was finalized on 12/16/2022 8:03 AM by Dr. Daniel Edmond,  M.D.        Scheduled Meds  vitamin C, 500 mg, Oral, Daily  aspirin, 81 mg, Oral, Daily  cholecalciferol, 2,000 Units, Oral, Daily  dilTIAZem CD, 180 mg, Oral, Q24H  empagliflozin, 10 mg, Oral, Daily  icosapent ethyl, 2 g, Oral, BID  insulin lispro, 0-9 Units, Subcutaneous, TID With Meals  levothyroxine, 100 mcg, Oral, QAM  linagliptin, 5 mg, Oral, Daily  metoprolol tartrate, 25 mg, Oral, BID  multivitamin, 1 tablet, Oral, Daily  pantoprazole, 40 mg, Oral, Q AM  potassium chloride, 10 mEq, Oral, Daily  pregabalin, 100 mg, Oral, Nightly    Continuous Infusions  Pharmacy to dose warfarin,     PRN Meds  •  acetaminophen  •  dextrose  •  dextrose  •  glucagon (human recombinant)  •  HYDROmorphone  •  melatonin  •  nitroglycerin  •  ondansetron **OR** ondansetron  •  Pharmacy to dose warfarin  •  [COMPLETED] Insert Peripheral IV **AND** sodium chloride    Pharmacy to dose warfarin,     Diet  Diet: Cardiac Diets, Diabetic Diets; Healthy Heart (2-3 Na+); Consistent Carbohydrate; Texture: Regular Texture (IDDSI 7); Fluid Consistency: Thin (IDDSI 0)    I have personally reviewed:  [x]  Laboratory   []  Microbiology   [x]  Radiology    [x]  EKG/Telemetry A. fib on telemetry  []  Cardiology/Vascular   []  Pathology   []  Records    Results for orders placed during the hospital encounter of 12/13/22    Adult Transthoracic Echo Complete W/ Cont if Necessary Per Protocol    Interpretation Summary  •  Left ventricular ejection fraction appears to be 56 - 60%.  •  Left ventricular diastolic function was indeterminate.  •  There is calcification of the aortic valve.  •  Moderate mitral valve regurgitation is present.  •  Estimated right ventricular systolic pressure from tricuspid regurgitation is mildly elevated (35-45 mmHg). Calculated right ventricular systolic pressure from tricuspid regurgitation is 39 mmHg.       Intake/Output Summary (Last 24 hours) at 12/16/2022 0935  Last data filed at 12/16/2022 0900  Gross per 24 hour    Intake 698 ml   Output 1100 ml   Net -402 ml         Assessment/Plan     Active Hospital Problems    Diagnosis  POA   • **Acute respiratory failure with hypoxia (HCC) [J96.01]  Unknown   • 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     89 y.o. female presented with shortness of breath, cough, congestion, wheezing. In ED she had A. fib RVR    Acute hypoxic respiratory failure: acute on chronic diastolic CHF  -Flow (L/min):  [2-3] 2   -Was started on antibiotics for possible pneumonia though procalcitonin not elevated. Lactate elevation probably from low perfusion. Respiratory panel negative. CT of the chest more consistent with pulmonary edema. Antibiotics stopped.   -Diuresed with improvement in symptoms (despite similar x-ray findings). Discussed with cardiology diuretics to PO monitoring renal function (creatinine a little higher today per cardiology not unusual for patient)  -Echocardiogram above      A. fib RVR  -Warfarin per pharmacy. INR 2.86 today  -Rate improved. Currently on Cardizem and metoprolol    Abdominal pain?  -Currently she denies abdominal pain   -CT with indeterminate lesion body of pancreas, lesion not excluded. Discussed with daughter she does not want any further work-up and would not want treatment if she had cancer.    Dementia  -Delirium precautions. Seems to be doing okay    Discussed with patient, family, nursing staff, CCP and care team on multidisciplinary rounds and Dr. Farias    Discharge: Probably SNF 1-2 days    Giovanny Castillo MD  [facility]  12/16/22

## 2022-12-16 NOTE — PROGRESS NOTES
[facility] Clinical Pharmacy Services: Warfarin Dosing/Monitoring Consult    Bessie Molina is a 89 y.o. female, estimated creatinine clearance is 18.7 mL/min (A) (by C-G formula based on SCr of 1.73 mg/dL (H)). weighing 62.2 kg (137 lb 3.2 oz).    Results from last 7 days   Lab Units 12/16/22  0435 12/15/22  0510 12/14/22  0502 12/14/22  0501 12/13/22  1216   INR  2.86* 2.79*  --  2.29* 1.86*   HEMOGLOBIN g/dL  --   --  10.4*  --  12.2   HEMATOCRIT %  --   --  31.9*  --  37.5   PLATELETS 10*3/mm3  --   --  211  --  236     Prior to admission anticoagulation: 6 mg MF 4.5 mg AOD, she does not know her doses herself    Hospital Anticoagulation:  Consulting provider: Dr Leyva  Start date: 12/13/22  Indication: A Fib - requiring full anticoagulation  Target INR: 2 - 3  Expected duration: lifelong   Bridge Therapy: No      Potential food or drug interactions: Azithromycin given in ER - effect from dose give may last days, ceftriaxone. Protonix, levothryroxine    Education complete?/Date: No; plan for follow up TBD - dementia    Assessment/Plan:  Dose: Will order 4.5 mg daily for now. Consider resuming above home dose if trends down over weekend  Monitor for any signs or symptoms of bleeding  Follow up daily INRs and dose adjustments    Pharmacy will continue to follow until discharge or discontinuation of warfarin.     Pascual Rosario [facility]  Clinical Pharmacist

## 2022-12-16 NOTE — PLAN OF CARE
Goal Outcome Evaluation:  VSS. Pt changed to Po lasix. Sat up in chair. Given and instructed on an IS. Chest xray done this am. Will continue to monitor.

## 2022-12-16 NOTE — PLAN OF CARE
Goal Outcome Evaluation:Received on 3 lpm O2 per NC. O2 sats stable from 94-97%, titrate down the O2 to 2 lpm and still tolerating it. Able to sleep the whole night ,no complaints made . Plan is to transfer her to a rehab / nursing care per  notes.

## 2022-12-16 NOTE — PROGRESS NOTES
CC: Congestive heart failure    Interval History: No new acute events overnight      Vital Signs  Temp:  [97.4 °F (36.3 °C)-98.5 °F (36.9 °C)] 97.8 °F (36.6 °C)  Heart Rate:  [] 75  Resp:  [17-20] 18  BP: (123-153)/(78-89) 125/89    Intake/Output Summary (Last 24 hours) at 12/16/2022 0857  Last data filed at 12/16/2022 0742  Gross per 24 hour   Intake 458 ml   Output 1100 ml   Net -642 ml     Flowsheet Rows    Flowsheet Row First Filed Value   Admission Height 157.5 cm (62\") Documented at 12/13/2022 1213   Admission Weight 63.5 kg (140 lb) Documented at 12/13/2022 1213          PHYSICAL EXAM:  General: On supplemental oxygen through nasal cannula.  Resp:NL Rate, symmetric chest expansion,unlabored, clear  CV: Irregularly irregular, NL PMI, NL S1 and S2, no Murmur, no gallop, no rub, No JVD.    ABD:Nl sounds, no masses or tenderness, nondistended, no guarding or rebound  Neuro: alert,cooperative and oriented  Extr:Normal pedal pulses, No edema or cyanosis, moves all extremities      Results Review:    Results from last 7 days   Lab Units 12/16/22  0435   SODIUM mmol/L 136   POTASSIUM mmol/L 3.8   CHLORIDE mmol/L 100   CO2 mmol/L 27.9   BUN mg/dL 44*   CREATININE mg/dL 1.73*   GLUCOSE mg/dL 136*   CALCIUM mg/dL 8.9     Results from last 7 days   Lab Units 12/13/22  1216   TROPONIN T ng/mL 0.098*     Results from last 7 days   Lab Units 12/14/22  0502   WBC 10*3/mm3 10.74   HEMOGLOBIN g/dL 10.4*   HEMATOCRIT % 31.9*   PLATELETS 10*3/mm3 211     Results from last 7 days   Lab Units 12/16/22  0435 12/15/22  0510 12/14/22  0501   INR  2.86* 2.79* 2.29*                 I reviewed the patient's new clinical results.  I personally viewed and interpreted the patient's EKG/Telemetry data        Medication Review:   Meds reviewed    Pharmacy to dose warfarin,         Assessment/Plan    1.  Acute on chronic diastolic congestive heart failure.  Echo on 12/14/2022 with grade 2 diastolic dysfunction with elevated left atrial  filling pressure and mild pulmonary hypertension.  Moderate to severe MR, sclerotic aortic valve with mild AI.  MR potentially  contributing to her symptoms.  2.  Persistent atrial fibrillation with RVR  Currently on diltiazem, metoprolol- prior history of intolerance to metoprolol and digoxin.  On Coumadin for anticoagulation-therapeutic INR  3.  Chronic kidney disease  4.  Type 2 diabetes mellitus  5.  Essential hypertension-controlled  6.  Mixed hyperlipidemia  7.  Peripheral arterial disease with prior interventions.    She has responded well to IV diuretic.  She reports improved breathing and extremity edema almost resolved.  Less oxygen requirement.  Good heart rate control with current regimen  Slightly worsened BUN/creatinine.  Switch to oral diuretic today  Add amlodipine 2.5 mg p.o. daily.  PT/OT/incentive spirometry.    I have discussed patient with Dr Anna Farias MD  12/16/22  08:57 EST

## 2022-12-17 LAB
ANION GAP SERPL CALCULATED.3IONS-SCNC: 10 MMOL/L (ref 5–15)
BUN SERPL-MCNC: 55 MG/DL (ref 8–23)
BUN/CREAT SERPL: 31.6 (ref 7–25)
CALCIUM SPEC-SCNC: 8.9 MG/DL (ref 8.2–9.6)
CHLORIDE SERPL-SCNC: 105 MMOL/L (ref 98–107)
CO2 SERPL-SCNC: 28 MMOL/L (ref 22–29)
CREAT SERPL-MCNC: 1.74 MG/DL (ref 0.57–1)
EGFRCR SERPLBLD CKD-EPI 2021: 27.6 ML/MIN/1.73
GLUCOSE BLDC GLUCOMTR-MCNC: 154 MG/DL (ref 70–130)
GLUCOSE BLDC GLUCOMTR-MCNC: 177 MG/DL (ref 70–130)
GLUCOSE BLDC GLUCOMTR-MCNC: 216 MG/DL (ref 70–130)
GLUCOSE SERPL-MCNC: 150 MG/DL (ref 65–99)
INR PPP: 2.88 (ref 0.9–1.1)
MAGNESIUM SERPL-MCNC: 2.1 MG/DL (ref 1.6–2.4)
POTASSIUM SERPL-SCNC: 3.6 MMOL/L (ref 3.5–5.2)
PROTHROMBIN TIME: 30.6 SECONDS (ref 11.7–14.2)
SODIUM SERPL-SCNC: 143 MMOL/L (ref 136–145)

## 2022-12-17 PROCEDURE — 82962 GLUCOSE BLOOD TEST: CPT

## 2022-12-17 PROCEDURE — 80048 BASIC METABOLIC PNL TOTAL CA: CPT | Performed by: HOSPITALIST

## 2022-12-17 PROCEDURE — 97116 GAIT TRAINING THERAPY: CPT

## 2022-12-17 PROCEDURE — 83735 ASSAY OF MAGNESIUM: CPT | Performed by: INTERNAL MEDICINE

## 2022-12-17 PROCEDURE — 63710000001 INSULIN LISPRO (HUMAN) PER 5 UNITS: Performed by: INTERNAL MEDICINE

## 2022-12-17 PROCEDURE — 85610 PROTHROMBIN TIME: CPT | Performed by: INTERNAL MEDICINE

## 2022-12-17 PROCEDURE — 99232 SBSQ HOSP IP/OBS MODERATE 35: CPT | Performed by: INTERNAL MEDICINE

## 2022-12-17 RX ADMIN — PANTOPRAZOLE SODIUM 40 MG: 40 TABLET, DELAYED RELEASE ORAL at 06:04

## 2022-12-17 RX ADMIN — OXYCODONE HYDROCHLORIDE AND ACETAMINOPHEN 500 MG: 500 TABLET ORAL at 09:11

## 2022-12-17 RX ADMIN — LEVOTHYROXINE SODIUM 100 MCG: 0.1 TABLET ORAL at 06:04

## 2022-12-17 RX ADMIN — INSULIN LISPRO 4 UNITS: 100 INJECTION, SOLUTION INTRAVENOUS; SUBCUTANEOUS at 16:43

## 2022-12-17 RX ADMIN — DILTIAZEM HYDROCHLORIDE 180 MG: 180 CAPSULE, EXTENDED RELEASE ORAL at 09:11

## 2022-12-17 RX ADMIN — AMLODIPINE BESYLATE 2.5 MG: 2.5 TABLET ORAL at 09:11

## 2022-12-17 RX ADMIN — ASPIRIN 81 MG: 81 TABLET, CHEWABLE ORAL at 09:11

## 2022-12-17 RX ADMIN — INSULIN LISPRO 2 UNITS: 100 INJECTION, SOLUTION INTRAVENOUS; SUBCUTANEOUS at 11:48

## 2022-12-17 RX ADMIN — METOPROLOL TARTRATE 25 MG: 25 TABLET, FILM COATED ORAL at 09:14

## 2022-12-17 RX ADMIN — PREGABALIN 100 MG: 100 CAPSULE ORAL at 20:37

## 2022-12-17 RX ADMIN — LINAGLIPTIN 5 MG: 5 TABLET, FILM COATED ORAL at 09:11

## 2022-12-17 RX ADMIN — EMPAGLIFLOZIN 10 MG: 10 TABLET, FILM COATED ORAL at 09:11

## 2022-12-17 RX ADMIN — Medication 2000 UNITS: at 09:11

## 2022-12-17 RX ADMIN — POTASSIUM CHLORIDE 10 MEQ: 750 TABLET, EXTENDED RELEASE ORAL at 09:14

## 2022-12-17 RX ADMIN — FUROSEMIDE 40 MG: 40 TABLET ORAL at 09:11

## 2022-12-17 RX ADMIN — ICOSAPENT ETHYL 2 G: 1000 CAPSULE ORAL at 20:38

## 2022-12-17 RX ADMIN — METOPROLOL TARTRATE 25 MG: 25 TABLET, FILM COATED ORAL at 20:37

## 2022-12-17 RX ADMIN — WARFARIN 4.5 MG: 2.5 TABLET ORAL at 18:08

## 2022-12-17 RX ADMIN — Medication 1 TABLET: at 09:11

## 2022-12-17 NOTE — PLAN OF CARE
Goal Outcome Evaluation:            VSS. Patient able to sleep majority of the shift. No complaints of pain. All due medications given. Will continue to monitor. To be endorsed accordingly.

## 2022-12-17 NOTE — PROGRESS NOTES
DAILY PROGRESS NOTE  [facility]    Patient Identification:  Name: Bessie Molina  Age: 89 y.o.  Sex: female  :  1932  MRN: [id number]         Primary Care Physician: Mireille La MD    Subjective:  Interval History: Sitting up eating lunch and she is completed about half of her food tray.  She is very pleasant and conversational did not appear in any distress nor did she appear agitated.  She is a bit hard of hearing with baseline dementia.  She denies any chest pain and feels that she is breathing much better.  Denies any nausea vomiting or diarrhea fever and/or chills    Objective: Clinically looks quite well.  No family noted at bedside.    Scheduled Meds:amLODIPine, 2.5 mg, Oral, Q24H  vitamin C, 500 mg, Oral, Daily  aspirin, 81 mg, Oral, Daily  cholecalciferol, 2,000 Units, Oral, Daily  dilTIAZem CD, 180 mg, Oral, Q24H  empagliflozin, 10 mg, Oral, Daily  icosapent ethyl, 2 g, Oral, BID  insulin lispro, 0-9 Units, Subcutaneous, TID With Meals  levothyroxine, 100 mcg, Oral, QAM  linagliptin, 5 mg, Oral, Daily  metoprolol tartrate, 25 mg, Oral, BID  multivitamin, 1 tablet, Oral, Daily  pantoprazole, 40 mg, Oral, Q AM  potassium chloride, 10 mEq, Oral, Daily  pregabalin, 100 mg, Oral, Nightly  warfarin, 4.5 mg, Oral, Daily      Continuous Infusions:Pharmacy to dose warfarin,         Vital signs in last 24 hours:  Temp:  [97.1 °F (36.2 °C)-98 °F (36.7 °C)] 98 °F (36.7 °C)  Heart Rate:  [73-86] 86  Resp:  [18] 18  BP: (117-134)/(65-82) 118/74    Intake/Output:    Intake/Output Summary (Last 24 hours) at 2022 1146  Last data filed at 2022 0521  Gross per 24 hour   Intake 360 ml   Output 2150 ml   Net -1790 ml       Exam:  /74 (BP Location: Right arm, Patient Position: Lying)   Pulse 86   Temp 98 °F (36.7 °C)   Resp 18   Ht 157.5 cm (62\")   Wt 62.2 kg (137 lb 3.2 oz)   SpO2 98%   BMI 25.09 kg/m²     General Appearance:    Alert, cooperative, no distress,  pleasantly demented                         Throat:   Oral mucosa pink and moist                           Neck:   No JVD                         Lungs:    Still decreased bases right greater than left to auscultation bilaterally, respirations unlabored                 Chest Wall:    No tenderness or deformity                          Heart:    Irregular rate and rhythm, S1 and S2 normal                  Abdomen:     Soft, nontender, bowel sounds active                 Extremities:   Trace edema                        Pulses:   Pulses palpable in lower extremities                               Data Review:  Labs in chart were reviewed.    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       Plan:    Acute hypoxic respiratory failure resolving secondary to acute on chronic diastolic CHF   -O2 requirements continue to improve currently only on 1 L   -Awaiting for cardiology to adjust diuretics -reviewed MAR and IV diuretics have been switched to p.o. currently discontinued   -Creatinine tolerating and will continue to monitor and will check uric acid with a.m. labs   -Echo with normal EF   -Provoked by A. fib/RVR resolved with therapeutic INR   -Low-dose amlodipine noted per cardiology    Denies any abdominal pain.  Indeterminate lesion of the pancreas noted and Dr. Castillo at previously discussed this with daughter with no plans for further work-up and I completely agree with that    Dementia without behavior disorder -smiling pleasant and likely near baseline      Disposition -CCP to  coordinate and finalize but I think this patient can be medically ready probably by Azucena Burks MD  12/17/2022  11:46 EST

## 2022-12-17 NOTE — PLAN OF CARE
Goal Outcome Evaluation:  Plan of Care Reviewed With: patient        Progress: improving  Outcome Evaluation: Pt continues to progress with mobility and activity tolerance. Pt needed Reyna with bed mobility and CGA with sit<->stand transfers. Pt ambulated 25ft with rwx, CGA. Pt with slow but steady gait. Pt feeling SOA which limited ambulation distance. Pt able to complete bilateral LE exercises today. will continue to progress pt as able.

## 2022-12-17 NOTE — THERAPY TREATMENT NOTE
Patient Name: Bessie Molina  : 1932    MRN: [id number]                              Today's Date: 2022       Admit Date: 2022    Visit Dx:     ICD-10-CM ICD-9-CM   1. Pneumonia of both lungs due to infectious organism, unspecified part of lung  J18.9 483.8   2. Acute on chronic congestive heart failure, unspecified heart failure type (HCC)  I50.9 428.0   3. Atrial fibrillation with RVR (HCC)  I48.91 427.31   4. Lactic acidosis  E87.20 276.2   5. Elevated troponin  R77.8 790.6   6. Hypoxia  R09.02 799.02     Patient Active Problem List   Diagnosis   • Atrial fibrillation, persistent (HCC)   • Chronic anticoagulation   • Dementia (HCC)   • Acute on chronic diastolic heart failure (HCC)   • Abdominal pain   • Acute respiratory failure with hypoxia (HCC)   • Pancreatic lesion     Past Medical History:   Diagnosis Date   • Atrial fibrillation (HCC)    • Hypertension      No past surgical history on file.   General Information     Row Name 22          Physical Therapy Time and Intention    Document Type therapy note (daily note)  -EB     Mode of Treatment physical therapy;individual therapy  -EB     Row Name 22 164          General Information    Existing Precautions/Restrictions fall  -EB     Row Name 22          Cognition    Orientation Status (Cognition) oriented x 3  -EB     Row Name 22          Safety Issues, Functional Mobility    Impairments Affecting Function (Mobility) strength;endurance/activity tolerance;shortness of breath  -EB           User Key  (r) = Recorded By, (t) = Taken By, (c) = Cosigned By    Initials Name Provider Type    EB Ophelia Callejas PTA Physical Therapist Assistant               Mobility     Row Name 22 164          Bed Mobility    Supine-Sit [place] (Bed Mobility) minimum assist (75% patient effort)  -EB     Sit-Supine [place] (Bed Mobility) not tested  -EB     Assistive Device (Bed Mobility) bed rails;head of  bed elevated  -EB     Row Name 12/17/22 1642          Sit-Stand Transfer    Sit-Stand [place] (Transfers) contact guard  -EB     Assistive Device (Sit-Stand Transfers) walker, front-wheeled  -EB     Row Name 12/17/22 1642          Gait/Stairs (Locomotion)    [place] Level (Gait) contact guard  -EB     Assistive Device (Gait) walker, front-wheeled  -EB     Distance in Feet (Gait) 25  -EB     Deviations/Abnormal Patterns (Gait) gait speed decreased;ele decreased;stride length decreased  -EB     Bilateral Gait Deviations forward flexed posture  -EB     Comment, (Gait/Stairs) slow gait, fairly steady. feel soa during activityl.  decreased activity tolerance  -EB           User Key  (r) = Recorded By, (t) = Taken By, (c) = Cosigned By    Initials Name Provider Type    Ophelia Hoskins PTA Physical Therapist Assistant               Obj/Interventions     Row Name 12/17/22 1643          Motor Skills    Therapeutic Exercise --  BLE: LAQs and seated marches (X10)  -EB           User Key  (r) = Recorded By, (t) = Taken By, (c) = Cosigned By    Initials Name Provider Type    Ophelia Hoskins PTA Physical Therapist Assistant               Goals/Plan    No documentation.                Clinical Impression     Row Name 12/17/22 1643          Plan of Care Review    Plan of Care Reviewed With patient  -EB     Progress improving  -EB     Outcome Evaluation Pt continues to progress with mobility and activity tolerance. Pt needed Reyna with bed mobility and CGA with sit<->stand transfers. Pt ambulated 25ft with rwx, CGA. Pt with slow but steady gait. Pt feeling SOA which limited ambulation distance. Pt able to complete bilateral LE exercises today. will continue to progress pt as able.  -EB     Row Name 12/17/22 1643          Therapy Assessment/Plan (PT)    Therapy Frequency (PT) 6 times/wk  -EB     Row Name 12/17/22 1643          Positioning and Restraints    Pre-Treatment Position in bed  -EB     Post Treatment  Position chair  -EB     In Chair sitting;call light within reach;encouraged to call for assist;exit alarm on;with family/caregiver  -EB           User Key  (r) = Recorded By, (t) = Taken By, (c) = Cosigned By    Initials Name Provider Type    Ophelia Hoskins PTA Physical Therapist Assistant               Outcome Measures     Row Name 12/17/22 1645          How much help from another person do you currently need...    Turning from your back to your side while in flat bed without using bedrails? 3  -EB     Moving from lying on back to sitting on the side of a flat bed without bedrails? 3  -EB     Moving to and from a bed to a chair (including a wheelchair)? 3  -EB     Standing up from a chair using your arms (e.g., wheelchair, bedside chair)? 3  -EB     Climbing 3-5 steps with a railing? 2  -EB     To walk in hospital room? 3  -EB     AM-PAC 6 Clicks Score (PT) 17  -EB     Highest level of mobility 5 --> Static standing  -DIAMOND           User Key  (r) = Recorded By, (t) = Taken By, (c) = Cosigned By    Initials Name Provider Type    Ophelia Hoskins PTA Physical Therapist Assistant                             Physical Therapy Education     Title: PT OT SLP Therapies (Done)     Topic: Physical Therapy (Done)     Point: Mobility training (Done)     Learning Progress Summary           Patient Acceptance, E,D, VU,NR by EB at 12/17/2022 1645    Acceptance, E, VU by EB at 12/15/2022 1606    Acceptance, E, VU by  at 12/14/2022 1547                   Point: Home exercise program (Done)     Learning Progress Summary           Patient Acceptance, E,D, VU,NR by EB at 12/17/2022 1645    Acceptance, E, VU by EB at 12/15/2022 1606                   Point: Body mechanics (Done)     Learning Progress Summary           Patient Acceptance, E,D, VU,NR by EB at 12/17/2022 1645    Acceptance, E, VU by EB at 12/15/2022 1606    Acceptance, E, VU by  at 12/14/2022 1547                   Point: Precautions (Done)     Learning Progress  Summary           Patient Acceptance, E,D, VU,NR by  at 12/17/2022 1645    Acceptance, E, VU by  at 12/15/2022 1606    Acceptance, E, VU by  at 12/14/2022 1547                               User Key     Initials Effective Dates Name Provider Type Discipline     06/16/21 -  Ophelia Callejas PTA Physical Therapist Assistant PT     05/02/22 -  Marie Terrazas PT Physical Therapist PT              PT Recommendation and Plan     Plan of Care Reviewed With: patient  Progress: improving  Outcome Evaluation: Pt continues to progress with mobility and activity tolerance. Pt needed Reyna with bed mobility and CGA with sit<->stand transfers. Pt ambulated 25ft with rwx, CGA. Pt with slow but steady gait. Pt feeling SOA which limited ambulation distance. Pt able to complete bilateral LE exercises today. will continue to progress pt as able.     Time Calculation:    PT Charges     Row Name 12/17/22 1642             Time Calculation    Start Time 1428  -EB      Stop Time 1444  -EB      Time Calculation (min) 16 min  -EB      PT Received On 12/17/22  -      PT - Next Appointment 12/19/22  -         Time Calculation- PT    Total Timed Code Minutes- PT 16 minute(s)  -EB            User Key  (r) = Recorded By, (t) = Taken By, (c) = Cosigned By    Initials Name Provider Type     Ophelia Callejas PTA Physical Therapist Assistant              Therapy Charges for Today     Code Description Service Date Service Provider Modifiers Qty    26554125200 HC GAIT TRAINING EA 15 MIN 12/17/2022 Ophelia Callejas PTA GP 1          PT G-Codes  Outcome Measure Options: AM-PAC 6 Clicks Basic Mobility (PT)  AM-PAC 6 Clicks Score (PT): Gallo Callejas PTA  12/17/2022

## 2022-12-17 NOTE — PROGRESS NOTES
CC: CHF/A. fib with RVR    Interval History: No new acute events overnight      Vital Signs  Temp:  [97.1 °F (36.2 °C)-98 °F (36.7 °C)] 97.9 °F (36.6 °C)  Heart Rate:  [80-86] 86  Resp:  [18] 18  BP: (118-134)/(74-82) 118/74    Intake/Output Summary (Last 24 hours) at 12/17/2022 1444  Last data filed at 12/17/2022 0521  Gross per 24 hour   Intake 240 ml   Output 1750 ml   Net -1510 ml     Flowsheet Rows    Flowsheet Row First Filed Value   Admission Height 157.5 cm (62\") Documented at 12/13/2022 1213   Admission Weight 63.5 kg (140 lb) Documented at 12/13/2022 1213          PHYSICAL EXAM:  General: No acute distress  Resp:NL Rate, symmetric chest expansion,unlabored, clear  CV: Irregularly irregular, NL PMI, NL S1 and S2, no Murmur, no gallop, no rub, No JVD.   ABD:Nl sounds, no masses or tenderness, nondistended, no guarding or rebound  Neuro: alert,cooperative and oriented  Extr:Normal pedal pulses, No edema or cyanosis, moves all extremities      Results Review:    Results from last 7 days   Lab Units 12/17/22  0505   SODIUM mmol/L 143   POTASSIUM mmol/L 3.6   CHLORIDE mmol/L 105   CO2 mmol/L 28.0   BUN mg/dL 55*   CREATININE mg/dL 1.74*   GLUCOSE mg/dL 150*   CALCIUM mg/dL 8.9     Results from last 7 days   Lab Units 12/13/22  1216   TROPONIN T ng/mL 0.098*     Results from last 7 days   Lab Units 12/14/22  0502   WBC 10*3/mm3 10.74   HEMOGLOBIN g/dL 10.4*   HEMATOCRIT % 31.9*   PLATELETS 10*3/mm3 211     Results from last 7 days   Lab Units 12/17/22  0505 12/16/22  0435 12/15/22  0510   INR  2.88* 2.86* 2.79*         Results from last 7 days   Lab Units 12/17/22  0505   MAGNESIUM mg/dL 2.1         I reviewed the patient's new clinical results.  I personally viewed and interpreted the patient's EKG/Telemetry data        Medication Review:   Meds reviewed    Pharmacy to dose warfarin,         Assessment/Plan    1.  Acute on chronic diastolic congestive heart failure.  Echo on 12/14/2022 with grade 2 diastolic  dysfunction with elevated left atrial filling pressure and mild pulmonary hypertension.  Moderate to severe MR, sclerotic aortic valve with mild AI.  MR potentially  contributing to her symptoms.  2.  Persistent atrial fibrillation with RVR  Currently on diltiazem, metoprolol- prior history of intolerance to metoprolol and digoxin.  On Coumadin for anticoagulation-therapeutic INR  3.  Chronic kidney disease  4.  Type 2 diabetes mellitus  5.  Essential hypertension-controlled  6.  Mixed hyperlipidemia  7.  Peripheral arterial disease with prior interventions.    No acute events overnight  Good heart rate control.  She has diuresed well.  I was going to hold diuretic today  today because of slightly worsened BUN/creatinine but she already received a dose.   BMP in a.m.  She would likely need placement in rehab or SNF.    Jag Farias MD  12/17/22  14:44 EST

## 2022-12-17 NOTE — PLAN OF CARE
Goal Outcome Evaluation:      No major issues over shift, VS stable, pt pleasant and cooperative but confused, pt now on 0.5 L of oxygen, will continue to wean as able, no complaints of pain or new complaints, labs being monitored, pt will likely be discharged come Tuesday

## 2022-12-18 LAB
ANION GAP SERPL CALCULATED.3IONS-SCNC: 8.5 MMOL/L (ref 5–15)
BACTERIA SPEC AEROBE CULT: NORMAL
BUN SERPL-MCNC: 53 MG/DL (ref 8–23)
BUN/CREAT SERPL: 34 (ref 7–25)
CALCIUM SPEC-SCNC: 8.9 MG/DL (ref 8.2–9.6)
CHLORIDE SERPL-SCNC: 104 MMOL/L (ref 98–107)
CO2 SERPL-SCNC: 30.5 MMOL/L (ref 22–29)
CREAT SERPL-MCNC: 1.56 MG/DL (ref 0.57–1)
EGFRCR SERPLBLD CKD-EPI 2021: 31.5 ML/MIN/1.73
GLUCOSE BLDC GLUCOMTR-MCNC: 146 MG/DL (ref 70–130)
GLUCOSE BLDC GLUCOMTR-MCNC: 204 MG/DL (ref 70–130)
GLUCOSE BLDC GLUCOMTR-MCNC: 208 MG/DL (ref 70–130)
GLUCOSE BLDC GLUCOMTR-MCNC: 248 MG/DL (ref 70–130)
GLUCOSE SERPL-MCNC: 149 MG/DL (ref 65–99)
INR PPP: 2.93 (ref 0.9–1.1)
POTASSIUM SERPL-SCNC: 3.5 MMOL/L (ref 3.5–5.2)
PROTHROMBIN TIME: 31 SECONDS (ref 11.7–14.2)
SODIUM SERPL-SCNC: 143 MMOL/L (ref 136–145)
URATE SERPL-MCNC: 6.7 MG/DL (ref 2.4–5.7)

## 2022-12-18 PROCEDURE — 63710000001 INSULIN LISPRO (HUMAN) PER 5 UNITS: Performed by: INTERNAL MEDICINE

## 2022-12-18 PROCEDURE — 85610 PROTHROMBIN TIME: CPT | Performed by: INTERNAL MEDICINE

## 2022-12-18 PROCEDURE — 80048 BASIC METABOLIC PNL TOTAL CA: CPT | Performed by: HOSPITALIST

## 2022-12-18 PROCEDURE — 84550 ASSAY OF BLOOD/URIC ACID: CPT | Performed by: HOSPITALIST

## 2022-12-18 PROCEDURE — 99232 SBSQ HOSP IP/OBS MODERATE 35: CPT | Performed by: INTERNAL MEDICINE

## 2022-12-18 PROCEDURE — 82962 GLUCOSE BLOOD TEST: CPT

## 2022-12-18 RX ORDER — FUROSEMIDE 40 MG/1
40 TABLET ORAL DAILY
Status: DISCONTINUED | OUTPATIENT
Start: 2022-12-18 | End: 2022-12-20 | Stop reason: HOSPADM

## 2022-12-18 RX ORDER — POTASSIUM CHLORIDE 750 MG/1
20 TABLET, FILM COATED, EXTENDED RELEASE ORAL DAILY
Status: DISCONTINUED | OUTPATIENT
Start: 2022-12-19 | End: 2022-12-20 | Stop reason: HOSPADM

## 2022-12-18 RX ADMIN — Medication 1 TABLET: at 09:24

## 2022-12-18 RX ADMIN — ICOSAPENT ETHYL 2 G: 1000 CAPSULE ORAL at 09:23

## 2022-12-18 RX ADMIN — ACETAMINOPHEN 650 MG: 325 TABLET, FILM COATED ORAL at 09:45

## 2022-12-18 RX ADMIN — METOPROLOL TARTRATE 25 MG: 25 TABLET, FILM COATED ORAL at 21:07

## 2022-12-18 RX ADMIN — INSULIN LISPRO 4 UNITS: 100 INJECTION, SOLUTION INTRAVENOUS; SUBCUTANEOUS at 11:38

## 2022-12-18 RX ADMIN — OXYCODONE HYDROCHLORIDE AND ACETAMINOPHEN 500 MG: 500 TABLET ORAL at 09:24

## 2022-12-18 RX ADMIN — ICOSAPENT ETHYL 2 G: 1000 CAPSULE ORAL at 21:09

## 2022-12-18 RX ADMIN — AMLODIPINE BESYLATE 2.5 MG: 2.5 TABLET ORAL at 09:24

## 2022-12-18 RX ADMIN — Medication 2000 UNITS: at 09:24

## 2022-12-18 RX ADMIN — FUROSEMIDE 40 MG: 40 TABLET ORAL at 10:27

## 2022-12-18 RX ADMIN — POTASSIUM CHLORIDE 10 MEQ: 750 TABLET, EXTENDED RELEASE ORAL at 09:24

## 2022-12-18 RX ADMIN — DILTIAZEM HYDROCHLORIDE 180 MG: 180 CAPSULE, EXTENDED RELEASE ORAL at 09:24

## 2022-12-18 RX ADMIN — LINAGLIPTIN 5 MG: 5 TABLET, FILM COATED ORAL at 09:24

## 2022-12-18 RX ADMIN — WARFARIN 4.5 MG: 2.5 TABLET ORAL at 18:16

## 2022-12-18 RX ADMIN — METOPROLOL TARTRATE 25 MG: 25 TABLET, FILM COATED ORAL at 09:24

## 2022-12-18 RX ADMIN — ASPIRIN 81 MG: 81 TABLET, CHEWABLE ORAL at 09:24

## 2022-12-18 RX ADMIN — PREGABALIN 100 MG: 100 CAPSULE ORAL at 21:07

## 2022-12-18 RX ADMIN — LEVOTHYROXINE SODIUM 100 MCG: 0.1 TABLET ORAL at 06:39

## 2022-12-18 RX ADMIN — EMPAGLIFLOZIN 10 MG: 10 TABLET, FILM COATED ORAL at 09:24

## 2022-12-18 RX ADMIN — PANTOPRAZOLE SODIUM 40 MG: 40 TABLET, DELAYED RELEASE ORAL at 06:39

## 2022-12-18 NOTE — PROGRESS NOTES
CC: CHF/atrial fibrillation    Interval History: No new acute events overnight      Vital Signs  Temp:  [97.4 °F (36.3 °C)-97.9 °F (36.6 °C)] 97.4 °F (36.3 °C)  Heart Rate:  [76-95] 76  Resp:  [16-18] 16  BP: (128-141)/(70-86) 133/70    Intake/Output Summary (Last 24 hours) at 12/18/2022 0948  Last data filed at 12/18/2022 0729  Gross per 24 hour   Intake 0 ml   Output 1400 ml   Net -1400 ml     Flowsheet Rows    Flowsheet Row First Filed Value   Admission Height 157.5 cm (62\") Documented at 12/13/2022 1213   Admission Weight 63.5 kg (140 lb) Documented at 12/13/2022 1213          PHYSICAL EXAM:  General: No acute distress  Resp:NL Rate, symmetric chest expansion,unlabored, clear  CV: Irregularly irregular, NL PMI, NL S1 and S2, no Murmur, no gallop, no rub, No JVD.   ABD:Nl sounds, no masses or tenderness, nondistended, no guarding or rebound  Neuro: alert,cooperative and oriented  Extr:Normal pedal pulses, No edema or cyanosis, moves all extremities      Results Review:    Results from last 7 days   Lab Units 12/18/22  0504   SODIUM mmol/L 143   POTASSIUM mmol/L 3.5   CHLORIDE mmol/L 104   CO2 mmol/L 30.5*   BUN mg/dL 53*   CREATININE mg/dL 1.56*   GLUCOSE mg/dL 149*   CALCIUM mg/dL 8.9     Results from last 7 days   Lab Units 12/13/22  1216   TROPONIN T ng/mL 0.098*     Results from last 7 days   Lab Units 12/14/22  0502   WBC 10*3/mm3 10.74   HEMOGLOBIN g/dL 10.4*   HEMATOCRIT % 31.9*   PLATELETS 10*3/mm3 211     Results from last 7 days   Lab Units 12/18/22  0504 12/17/22  0505 12/16/22  0435   INR  2.93* 2.88* 2.86*         Results from last 7 days   Lab Units 12/17/22  0505   MAGNESIUM mg/dL 2.1         I reviewed the patient's new clinical results.  I personally viewed and interpreted the patient's EKG/Telemetry data        Medication Review:   Meds reviewed    Pharmacy to dose warfarin,         Assessment/Plan    1.  Acute on chronic diastolic congestive heart failure.  Echo on 12/14/2022 with grade 2  diastolic dysfunction with elevated left atrial filling pressure and mild pulmonary hypertension.  Moderate to severe MR, sclerotic aortic valve with mild AI.  MR potentially  contributing to her symptoms.  2.  Persistent atrial fibrillation with RVR  Currently on diltiazem, metoprolol- prior history of intolerance to metoprolol and digoxin.  On Coumadin for anticoagulation-therapeutic INR  3.  Chronic kidney disease  4.  Type 2 diabetes mellitus  5.  Essential hypertension-controlled  Added amlodipine   6.  Mixed hyperlipidemia  7.  Peripheral arterial disease with prior interventions.    No acute events overnight.  Patient almost euvolemic.  Continue oral Lasix at current dose.  Heart rate well controlled-continue current regimen and Coumadin    Okay to DC patient to rehab or home from cardiology standpoint.    Patient will be followed in clinic in 2 weeks.  Get BMP in 1 week.      Thank you for consulting with  cardiology.    Jag Farias MD  12/18/22  09:48 EST

## 2022-12-18 NOTE — PROGRESS NOTES
DAILY PROGRESS NOTE  [facility]    Patient Identification:  Name: Bessie Molina  Age: 89 y.o.  Sex: female  :  1932  MRN: [id number]         Primary Care Physician: Mireille La MD    Subjective:  Interval History: Continues to feel improved much better.  She was laying completely supine upon entering room and is only on 1 L via nasal cannula.  She remains quite hard of hearing but otherwise pleasant and conversational.  Denies any fever or chills    Objective: Nontoxic and in no apparent distress.  No family noted at bedside.  Patient is anxious for discharge in the hopes that she gets to go home    Scheduled Meds:amLODIPine, 2.5 mg, Oral, Q24H  vitamin C, 500 mg, Oral, Daily  aspirin, 81 mg, Oral, Daily  cholecalciferol, 2,000 Units, Oral, Daily  dilTIAZem CD, 180 mg, Oral, Q24H  empagliflozin, 10 mg, Oral, Daily  furosemide, 40 mg, Oral, Daily  icosapent ethyl, 2 g, Oral, BID  insulin lispro, 0-9 Units, Subcutaneous, TID With Meals  levothyroxine, 100 mcg, Oral, QAM  linagliptin, 5 mg, Oral, Daily  metoprolol tartrate, 25 mg, Oral, BID  multivitamin, 1 tablet, Oral, Daily  pantoprazole, 40 mg, Oral, Q AM  [START ON 2022] potassium chloride, 20 mEq, Oral, Daily  pregabalin, 100 mg, Oral, Nightly  warfarin, 4.5 mg, Oral, Daily      Continuous Infusions:Pharmacy to dose warfarin,         Vital signs in last 24 hours:  Temp:  [97.4 °F (36.3 °C)-97.9 °F (36.6 °C)] 97.4 °F (36.3 °C)  Heart Rate:  [76-95] 76  Resp:  [16-18] 16  BP: (128-141)/(70-86) 133/70    Intake/Output:    Intake/Output Summary (Last 24 hours) at 2022 1315  Last data filed at 2022 1115  Gross per 24 hour   Intake 0 ml   Output 1400 ml   Net -1400 ml       Exam:  /70 (BP Location: Right arm, Patient Position: Lying)   Pulse 76   Temp 97.4 °F (36.3 °C) (Oral)   Resp 16   Ht 157.5 cm (62\")   Wt 61.1 kg (134 lb 9.6 oz)   SpO2 100%   BMI 24.62 kg/m²     General Appearance:    Alert,  cooperative, no distress, Hard of hearing/pleasantly demented                         Throat:   Oral mucosa pink and moist                           Neck:   No JVD                         Lungs:    Much more clear to auscultation bilaterally, respirations unlabored                         Heart:    Irregular rate and rhythm, S1 and S2 normal                  Abdomen:     Soft, nontender, bowel sounds active                  Extremities:   No cyanosis or edema                           Data Review:  Labs in chart were reviewed.    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       Plan:    Acute hypoxic respiratory failure resolving secondary to acute on chronic diastolic CHF              -O2 requirements - 1 L              -Cardiology has on Lasix at 40 mg p.o.              -Creatinine tolerating and further improved to 1.56 with a uric of 6.7              -Echo with normal EF              -Provoked by A. fib/RVR resolved with therapeutic INR-2.93              -Low-dose amlodipine noted per cardiology     Denies any abdominal pain.  Indeterminate lesion of the pancreas noted and Dr. Castillo at previously discussed this with daughter with no plans for further work-up and I completely agree with that     Dementia without behavior disorder -smiling pleasant and likely near baseline        Disposition -[facility] to coordinate and finalize but I think this patient can be medically ready probably by Monday-Tuesday    Haroldo Burks MD  12/18/2022  13:15 EST

## 2022-12-18 NOTE — PLAN OF CARE
Goal Outcome Evaluation:            No major issues over shift, VS stable, pt somewhat confused but pleasant and cooperative, pt requires some oxygen at night, but on room air during day, will likely get discharged in the next day or two, pt's daughter aware of plan, will discuss options with  tomorrow

## 2022-12-18 NOTE — PLAN OF CARE
Goal Outcome Evaluation:               VSS. Patient asleep majority of the shift. No acute events noted. Possible discharge 2 days from now. All due medications given. Will continue to monitor. To be endorsed accordingly.

## 2022-12-19 LAB
GLUCOSE BLDC GLUCOMTR-MCNC: 168 MG/DL (ref 70–130)
GLUCOSE BLDC GLUCOMTR-MCNC: 177 MG/DL (ref 70–130)
GLUCOSE BLDC GLUCOMTR-MCNC: 189 MG/DL (ref 70–130)
GLUCOSE BLDC GLUCOMTR-MCNC: 209 MG/DL (ref 70–130)
QT INTERVAL: 336 MS

## 2022-12-19 PROCEDURE — 97116 GAIT TRAINING THERAPY: CPT

## 2022-12-19 PROCEDURE — 82962 GLUCOSE BLOOD TEST: CPT

## 2022-12-19 PROCEDURE — 63710000001 INSULIN LISPRO (HUMAN) PER 5 UNITS: Performed by: INTERNAL MEDICINE

## 2022-12-19 RX ADMIN — Medication 1 TABLET: at 09:15

## 2022-12-19 RX ADMIN — EMPAGLIFLOZIN 10 MG: 10 TABLET, FILM COATED ORAL at 09:16

## 2022-12-19 RX ADMIN — METOPROLOL TARTRATE 25 MG: 25 TABLET, FILM COATED ORAL at 09:16

## 2022-12-19 RX ADMIN — WARFARIN 4.5 MG: 2.5 TABLET ORAL at 17:06

## 2022-12-19 RX ADMIN — INSULIN LISPRO 4 UNITS: 100 INJECTION, SOLUTION INTRAVENOUS; SUBCUTANEOUS at 13:30

## 2022-12-19 RX ADMIN — INSULIN LISPRO 2 UNITS: 100 INJECTION, SOLUTION INTRAVENOUS; SUBCUTANEOUS at 17:06

## 2022-12-19 RX ADMIN — LEVOTHYROXINE SODIUM 100 MCG: 0.1 TABLET ORAL at 07:13

## 2022-12-19 RX ADMIN — METOPROLOL TARTRATE 25 MG: 25 TABLET, FILM COATED ORAL at 20:45

## 2022-12-19 RX ADMIN — DILTIAZEM HYDROCHLORIDE 180 MG: 180 CAPSULE, EXTENDED RELEASE ORAL at 09:16

## 2022-12-19 RX ADMIN — POTASSIUM CHLORIDE 20 MEQ: 750 TABLET, EXTENDED RELEASE ORAL at 09:16

## 2022-12-19 RX ADMIN — ICOSAPENT ETHYL 2 G: 1000 CAPSULE ORAL at 20:46

## 2022-12-19 RX ADMIN — FUROSEMIDE 40 MG: 40 TABLET ORAL at 09:15

## 2022-12-19 RX ADMIN — ICOSAPENT ETHYL 2 G: 1000 CAPSULE ORAL at 09:15

## 2022-12-19 RX ADMIN — PREGABALIN 100 MG: 100 CAPSULE ORAL at 22:32

## 2022-12-19 RX ADMIN — Medication 10 ML: at 09:16

## 2022-12-19 RX ADMIN — PANTOPRAZOLE SODIUM 40 MG: 40 TABLET, DELAYED RELEASE ORAL at 07:13

## 2022-12-19 RX ADMIN — Medication 2000 UNITS: at 09:16

## 2022-12-19 RX ADMIN — ASPIRIN 81 MG: 81 TABLET, CHEWABLE ORAL at 09:15

## 2022-12-19 RX ADMIN — INSULIN LISPRO 2 UNITS: 100 INJECTION, SOLUTION INTRAVENOUS; SUBCUTANEOUS at 09:15

## 2022-12-19 RX ADMIN — OXYCODONE HYDROCHLORIDE AND ACETAMINOPHEN 500 MG: 500 TABLET ORAL at 09:15

## 2022-12-19 RX ADMIN — LINAGLIPTIN 5 MG: 5 TABLET, FILM COATED ORAL at 09:16

## 2022-12-19 RX ADMIN — AMLODIPINE BESYLATE 2.5 MG: 2.5 TABLET ORAL at 09:16

## 2022-12-19 NOTE — THERAPY TREATMENT NOTE
Patient Name: Bessie Molina  : 1932    MRN: [id number]                              Today's Date: 2022       Admit Date: 2022    Visit Dx:     ICD-10-CM ICD-9-CM   1. Pneumonia of both lungs due to infectious organism, unspecified part of lung  J18.9 483.8   2. Acute on chronic congestive heart failure, unspecified heart failure type (HCC)  I50.9 428.0   3. Atrial fibrillation with RVR (HCC)  I48.91 427.31   4. Lactic acidosis  E87.20 276.2   5. Elevated troponin  R77.8 790.6   6. Hypoxia  R09.02 799.02     Patient Active Problem List   Diagnosis   • Atrial fibrillation, persistent (HCC)   • Chronic anticoagulation   • Dementia (HCC)   • Acute on chronic diastolic heart failure (HCC)   • Abdominal pain   • Acute respiratory failure with hypoxia (HCC)   • Pancreatic lesion     Past Medical History:   Diagnosis Date   • Atrial fibrillation (HCC)    • Hypertension      No past surgical history on file.   General Information     Row Name 22 1457          Physical Therapy Time and Intention    Document Type therapy note (daily note)  -EB     Mode of Treatment physical therapy;individual therapy  -EB     Row Name 22 1457          General Information    Existing Precautions/Restrictions fall  -EB     Row Name 22 1457          Cognition    Orientation Status (Cognition) oriented x 3  -EB     Row Name 22 1457          Safety Issues, Functional Mobility    Impairments Affecting Function (Mobility) strength;endurance/activity tolerance;shortness of breath;pain  -EB           User Key  (r) = Recorded By, (t) = Taken By, (c) = Cosigned By    Initials Name Provider Type    EB Ophelia Callejas PTA Physical Therapist Assistant               Mobility     Row Name 22 1458          Bed Mobility    Comment, (Bed Mobility) NT-UIC  -EB     Row Name 22 1458          Sit-Stand Transfer    Sit-Stand [place] (Transfers) minimum assist (75% patient effort)  -EB     Assistive  Device (Sit-Stand Transfers) walker, front-wheeled  -EB     Comment, (Sit-Stand Transfer) a little more assistance needed due to bilateral knee pain from pt's arthritis  -EB     Row Name 12/19/22 1458          Gait/Stairs (Locomotion)    [place] Level (Gait) contact guard  -EB     Assistive Device (Gait) walker, front-wheeled  -EB     Distance in Feet (Gait) 30ft  -EB     Deviations/Abnormal Patterns (Gait) gait speed decreased;ele decreased;stride length decreased  -EB     Bilateral Gait Deviations forward flexed posture  -EB     Comment, (Gait/Stairs) cues for posture. fatigues quickly  -EB           User Key  (r) = Recorded By, (t) = Taken By, (c) = Cosigned By    Initials Name Provider Type    Ophelia Hoskins PTA Physical Therapist Assistant               Obj/Interventions     Row Name 12/19/22 1500          Motor Skills    Therapeutic Exercise --  BLE: LAQs and seated marches (X12)  -EB           User Key  (r) = Recorded By, (t) = Taken By, (c) = Cosigned By    Initials Name Provider Type    Ophelia Hoskins PTA Physical Therapist Assistant               Goals/Plan    No documentation.                Clinical Impression     Row Name 12/19/22 1500          Plan of Care Review    Plan of Care Reviewed With patient  -EB     Progress improving  -EB     Outcome Evaluation Pt continues to progress with mobility. Pt did need a little more assistance today with transfers due to bilateral knee pain from arthritis. Pt required Reyna with transfers and ambulated 30ft with rwx, CGA. pt with slow pace and fatigues quickly. Pt able to complete bilateral LE exercises. Will continue to progress pt as able.  -     Row Name 12/19/22 1500          Therapy Assessment/Plan (PT)    Therapy Frequency (PT) 5 times/wk  -     Row Name 12/19/22 1500          Positioning and Restraints    Pre-Treatment Position sitting in chair/recliner  -EB     Post Treatment Position chair  -EB     In Chair reclined;call light within  reach;encouraged to call for assist;exit alarm on  -EB           User Key  (r) = Recorded By, (t) = Taken By, (c) = Cosigned By    Initials Name Provider Type    Ophelia Hoskins PTA Physical Therapist Assistant               Outcome Measures     Row Name 12/19/22 1502          How much help from another person do you currently need...    Turning from your back to your side while in flat bed without using bedrails? 3  -EB     Moving from lying on back to sitting on the side of a flat bed without bedrails? 3  -EB     Moving to and from a bed to a chair (including a wheelchair)? 3  -EB     Standing up from a chair using your arms (e.g., wheelchair, bedside chair)? 3  -EB     Climbing 3-5 steps with a railing? 2  -EB     To walk in hospital room? 3  -EB     AM-PAC 6 Clicks Score (PT) 17  -EB     Highest level of mobility 5 --> Static standing  -EB           User Key  (r) = Recorded By, (t) = Taken By, (c) = Cosigned By    Initials Name Provider Type    Ophelia Hoskins PTA Physical Therapist Assistant                             Physical Therapy Education     Title: PT OT SLP Therapies (Done)     Topic: Physical Therapy (Done)     Point: Mobility training (Done)     Learning Progress Summary           Patient Acceptance, E,D, VU,NR by EB at 12/19/2022 1502    Acceptance, E,D, VU,NR by EB at 12/17/2022 1645    Acceptance, E, VU by EB at 12/15/2022 1606    Acceptance, E, VU by  at 12/14/2022 1547                   Point: Home exercise program (Done)     Learning Progress Summary           Patient Acceptance, E,D, VU,NR by EB at 12/19/2022 1502    Acceptance, E,D, VU,NR by EB at 12/17/2022 1645    Acceptance, E, VU by EB at 12/15/2022 1606                   Point: Body mechanics (Done)     Learning Progress Summary           Patient Acceptance, E,D, VU,NR by EB at 12/19/2022 1502    Acceptance, E,D, VU,NR by EB at 12/17/2022 1645    Acceptance, E, VU by EB at 12/15/2022 1606    Acceptance, E, VU by  at 12/14/2022  1547                   Point: Precautions (Done)     Learning Progress Summary           Patient Acceptance, E,D, VU,NR by  at 12/19/2022 1502    Acceptance, E,D, VU,NR by  at 12/17/2022 1645    Acceptance, E, VU by  at 12/15/2022 1606    Acceptance, E, VU by  at 12/14/2022 1547                               User Key     Initials Effective Dates Name Provider Type Discipline     06/16/21 -  Ophelia Callejas PTA Physical Therapist Assistant PT     05/02/22 -  Marie Terrazas PT Physical Therapist PT              PT Recommendation and Plan     Plan of Care Reviewed With: patient  Progress: improving  Outcome Evaluation: Pt continues to progress with mobility. Pt did need a little more assistance today with transfers due to bilateral knee pain from arthritis. Pt required Reyna with transfers and ambulated 30ft with rwx, CGA. pt with slow pace and fatigues quickly. Pt able to complete bilateral LE exercises. Will continue to progress pt as able.     Time Calculation:    PT Charges     Row Name 12/19/22 1457             Time Calculation    Start Time 1341  -EB      Stop Time 1400  -EB      Time Calculation (min) 19 min  -EB      PT Received On 12/19/22  -EB      PT - Next Appointment 12/20/22  -         Time Calculation- PT    Total Timed Code Minutes- PT 19 minute(s)  -EB            User Key  (r) = Recorded By, (t) = Taken By, (c) = Cosigned By    Initials Name Provider Type    EB Ophelia Callejas PTA Physical Therapist Assistant              Therapy Charges for Today     Code Description Service Date Service Provider Modifiers Qty    62012859994 HC GAIT TRAINING EA 15 MIN 12/19/2022 Ophelia Callejas PTA GP 1          PT G-Codes  Outcome Measure Options: AM-PAC 6 Clicks Basic Mobility (PT)  AM-PAC 6 Clicks Score (PT): 17       Ophelia Callejas PTA  12/19/2022

## 2022-12-19 NOTE — PLAN OF CARE
Goal Outcome Evaluation:  VSS. Pt sat in chair most of the day. On room air. Awaiting placement. Possible d/c tomorrow. Will continue to monitor.

## 2022-12-19 NOTE — PLAN OF CARE
Goal Outcome Evaluation:  Plan of Care Reviewed With: patient        Progress: improving  Outcome Evaluation: Pt continues to progress with mobility. Pt did need a little more assistance today with transfers due to bilateral knee pain from arthritis. Pt required Reyna with transfers and ambulated 30ft with rwx, CGA. pt with slow pace and fatigues quickly. Pt able to complete bilateral LE exercises. Will continue to progress pt as able.

## 2022-12-19 NOTE — PROGRESS NOTES
Patient seen and examined this AM    Awaiting PT and CCP to coordinate discharge needs as should be medically stable today    Patient really wants to go home where she states she lives with daughter and son-in-law.  Need to ensure a safe disposition and I counseled the patient hopefully show improvement with PT evaluation today    98.3, 86, 18, 130/79, 95% room air  Alert and conversational and seems like she is clinically back to baseline  MMM no JVD and overall clear to auscultation with no increased work of breathing  Irregular rate and rhythm  Soft nontender nondistended positive bowel sounds  Moving all and no volume overload  Cranial nerves grossly intact    Meds/chart reviewed    Case discussed on multidisciplinary rounds -medically stable at any time his final discharge needs are finalized with PT and CCP and family

## 2022-12-19 NOTE — PROGRESS NOTES
Discharge Planning Assessment  [facility]     Patient Name: Bessie Molina  MRN: [id number]  Today's Date: 12/19/2022    Admit Date: 12/13/2022    Plan: Skilled nursing referrals   Discharge Needs Assessment    No documentation.                Discharge Plan     Row Name 12/19/22 1516       Plan    Plan Skilled nursing referrals    Plan Comments Follow up email sent to [facility] for [facility], [facility] and [facility] and voice message left for Jeimy/Cornel[facility] to let them know patient is ready for discharge. Amara AVALOS              Continued Care and Services - Admitted Since 12/13/2022     Destination     Service Provider Request Status Selected Services Address Phone Fax Patient Preferred    [facility] [place] Pending - Request Sent N/A [address] [facility] [id number] [phone] --    [facility] Pending - Request Sent N/A [address] [id number] [phone] --    [facility] Pending - Request Sent N/A [address] [phone] --    [facility] Pending - Request Sent N/A [address] [facility] [address] [phone] --              Expected Discharge Date and Time     Expected Discharge Date Expected Discharge Time    Dec 16, 2022          Demographic Summary    No documentation.                Functional Status    No documentation.                Psychosocial    No documentation.                Abuse/Neglect    No documentation.                Legal    No documentation.                Substance Abuse    No documentation.                Patient Forms    No documentation.                   Baylee Mai, BAILEY     On arrival pt with extensive bleeding noted from laceration. Tourniquet was applied by hospital staff.  to bedside immediatly.

## 2022-12-20 VITALS
TEMPERATURE: 97.3 F | SYSTOLIC BLOOD PRESSURE: 137 MMHG | HEIGHT: 62 IN | HEART RATE: 80 BPM | RESPIRATION RATE: 16 BRPM | BODY MASS INDEX: 24.53 KG/M2 | DIASTOLIC BLOOD PRESSURE: 95 MMHG | WEIGHT: 133.3 LBS | OXYGEN SATURATION: 95 %

## 2022-12-20 LAB
GLUCOSE BLDC GLUCOMTR-MCNC: 152 MG/DL (ref 70–130)
GLUCOSE BLDC GLUCOMTR-MCNC: 202 MG/DL (ref 70–130)

## 2022-12-20 PROCEDURE — 82962 GLUCOSE BLOOD TEST: CPT

## 2022-12-20 PROCEDURE — 63710000001 INSULIN LISPRO (HUMAN) PER 5 UNITS: Performed by: INTERNAL MEDICINE

## 2022-12-20 RX ORDER — PREGABALIN 100 MG/1
100 CAPSULE ORAL NIGHTLY
Qty: 5 CAPSULE | Refills: 0 | Status: SHIPPED | OUTPATIENT
Start: 2022-12-20

## 2022-12-20 RX ORDER — AMLODIPINE BESYLATE 2.5 MG/1
2.5 TABLET ORAL
Start: 2022-12-21 | End: 2023-02-01

## 2022-12-20 RX ORDER — FUROSEMIDE 40 MG/1
40 TABLET ORAL DAILY
Start: 2022-12-21

## 2022-12-20 RX ORDER — ACETAMINOPHEN 325 MG/1
650 TABLET ORAL EVERY 4 HOURS PRN
Start: 2022-12-20

## 2022-12-20 RX ADMIN — LEVOTHYROXINE SODIUM 100 MCG: 0.1 TABLET ORAL at 06:29

## 2022-12-20 RX ADMIN — AMLODIPINE BESYLATE 2.5 MG: 2.5 TABLET ORAL at 09:13

## 2022-12-20 RX ADMIN — Medication 2000 UNITS: at 09:13

## 2022-12-20 RX ADMIN — POTASSIUM CHLORIDE 20 MEQ: 750 TABLET, EXTENDED RELEASE ORAL at 09:13

## 2022-12-20 RX ADMIN — ICOSAPENT ETHYL 2 G: 1000 CAPSULE ORAL at 09:12

## 2022-12-20 RX ADMIN — Medication 10 ML: at 09:14

## 2022-12-20 RX ADMIN — INSULIN LISPRO 4 UNITS: 100 INJECTION, SOLUTION INTRAVENOUS; SUBCUTANEOUS at 12:49

## 2022-12-20 RX ADMIN — ASPIRIN 81 MG: 81 TABLET, CHEWABLE ORAL at 09:13

## 2022-12-20 RX ADMIN — Medication 1 TABLET: at 09:13

## 2022-12-20 RX ADMIN — PANTOPRAZOLE SODIUM 40 MG: 40 TABLET, DELAYED RELEASE ORAL at 06:29

## 2022-12-20 RX ADMIN — LINAGLIPTIN 5 MG: 5 TABLET, FILM COATED ORAL at 09:13

## 2022-12-20 RX ADMIN — OXYCODONE HYDROCHLORIDE AND ACETAMINOPHEN 500 MG: 500 TABLET ORAL at 09:13

## 2022-12-20 RX ADMIN — EMPAGLIFLOZIN 10 MG: 10 TABLET, FILM COATED ORAL at 09:13

## 2022-12-20 RX ADMIN — FUROSEMIDE 40 MG: 40 TABLET ORAL at 09:13

## 2022-12-20 RX ADMIN — DILTIAZEM HYDROCHLORIDE 180 MG: 180 CAPSULE, EXTENDED RELEASE ORAL at 09:13

## 2022-12-20 RX ADMIN — METOPROLOL TARTRATE 25 MG: 25 TABLET, FILM COATED ORAL at 09:13

## 2022-12-20 RX ADMIN — INSULIN LISPRO 2 UNITS: 100 INJECTION, SOLUTION INTRAVENOUS; SUBCUTANEOUS at 09:12

## 2022-12-20 NOTE — CASE MANAGEMENT/SOCIAL WORK
Continued Stay Note  [facility]     Patient Name: Bessie Molina  MRN: [id number]  Today's Date: 12/20/2022    Admit Date: 12/13/2022    Plan: [facility] today. Family to transport.   Discharge Plan     Row Name 12/20/22 1214       Plan    Plan [facility] today. Family to transport.    Patient/Family in Agreement with Plan yes    Plan Comments Per [facility], can accept to [facility] today. Verified with Daughter, Tuyet, that she would be transporting pt. Leonel Manzano RN-BC    Final Discharge Disposition Code 03 - skilled nursing facility (SNF)    Final Note [facility] SN[facility] today. Family to transport.               Discharge Codes    No documentation.               Expected Discharge Date and Time     Expected Discharge Date Expected Discharge Time    Dec 20, 2022             Leonel Manzano RN

## 2022-12-20 NOTE — CASE MANAGEMENT/SOCIAL WORK
Case Management Discharge Note      Final Note: [facility] SN[facility] today. Family to transport.    Provided Post Acute Provider List?: Yes  Post Acute Provider List: Home Health, Nursing Home, Inpatient Rehab  Provided Post Acute Provider Quality & Resource List?: Yes  Post Acute Provider Quality and Resource List: Home Health, Nursing Home, Inpatient Rehab  Delivered To: Patient, Support Person  Support Person: Tuyethiginio  Method of Delivery: In person    Selected Continued Care - Admitted Since 12/13/2022     Destination Coordination complete.    Service Provider Selected Services Address Phone Fax Patient Preferred    [facility] [address] [phone] --          Durable Medical Equipment    No services have been selected for the patient.              Dialysis/Infusion    No services have been selected for the patient.              Home Medical Care    No services have been selected for the patient.              Therapy    No services have been selected for the patient.              Community Resources    No services have been selected for the patient.              Community & DME    No services have been selected for the patient.                  Transportation Services  Private: Car    Final Discharge Disposition Code: 03 - skilled nursing facility (SNF)

## 2022-12-20 NOTE — DISCHARGE PLACEMENT REQUEST
Rukhsana Molina (89 y.o. Female)     Date of Birth   01/22/1932    Social Security Number       Address   [address] [place] [address]    Home Phone   [phone]    MRN   [id number]       Taoist   None    Marital Status                               Admission Date   12/13/22    Admission Type   Emergency    Admitting Provider   Cristiane Leyva MD    Attending Provider   Haroldo Burks MD    Department, Room/Bed   [address], [address]/1       Discharge Date       Discharge Disposition   Skilled Nursing Facility (DC - External)    Discharge Destination                               Attending Provider: Haroldo Burks MD    Allergies: Bee Venom, Atorvastatin, Gemfibrozil, Haemophilus Influenzae, Influenza Vac Split Quad, Isosorbide, Metformin, Propoxyphene, Simvastatin    Isolation: None   Infection: None   Code Status: No CPR    Ht: 157.5 cm (62\")   Wt: 60.5 kg (133 lb 4.8 oz)    Admission Cmt: None   Principal Problem: Acute respiratory failure with hypoxia (HCC) [J96.01]                 Active Insurance as of 12/13/2022     Primary Coverage     Payor Plan Insurance Group Employer/Plan Group    MEDICARE MEDICARE A & B      Payor Plan Address Payor Plan Phone Number Payor Plan Fax Number Effective Dates    [address] [phone]  1/1/1997 - None Entered    [facility] [id number]       Subscriber Name Subscriber Birth Date Member ID       RUKHSANA MOLINA 1/22/1932 [id number]           Secondary Coverage     Payor Plan Insurance Group Employer/Plan Group     FOR LIFE  FOR LIFE  SUP       Payor Plan Address Payor Plan Phone Number Payor Plan Fax Number Effective Dates    [address] [phone]  7/2/2021 - None Entered    [facility] [id number]       Subscriber Name Subscriber Birth Date Member ID       RUKHSANA MOLINA 1/22/1932 [id number]                 Emergency Contacts      (Rel.) Home Phone Work Phone Mobile Phone     ELI RICCI (Daughter) [phone] -- [phone]    VALENCIA RICCI SON IN LAW (Relative) [phone] -- [phone]                 Discharge Summary      Haroldo Burks MD at 12/20/22 1029                              [facility]    Date of Discharge:  12/20/2022    PCP: Mireille La MD    Discharge Diagnosis:   Active Hospital Problems    Diagnosis  POA   • **Acute respiratory failure with hypoxia (HCC) [J96.01]  Unknown   • 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          Consults     Date and Time Order Name Status Description    12/15/2022  7:29 AM Inpatient Cardiology Consult Completed     12/13/2022  2:39 PM [facility] (on-call MD unless specified) Details          Hospital Course  89 y.o. female initially admitted for shortness of breath and cough.  She was found to have acute hypoxic respiratory failure that is resolved and was due to acute on chronic diastolic CHF.  She is pretty much been weaned to room air by discharge and cardiology has been okay with discharge over the last couple days.  They have switched her off of IV back to p.o. diuretics though she is at an increased level of 40 mg as opposed to 20 mg at the time of admission.  They plan on watching her kidney function and electrolytes closely with upcoming scheduled BMP.  Echocardiogram obtained with normal EF -see report for further clarification.  Additional diastolic CHF likely provoked by A. fib with RVR and patient is now on a beta-blocker and has been chronically anticoagulated on Coumadin with a therapeutic INR.  Cardiology also elected to add low-dose amlodipine to her regimen  and she seems to be tolerating all.  She denies any abdominal pain and Dr. Castillo who was previously managing this patient had discussed case with family and there were no plans for further work-up of this indeterminate pancreatic lesion.  I personally have not seen the family at bedside at the time in which I round and I think that is a very realistic plan for this patient of 89 years of age she already suffers from dementia though thankfully she does not have any aspects of behavior disorder and remains conversational pleasant and smiling.  At this juncture she is more than medically stable to be discharged from the hospital and I agree with the idea of transition towards subacute rehab given increased falls risk on anticoagulation as well as poor PT notes in which she is demonstrating only 30 feet of ambulation.  At this juncture case will be discussed in multidisciplinary rounds and she is medically stable for discharge as long as the above logistics can be finalized per CCP.      Condition on Discharge: Improved.     Temp:  [97.3 °F (36.3 °C)-98.2 °F (36.8 °C)] 97.3 °F (36.3 °C)  Heart Rate:  [77-84] 80  Resp:  [16-18] 16  BP: (127-138)/(84-95) 137/95  Body mass index is 24.38 kg/m².    Physical Exam  Constitutional:       Comments: Pleasantly demented.  Nontoxic.  Smiling and conversational.  No family at bedside   HENT:      Head: Normocephalic.      Nose: Nose normal.      Mouth/Throat:      Mouth: Mucous membranes are moist.      Pharynx: Oropharynx is clear.   Eyes:      General: No scleral icterus.     Conjunctiva/sclera: Conjunctivae normal.   Cardiovascular:      Rate and Rhythm: Normal rate and regular rhythm.   Pulmonary:      Effort: Pulmonary effort is normal. No respiratory distress.      Breath sounds: Normal breath sounds.   Abdominal:      General: Bowel sounds are normal. There is no distension.      Palpations: Abdomen is soft.      Tenderness: There is no abdominal tenderness.    Musculoskeletal:         General: No swelling.   Skin:     General: Skin is warm and dry.   Neurological:      Mental Status: She is alert. Mental status is at baseline.      Motor: Weakness present.       Disposition: Skilled Nursing Facility (DC - External)       Discharge Medications      New Medications      Instructions Start Date   amLODIPine 2.5 MG tablet  Commonly known as: NORVASC   2.5 mg, Oral, Every 24 Hours Scheduled   Start Date: December 21, 2022        Changes to Medications      Instructions Start Date   acetaminophen 325 MG tablet  Commonly known as: TYLENOL  What changed:   · medication strength  · how much to take  · reasons to take this   650 mg, Oral, Every 4 Hours PRN      furosemide 40 MG tablet  Commonly known as: LASIX  What changed:   · medication strength  · how much to take   40 mg, Oral, Daily   Start Date: December 21, 2022     pregabalin 100 MG capsule  Commonly known as: LYRICA  What changed: when to take this   100 mg, Oral, Nightly         Continue These Medications      Instructions Start Date   aspirin 81 MG chewable tablet   81 mg, Oral, Daily      dilTIAZem  MG 24 hr capsule  Commonly known as: CARDIZEM CD   180 mg, Oral, Every 24 Hours Scheduled      ezetimibe 10 MG tablet  Commonly known as: ZETIA   10 mg, Oral, Daily      fish oil-omega-3 fatty acids 1000 MG capsule   1 g, Oral, Daily      icosapent ethyl 1 g capsule capsule  Commonly known as: VASCEPA   2 capsules, Oral, 2 Times Daily      levothyroxine 100 MCG tablet  Commonly known as: SYNTHROID, LEVOTHROID   100 mcg, Oral, Daily      metoprolol tartrate 25 MG tablet  Commonly known as: LOPRESSOR   25 mg, Oral, 2 Times Daily      multivitamin tablet tablet   1 tablet, Oral, Daily      pantoprazole 20 MG EC tablet  Commonly known as: PROTONIX   40 mg, Oral, Daily      potassium chloride 10 MEQ CR tablet   10 mEq, Oral, Daily      SITagliptin 100 MG tablet  Commonly known as: JANUVIA   100 mg, Oral, Every Evening       VITAMIN B COMPLEX PO   1 tablet, Oral, Daily      Vitamin C 500 MG capsule   1 tablet, Oral, Daily      Vitamin D3 50 MCG (2000 UT) capsule   2,000 Units, Oral, Daily      warfarin 3 MG tablet  Commonly known as: COUMADIN   6 mg, Oral, See Admin Instructions, 6 mg on Monday and Friday      warfarin 3 MG tablet  Commonly known as: COUMADIN   4.5 mg, Oral, See Admin Instructions, 4.5 mg on Tuesday, Wednesday, Thursday, Saturday and Sunday         Stop These Medications    glipizide 10 MG 24 hr tablet  Commonly known as: GLUCOTROL XL     meclizine 12.5 MG tablet  Commonly known as: ANTIVERT     traMADol 50 MG tablet  Commonly known as: ULTRAM             Additional Instructions for the Follow-ups that You Need to Schedule     Discharge Follow-up with PCP   As directed       Currently Documented PCP:    Mireille La MD    PCP Phone Number:    [phone]     Follow Up Details: PCP post rehab.  Cardiology per their recommendations         Basic Metabolic Panel    Dec 23, 2022 (Approximate)      Release to patient: Routine Release            Follow-up Information     Mireille La MD .    Specialty: Internal Medicine  Why: PCP post rehab.  Cardiology per their recommendations  Contact information:  [address] [place] [address]  [phone]                           Haroldo Iverson MD  [facility]  12/20/22    Discharge time spent greater than 30 minutes.      Electronically signed by Haroldo Iverson MD at 12/20/22 1033       Discharge Order (From admission, onward)     Start     Ordered    12/20/22 1027  Discharge patient  Once        Expected Discharge Date: 12/20/22    Discharge Disposition: Skilled Nursing Facility (DC - External)    Physician of Record for Attribution - Please select from Treatment Team: HAROLDO IVERSON [[id number]]    Review needed by CMO to determine Physician of Record: No       Question Answer Comment   Physician of Record for Attribution - Please  select from Treatment Team MARCO ANTONIO IVERSON    Review needed by CMO to determine Physician of Record No        12/20/22 1020

## 2022-12-20 NOTE — PLAN OF CARE
Goal Outcome Evaluation:VS stable , No other complaints made. For possible DC to nursing home/rehab today.

## 2022-12-21 NOTE — PROGRESS NOTES
Enter Query Response Below      Query Response:     unable to clinically determine          If applicable, please update the problem list.        Patient: Bessie Molina        : 1932  Account: [id number]           Admit Date:         How to Respond to this query:       a. Click New Note     b. Answer query within the yellow box.                c. Update the Problem List, if applicable.      If you have any questions about this query contact me at: [email or web address]    ,     Patient admitted with atrial fibrillation, acute on chronic CHF, CKD.  Creatinines  1.44,  1.47,  1.78,  1.74,  1.56.  IVF bolus.    Per cardiology note \" I was going to hold diuretic today  today because of slightly worsened BUN/creatinine but she already received a dose.\"    Please clarify if the patient is treated/monitored for the following:  -acute kidney injury  -other___________  -unable to clinically determine     RODRICK is defined as any of the following (per KDIGO criteria):   Increase in SCr by 0.3 mg/dl within 48 hours; or    Increase in SCr to 1.5 times baseline, which is known or presumed to have occurred within the prior 7 days; or   Urine volume <0.5 ml/kg/h for 6 hours.    By submitting this query, we are merely seeking further clarification of documentation to accurately reflect all conditions that you are monitoring, evaluating, treating or that extend the hospitalization or utilize additional resources of care. Please utilize your independent clinical judgment when addressing the question(s) above.     This query and your response, once completed, will be entered into the legal medical record.    Sincerely,  Karrie Perez RN BSN  Clinical Documentation Integrity Program

## 2022-12-21 NOTE — PROGRESS NOTES
Enter Query Response Below      Query Response:     unable to clinically determine          If applicable, please update the problem list.        Patient: Bessie Molina        : 1932  Account: [id number]           Admit Date:         How to Respond to this query:       a. Click New Note     b. Answer query within the yellow box.                c. Update the Problem List, if applicable.      If you have any questions about this query contact me at: [email or web address]    Dr. Burks,     Patient admitted with atrial fibrillation, acute on chronic CHF.  Patient with noted shortness of breath, mild distress and wheezing in ED, no accessory muscle use, no labored breathing noted.  Oxygen saturations 88-89 % on room air x1 on admission, % on 1-3L, at discharge patient 100% on room air.  Respiratory rate 16-20, 26 x1 on admission.  Acute respiratory failure with hypoxia documented in progress notes and discharge summary.      After study, is acute respiratory failure with hypoxia clinically supported this admisison?  -yes, please provide further clinical support and treatment___________  -no  -other____________  -unable to clinically determine     By submitting this query, we are merely seeking further clarification of documentation to accurately reflect all conditions that you are monitoring, evaluating, treating or that extend the hospitalization or utilize additional resources of care. Please utilize your independent clinical judgment when addressing the question(s) above.     This query and your response, once completed, will be entered into the legal medical record.    Sincerely,  Karrie Perez RN BSN  Clinical Documentation Integrity Program

## 2022-12-21 NOTE — PROGRESS NOTES
Enter Query Response Below      Query Response:       CKD Stage 3b  GR 30-44       If applicable, please update the problem list.        Patient: Bessie Molina        : 1932  Account: [id number]           Admit Date:         How to Respond to this query:       a. Click New Note     b. Answer query within the yellow box.                c. Update the Problem List, if applicable.      If you have any questions about this query contact me at: [email or web address]    Dr. Burks,     Patient admitted with acute on chronic CHF, treated with IV and PO lasix .  CKD documented in progress/consult notes.    Creatinine:   1.44  GFR 34   1.47  GFR 33    1.78 GFR 27   1.74  GFR 27    1.56  GFR 31  IVF bolus    Please clarify the stage of CKD being treated/monitored.    KDIGO CKD staging www.[email or web address]  CKD Stage 1   GFR > 90  CKD Stage 2   GFR 60-89  CKD Stage 3a  GFR 45-59  CKD Stage 3b  GR 30-44  CKD Stage 4     GFR 15-29  CKD Stage 5     GFR <15    By submitting this query, we are merely seeking further clarification of documentation to accurately reflect all conditions that you are monitoring, evaluating, treating or that extend the hospitalization or utilize additional resources of care. Please utilize your independent clinical judgment when addressing the question(s) above.     This query and your response, once completed, will be entered into the legal medical record.    Sincerely,  Karrie Perez RN BSN  Clinical Documentation Integrity Program

## 2023-01-17 ENCOUNTER — TELEPHONE (OUTPATIENT)
Dept: CARDIOLOGY | Facility: CLINIC | Age: 88
End: 2023-01-17

## 2023-01-18 ENCOUNTER — TELEPHONE (OUTPATIENT)
Dept: CARDIOLOGY | Facility: CLINIC | Age: 88
End: 2023-01-18
Payer: MEDICARE

## 2023-01-20 ENCOUNTER — TELEPHONE (OUTPATIENT)
Dept: CARDIOLOGY | Facility: CLINIC | Age: 88
End: 2023-01-20
Payer: MEDICARE

## 2023-02-01 ENCOUNTER — OFFICE VISIT (OUTPATIENT)
Dept: CARDIOLOGY | Facility: CLINIC | Age: 88
End: 2023-02-01
Payer: MEDICARE

## 2023-02-01 VITALS
HEART RATE: 91 BPM | OXYGEN SATURATION: 99 % | BODY MASS INDEX: 24.84 KG/M2 | DIASTOLIC BLOOD PRESSURE: 80 MMHG | WEIGHT: 135 LBS | HEIGHT: 62 IN | SYSTOLIC BLOOD PRESSURE: 127 MMHG

## 2023-02-01 DIAGNOSIS — Z79.01 CHRONIC ANTICOAGULATION: Primary | ICD-10-CM

## 2023-02-01 DIAGNOSIS — I50.32 CHRONIC DIASTOLIC CHF (CONGESTIVE HEART FAILURE): ICD-10-CM

## 2023-02-01 DIAGNOSIS — I48.19 ATRIAL FIBRILLATION, PERSISTENT: ICD-10-CM

## 2023-02-01 PROBLEM — I50.33 ACUTE ON CHRONIC DIASTOLIC HEART FAILURE: Status: RESOLVED | Noted: 2022-12-14 | Resolved: 2023-02-01

## 2023-02-01 PROCEDURE — 93000 ELECTROCARDIOGRAM COMPLETE: CPT | Performed by: NURSE PRACTITIONER

## 2023-02-01 PROCEDURE — 99214 OFFICE O/P EST MOD 30 MIN: CPT | Performed by: NURSE PRACTITIONER

## 2023-02-01 RX ORDER — WARFARIN SODIUM 3 MG/1
3 TABLET ORAL
COMMUNITY
End: 2023-03-15 | Stop reason: SDUPTHER

## 2023-02-01 RX ORDER — LEVOTHYROXINE SODIUM 0.1 MG/1
100 TABLET ORAL DAILY
COMMUNITY
End: 2023-03-15 | Stop reason: SDUPTHER

## 2023-02-01 NOTE — PROGRESS NOTES
Date of Office Visit: 2023  Encounter Provider: CLAUDETTE Dawson  Place of Service: [facility] CARDIOLOGY  Patient Name: Bessie Molina  :1932    Chief Complaint   Patient presents with   • Follow-up   • Atrial Fibrillation   :     HPI: Bessie Molina is a 89 y.o. female who is a patient of  Dr. Farias and is known to me from previous. She follows with us in the clinic she has a history of atrial fibrillation, diabetes mellitus.  In November she had a left lower extremity peripheral angioplasty and stenting for nonhealing fourth toe wound.  She started walking after that was doing better her EKG has some persistent A-fib and she was rate controlled with diltiazem.  In December she complained of weakness and shortness of breath for 3 days.  She came to the hospital and was having some acute diastolic heart failure with persistent A-fib with RVR.  Her rate medications were optimized she was transitioned over to oral diuretics and went to rehab.  Her blood pressure was getting a little low and we had to cut back some of her medicines over the last couple of weeks.    She is now living with her family.  She is no longer in rehab.  She is using a walker and a cane to get around.  Her breathing is much better.  She follows with nephrology.  There has been times where her blood pressure is gotten low and her heart rate was in the 50s.  She is only taking the metoprolol once a day.  Previous testing and notes have been reviewed by me.   Past Medical History:   Diagnosis Date   • Atrial fibrillation (HCC)    • Hypertension        History reviewed. No pertinent surgical history.    Social History     Socioeconomic History   • Marital status:    Tobacco Use   • Smoking status: Never   • Smokeless tobacco: Never   Vaping Use   • Vaping Use: Never used   Substance and Sexual Activity   • Alcohol use: Yes     Comment: rare/caffeine use        History reviewed.  No pertinent family history.    Review of Systems   Constitutional: Negative for diaphoresis and malaise/fatigue.   Cardiovascular: Positive for leg swelling. Negative for chest pain, claudication, dyspnea on exertion, irregular heartbeat, near-syncope, orthopnea, palpitations, paroxysmal nocturnal dyspnea and syncope.   Respiratory: Negative for cough, shortness of breath and sleep disturbances due to breathing.    Musculoskeletal: Negative for falls.   Neurological: Negative for dizziness and weakness.   Psychiatric/Behavioral: Negative for altered mental status and substance abuse.       Allergies   Allergen Reactions   • Bee Venom Anaphylaxis   • Atorvastatin Nausea Only   • Gemfibrozil Nausea Only   • Haemophilus Influenzae Unknown - High Severity   • Influenza Vac Split Quad Unknown - High Severity   • Isosorbide Unknown - High Severity   • Metformin Unknown - High Severity   • Propoxyphene Unknown - High Severity   • Simvastatin Nausea Only         Current Outpatient Medications:   •  acetaminophen (TYLENOL) 325 MG tablet, Take 2 tablets by mouth Every 4 (Four) Hours As Needed for Mild Pain., Disp: , Rfl:   •  Ascorbic Acid (Vitamin C) 500 MG capsule, Take 1 tablet by mouth Daily., Disp: , Rfl:   •  aspirin 81 MG chewable tablet, Chew 81 mg Daily., Disp: , Rfl:   •  B Complex Vitamins (VITAMIN B COMPLEX PO), Take 1 tablet by mouth Daily., Disp: , Rfl:   •  Cholecalciferol (Vitamin D3) 50 MCG (2000 UT) capsule, Take 2,000 Units by mouth Daily., Disp: , Rfl:   •  ezetimibe (ZETIA) 10 MG tablet, Take 10 mg by mouth Daily., Disp: , Rfl:   •  fish oil-omega-3 fatty acids 1000 MG capsule, Take 1 g by mouth Daily., Disp: , Rfl:   •  furosemide (LASIX) 40 MG tablet, Take 1 tablet by mouth Daily., Disp: , Rfl:   •  icosapent ethyl (VASCEPA) 1 g capsule capsule, Take 2 capsules by mouth 2 (Two) Times a Day., Disp: , Rfl:   •  levothyroxine (SYNTHROID, LEVOTHROID) 100 MCG tablet, Take 100 mcg by mouth Daily., Disp: ,  "Rfl:   •  metoprolol tartrate (LOPRESSOR) 25 MG tablet, Take  by mouth 2 (Two) Times a Day., Disp: , Rfl:   •  multivitamin (THERAGRAN) tablet tablet, Take 1 tablet by mouth Daily., Disp: , Rfl:   •  potassium chloride 10 MEQ CR tablet, Take 1 tablet by mouth Daily., Disp: 90 tablet, Rfl: 3  •  pregabalin (LYRICA) 100 MG capsule, Take 1 capsule by mouth Every Night., Disp: 5 capsule, Rfl: 0  •  warfarin (COUMADIN) 3 MG tablet, Take 3 mg by mouth Daily., Disp: , Rfl:   •  dilTIAZem CD (CARDIZEM CD) 180 MG 24 hr capsule, Take 1 capsule by mouth Daily for 60 days., Disp: 30 capsule, Rfl: 11  •  SITagliptin (JANUVIA) 100 MG tablet, Take 100 mg by mouth Every Evening., Disp: , Rfl:       Objective:     Vitals:    02/01/23 1344   BP: 127/80   Pulse: 91   SpO2: 99%   Weight: 61.2 kg (135 lb)   Height: 157.5 cm (62\")     Body mass index is 24.69 kg/m².    PHYSICAL EXAM:    Constitutional:       General: Not in acute distress.     Appearance: Normal appearance. Well-developed.   Eyes:      Pupils: Pupils are equal, round, and reactive to light.   HENT:      Head: Normocephalic.   Neck:      Vascular: No carotid bruit or JVD.   Pulmonary:      Effort: Pulmonary effort is normal. No tachypnea.      Breath sounds: Normal breath sounds. No wheezing. No rales.   Cardiovascular:      Normal rate. Irregularly irregular rhythm.      No gallop.   Pulses:     Intact distal pulses.   Edema:     Pretibial: bilateral trace edema of the pretibial area.  Abdominal:      General: Bowel sounds are normal.      Palpations: Abdomen is soft.      Tenderness: There is no abdominal tenderness.   Musculoskeletal: Normal range of motion.      Cervical back: Normal range of motion and neck supple. No edema. Skin:     General: Skin is warm and dry.   Neurological:      Mental Status: Alert and oriented to person, place, and time.           ECG 12 Lead    Date/Time: 2/1/2023 2:05 PM  Performed by: Nicole Landry APRN  Authorized by: Nicole Landry " CLAUDETTE Lino   Comparison: compared with previous ECG from 12/13/2022  Similar to previous ECG  Rhythm: atrial fibrillation  Rate: normal  QRS axis: right  Other findings: non-specific ST-T wave changes    Clinical impression: abnormal EKG              Assessment:       Diagnosis Plan   1. Chronic anticoagulation        2. Chronic diastolic CHF (congestive heart failure) (HCC)        3. Atrial fibrillation, persistent (HCC)          Orders Placed This Encounter   Procedures   • ECG 12 Lead     This order was created via procedure documentation     Order Specific Question:   Release to patient     Answer:   Routine Release          Plan:       I am actually going to stop the amlodipine she is also on diltiazem.  I am going to have her take half of the metoprolol twice a day.  In terms of volume status she has a little bit of swelling in her lower extremities.  I think stopping amlodipine can help with this.  She is wearing compression stockings and is doing well.  Her heart rate is well controlled on her current regimen.  She sees nephrology next week and has plans for labs.  She can come back and see me in a month and she has an appointment with Dr. Farias in May.          Your medication list          Accurate as of February 1, 2023  2:29 PM. If you have any questions, ask your nurse or doctor.            CONTINUE taking these medications      Instructions Last Dose Given Next Dose Due   acetaminophen 325 MG tablet  Commonly known as: TYLENOL      Take 2 tablets by mouth Every 4 (Four) Hours As Needed for Mild Pain.       aspirin 81 MG chewable tablet      Chew 81 mg Daily.       dilTIAZem  MG 24 hr capsule  Commonly known as: CARDIZEM CD      Take 1 capsule by mouth Daily for 60 days.       ezetimibe 10 MG tablet  Commonly known as: ZETIA      Take 10 mg by mouth Daily.       fish oil-omega-3 fatty acids 1000 MG capsule      Take 1 g by mouth Daily.       furosemide 40 MG tablet  Commonly known as:  LASIX      Take 1 tablet by mouth Daily.       icosapent ethyl 1 g capsule capsule  Commonly known as: VASCEPA      Take 2 capsules by mouth 2 (Two) Times a Day.       levothyroxine 100 MCG tablet  Commonly known as: SYNTHROID, LEVOTHROID      Take 100 mcg by mouth Daily.       metoprolol tartrate 25 MG tablet  Commonly known as: LOPRESSOR      Take  by mouth 2 (Two) Times a Day.       multivitamin tablet tablet      Take 1 tablet by mouth Daily.       potassium chloride 10 MEQ CR tablet      Take 1 tablet by mouth Daily.       pregabalin 100 MG capsule  Commonly known as: LYRICA      Take 1 capsule by mouth Every Night.       SITagliptin 100 MG tablet  Commonly known as: JANUVIA      Take 100 mg by mouth Every Evening.       VITAMIN B COMPLEX PO      Take 1 tablet by mouth Daily.       Vitamin C 500 MG capsule      Take 1 tablet by mouth Daily.       Vitamin D3 50 MCG (2000 UT) capsule      Take 2,000 Units by mouth Daily.       warfarin 3 MG tablet  Commonly known as: COUMADIN      Take 3 mg by mouth Daily.          STOP taking these medications    amLODIPine 2.5 MG tablet  Commonly known as: NORVASC  Stopped by: CLAUDETTE Dawson                 As always, it has been a pleasure to participate in your patient's care.      Sincerely,     Nicole WILKINS

## 2023-03-15 ENCOUNTER — OFFICE VISIT (OUTPATIENT)
Dept: CARDIOLOGY | Facility: CLINIC | Age: 88
End: 2023-03-15
Payer: MEDICARE

## 2023-03-15 VITALS
HEIGHT: 62 IN | OXYGEN SATURATION: 82 % | WEIGHT: 133 LBS | HEART RATE: 85 BPM | DIASTOLIC BLOOD PRESSURE: 72 MMHG | BODY MASS INDEX: 24.48 KG/M2 | SYSTOLIC BLOOD PRESSURE: 141 MMHG

## 2023-03-15 DIAGNOSIS — I50.32 CHRONIC DIASTOLIC CHF (CONGESTIVE HEART FAILURE): ICD-10-CM

## 2023-03-15 DIAGNOSIS — I48.19 ATRIAL FIBRILLATION, PERSISTENT: Primary | ICD-10-CM

## 2023-03-15 DIAGNOSIS — Z79.01 CHRONIC ANTICOAGULATION: ICD-10-CM

## 2023-03-15 PROCEDURE — 1160F RVW MEDS BY RX/DR IN RCRD: CPT | Performed by: NURSE PRACTITIONER

## 2023-03-15 PROCEDURE — 93000 ELECTROCARDIOGRAM COMPLETE: CPT | Performed by: NURSE PRACTITIONER

## 2023-03-15 PROCEDURE — 1159F MED LIST DOCD IN RCRD: CPT | Performed by: NURSE PRACTITIONER

## 2023-03-15 PROCEDURE — 99214 OFFICE O/P EST MOD 30 MIN: CPT | Performed by: NURSE PRACTITIONER

## 2023-03-15 RX ORDER — PANTOPRAZOLE SODIUM 40 MG/1
TABLET, DELAYED RELEASE ORAL
COMMUNITY
Start: 2023-03-13

## 2023-03-15 RX ORDER — WARFARIN SODIUM 3 MG/1
TABLET ORAL
COMMUNITY
Start: 2023-01-05

## 2023-03-15 NOTE — PROGRESS NOTES
Date of Office Visit: 03/15/2023  Encounter Provider: CLAUDETTE Dawson  Place of Service: [facility] CARDIOLOGY  Patient Name: Bessie Molina  :1932    Chief Complaint   Patient presents with   • Follow-up   • Atrial Fibrillation   :     HPI:Bessie Molina is a 89 y.o. female who is a patient of  Dr. Farias and is known to me from previous. She follows with us in the clinic she has a history of atrial fibrillation, diabetes mellitus.  In November she had a left lower extremity peripheral angioplasty and stenting for nonhealing fourth toe wound.  She started walking after that was doing better her EKG has some persistent A-fib and she was rate controlled with diltiazem.  In December she complained of weakness and shortness of breath for 3 days.  She came to the hospital and was having some acute diastolic heart failure with persistent A-fib with RVR.  Her rate medications were optimized she was transitioned over to oral diuretics and went to rehab.  Her blood pressure was getting a little low and we had to cut back some of her medicines over the last couple of weeks.     She is now living with her family.  She is no longer in rehab.  She is using a walker and a cane to get around.  Her breathing is much better.  She follows with nephrology.  There has been times where her blood pressure is gotten low and her heart rate was in the 50s.  At her last appointment I cut her metoprolol back to 12.5 twice a day from 25 1 today.  Her blood pressure was also getting low and she was on amlodipine plus diltiazem.  I stopped the amlodipine.  She followed up with nephrology. Her renal function is doing well.        Previous testing and notes have been reviewed by me.   Past Medical History:   Diagnosis Date   • Atrial fibrillation (HCC)    • Hypertension        History reviewed. No pertinent surgical history.    Social History     Socioeconomic History   • Marital status:     Tobacco Use   • Smoking status: Never   • Smokeless tobacco: Never   Vaping Use   • Vaping Use: Never used   Substance and Sexual Activity   • Alcohol use: Yes     Comment: rare/caffeine use        History reviewed. No pertinent family history.    Review of Systems   Constitutional: Negative for diaphoresis and malaise/fatigue.   Cardiovascular: Negative for chest pain, claudication, dyspnea on exertion, irregular heartbeat, leg swelling, near-syncope, orthopnea, palpitations, paroxysmal nocturnal dyspnea and syncope.   Respiratory: Negative for cough, shortness of breath and sleep disturbances due to breathing.    Musculoskeletal: Negative for falls.   Neurological: Negative for dizziness and weakness.   Psychiatric/Behavioral: Negative for altered mental status and substance abuse.       Allergies   Allergen Reactions   • Bee Venom Anaphylaxis   • Atorvastatin Nausea Only   • Gemfibrozil Nausea Only   • Haemophilus Influenzae Unknown - High Severity   • Influenza Vac Split Quad Unknown - High Severity   • Isosorbide Unknown - High Severity   • Metformin Unknown - High Severity   • Propoxyphene Unknown - High Severity   • Simvastatin Nausea Only         Current Outpatient Medications:   •  acetaminophen (TYLENOL) 325 MG tablet, Take 2 tablets by mouth Every 4 (Four) Hours As Needed for Mild Pain., Disp: , Rfl:   •  Ascorbic Acid (Vitamin C) 500 MG capsule, Take 1 tablet by mouth Daily., Disp: , Rfl:   •  aspirin 81 MG chewable tablet, Chew 1 tablet Daily., Disp: , Rfl:   •  B Complex Vitamins (VITAMIN B COMPLEX PO), Take 1 tablet by mouth Daily., Disp: , Rfl:   •  Cholecalciferol (Vitamin D3) 50 MCG (2000 UT) capsule, Take 1 capsule by mouth Daily., Disp: , Rfl:   •  ezetimibe (ZETIA) 10 MG tablet, Take 1 tablet by mouth Daily., Disp: , Rfl:   •  fish oil-omega-3 fatty acids 1000 MG capsule, Take 1 capsule by mouth Daily., Disp: , Rfl:   •  furosemide (LASIX) 40 MG tablet, Take 1 tablet by mouth Daily.,  "Disp: , Rfl:   •  icosapent ethyl (VASCEPA) 1 g capsule capsule, Take 2 g by mouth 2 (Two) Times a Day., Disp: , Rfl:   •  metoprolol tartrate (LOPRESSOR) 25 MG tablet, Take 12.5 mg by mouth 2 (Two) Times a Day., Disp: , Rfl:   •  multivitamin (THERAGRAN) tablet tablet, Take 1 tablet by mouth Daily., Disp: , Rfl:   •  pantoprazole (PROTONIX) 40 MG EC tablet, , Disp: , Rfl:   •  potassium chloride 10 MEQ CR tablet, Take 1 tablet by mouth Daily., Disp: 90 tablet, Rfl: 3  •  pregabalin (LYRICA) 100 MG capsule, Take 1 capsule by mouth Every Night., Disp: 5 capsule, Rfl: 0  •  warfarin (COUMADIN) 3 MG tablet, , Disp: , Rfl:   •  dilTIAZem CD (CARDIZEM CD) 180 MG 24 hr capsule, Take 1 capsule by mouth Daily for 60 days., Disp: 30 capsule, Rfl: 11  •  SITagliptin (JANUVIA) 100 MG tablet, Take 100 mg by mouth Every Evening., Disp: , Rfl:       Objective:     Vitals:    03/15/23 1348   BP: 141/72   Pulse: 85   SpO2: (!) 82%   Weight: 60.3 kg (133 lb)   Height: 157.5 cm (62\")     Body mass index is 24.33 kg/m².    PHYSICAL EXAM:    Constitutional:       General: Not in acute distress.     Appearance: Normal appearance. Well-developed.   Eyes:      Pupils: Pupils are equal, round, and reactive to light.   HENT:      Head: Normocephalic.   Neck:      Vascular: No carotid bruit or JVD.   Pulmonary:      Effort: Pulmonary effort is normal. No tachypnea.      Breath sounds: Normal breath sounds. No wheezing. No rales.   Cardiovascular:      Normal rate. Regular rhythm.      No gallop.   Pulses:     Intact distal pulses.   Edema:     Peripheral edema absent.   Abdominal:      General: Bowel sounds are normal.      Palpations: Abdomen is soft.      Tenderness: There is no abdominal tenderness.   Musculoskeletal: Normal range of motion.      Cervical back: Normal range of motion and neck supple. No edema. Skin:     General: Skin is warm and dry.   Neurological:      Mental Status: Alert and oriented to person, place, and time. "           ECG 12 Lead    Date/Time: 3/15/2023 2:38 PM  Performed by: Nicole Landry APRN  Authorized by: Nicole Landry APRN   Comparison: compared with previous ECG from 2/1/2023  Rhythm: atrial fibrillation  Rate: normal  BPM: 86  QRS axis: normal    Clinical impression: abnormal EKG              Assessment:       Diagnosis Plan   1. Atrial fibrillation, persistent (HCC)     Heart rate controlled on current regimen of metoprolol and diltiazem.  Blood pressure improved and no longer hypotensive.   2. Chronic diastolic CHF (congestive heart failure) (HCC)     Volume status stable   3. Chronic anticoagulation     INR has been therapeutic.     Orders Placed This Encounter   Procedures   • ECG 12 Lead     This order was created via procedure documentation     Order Specific Question:   Release to patient     Answer:   Routine Release          Plan:       She is doing really well since we last saw her.  She is getting around better in her hemodynamics are stable.  She did not tolerate any higher doses of metoprolol but is doing good with low-dose diltiazem and metoprolol combination.  She can follow-up with us in 3 months.         Your medication list          Accurate as of March 15, 2023  2:39 PM. If you have any questions, ask your nurse or doctor.            CONTINUE taking these medications      Instructions Last Dose Given Next Dose Due   acetaminophen 325 MG tablet  Commonly known as: TYLENOL      Take 2 tablets by mouth Every 4 (Four) Hours As Needed for Mild Pain.       aspirin 81 MG chewable tablet      Chew 1 tablet Daily.       dilTIAZem  MG 24 hr capsule  Commonly known as: CARDIZEM CD      Take 1 capsule by mouth Daily for 60 days.       ezetimibe 10 MG tablet  Commonly known as: ZETIA      Take 1 tablet by mouth Daily.       fish oil-omega-3 fatty acids 1000 MG capsule      Take 1 capsule by mouth Daily.       furosemide 40 MG tablet  Commonly known as: LASIX      Take 1 tablet by mouth  Daily.       icosapent ethyl 1 g capsule capsule  Commonly known as: VASCEPA      Take 2 g by mouth 2 (Two) Times a Day.       metoprolol tartrate 25 MG tablet  Commonly known as: LOPRESSOR      Take 12.5 mg by mouth 2 (Two) Times a Day.       multivitamin tablet tablet      Take 1 tablet by mouth Daily.       pantoprazole 40 MG EC tablet  Commonly known as: PROTONIX           potassium chloride 10 MEQ CR tablet      Take 1 tablet by mouth Daily.       pregabalin 100 MG capsule  Commonly known as: LYRICA      Take 1 capsule by mouth Every Night.       SITagliptin 100 MG tablet  Commonly known as: JANUVIA      Take 100 mg by mouth Every Evening.       VITAMIN B COMPLEX PO      Take 1 tablet by mouth Daily.       Vitamin C 500 MG capsule      Take 1 tablet by mouth Daily.       Vitamin D3 50 MCG (2000 UT) capsule      Take 1 capsule by mouth Daily.       warfarin 3 MG tablet  Commonly known as: COUMADIN                    As always, it has been a pleasure to participate in your patient's care.      Sincerely,     Nicole WILKINS

## 2023-07-06 ENCOUNTER — APPOINTMENT (OUTPATIENT)
Dept: GENERAL RADIOLOGY | Facility: HOSPITAL | Age: 88
DRG: 271 | End: 2023-07-06
Payer: MEDICARE

## 2023-07-06 ENCOUNTER — APPOINTMENT (OUTPATIENT)
Dept: CARDIOLOGY | Facility: HOSPITAL | Age: 88
DRG: 271 | End: 2023-07-06
Payer: MEDICARE

## 2023-07-06 ENCOUNTER — HOSPITAL ENCOUNTER (INPATIENT)
Facility: HOSPITAL | Age: 88
LOS: 12 days | Discharge: HOME HEALTH | DRG: 271 | End: 2023-07-18
Attending: EMERGENCY MEDICINE | Admitting: HOSPITALIST
Payer: MEDICARE

## 2023-07-06 DIAGNOSIS — R79.82 CRP ELEVATED: ICD-10-CM

## 2023-07-06 DIAGNOSIS — Z78.9 FAILURE OF OUTPATIENT TREATMENT: ICD-10-CM

## 2023-07-06 DIAGNOSIS — N18.9 CHRONIC KIDNEY DISEASE, UNSPECIFIED CKD STAGE: ICD-10-CM

## 2023-07-06 DIAGNOSIS — L03.115 CELLULITIS OF RIGHT FOOT: Primary | ICD-10-CM

## 2023-07-06 DIAGNOSIS — R73.9 HYPERGLYCEMIA: ICD-10-CM

## 2023-07-06 DIAGNOSIS — D72.829 LEUKOCYTOSIS, UNSPECIFIED TYPE: ICD-10-CM

## 2023-07-06 DIAGNOSIS — M86.9 OSTEOMYELITIS: ICD-10-CM

## 2023-07-06 DIAGNOSIS — R70.0 ESR RAISED: ICD-10-CM

## 2023-07-06 LAB
ALBUMIN SERPL-MCNC: 4.3 G/DL (ref 3.5–5.2)
ALBUMIN/GLOB SERPL: 1.2 G/DL
ALP SERPL-CCNC: 105 U/L (ref 39–117)
ALT SERPL W P-5'-P-CCNC: 25 U/L (ref 1–33)
ANION GAP SERPL CALCULATED.3IONS-SCNC: 13.5 MMOL/L (ref 5–15)
APTT PPP: 32.8 SECONDS (ref 22.7–35.4)
AST SERPL-CCNC: 29 U/L (ref 1–32)
BASOPHILS # BLD AUTO: 0.07 10*3/MM3 (ref 0–0.2)
BASOPHILS NFR BLD AUTO: 0.6 % (ref 0–1.5)
BH CV LOWER ARTERIAL LEFT ABI RATIO: 1.01
BH CV LOWER ARTERIAL LEFT CALF RATIO: 0.89
BH CV LOWER ARTERIAL LEFT DORSALIS PEDIS SYS MAX: 118
BH CV LOWER ARTERIAL LEFT GREAT TOE SYS MAX: 62
BH CV LOWER ARTERIAL LEFT HIGH THIGH RATIO: NORMAL
BH CV LOWER ARTERIAL LEFT HIGH THIGH SYS MAX: NORMAL
BH CV LOWER ARTERIAL LEFT LOW THIGH RATIO: NORMAL
BH CV LOWER ARTERIAL LEFT LOW THIGH SYS MAX: NORMAL
BH CV LOWER ARTERIAL LEFT POPLITEAL SYS MAX: 151
BH CV LOWER ARTERIAL LEFT POST TIBIAL SYS MAX: 171
BH CV LOWER ARTERIAL LEFT TBI RATIO: 0.37
BH CV LOWER ARTERIAL RIGHT ABI RATIO: NORMAL
BH CV LOWER ARTERIAL RIGHT CALF RATIO: 0.83
BH CV LOWER ARTERIAL RIGHT DORSALIS PEDIS SYS MAX: 69
BH CV LOWER ARTERIAL RIGHT GREAT TOE SYS MAX: 59
BH CV LOWER ARTERIAL RIGHT HIGH THIGH RATIO: NORMAL
BH CV LOWER ARTERIAL RIGHT HIGH THIGH SYS MAX: NORMAL
BH CV LOWER ARTERIAL RIGHT LOW THIGH RATIO: NORMAL
BH CV LOWER ARTERIAL RIGHT LOW THIGH SYS MAX: NORMAL
BH CV LOWER ARTERIAL RIGHT POPLITEAL SYS MAX: 140
BH CV LOWER ARTERIAL RIGHT POST TIBIAL SYS MAX: NORMAL
BH CV LOWER ARTERIAL RIGHT TBI RATIO: 0.35
BILIRUB SERPL-MCNC: 0.5 MG/DL (ref 0–1.2)
BUN SERPL-MCNC: 31 MG/DL (ref 8–23)
BUN/CREAT SERPL: 21.1 (ref 7–25)
CALCIUM SPEC-SCNC: 9.7 MG/DL (ref 8.2–9.6)
CHLORIDE SERPL-SCNC: 98 MMOL/L (ref 98–107)
CO2 SERPL-SCNC: 24.5 MMOL/L (ref 22–29)
CREAT SERPL-MCNC: 1.47 MG/DL (ref 0.57–1)
CRP SERPL-MCNC: 1.98 MG/DL (ref 0–0.5)
D-LACTATE SERPL-SCNC: 1.9 MMOL/L (ref 0.5–2)
DEPRECATED RDW RBC AUTO: 47.7 FL (ref 37–54)
EGFRCR SERPLBLD CKD-EPI 2021: 33.6 ML/MIN/1.73
EOSINOPHIL # BLD AUTO: 0.3 10*3/MM3 (ref 0–0.4)
EOSINOPHIL NFR BLD AUTO: 2.6 % (ref 0.3–6.2)
ERYTHROCYTE [DISTWIDTH] IN BLOOD BY AUTOMATED COUNT: 12.9 % (ref 12.3–15.4)
ERYTHROCYTE [SEDIMENTATION RATE] IN BLOOD: 62 MM/HR (ref 0–30)
GLOBULIN UR ELPH-MCNC: 3.6 GM/DL
GLUCOSE BLDC GLUCOMTR-MCNC: 124 MG/DL (ref 70–130)
GLUCOSE BLDC GLUCOMTR-MCNC: 203 MG/DL (ref 70–130)
GLUCOSE SERPL-MCNC: 256 MG/DL (ref 65–99)
HCT VFR BLD AUTO: 41.6 % (ref 34–46.6)
HGB BLD-MCNC: 13.7 G/DL (ref 12–15.9)
IMM GRANULOCYTES # BLD AUTO: 0.05 10*3/MM3 (ref 0–0.05)
IMM GRANULOCYTES NFR BLD AUTO: 0.4 % (ref 0–0.5)
INR PPP: 2.16 (ref 0.9–1.1)
INR PPP: 2.44 (ref 0.9–1.1)
LYMPHOCYTES # BLD AUTO: 3.73 10*3/MM3 (ref 0.7–3.1)
LYMPHOCYTES NFR BLD AUTO: 32.4 % (ref 19.6–45.3)
MCH RBC QN AUTO: 33.3 PG (ref 26.6–33)
MCHC RBC AUTO-ENTMCNC: 32.9 G/DL (ref 31.5–35.7)
MCV RBC AUTO: 101 FL (ref 79–97)
MONOCYTES # BLD AUTO: 0.85 10*3/MM3 (ref 0.1–0.9)
MONOCYTES NFR BLD AUTO: 7.4 % (ref 5–12)
NEUTROPHILS NFR BLD AUTO: 56.6 % (ref 42.7–76)
NEUTROPHILS NFR BLD AUTO: 6.52 10*3/MM3 (ref 1.7–7)
NRBC BLD AUTO-RTO: 0 /100 WBC (ref 0–0.2)
NT-PROBNP SERPL-MCNC: 2224 PG/ML (ref 0–1800)
PLATELET # BLD AUTO: 287 10*3/MM3 (ref 140–450)
PMV BLD AUTO: 9.6 FL (ref 6–12)
POTASSIUM SERPL-SCNC: 3.6 MMOL/L (ref 3.5–5.2)
PROCALCITONIN SERPL-MCNC: 0.04 NG/ML (ref 0–0.25)
PROT SERPL-MCNC: 7.9 G/DL (ref 6–8.5)
PROTHROMBIN TIME: 24.5 SECONDS (ref 11.7–14.2)
PROTHROMBIN TIME: 27 SECONDS (ref 11.7–14.2)
RBC # BLD AUTO: 4.12 10*6/MM3 (ref 3.77–5.28)
SODIUM SERPL-SCNC: 136 MMOL/L (ref 136–145)
UPPER ARTERIAL LEFT ARM BRACHIAL SYS MAX: 161 MMHG
UPPER ARTERIAL RIGHT ARM BRACHIAL SYS MAX: 169 MMHG
WBC NRBC COR # BLD: 11.52 10*3/MM3 (ref 3.4–10.8)

## 2023-07-06 PROCEDURE — 87040 BLOOD CULTURE FOR BACTERIA: CPT | Performed by: EMERGENCY MEDICINE

## 2023-07-06 PROCEDURE — 63710000001 INSULIN LISPRO (HUMAN) PER 5 UNITS: Performed by: HOSPITALIST

## 2023-07-06 PROCEDURE — 83880 ASSAY OF NATRIURETIC PEPTIDE: CPT | Performed by: HOSPITALIST

## 2023-07-06 PROCEDURE — 25010000002 SODIUM CHLORIDE 0.9 % WITH KCL 20 MEQ 20-0.9 MEQ/L-% SOLUTION: Performed by: HOSPITALIST

## 2023-07-06 PROCEDURE — 83605 ASSAY OF LACTIC ACID: CPT | Performed by: EMERGENCY MEDICINE

## 2023-07-06 PROCEDURE — 85610 PROTHROMBIN TIME: CPT | Performed by: HOSPITALIST

## 2023-07-06 PROCEDURE — 85652 RBC SED RATE AUTOMATED: CPT | Performed by: EMERGENCY MEDICINE

## 2023-07-06 PROCEDURE — 82948 REAGENT STRIP/BLOOD GLUCOSE: CPT

## 2023-07-06 PROCEDURE — 80053 COMPREHEN METABOLIC PANEL: CPT | Performed by: EMERGENCY MEDICINE

## 2023-07-06 PROCEDURE — 93923 UPR/LXTR ART STDY 3+ LVLS: CPT

## 2023-07-06 PROCEDURE — 36415 COLL VENOUS BLD VENIPUNCTURE: CPT | Performed by: EMERGENCY MEDICINE

## 2023-07-06 PROCEDURE — 99284 EMERGENCY DEPT VISIT MOD MDM: CPT

## 2023-07-06 PROCEDURE — 86140 C-REACTIVE PROTEIN: CPT | Performed by: EMERGENCY MEDICINE

## 2023-07-06 PROCEDURE — 85610 PROTHROMBIN TIME: CPT | Performed by: EMERGENCY MEDICINE

## 2023-07-06 PROCEDURE — 85025 COMPLETE CBC W/AUTO DIFF WBC: CPT | Performed by: EMERGENCY MEDICINE

## 2023-07-06 PROCEDURE — 25010000002 VANCOMYCIN 10 G RECONSTITUTED SOLUTION: Performed by: EMERGENCY MEDICINE

## 2023-07-06 PROCEDURE — 84145 PROCALCITONIN (PCT): CPT | Performed by: EMERGENCY MEDICINE

## 2023-07-06 PROCEDURE — 85730 THROMBOPLASTIN TIME PARTIAL: CPT | Performed by: EMERGENCY MEDICINE

## 2023-07-06 PROCEDURE — 73630 X-RAY EXAM OF FOOT: CPT

## 2023-07-06 PROCEDURE — 25010000002 CEFTRIAXONE PER 250 MG: Performed by: EMERGENCY MEDICINE

## 2023-07-06 PROCEDURE — 93971 EXTREMITY STUDY: CPT

## 2023-07-06 RX ORDER — DILTIAZEM HYDROCHLORIDE 180 MG/1
180 CAPSULE, COATED, EXTENDED RELEASE ORAL
Status: DISCONTINUED | OUTPATIENT
Start: 2023-07-06 | End: 2023-07-18 | Stop reason: HOSPADM

## 2023-07-06 RX ORDER — DILTIAZEM HYDROCHLORIDE 180 MG/1
180 CAPSULE, COATED, EXTENDED RELEASE ORAL DAILY
COMMUNITY
End: 2023-07-18 | Stop reason: HOSPADM

## 2023-07-06 RX ORDER — DOCUSATE SODIUM 100 MG/1
100 CAPSULE, LIQUID FILLED ORAL DAILY PRN
COMMUNITY

## 2023-07-06 RX ORDER — FAMOTIDINE 20 MG/1
20 TABLET, FILM COATED ORAL 2 TIMES DAILY
Status: DISCONTINUED | OUTPATIENT
Start: 2023-07-06 | End: 2023-07-11

## 2023-07-06 RX ORDER — INSULIN LISPRO 100 [IU]/ML
2-7 INJECTION, SOLUTION INTRAVENOUS; SUBCUTANEOUS
Status: DISCONTINUED | OUTPATIENT
Start: 2023-07-06 | End: 2023-07-18 | Stop reason: HOSPADM

## 2023-07-06 RX ORDER — LEVOTHYROXINE SODIUM 0.1 MG/1
100 TABLET ORAL EVERY OTHER DAY
Status: ON HOLD | COMMUNITY
End: 2023-07-18 | Stop reason: SDUPTHER

## 2023-07-06 RX ORDER — WARFARIN SODIUM 2 MG/1
2 TABLET ORAL
Status: DISCONTINUED | OUTPATIENT
Start: 2023-07-06 | End: 2023-07-06 | Stop reason: DRUGHIGH

## 2023-07-06 RX ORDER — NICOTINE POLACRILEX 4 MG
15 LOZENGE BUCCAL
Status: DISCONTINUED | OUTPATIENT
Start: 2023-07-06 | End: 2023-07-11

## 2023-07-06 RX ORDER — MELATONIN
2000 DAILY
Status: DISCONTINUED | OUTPATIENT
Start: 2023-07-06 | End: 2023-07-06

## 2023-07-06 RX ORDER — PREGABALIN 100 MG/1
100 CAPSULE ORAL NIGHTLY
Status: DISCONTINUED | OUTPATIENT
Start: 2023-07-06 | End: 2023-07-18 | Stop reason: HOSPADM

## 2023-07-06 RX ORDER — ASPIRIN 81 MG/1
81 TABLET, CHEWABLE ORAL DAILY
Status: DISCONTINUED | OUTPATIENT
Start: 2023-07-06 | End: 2023-07-18 | Stop reason: HOSPADM

## 2023-07-06 RX ORDER — SODIUM CHLORIDE AND POTASSIUM CHLORIDE 150; 900 MG/100ML; MG/100ML
75 INJECTION, SOLUTION INTRAVENOUS CONTINUOUS
Status: DISCONTINUED | OUTPATIENT
Start: 2023-07-06 | End: 2023-07-07

## 2023-07-06 RX ORDER — WARFARIN SODIUM 3 MG/1
3 TABLET ORAL
Status: DISCONTINUED | OUTPATIENT
Start: 2023-07-06 | End: 2023-07-07

## 2023-07-06 RX ORDER — IBUPROFEN 600 MG/1
1 TABLET ORAL
Status: DISCONTINUED | OUTPATIENT
Start: 2023-07-06 | End: 2023-07-11

## 2023-07-06 RX ORDER — DEXTROSE MONOHYDRATE 25 G/50ML
25 INJECTION, SOLUTION INTRAVENOUS
Status: DISCONTINUED | OUTPATIENT
Start: 2023-07-06 | End: 2023-07-11

## 2023-07-06 RX ORDER — PANTOPRAZOLE SODIUM 40 MG/1
40 TABLET, DELAYED RELEASE ORAL
Status: DISCONTINUED | OUTPATIENT
Start: 2023-07-07 | End: 2023-07-06

## 2023-07-06 RX ORDER — LEVOTHYROXINE SODIUM 88 UG/1
88 TABLET ORAL EVERY OTHER DAY
COMMUNITY
End: 2023-07-18 | Stop reason: HOSPADM

## 2023-07-06 RX ORDER — GLIPIZIDE 10 MG/1
10 TABLET, FILM COATED, EXTENDED RELEASE ORAL EVERY MORNING
COMMUNITY

## 2023-07-06 RX ADMIN — FAMOTIDINE 20 MG: 20 TABLET, FILM COATED ORAL at 21:13

## 2023-07-06 RX ADMIN — METOPROLOL TARTRATE 12.5 MG: 25 TABLET, FILM COATED ORAL at 21:12

## 2023-07-06 RX ADMIN — CEFTRIAXONE SODIUM 1 G: 1 INJECTION, POWDER, FOR SOLUTION INTRAMUSCULAR; INTRAVENOUS at 15:51

## 2023-07-06 RX ADMIN — SODIUM CHLORIDE 500 ML: 9 INJECTION, SOLUTION INTRAVENOUS at 15:50

## 2023-07-06 RX ADMIN — WARFARIN 3 MG: 3 TABLET ORAL at 19:07

## 2023-07-06 RX ADMIN — INSULIN LISPRO 3 UNITS: 100 INJECTION, SOLUTION INTRAVENOUS; SUBCUTANEOUS at 21:17

## 2023-07-06 RX ADMIN — PREGABALIN 100 MG: 100 CAPSULE ORAL at 21:13

## 2023-07-06 RX ADMIN — POTASSIUM CHLORIDE AND SODIUM CHLORIDE 75 ML/HR: 900; 150 INJECTION, SOLUTION INTRAVENOUS at 19:09

## 2023-07-06 NOTE — H&P
History and physical    Primary care physician  Dr. Mireille Wick    Chief complaint  Right foot redness swelling and pain    History of present illness  89-year-old white female with history of chronic atrial fibrillation diabetes mellitus hypertension hyperlipidemia peripheral artery disease and dementia who lives at home with her daughter brought to the emergency room with right foot redness swelling pain for last 1 month which is treated by primary care physician with oral antibiotics without any improvement.  Patient has infection around right fifth toe with surrounding cellulitis admitted for management.  Patient remained awake and alert and denies any chest pain shortness of breath palpitation no fever chills abdominal pain nausea vomiting diarrhea.  Most of the history obtained from the daughter and son-in-law but patient also contributed.    PAST MEDICAL HISTORY   Atrial fibrillation      Hypertension  Hyperlipidemia  Diabetes mellitus  Peripheral artery disease  Dementia        PAST SURGICAL HISTORY     History reviewed. No pertinent surgical history.     FAMILY HISTORY  History reviewed. No pertinent family history.     SOCIAL HISTORY                 Socioeconomic History    Marital status:    Tobacco Use    Smoking status: Never    Smokeless tobacco: Never   Vaping Use    Vaping Use: Never used   Substance and Sexual Activity    Alcohol use: Yes       Comment: rare/caffeine use          ALLERGIES  Bee venom, Atorvastatin, Gemfibrozil, Haemophilus influenzae, Influenza vac split quad, Isosorbide, Metformin, Propoxyphene, and Simvastatin  Home medications reviewed     REVIEW OF SYSTEMS  All systems reviewed and negative except for those discussed in HPI.      PHYSICAL EXAM     General: Awake and alert no acute distress.  HENT: NCAT, PERRL, Nares patent.  Eyes: no scleral icterus.  Neck: trachea midline, no ROM limitations.  CV: Irregularly irregular S1-S2  Respiratory: normal effort, CTAB.  Abdomen:  "soft, nondistended, nontender bowel sounds positive  Musculoskeletal: no deformity.  Neuro: alert, moves all extremities, follows commands.  Skin: warm, dry.  Right foot: Patient has ulceration on the lateral base of the right fifth toe, erythema and swelling extending into the dorsum of the foot, no erythema but swelling of the right calf, no palpable cords, negative Homans.     LAB RESULTS  Lab Results (last 24 hours)       Procedure Component Value Units Date/Time    Procalcitonin [[id number]]  (Normal) Collected: 07/06/23 1219    Specimen: Blood Updated: 07/06/23 1316     Procalcitonin 0.04 ng/mL     Narrative:      As a Marker for Sepsis (Non-Neonates):    1. <0.5 ng/mL represents a low risk of severe sepsis and/or septic shock.  2. >2 ng/mL represents a high risk of severe sepsis and/or septic shock.    As a Marker for Lower Respiratory Tract Infections that require antibiotic therapy:    PCT on Admission    Antibiotic Therapy       6-12 Hrs later    >0.5                Strongly Recommended  >0.25 - <0.5        Recommended   0.1 - 0.25          Discouraged              Remeasure/reassess PCT  <0.1                Strongly Discouraged     Remeasure/reassess PCT    As 28 day mortality risk marker: \"Change in Procalcitonin Result\" (>80% or <=80%) if Day 0 (or Day 1) and Day 4 values are available. Refer to [email or web address]://[email or web address]    Change in PCT <=80%  A decrease of PCT levels below or equal to 80% defines a positive change in PCT test result representing a higher risk for 28-day all-cause mortality of patients diagnosed with severe sepsis for septic shock.    Change in PCT >80%  A decrease of PCT levels of more than 80% defines a negative change in PCT result representing a lower risk for 28-day all-cause mortality of patients diagnosed with severe sepsis or septic shock.       Comprehensive Metabolic Panel [[id number]]  (Abnormal) Collected: 07/06/23 1219    Specimen: Blood Updated: 07/06/23 1310     " Glucose 256 mg/dL      BUN 31 mg/dL      Creatinine 1.47 mg/dL      Sodium 136 mmol/L      Potassium 3.6 mmol/L      Chloride 98 mmol/L      CO2 24.5 mmol/L      Calcium 9.7 mg/dL      Total Protein 7.9 g/dL      Albumin 4.3 g/dL      ALT (SGPT) 25 U/L      AST (SGOT) 29 U/L      Alkaline Phosphatase 105 U/L      Total Bilirubin 0.5 mg/dL      Globulin 3.6 gm/dL      A/G Ratio 1.2 g/dL      BUN/Creatinine Ratio 21.1     Anion Gap 13.5 mmol/L      eGFR 33.6 mL/min/1.73     Narrative:      GFR Normal >60  Chronic Kidney Disease <60  Kidney Failure <15    The GFR formula is only valid for adults with stable renal function between ages 18 and 70.    C-reactive Protein [[id number]]  (Abnormal) Collected: 07/06/23 1219    Specimen: Blood Updated: 07/06/23 1310     C-Reactive Protein 1.98 mg/dL     Sedimentation Rate [[id number]]  (Abnormal) Collected: 07/06/23 1219    Specimen: Blood Updated: 07/06/23 1255     Sed Rate 62 mm/hr     Lactic Acid, Plasma [[id number]]  (Normal) Collected: 07/06/23 1219    Specimen: Blood Updated: 07/06/23 1249     Lactate 1.9 mmol/L     aPTT [[id number]]  (Normal) Collected: 07/06/23 1219    Specimen: Blood Updated: 07/06/23 1244     PTT 32.8 seconds     Protime-INR [[id number]]  (Abnormal) Collected: 07/06/23 1219    Specimen: Blood Updated: 07/06/23 1244     Protime 24.5 Seconds      INR 2.16    Blood Culture - Blood, Arm, Left [[id number]] Collected: 07/06/23 1235    Specimen: Blood from Arm, Left Updated: 07/06/23 1240    CBC & Differential [[id number]]  (Abnormal) Collected: 07/06/23 1219    Specimen: Blood Updated: 07/06/23 1235    Narrative:      The following orders were created for panel order CBC & Differential.  Procedure                               Abnormality         Status                     ---------                               -----------         ------                     CBC Auto Differential[[id number]]        Abnormal            Final result                 Please view  results for these tests on the individual orders.    CBC Auto Differential [[id number]]  (Abnormal) Collected: 07/06/23 1219    Specimen: Blood Updated: 07/06/23 1235     WBC 11.52 10*3/mm3      RBC 4.12 10*6/mm3      Hemoglobin 13.7 g/dL      Hematocrit 41.6 %      .0 fL      MCH 33.3 pg      MCHC 32.9 g/dL      RDW 12.9 %      RDW-SD 47.7 fl      MPV 9.6 fL      Platelets 287 10*3/mm3      Neutrophil % 56.6 %      Lymphocyte % 32.4 %      Monocyte % 7.4 %      Eosinophil % 2.6 %      Basophil % 0.6 %      Immature Grans % 0.4 %      Neutrophils, Absolute 6.52 10*3/mm3      Lymphocytes, Absolute 3.73 10*3/mm3      Monocytes, Absolute 0.85 10*3/mm3      Eosinophils, Absolute 0.30 10*3/mm3      Basophils, Absolute 0.07 10*3/mm3      Immature Grans, Absolute 0.05 10*3/mm3      nRBC 0.0 /100 WBC     Blood Culture - Blood, Arm, Right [[id number]] Collected: 07/06/23 1219    Specimen: Blood from Arm, Right Updated: 07/06/23 1225          Imaging Results (Last 24 Hours)       Procedure Component Value Units Date/Time    XR Foot 3+ View Right [[id number]] Collected: 07/06/23 1232     Updated: 07/06/23 1237    Narrative:      XR FOOT 3+ VW RIGHT-     INDICATIONS: Wound/infection     TECHNIQUE: 3 views of the right foot     COMPARISON: None available     FINDINGS:     No acute fracture, erosion, or dislocation is identified. Conspicuous  posterior calcaneal spurring is noted. Soft tissue swelling of the  forefoot may be evidence of inflammation, infection, injury, correlate  clinically. Arterial calcifications are present. If there is clinical  suspicion for radiographically occult osteomyelitis, MRI or bone scan  could be obtained.       Impression:         As described.     This report was finalized on 7/6/2023 12:34 PM by Dr. Jorge Frye M.D.              Interpretation Summary    Right Conclusion: The right YADIEL is severely reduced. Severe digital ischemia.    While the left ankle-brachial indices are within  normal limits there is vessel incompressibility.  ABIs are likely falsely elevated.  Moderate digital ischemia noted.    Unable to determine level of disease due to vessel incompressibility but suspect multi level disease based upon waveforms.     Interpretation Summary    Right popliteal fossa fluid collection.    Normal right lower extremity venous duplex scan.      Current Facility-Administered Medications:     aspirin chewable tablet 81 mg, 81 mg, Oral, Daily, Uziel Mcrae MD    [START ON 7/7/2023] cefTRIAXone (ROCEPHIN) 1 g in sodium chloride 0.9 % 100 mL IVPB-VTB, 1 g, Intravenous, Q24H, Uziel Mcrae MD    cholecalciferol (VITAMIN D3) tablet 2,000 Units, 2,000 Units, Oral, Daily, Uziel Mcrae MD    dextrose (D50W) (25 g/50 mL) IV injection 25 g, 25 g, Intravenous, Q15 Min PRN, Uziel Mcrae MD    dextrose (GLUTOSE) oral gel 15 g, 15 g, Oral, Q15 Min PRN, Uziel Mcrae MD    dilTIAZem CD (CARDIZEM CD) 24 hr capsule 180 mg, 180 mg, Oral, Q24H, Uziel Mcrae MD    glucagon (GLUCAGEN) injection 1 mg, 1 mg, Intramuscular, Q15 Min PRN, Uziel Mcrae MD    insulin lispro (HUMALOG/ADMELOG) injection 2-7 Units, 2-7 Units, Subcutaneous, 4x Daily AC & at Bedtime, Uziel Mcrae MD    metoprolol tartrate (LOPRESSOR) tablet 12.5 mg, 12.5 mg, Oral, BID, Uziel Mcrae MD    [START ON 7/7/2023] pantoprazole (PROTONIX) EC tablet 40 mg, 40 mg, Oral, Q AM, Uziel Mcrae MD    Pharmacy to dose vancomycin, , Does not apply, Continuous PRN, Uziel Mcrae MD    Pharmacy to dose warfarin, , Does not apply, Continuous PRN, Uziel Mcrae MD    pregabalin (LYRICA) capsule 100 mg, 100 mg, Oral, Nightly, Uziel Mcrae MD    sodium chloride 0.9 % with KCl 20 mEq/L infusion, 75 mL/hr, Intravenous, Continuous, Uzile Mcrae MD    vancomycin 1250 mg/250 mL 0.9% NS IVPB ([facility]), 20 mg/kg, Intravenous, Once, Bayron Bar MD    warfarin (COUMADIN) tablet 2 mg, 2 mg, Oral, Daily, Uziel Mcrae MD     ASSESSMENT  Right foot infected wound  with surrounding cellulitis who failed outpatient treatment  Peripheral artery disease  Diabetes mellitus  Hypertension  Hyperlipidemia  Chronic atrial fibrillation on Coumadin    PLAN  Admit  IVF  IV antibiotics  Vascular surgery consult  Infectious disease to follow patient  Continue home medications  Stress ulcer DVT prophylaxis  Supportive care  DNR  Discussed with family nursing staff  Follow closely further recommendation current hospital course    DAISHA CARLSON MD

## 2023-07-06 NOTE — PROGRESS NOTES
[facility] Clinical Pharmacy Services: Warfarin Dosing/Monitoring Consult    Bessie Molina is a 89 y.o. female, estimated creatinine clearance is 21.3 mL/min (A) (by C-G formula based on SCr of 1.47 mg/dL (H)). weighing 60.3 kg (133 lb).    Results from last 7 days   Lab Units 07/06/23  1219   INR  2.16*   APTT seconds 32.8   HEMOGLOBIN g/dL 13.7   HEMATOCRIT % 41.6   PLATELETS 10*3/mm3 287     Prior to admission anticoagulation: warfarin 4.5 mg on Mon, Wed, Fri; 3 mg all other days    Hospital Anticoagulation:  Consulting provider: Dr. Mcrae  Start date: 7/6/23  Indication: DVT/PE (active thrombosis)  Target INR: 2 - 3  Expected duration: tbd   Bridge Therapy: No      Potential food or drug interactions: none at this time    Education complete?/Date: No; plan for follow up TBD    Assessment/Plan:  Dose: INR is therapeutic - will resume home regimen starting with 3 mg tonight  Monitor for any signs or symptoms of bleeding  Follow up daily INRs and dose adjustments    Pharmacy will continue to follow until discharge or discontinuation of warfarin.     Louann Delaney [facility]  Clinical Pharmacist

## 2023-07-06 NOTE — CONSULTS
Name: Bessie Molina ADMIT: 2023   : 1932  PCP: Mireille La MD    MRN: [id number] LOS: 0 days   AGE/SEX: 89 y.o. female  ROOM: [address]      Patient Care Team:  Mireille La MD as PCP - General (Internal Medicine)  Chief Complaint   Patient presents with    Leg Pain     CC: Foot wound, peripheral arterial disease evaluation    Subjective     Inpatient Vascular Surgery Consult  Consult performed by: Chacho Barbour MD  Consult ordered by: Uziel Mcrae MD      History of Present Illness  Review of Systems    Past Medical History:   Diagnosis Date    Atrial fibrillation     Hypertension      History reviewed. No pertinent surgical history.  History reviewed. No pertinent family history.    Social History     Tobacco Use    Smoking status: Never    Smokeless tobacco: Never   Vaping Use    Vaping Use: Never used   Substance Use Topics    Alcohol use: Yes     Comment: rare/caffeine use      Medications Prior to Admission   Medication Sig Dispense Refill Last Dose    acetaminophen (TYLENOL) 325 MG tablet Take 2 tablets by mouth Every 4 (Four) Hours As Needed for Mild Pain.   2023    Ascorbic Acid (Vitamin C) 500 MG capsule Take 1 tablet by mouth Daily.   2023    aspirin 81 MG chewable tablet Chew 1 tablet Daily.   2023    B Complex Vitamins (VITAMIN B COMPLEX PO) Take 1 tablet by mouth Daily.   2023    Cholecalciferol (Vitamin D3) 50 MCG (2000 UT) capsule Take 1 capsule by mouth Daily.   2023    dilTIAZem CD (CARDIZEM CD) 180 MG 24 hr capsule Take 1 capsule by mouth Daily.   2023    docusate sodium (COLACE) 100 MG capsule Take 1 capsule by mouth Daily As Needed for Constipation (constipation).   Past Month    ezetimibe (ZETIA) 10 MG tablet Take 1 tablet by mouth Daily.   2023    fish oil-omega-3 fatty acids 1000 MG capsule Take 1 capsule by mouth Daily.   2023    furosemide (LASIX) 40 MG tablet Take 1 tablet by mouth Daily.   2023    glipizide  (GLUCOTROL XL) 10 MG 24 hr tablet Take 1 tablet by mouth Every Morning.   7/6/2023    icosapent ethyl (VASCEPA) 1 g capsule capsule Take 2 g by mouth 2 (Two) Times a Day.   7/6/2023    levothyroxine (SYNTHROID, LEVOTHROID) 100 MCG tablet Take 1 tablet by mouth Every Other Day.   7/6/2023    levothyroxine (SYNTHROID, LEVOTHROID) 88 MCG tablet Take 1 tablet by mouth Every Other Day.   7/5/2023    metoprolol tartrate (LOPRESSOR) 25 MG tablet Take 12.5 mg by mouth 2 (Two) Times a Day.   7/6/2023    multivitamin (THERAGRAN) tablet tablet Take 1 tablet by mouth Daily.   7/6/2023    pantoprazole (PROTONIX) 40 MG EC tablet 1 tablet Daily.   7/6/2023    potassium chloride 10 MEQ CR tablet Take 1 tablet by mouth Daily. 90 tablet 3 7/5/2023    pregabalin (LYRICA) 100 MG capsule Take 1 capsule by mouth Every Night. 5 capsule 0 7/5/2023    SITagliptin (JANUVIA) 100 MG tablet Take 1 tablet by mouth Every Evening.   7/5/2023    warfarin (COUMADIN) 3 MG tablet Take 1 tablet by mouth Every Night. 3 mg Tues, Thurs, Sat, Sun; 4.5 MG Mon, Wed, Fri   7/5/2023     aspirin, 81 mg, Oral, Daily  [START ON 7/7/2023] cefTRIAXone, 1 g, Intravenous, Q24H  dilTIAZem CD, 180 mg, Oral, Q24H  famotidine, 20 mg, Oral, BID  insulin lispro, 2-7 Units, Subcutaneous, 4x Daily AC & at Bedtime  metoprolol tartrate, 25 mg, Oral, BID  pregabalin, 100 mg, Oral, Nightly      Pharmacy to dose vancomycin,   Pharmacy to dose warfarin,   sodium chloride 0.9 % with KCl 20 mEq, 75 mL/hr        dextrose    dextrose    glucagon (human recombinant)    Pharmacy to dose vancomycin    Pharmacy to dose warfarin  Bee venom, Atorvastatin, Gemfibrozil, Haemophilus influenzae, Influenza vac split quad, Isosorbide, Metformin, Propoxyphene, and Simvastatin    Objective     Physical Exam:  Physical Exam     Vital Signs and Labs:  Vital Signs Patient Vitals for the past 24 hrs:   BP Temp Temp src Pulse Resp SpO2 Height Weight   07/06/23 1637 -- 97.6 °F (36.4 °C) Oral -- -- -- --  "--   07/06/23 1550 136/75 -- -- 87 18 98 % -- --   07/06/23 1302 -- -- -- 88 -- 90 % -- --   07/06/23 1301 138/64 -- -- 86 17 92 % -- --   07/06/23 1233 -- -- -- 97 -- 100 % -- --   07/06/23 1231 151/80 -- -- -- -- -- -- --   07/06/23 1133 149/90 -- -- 102 -- -- -- --   07/06/23 1127 -- 97.6 °F (36.4 °C) Tympanic 84 16 97 % 157.5 cm (62\") 60.3 kg (133 lb)     I/O:  No intake/output data recorded.    CBC    Results from last 7 days   Lab Units 07/06/23  1219   WBC 10*3/mm3 11.52*   HEMOGLOBIN g/dL 13.7   PLATELETS 10*3/mm3 287     BMP   Results from last 7 days   Lab Units 07/06/23  1219   SODIUM mmol/L 136   POTASSIUM mmol/L 3.6   CHLORIDE mmol/L 98   CO2 mmol/L 24.5   BUN mg/dL 31*   CREATININE mg/dL 1.47*   GLUCOSE mg/dL 256*     Radiology(recent) XR Foot 3+ View Right    Result Date: 7/6/2023   As described.  This report was finalized on 7/6/2023 12:34 PM by Dr. Jorge Frye M.D.       Active Hospital Problems    Diagnosis  POA    **Cellulitis of right foot [L03.115]  Yes      Resolved Hospital Problems   No resolved problems to display.     Problem Points:  4:  Patient has a new problem, with additional work-up planned  Total problem points:4 or more    Data Points:  1:  I have reviewed or order clinical lab test  1:  I have reviewed or order radiology test (except heart catheterization or echo)  2:  I have personally and independently review of image, tracing, or specimen  Total data points:4 or more    Risk Points:  High:  Chronic illness with severe exacerbation or progression    MDM requires 2/3 (Problem points, Data points and Risk)  MDM Prob point Data point Risk   SF 1 1 Minimal   Low 2 2 Low   Mod 3 3 Moderate   High 4 4 High     Code requires 3/3 (MDM, History and Exam)  Code MDM History Exam Time   29677 SF/Low Detailed Detailed 30   53608 Mod Comprehensive Comprehensive 50   13812 High Comprehensive Comprehensive 70     Detailed history:  4 elements HPI or status of 3 chronic problems; 2-9 " system ROS  Comprehensive:  4 elements HPI or status of 3 chronic problems;  10 system ROS    Detailed Exam:  12 findings from any organ system  Comprehensive Exam:  2 findings from each of 9 systems.   60621    Assessment & Plan       Cellulitis of right foot              89 y.o. female severe peripheral vascular disease with gangrenous ulcer with surrounding cellulitis of her fifth lateral toe on the right foot.  She has had prior interventions done at [facility] December 2022.  Based on her lower extremity arterial Doppler she has severe disease likely at multiple levels.  She has chronic kidney disease.  After her interventions in [facility] she had hospitalization for congestive heart failure.    7/6/2023: We will start simple Betadine dressing changes to her right foot wound.  I do not feel there is any overwhelming drivers of sepsis currently in her right foot.  We will need further work-up with CT angiogram with runoff given clear evidence of multi level disease on Doppler.  Prior to administration of contrast we will ask nephrology to see to optimize her and better understand risk of contrast-induced nephropathy in this patient.    I discussed the patients findings and my recommendations with patient, family, and nursing staff.    Chacho Barbour MD  07/06/23  17:16 EDT    Please call my office with any question: [phone]

## 2023-07-06 NOTE — ED PROVIDER NOTES
EMERGENCY DEPARTMENT ENCOUNTER  I wore full protective equipment throughout this patient encounter including a N95 mask, eye shield, gown and gloves. Hand hygiene was performed before donning protective equipment and after removal when leaving the room.    Room Number:  12/12  Date of encounter:  7/6/2023  PCP: Mireille La MD  Patient Care Team:  Mireille La MD as PCP - General (Internal Medicine)     HPI:  Context: Bessie Molina is a 89 y.o. female who presents to the ED c/o chief complaint of right foot wound.  History supplied by patient and patient's family member, patient has a history of dementia, predominantly supplied by family members.  Patient has had a ulceration on her right lateral foot for the last month, was seen and diagnosed with infection approximately week ago, started on doxycycline at that time.  Wound has worsened and patient began having redness and swelling on the top of her foot, began having some swelling in her lower leg.  Patient now complains of pain throughout her leg, no fever shakes chills or night sweats.  Patient reports history of peripheral vascular disease with a stent in the left leg, was seen by vascular today, unable to get ultrasound imaging, sent to the emergency department.  Patient denies any chest pain or shortness of breath.    MEDICAL HISTORY REVIEW  Reviewed in EPIC    PAST MEDICAL HISTORY  Active Ambulatory Problems     Diagnosis Date Noted    Atrial fibrillation, persistent 01/25/2022    Chronic anticoagulation 01/25/2022    Dementia 12/14/2022    Abdominal pain 12/14/2022    Acute respiratory failure with hypoxia 12/14/2022    Pancreatic lesion 12/15/2022    Chronic diastolic CHF (congestive heart failure) 02/01/2023     Resolved Ambulatory Problems     Diagnosis Date Noted    Chest pain 12/20/2021    Hypertriglyceridemia 01/25/2022    Acute on chronic diastolic heart failure 12/14/2022     Past Medical History:   Diagnosis Date    Atrial  fibrillation     Hypertension        PAST SURGICAL HISTORY  History reviewed. No pertinent surgical history.    FAMILY HISTORY  History reviewed. No pertinent family history.    SOCIAL HISTORY  Social History     Socioeconomic History    Marital status:    Tobacco Use    Smoking status: Never    Smokeless tobacco: Never   Vaping Use    Vaping Use: Never used   Substance and Sexual Activity    Alcohol use: Yes     Comment: rare/caffeine use        ALLERGIES  Bee venom, Atorvastatin, Gemfibrozil, Haemophilus influenzae, Influenza vac split quad, Isosorbide, Metformin, Propoxyphene, and Simvastatin    The patient's allergies have been reviewed    REVIEW OF SYSTEMS  All systems reviewed and negative except for those discussed in HPI.     PHYSICAL EXAM  I have reviewed the triage vital signs and nursing notes.  ED Triage Vitals   Temp Heart Rate Resp BP SpO2   07/06/23 1127 07/06/23 1127 07/06/23 1127 07/06/23 1133 07/06/23 1127   97.6 °F (36.4 °C) 84 16 149/90 97 %      Temp src Heart Rate Source Patient Position BP Location FiO2 (%)   07/06/23 1127 07/06/23 1127 07/06/23 1133 07/06/23 1133 --   Tympanic Monitor Sitting Left arm        General: No acute distress.  HENT: NCAT, PERRL, Nares patent.  Eyes: no scleral icterus.  Neck: trachea midline, no ROM limitations.  CV: Irregularly irregular  Respiratory: normal effort, CTAB.  Abdomen: soft, nondistended, NTTP, no rebound tenderness, no guarding or rigidity.  Musculoskeletal: no deformity.  Neuro: alert, moves all extremities, follows commands.  Skin: warm, dry.  Right foot: Patient has ulceration on the lateral base of the right fifth toe, erythema and swelling extending into the dorsum of the foot, no erythema but swelling of the right calf, no palpable cords, negative Homans.    LAB RESULTS  Recent Results (from the past 24 hour(s))   Comprehensive Metabolic Panel    Collection Time: 07/06/23 12:19 PM    Specimen: Blood   Result Value Ref Range    Glucose 256  (H) 65 - 99 mg/dL    BUN 31 (H) 8 - 23 mg/dL    Creatinine 1.47 (H) 0.57 - 1.00 mg/dL    Sodium 136 136 - 145 mmol/L    Potassium 3.6 3.5 - 5.2 mmol/L    Chloride 98 98 - 107 mmol/L    CO2 24.5 22.0 - 29.0 mmol/L    Calcium 9.7 (H) 8.2 - 9.6 mg/dL    Total Protein 7.9 6.0 - 8.5 g/dL    Albumin 4.3 3.5 - 5.2 g/dL    ALT (SGPT) 25 1 - 33 U/L    AST (SGOT) 29 1 - 32 U/L    Alkaline Phosphatase 105 39 - 117 U/L    Total Bilirubin 0.5 0.0 - 1.2 mg/dL    Globulin 3.6 gm/dL    A/G Ratio 1.2 g/dL    BUN/Creatinine Ratio 21.1 7.0 - 25.0    Anion Gap 13.5 5.0 - 15.0 mmol/L    eGFR 33.6 (L) >60.0 mL/min/1.73   Protime-INR    Collection Time: 07/06/23 12:19 PM    Specimen: Blood   Result Value Ref Range    Protime 24.5 (H) 11.7 - 14.2 Seconds    INR 2.16 (H) 0.90 - 1.10   aPTT    Collection Time: 07/06/23 12:19 PM    Specimen: Blood   Result Value Ref Range    PTT 32.8 22.7 - 35.4 seconds   Lactic Acid, Plasma    Collection Time: 07/06/23 12:19 PM    Specimen: Blood   Result Value Ref Range    Lactate 1.9 0.5 - 2.0 mmol/L   C-reactive Protein    Collection Time: 07/06/23 12:19 PM    Specimen: Blood   Result Value Ref Range    C-Reactive Protein 1.98 (H) 0.00 - 0.50 mg/dL   Sedimentation Rate    Collection Time: 07/06/23 12:19 PM    Specimen: Blood   Result Value Ref Range    Sed Rate 62 (H) 0 - 30 mm/hr   Procalcitonin    Collection Time: 07/06/23 12:19 PM    Specimen: Blood   Result Value Ref Range    Procalcitonin 0.04 0.00 - 0.25 ng/mL   CBC Auto Differential    Collection Time: 07/06/23 12:19 PM    Specimen: Blood   Result Value Ref Range    WBC 11.52 (H) 3.40 - 10.80 10*3/mm3    RBC 4.12 3.77 - 5.28 10*6/mm3    Hemoglobin 13.7 12.0 - 15.9 g/dL    Hematocrit 41.6 34.0 - 46.6 %    .0 (H) 79.0 - 97.0 fL    MCH 33.3 (H) 26.6 - 33.0 pg    MCHC 32.9 31.5 - 35.7 g/dL    RDW 12.9 12.3 - 15.4 %    RDW-SD 47.7 37.0 - 54.0 fl    MPV 9.6 6.0 - 12.0 fL    Platelets 287 140 - 450 10*3/mm3    Neutrophil % 56.6 42.7 - 76.0 %     Lymphocyte % 32.4 19.6 - 45.3 %    Monocyte % 7.4 5.0 - 12.0 %    Eosinophil % 2.6 0.3 - 6.2 %    Basophil % 0.6 0.0 - 1.5 %    Immature Grans % 0.4 0.0 - 0.5 %    Neutrophils, Absolute 6.52 1.70 - 7.00 10*3/mm3    Lymphocytes, Absolute 3.73 (H) 0.70 - 3.10 10*3/mm3    Monocytes, Absolute 0.85 0.10 - 0.90 10*3/mm3    Eosinophils, Absolute 0.30 0.00 - 0.40 10*3/mm3    Basophils, Absolute 0.07 0.00 - 0.20 10*3/mm3    Immature Grans, Absolute 0.05 0.00 - 0.05 10*3/mm3    nRBC 0.0 0.0 - 0.2 /100 WBC       I ordered the above labs and reviewed the results.    RADIOLOGY  XR Foot 3+ View Right    Result Date: 7/6/2023  XR FOOT 3+ VW RIGHT-  INDICATIONS: Wound/infection  TECHNIQUE: 3 views of the right foot  COMPARISON: None available  FINDINGS:  No acute fracture, erosion, or dislocation is identified. Conspicuous posterior calcaneal spurring is noted. Soft tissue swelling of the forefoot may be evidence of inflammation, infection, injury, correlate clinically. Arterial calcifications are present. If there is clinical suspicion for radiographically occult osteomyelitis, MRI or bone scan could be obtained.       As described.  This report was finalized on 7/6/2023 12:34 PM by Dr. Jorge Frye M.D.       I ordered the above noted radiological studies. I reviewed the images and results. I agree with the radiologist interpretation.    PROCEDURES  Procedures    MEDICATIONS GIVEN IN ER  Medications   vancomycin 1250 mg/250 mL 0.9% NS IVPB ([facility]) (has no administration in time range)   cefTRIAXone (ROCEPHIN) 1 g in sodium chloride 0.9 % 100 mL IVPB-VTB (has no administration in time range)   sodium chloride 0.9 % bolus 500 mL (has no administration in time range)       PROGRESS, DATA ANALYSIS, CONSULTS, AND MEDICAL DECISION MAKING  A complete history and physical exam have been performed.  All available laboratory and imaging results have been reviewed by myself prior to disposition.    MDM    After the initial H&P, I  discussed pertinent information from history and physical exam with patient/family.  Discussed differential diagnosis.  Discussed plan for ED evaluation/workup/treatment.  All questions answered.  Patient/family is agreeable with plan.  ED Course as of 07/06/23 1404   u Jul 06, 2023   1315 I reviewed right foot x-ray and PACS, no obvious osteomyelitis per my read. [JG]   1327 89-year-old female presents with right foot ulceration and cellulitis with failure of outpatient treatment.  Patient afebrile, does have leukocytosis, lactic acid is normal, mild elevation of inflammatory markers.Glucose elevated 256, creatinine mildly elevated at 1.47.  CO2 normal, no anion gap no DKA.  No obvious osteomyelitis on x-ray imaging.  Treating with vancomycin and ceftriaxone, consulting hospitalist for admission. [JG]   1158 Phone call with Dr. Mcrae.  Discussed the patient, relevant history, exam, diagnostics, ED findings/progress, and concerns. They agree to admit the patient to telemetry. Care assumed by the admitting physician at this time.     [JG]      ED Course User Index  [JG] Bayron Bar MD       AS OF 14:04 EDT VITALS:    BP - 138/64  HR - 88  TEMP - 97.6 °F (36.4 °C) (Tympanic)  O2 SATS - 90%    DIAGNOSIS  Final diagnoses:   Cellulitis of right foot   Failure of outpatient treatment   Leukocytosis, unspecified type   ESR raised   CRP elevated   Hyperglycemia   Chronic kidney disease, unspecified CKD stage         DISPOSITION  ADMISSION    Discussed treatment plan and reason for admission with pt/family and admitting physician.  Pt/family voiced understanding of the plan for admission for further testing/treatment as needed.        Bayron Bar MD  07/06/23 2161

## 2023-07-06 NOTE — ED NOTES
Pt has a worsening wound on her right 4th and 5th toe.  She was told to get ultra sound.  now she has pain and warmth on the back of her right leg

## 2023-07-06 NOTE — ED NOTES
Nursing report ED to floor  Bessie Molina  89 y.o.  female    HPI :   Chief Complaint   Patient presents with    Leg Pain       Admitting doctor:   Uziel Mcrae MD    Admitting diagnosis:   The primary encounter diagnosis was Cellulitis of right foot. Diagnoses of Failure of outpatient treatment, Leukocytosis, unspecified type, ESR raised, CRP elevated, Hyperglycemia, and Chronic kidney disease, unspecified CKD stage were also pertinent to this visit.    Code status:   Current Code Status       Date Active Code Status Order ID Comments User Context       Prior            Allergies:   Bee venom, Atorvastatin, Gemfibrozil, Haemophilus influenzae, Influenza vac split quad, Isosorbide, Metformin, Propoxyphene, and Simvastatin    Isolation:   No active isolations    Intake and Output  No intake or output data in the 24 hours ending 07/06/23 1512    Weight:       07/06/23  1127   Weight: 60.3 kg (133 lb)       Most recent vitals:   Vitals:    07/06/23 1231 07/06/23 1233 07/06/23 1301 07/06/23 1302   BP: 151/80  138/64    BP Location:   Left arm    Patient Position:   Sitting    Pulse:  97 86 88   Resp:   17    Temp:       TempSrc:       SpO2:  100% 92% 90%   Weight:       Height:           Active LDAs/IV Access:   Lines, Drains & Airways       Active LDAs       Name Placement date Placement time Site Days    Peripheral IV 12/17/22 1136 Left Hand 12/17/22  1136  Hand  201    External Urinary Catheter 12/19/21  2345  --  563    External Urinary Catheter 12/14/22  1942  --  203                    Labs (abnormal labs have a star):   Labs Reviewed   COMPREHENSIVE METABOLIC PANEL - Abnormal; Notable for the following components:       Result Value    Glucose 256 (*)     BUN 31 (*)     Creatinine 1.47 (*)     Calcium 9.7 (*)     eGFR 33.6 (*)     All other components within normal limits    Narrative:     GFR Normal >60  Chronic Kidney Disease <60  Kidney Failure <15    The GFR formula is only valid for adults with stable  "renal function between ages 18 and 70.   PROTIME-INR - Abnormal; Notable for the following components:    Protime 24.5 (*)     INR 2.16 (*)     All other components within normal limits   C-REACTIVE PROTEIN - Abnormal; Notable for the following components:    C-Reactive Protein 1.98 (*)     All other components within normal limits   SEDIMENTATION RATE - Abnormal; Notable for the following components:    Sed Rate 62 (*)     All other components within normal limits   CBC WITH AUTO DIFFERENTIAL - Abnormal; Notable for the following components:    WBC 11.52 (*)     .0 (*)     MCH 33.3 (*)     Lymphocytes, Absolute 3.73 (*)     All other components within normal limits   APTT - Normal   LACTIC ACID, PLASMA - Normal   PROCALCITONIN - Normal    Narrative:     As a Marker for Sepsis (Non-Neonates):    1. <0.5 ng/mL represents a low risk of severe sepsis and/or septic shock.  2. >2 ng/mL represents a high risk of severe sepsis and/or septic shock.    As a Marker for Lower Respiratory Tract Infections that require antibiotic therapy:    PCT on Admission    Antibiotic Therapy       6-12 Hrs later    >0.5                Strongly Recommended  >0.25 - <0.5        Recommended   0.1 - 0.25          Discouraged              Remeasure/reassess PCT  <0.1                Strongly Discouraged     Remeasure/reassess PCT    As 28 day mortality risk marker: \"Change in Procalcitonin Result\" (>80% or <=80%) if Day 0 (or Day 1) and Day 4 values are available. Refer to http://[email or web address] [email or web address]cal[email or web address]    Change in PCT <=80%  A decrease of PCT levels below or equal to 80% defines a positive change in PCT test result representing a higher risk for 28-day all-cause mortality of patients diagnosed with severe sepsis for septic shock.    Change in PCT >80%  A decrease of PCT levels of more than 80% defines a negative change in PCT result representing a lower risk for 28-day all-cause mortality of patients diagnosed with severe sepsis or " septic shock.      BLOOD CULTURE   BLOOD CULTURE   CBC AND DIFFERENTIAL    Narrative:     The following orders were created for panel order CBC & Differential.  Procedure                               Abnormality         Status                     ---------                               -----------         ------                     CBC Auto Differential[[id number]]        Abnormal            Final result                 Please view results for these tests on the individual orders.       EKG:   No orders to display       Meds given in ED:   Medications   vancomycin 1250 mg/250 mL 0.9% NS IVPB ([facility]) (has no administration in time range)   cefTRIAXone (ROCEPHIN) 1 g in sodium chloride 0.9 % 100 mL IVPB-VTB (has no administration in time range)   sodium chloride 0.9 % bolus 500 mL (has no administration in time range)       Imaging results:  XR Foot 3+ View Right    Result Date: 7/6/2023   As described.  This report was finalized on 7/6/2023 12:34 PM by Dr. Jorge Frye M.D.       Ambulatory status:   - assist    Social issues:   Social History     Socioeconomic History    Marital status:    Tobacco Use    Smoking status: Never    Smokeless tobacco: Never   Vaping Use    Vaping Use: Never used   Substance and Sexual Activity    Alcohol use: Yes     Comment: rare/caffeine use        NIH Stroke Scale:       To Garvin RN  07/06/23 15:12 EDT

## 2023-07-07 ENCOUNTER — APPOINTMENT (OUTPATIENT)
Dept: CT IMAGING | Facility: HOSPITAL | Age: 88
DRG: 271 | End: 2023-07-07
Payer: MEDICARE

## 2023-07-07 LAB
ALBUMIN SERPL-MCNC: 3.6 G/DL (ref 3.5–5.2)
ALBUMIN/GLOB SERPL: 1.1 G/DL
ALP SERPL-CCNC: 85 U/L (ref 39–117)
ALT SERPL W P-5'-P-CCNC: 18 U/L (ref 1–33)
ANION GAP SERPL CALCULATED.3IONS-SCNC: 10 MMOL/L (ref 5–15)
AST SERPL-CCNC: 19 U/L (ref 1–32)
BASOPHILS # BLD AUTO: 0.05 10*3/MM3 (ref 0–0.2)
BASOPHILS NFR BLD AUTO: 0.5 % (ref 0–1.5)
BILIRUB SERPL-MCNC: 0.3 MG/DL (ref 0–1.2)
BUN SERPL-MCNC: 26 MG/DL (ref 8–23)
BUN/CREAT SERPL: 20.2 (ref 7–25)
CALCIUM SPEC-SCNC: 9.6 MG/DL (ref 8.2–9.6)
CHLORIDE SERPL-SCNC: 106 MMOL/L (ref 98–107)
CHOLEST SERPL-MCNC: 161 MG/DL (ref 0–200)
CO2 SERPL-SCNC: 28 MMOL/L (ref 22–29)
CREAT SERPL-MCNC: 1.29 MG/DL (ref 0.57–1)
DEPRECATED RDW RBC AUTO: 45.8 FL (ref 37–54)
EGFRCR SERPLBLD CKD-EPI 2021: 39.3 ML/MIN/1.73
EOSINOPHIL # BLD AUTO: 0.2 10*3/MM3 (ref 0–0.4)
EOSINOPHIL NFR BLD AUTO: 2 % (ref 0.3–6.2)
ERYTHROCYTE [DISTWIDTH] IN BLOOD BY AUTOMATED COUNT: 12.7 % (ref 12.3–15.4)
GLOBULIN UR ELPH-MCNC: 3.2 GM/DL
GLUCOSE BLDC GLUCOMTR-MCNC: 145 MG/DL (ref 70–130)
GLUCOSE BLDC GLUCOMTR-MCNC: 194 MG/DL (ref 70–130)
GLUCOSE BLDC GLUCOMTR-MCNC: 213 MG/DL (ref 70–130)
GLUCOSE BLDC GLUCOMTR-MCNC: 236 MG/DL (ref 70–130)
GLUCOSE SERPL-MCNC: 143 MG/DL (ref 65–99)
HBA1C MFR BLD: 7.3 % (ref 4.8–5.6)
HCT VFR BLD AUTO: 37.7 % (ref 34–46.6)
HDLC SERPL-MCNC: 27 MG/DL (ref 40–60)
HGB BLD-MCNC: 12.9 G/DL (ref 12–15.9)
IMM GRANULOCYTES # BLD AUTO: 0.05 10*3/MM3 (ref 0–0.05)
IMM GRANULOCYTES NFR BLD AUTO: 0.5 % (ref 0–0.5)
INR PPP: 2.37 (ref 0.9–1.1)
LDLC SERPL CALC-MCNC: 93 MG/DL (ref 0–100)
LDLC/HDLC SERPL: 3.17 {RATIO}
LYMPHOCYTES # BLD AUTO: 3.99 10*3/MM3 (ref 0.7–3.1)
LYMPHOCYTES NFR BLD AUTO: 39.3 % (ref 19.6–45.3)
MCH RBC QN AUTO: 33.6 PG (ref 26.6–33)
MCHC RBC AUTO-ENTMCNC: 34.2 G/DL (ref 31.5–35.7)
MCV RBC AUTO: 98.2 FL (ref 79–97)
MONOCYTES # BLD AUTO: 1.02 10*3/MM3 (ref 0.1–0.9)
MONOCYTES NFR BLD AUTO: 10 % (ref 5–12)
MRSA DNA SPEC QL NAA+PROBE: NORMAL
NEUTROPHILS NFR BLD AUTO: 4.84 10*3/MM3 (ref 1.7–7)
NEUTROPHILS NFR BLD AUTO: 47.7 % (ref 42.7–76)
NRBC BLD AUTO-RTO: 0 /100 WBC (ref 0–0.2)
PLATELET # BLD AUTO: 256 10*3/MM3 (ref 140–450)
PMV BLD AUTO: 9.5 FL (ref 6–12)
POTASSIUM SERPL-SCNC: 3.7 MMOL/L (ref 3.5–5.2)
PROT SERPL-MCNC: 6.8 G/DL (ref 6–8.5)
PROTHROMBIN TIME: 26.4 SECONDS (ref 11.7–14.2)
RBC # BLD AUTO: 3.84 10*6/MM3 (ref 3.77–5.28)
SODIUM SERPL-SCNC: 144 MMOL/L (ref 136–145)
TRIGL SERPL-MCNC: 242 MG/DL (ref 0–150)
TSH SERPL DL<=0.05 MIU/L-ACNC: 3.87 UIU/ML (ref 0.27–4.2)
VLDLC SERPL-MCNC: 41 MG/DL (ref 5–40)
WBC NRBC COR # BLD: 10.15 10*3/MM3 (ref 3.4–10.8)

## 2023-07-07 PROCEDURE — 75635 CT ANGIO ABDOMINAL ARTERIES: CPT

## 2023-07-07 PROCEDURE — 97166 OT EVAL MOD COMPLEX 45 MIN: CPT

## 2023-07-07 PROCEDURE — 97530 THERAPEUTIC ACTIVITIES: CPT

## 2023-07-07 PROCEDURE — 25010000002 CEFTRIAXONE PER 250 MG: Performed by: HOSPITALIST

## 2023-07-07 PROCEDURE — 97162 PT EVAL MOD COMPLEX 30 MIN: CPT

## 2023-07-07 PROCEDURE — 80053 COMPREHEN METABOLIC PANEL: CPT | Performed by: HOSPITALIST

## 2023-07-07 PROCEDURE — 97535 SELF CARE MNGMENT TRAINING: CPT

## 2023-07-07 PROCEDURE — 85610 PROTHROMBIN TIME: CPT | Performed by: HOSPITALIST

## 2023-07-07 PROCEDURE — 25510000001 IOPAMIDOL PER 1 ML: Performed by: HOSPITALIST

## 2023-07-07 PROCEDURE — 87641 MR-STAPH DNA AMP PROBE: CPT | Performed by: INTERNAL MEDICINE

## 2023-07-07 PROCEDURE — 63710000001 INSULIN LISPRO (HUMAN) PER 5 UNITS: Performed by: HOSPITALIST

## 2023-07-07 PROCEDURE — 84443 ASSAY THYROID STIM HORMONE: CPT | Performed by: HOSPITALIST

## 2023-07-07 PROCEDURE — 82948 REAGENT STRIP/BLOOD GLUCOSE: CPT

## 2023-07-07 PROCEDURE — 85025 COMPLETE CBC W/AUTO DIFF WBC: CPT | Performed by: HOSPITALIST

## 2023-07-07 PROCEDURE — 80061 LIPID PANEL: CPT | Performed by: HOSPITALIST

## 2023-07-07 PROCEDURE — 83036 HEMOGLOBIN GLYCOSYLATED A1C: CPT | Performed by: HOSPITALIST

## 2023-07-07 RX ORDER — SODIUM CHLORIDE 9 MG/ML
75 INJECTION, SOLUTION INTRAVENOUS CONTINUOUS
Status: DISCONTINUED | OUTPATIENT
Start: 2023-07-07 | End: 2023-07-08

## 2023-07-07 RX ADMIN — FAMOTIDINE 20 MG: 20 TABLET, FILM COATED ORAL at 21:14

## 2023-07-07 RX ADMIN — PREGABALIN 100 MG: 100 CAPSULE ORAL at 21:14

## 2023-07-07 RX ADMIN — IOPAMIDOL 100 ML: 755 INJECTION, SOLUTION INTRAVENOUS at 12:51

## 2023-07-07 RX ADMIN — DILTIAZEM HYDROCHLORIDE 180 MG: 180 CAPSULE, COATED, EXTENDED RELEASE ORAL at 10:59

## 2023-07-07 RX ADMIN — METOPROLOL TARTRATE 12.5 MG: 25 TABLET, FILM COATED ORAL at 09:17

## 2023-07-07 RX ADMIN — FAMOTIDINE 20 MG: 20 TABLET, FILM COATED ORAL at 09:17

## 2023-07-07 RX ADMIN — INSULIN LISPRO 2 UNITS: 100 INJECTION, SOLUTION INTRAVENOUS; SUBCUTANEOUS at 12:30

## 2023-07-07 RX ADMIN — METOPROLOL TARTRATE 25 MG: 25 TABLET, FILM COATED ORAL at 21:14

## 2023-07-07 RX ADMIN — ASPIRIN 81 MG: 81 TABLET, CHEWABLE ORAL at 10:59

## 2023-07-07 RX ADMIN — CEFTRIAXONE SODIUM 1 G: 1 INJECTION, POWDER, FOR SOLUTION INTRAMUSCULAR; INTRAVENOUS at 17:32

## 2023-07-07 RX ADMIN — INSULIN LISPRO 3 UNITS: 100 INJECTION, SOLUTION INTRAVENOUS; SUBCUTANEOUS at 22:05

## 2023-07-07 RX ADMIN — SODIUM CHLORIDE 75 ML/HR: 9 INJECTION, SOLUTION INTRAVENOUS at 08:47

## 2023-07-07 RX ADMIN — INSULIN LISPRO 3 UNITS: 100 INJECTION, SOLUTION INTRAVENOUS; SUBCUTANEOUS at 17:32

## 2023-07-07 NOTE — THERAPY EVALUATION
"Patient Name: Bessie Molina  : 1932    MRN: [id number]                              Today's Date: 2023       Admit Date: 2023    Visit Dx:     ICD-10-CM ICD-9-CM   1. Cellulitis of right foot  L03.115 682.7   2. Failure of outpatient treatment  Z78.9 V49.89   3. Leukocytosis, unspecified type  D72.829 288.60   4. ESR raised  R70.0 790.1   5. CRP elevated  R79.82 790.95   6. Hyperglycemia  R73.9 790.29   7. Chronic kidney disease, unspecified CKD stage  N18.9 585.9     Patient Active Problem List   Diagnosis    Atrial fibrillation, persistent    Chronic anticoagulation    Dementia    Abdominal pain    Acute respiratory failure with hypoxia    Pancreatic lesion    Chronic diastolic CHF (congestive heart failure)    Cellulitis of right foot     Past Medical History:   Diagnosis Date    Atrial fibrillation     Hypertension      History reviewed. No pertinent surgical history.   General Information       Row Name 23 1024          OT Time and Intention    Document Type evaluation  -KA     Mode of Treatment co-treatment;physical therapy  -       Row Name 23 1024          General Information    Patient Profile Reviewed yes  -KA     Prior Level of Function independent:;ADL's;all household mobility  uses rollator. States \"I can dress myself, my daughter just tells me what to do\".  -KA     Existing Precautions/Restrictions fall  -KA     Barriers to Rehab hearing deficit  -       Row Name 23 1024          Living Environment    People in Home child(veena), adult  -       Row Name 23 1024          Home Main Entrance    Number of Stairs, Main Entrance none  -KA       Row Name 23 1024          Stairs Within Home, Primary    Number of Stairs, Within Home, Primary none  -KA       Row Name 23 1024          Cognition    Orientation Status (Cognition) oriented to;person;place  -       Row Name 23 1024          Safety Issues, Functional Mobility    Impairments Affecting " Function (Mobility) endurance/activity tolerance;strength;pain  -     Comment, Safety Issues/Impairments (Mobility) nonskid socks and gait belt donned  -               User Key  (r) = Recorded By, (t) = Taken By, (c) = Cosigned By      Initials Name Provider Type    Jewell Mo OT Occupational Therapist                     Mobility/ADL's       Row Name 07/07/23 1025          Bed Mobility    Supine-Sit [place] (Bed Mobility) standby assist  -     Sit-Supine [place] (Bed Mobility) not tested  -     Assistive Device (Bed Mobility) head of bed elevated  -     Comment, (Bed Mobility) [facility] at the end of session  -       Row Name 07/07/23 1025          Transfers    Transfers sit-stand transfer  -       Row Name 07/07/23 1025          Sit-Stand Transfer    Sit-Stand [place] (Transfers) standby assist  -     Assistive Device (Sit-Stand Transfers) walker, front-wheeled  -       Row Name 07/07/23 1025          Functional Mobility    Functional Mobility- Ind. Level contact guard assist;standby assist  -     Functional Mobility- Device walker, front-wheeled  -     Functional Mobility-Distance (Feet) --  Within room around bed to chair  -       Row Name 07/07/23 1025          Activities of Daily Living    BADL Assessment/Intervention lower body dressing;grooming  -       Row Name 07/07/23 1025          Lower Body Dressing Assessment/Training    Comment, (Lower Body Dressing) Pt able to cross LEs and adjust socks while sitting up in bed with supervision  -       Row Name 07/07/23 1025          Grooming Assessment/Training    [place] Level (Grooming) grooming skills;oral care regimen;wash face, hands;minimum assist (75% patient effort)  -     Position (Grooming) supported sitting  -     Comment, (Grooming) Required cues for task initiation and sequencing at times. Pt also [place].  -               User Key  (r) = Recorded By, (t) = Taken By, (c) = Cosigned By      Initials  Name Provider Type    Jewell Mo OT Occupational Therapist                   Obj/Interventions       [facility] Name 07/07/23 1027          Sensory Assessment (Somatosensory)    Sensory Assessment (Somatosensory) sensation intact  -KA       Row Name 07/07/23 1027          Range of Motion Comprehensive    General Range of Motion bilateral upper extremity ROM WFL  -KA       Row Name 07/07/23 1027          Strength Comprehensive (MMT)    General Manual Muscle Testing (MMT) Assessment upper extremity strength deficits identified  -     Comment, General Manual Muscle Testing (MMT) Assessment BUE >3/5. Generalized weakness noted with functional activities  -KA       Row Name 07/07/23 1027          Balance    Static Sitting Balance standby assist  -     Dynamic Sitting Balance standby assist  -KA     Position, Sitting Balance sitting edge of bed  -     Static Standing Balance standby assist  -     Dynamic Standing Balance standby assist  -     Position/Device Used, Standing Balance walker, front-wheeled  -     Balance Interventions sitting;standing  -               User Key  (r) = Recorded By, (t) = Taken By, (c) = Cosigned By      Initials Name Provider Type    Jewell Mo OT Occupational Therapist                   Goals/Plan       Row Name 07/07/23 1032          Transfer Goal 1 (OT)    Activity/Assistive Device (Transfer Goal 1, OT) toilet;shower chair  -     [place] Level/Cues Needed (Transfer Goal 1, OT) modified independence  -     Time Frame (Transfer Goal 1, OT) short term goal (STG);2 weeks  -KA       Row Name 07/07/23 1032          Bathing Goal 1 (OT)    Activity/Device (Bathing Goal 1, OT) bathing skills, all  -     [place] Level/Cues Needed (Bathing Goal 1, OT) supervision required  -     Time Frame (Bathing Goal 1, OT) short term goal (STG);2 weeks  -     Progress/Outcomes (Bathing Goal 1, OT) goal ongoing  -KA       Row Name 07/07/23 1032          Dressing Goal 1  (OT)    Activity/Device (Dressing Goal 1, OT) dressing skills, all  -KA     [place]/Cues Needed (Dressing Goal 1, OT) supervision required  -KA     Time Frame (Dressing Goal 1, OT) short term goal (STG);2 weeks  -KA     Progress/Outcome (Dressing Goal 1, OT) goal ongoing  -       Row Name 07/07/23 1032          Grooming Goal 1 (OT)    Activity/Device (Grooming Goal 1, OT) grooming skills, all  -KA     [place] (Grooming Goal 1, OT) supervision required  -KA     Time Frame (Grooming Goal 1, OT) short term goal (STG);2 weeks  -KA     Progress/Outcome (Grooming Goal 1, OT) goal ongoing  -       Row Name 07/07/23 1032          Therapy Assessment/Plan (OT)    Planned Therapy Interventions (OT) activity tolerance training;cognitive/visual perception retraining;functional balance retraining;occupation/activity based interventions;strengthening exercise;transfer/mobility retraining;patient/caregiver education/training  -               User Key  (r) = Recorded By, (t) = Taken By, (c) = Cosigned By      Initials Name Provider Type    Jewell Mo, OT Occupational Therapist                   Clinical Impression       Row Name 07/07/23 1028          Pain Assessment    Pretreatment Pain Rating 0/10 - no pain  -KA     Posttreatment Pain Rating 5/10  -KA     Pain Location - Side/Orientation Right  -     Pain Location - foot  -     Pain Intervention(s) Repositioned;Rest  -       Row Name 07/07/23 1028          Plan of Care Review    Plan of Care Reviewed With patient  -     Outcome Evaluation Pt seen for OT eval this AM. Pt admitted with R foot ulceration and cellulitis. PMHx includes dementia, afib, DM, HTN and peripheral vascular disease. She lives at home with her daughter and states she uses a rollator for mobility and has 1 MATTHEW home. She also reports she can dress herself with cues. Pt completed bed mobility with SBA and stood and ambulated short distance to chair with SBA and use of rwx. Pt  required increased time due to pain in R foot with very slow gait. Pt sat UIC and performed grooming task with min A to complete and cues for task initiation and sequencing. Pt also [place]. Pt presents with increased R foot pain, decreased strength, endurance and activity tolerance. Pt to benefit from skilled OT to address deficits and maximize independence with ADLs.  -       Row Name 07/07/23 1028          Therapy Assessment/Plan (OT)    Rehab Potential (OT) good, to achieve stated therapy goals  -     Criteria for Skilled Therapeutic Interventions Met (OT) yes  -KA     Therapy Frequency (OT) 5 times/wk  -       Row Name 07/07/23 1028          Therapy Plan Review/Discharge Plan (OT)    Anticipated Discharge Disposition (OT) home with assist;home  -       Row Name 07/07/23 1028          Vital Signs    Pre Patient Position Supine  -     Intra Patient Position Standing  -KA     Post Patient Position Sitting  -       Row Name 07/07/23 1028          Positioning and Restraints    Pre-Treatment Position in bed  -KA     Post Treatment Position chair  -KA     In Chair reclined;call light within reach;encouraged to call for assist;exit alarm on;with nsg  -               User Key  (r) = Recorded By, (t) = Taken By, (c) = Cosigned By      Initials Name Provider Type    Jewell Mo, OT Occupational Therapist                   Outcome Measures       Row Name 07/07/23 1032          How much help from another is currently needed...    Putting on and taking off regular lower body clothing? 3  -KA     Bathing (including washing, rinsing, and drying) 3  -KA     Toileting (which includes using toilet bed pan or urinal) 3  -KA     Putting on and taking off regular upper body clothing 4  -KA     Taking care of personal grooming (such as brushing teeth) 3  -KA     Eating meals 4  -KA     AM-PAC 6 Clicks Score (OT) 20  -KA       Row Name 07/07/23 0954          How much help from another person do you currently need...     Turning from your back to your side while in flat bed without using bedrails? 4  -CS     Moving from lying on back to sitting on the side of a flat bed without bedrails? 3  -CS     Moving to and from a bed to a chair (including a wheelchair)? 4  -CS     Standing up from a chair using your arms (e.g., wheelchair, bedside chair)? 4  -CS     Climbing 3-5 steps with a railing? 3  -CS     To walk in hospital room? 4  -CS     AM-PAC 6 Clicks Score (PT) 22  -CS     Highest level of mobility 7 --> Walked 25 feet or more  -CS       Row Name 07/07/23 1032 07/07/23 0954       Functional Assessment    Outcome Measure Options AM-PAC 6 Clicks Daily Activity (OT)  - AM-PAC 6 Clicks Basic Mobility (PT)  -CS              User Key  (r) = Recorded By, (t) = Taken By, (c) = Cosigned By      Initials Name Provider Type    Jewell Mo OT Occupational Therapist    Raven Byrne, PT Physical Therapist                    Occupational Therapy Education       Title: PT OT SLP Therapies (In Progress)       Topic: Occupational Therapy (Done)       Point: ADL training (Done)       Description:   Instruct learner(s) on proper safety adaptation and remediation techniques during self care or transfers.   Instruct in proper use of assistive devices.                  Learning Progress Summary             Patient Acceptance, E, KARI,DU by JESSA at 7/7/2023 1033                         Point: Home exercise program (Done)       Description:   Instruct learner(s) on appropriate technique for monitoring, assisting and/or progressing therapeutic exercises/activities.                  Learning Progress Summary             Patient Acceptance, E, KARI,DU by JESSA at 7/7/2023 1033                                         User Key       Initials Effective Dates Name Provider Type [facility] 09/22/22 -  Jewell Acevedo OT Occupational Therapist OT                  OT Recommendation and Plan  Planned Therapy Interventions (OT): activity tolerance  training, cognitive/visual perception retraining, functional balance retraining, occupation/activity based interventions, strengthening exercise, transfer/mobility retraining, patient/caregiver education/training  Therapy Frequency (OT): 5 times/wk  Plan of Care Review  Plan of Care Reviewed With: patient  Outcome Evaluation: Pt seen for OT eval this AM. Pt admitted with R foot ulceration and cellulitis. PMHx includes dementia, afib, DM, HTN and peripheral vascular disease. She lives at home with her daughter and states she uses a rollator for mobility and has 1 MATTHEW home. She also reports she can dress herself with cues. Pt completed bed mobility with SBA and stood and ambulated short distance to chair with SBA and use of rwx. Pt required increased time due to pain in R foot with very slow gait. Pt sat UIC and performed grooming task with min A to complete and cues for task initiation and sequencing. Pt also [place]. Pt presents with increased R foot pain, decreased strength, endurance and activity tolerance. Pt to benefit from skilled OT to address deficits and maximize independence with ADLs.     Time Calculation:    Time Calculation- OT       Row Name 07/07/23 1033             Time Calculation- OT    OT Start Time 0830  -KA      OT Stop Time 0852  -KA      OT Time Calculation (min) 22 min  -KA      Total Timed Code Minutes- OT 12 minute(s)  -KA      OT Received On 07/07/23  -KA      OT - Next Appointment 07/10/23  -KA      OT Goal Re-Cert Due Date 07/21/23  -KA         Timed Charges    08874 - OT Self Care/Mgmt Minutes 12  -KA         Untimed Charges    OT Eval/Re-eval Minutes 10  -KA         Total Minutes    Timed Charges Total Minutes 12  -KA      Untimed Charges Total Minutes 10  -KA       Total Minutes 22  -KA                User Key  (r) = Recorded By, (t) = Taken By, (c) = Cosigned By      Initials Name Provider Type    Jewell Mo OT Occupational Therapist                  Therapy Charges for Today        Code Description Service Date Service Provider Modifiers Qty    85398909351 HC OT SELF CARE/MGMT/TRAIN EA 15 MIN 7/7/2023 Jewell Acevedo OT GO 1    93762233848 HC OT EVAL MOD COMPLEXITY 2 7/7/2023 Jewell Acevedo OT GO 1                 Jewell Acevedo OT  7/7/2023

## 2023-07-07 NOTE — NURSING NOTE
CWON note: consult received for right foot wound. Pt is being followed by Vascular surgery and wound care orders are in EPIC. Will defer care and management of the foot wound to vascular. Please re-consult for any additional needs.

## 2023-07-07 NOTE — PLAN OF CARE
Goal Outcome Evaluation:  Plan of Care Reviewed With: patient           Outcome Evaluation: Pt is a 89 y/o F admitted to [facility] with right foot infected wound with surrounding cellulitis who failed outpatient treatment. Pt has a past med hx of A fib, DM, PAD, HTN, and dementia. Pt received in bed upon arrival and agreeable to PT eval. Pt reports she lives with her daughter with 1 MATTHEW and uses a rollator for mobility at BL. Pt presents to PT with generalized weakness, R foot pain, and decreased endurance. Pt completed supine to sit, STS transfer, and ambulated 15' c RW requiring SBA. Pt demo's a very slow antalgic gait but no LOB. PT encouraged pt to sit UIC and ambulate for bathroom needs with staff. PT recommends home with assist and HHPT at D/C.      Anticipated Discharge Disposition (PT): home with assist, home with home health

## 2023-07-07 NOTE — PROGRESS NOTES
"Nutrition Services    Patient Name:  Bessie Molina  YOB: 1932  MRN: [id number]  Admit Date:  7/6/2023    FOLLOW UP - CLINICAL NUTRITION    Assessment Date:  07/07/23    Comments: Visited patient at bedside who was confused, severely [place] and unable to answer questions. Nutrition following for unstageable 5th toe laceration/pressure ulcer? Vascular is following due to patient's hx of PAD. Patient has dementia and is forgetful. No wt loss noted in wt hx. Per EMR she ate % of breakfast and lunch today. Narendra could possibly be helpful for her wound if vascular thinks it could heal.     Will continue to follow     Encounter Information         Reason for Encounter Skin- 5th toe unstageable laceration/pressure ulcer? , MST 2, unsure    Current Issues Cellulitis of right foot     Current Nutrition Orders & Evaluation of Intake       Oral Nutrition     Current PO Diet Diet: Regular/House Diet; Texture: Regular Texture (IDDSI 7); Fluid Consistency: Thin (IDDSI 0)   Supplement n/a   PO Evaluation     % PO Intake % x 2 meals     # of Days Evaluated 1    Factors Affecting Intake  No factors at this time   --  Anthropometrics          Height    Weight Height: 157.5 cm (62\")  Weight: 60.3 kg (133 lb) (07/06/23 1127)    BMI kg/m2 Body mass index is 24.33 kg/m².  Normal/Healthy (18.4 - 24.9)    Weight trend Stable     Labs        Pertinent Labs Reviewed, listed below     Results from last 7 days   Lab Units 07/07/23  0419 07/06/23  1219   SODIUM mmol/L 144 136   POTASSIUM mmol/L 3.7 3.6   CHLORIDE mmol/L 106 98   CO2 mmol/L 28.0 24.5   BUN mg/dL 26* 31*   CREATININE mg/dL 1.29* 1.47*   CALCIUM mg/dL 9.6 9.7*   BILIRUBIN mg/dL 0.3 0.5   ALK PHOS U/L 85 105   ALT (SGPT) U/L 18 25   AST (SGOT) U/L 19 29   GLUCOSE mg/dL 143* 256*     Results from last 7 days   Lab Units 07/07/23  0419   HEMOGLOBIN g/dL 12.9   HEMATOCRIT % 37.7   WBC 10*3/mm3 10.15   TRIGLYCERIDES mg/dL 242*   ALBUMIN g/dL 3.6     Results " from last 7 days   Lab Units 07/07/23  0419 07/06/23  1905 07/06/23  1219   INR  2.37* 2.44* 2.16*   APTT seconds  --   --  32.8   PLATELETS 10*3/mm3 256  --  287     COVID19   Date Value Ref Range Status   12/13/2022 Not Detected Not Detected - Ref. Range Final     Lab Results   Component Value Date    HGBA1C 7.30 (H) 07/07/2023          Medications            Scheduled Medications aspirin, 81 mg, Oral, Daily  cefTRIAXone, 1 g, Intravenous, Q24H  dilTIAZem CD, 180 mg, Oral, Q24H  famotidine, 20 mg, Oral, BID  insulin lispro, 2-7 Units, Subcutaneous, 4x Daily AC & at Bedtime  metoprolol tartrate, 25 mg, Oral, BID  pregabalin, 100 mg, Oral, Nightly        Infusions Pharmacy to dose warfarin,   sodium chloride, 75 mL/hr, Last Rate: Stopped (07/07/23 1321)        PRN Medications   dextrose    dextrose    glucagon (human recombinant)    Pharmacy to dose warfarin     Physical Findings          Physical Appearance alert, disoriented, hard of hearing   Oral/Mouth Cavity tooth or teeth missing   Edema  1+ (trace)   Gastrointestinal last bowel movement:7/5   Skin  other:5th toe unstageable laceration/pressure ulcer?    Tubes/Drains/Lines none   NFPE Not indicated at this time   --  NUTRITION INTERVENTION / PLAN OF CARE  Intervention Goal         Intervention Goal(s) Reduce/improve symptoms, Disease management/therapy, Tolerate PO , Maintain intake, and Maintain weight     Nutrition Intervention         RD Action Interview for preferences, Follow Tx Progress, and Care plan reviewed     Nutrition Prescription         Diet Prescription     Supplement Prescription Narendra   EN/PN Prescription    New Prescription Ordered? No, recommended   --  Monitor/Evaluation        Monitor Per protocol, I&O, PO intake, Pertinent labs, Weight, Skin status, GI status, Symptoms, POC/GOC   Discharge Needs No discharge needs identified at this time   Education Will instruct as appropriate   --    RD to follow up per protocol.    Electronically  signed by:  Terri Giordano RD  07/07/23 16:22 EDT

## 2023-07-07 NOTE — PLAN OF CARE
Goal Outcome Evaluation:              Outcome Evaluation: VSS; no complaints of pain or discomfort; pt has worked with PT and OT today; pt sat in a chair and has walked to the bathroom; wound care done; CTA done today; IV antibiotics given; arteriogram on Monday; safety maintained; continue to monitor

## 2023-07-07 NOTE — PROGRESS NOTES
Daily progress note    Primary care physician  Dr. JASSO    Chief complaint  Awake and alert and feeling little better with no new complaints.    History of present illness  89-year-old white female with history of chronic atrial fibrillation diabetes mellitus hypertension hyperlipidemia peripheral artery disease and dementia who lives at home with her daughter brought to the emergency room with right foot redness swelling pain for last 1 month which is treated by primary care physician with oral antibiotics without any improvement.  Patient has infection around right fifth toe with surrounding cellulitis admitted for management.  Patient remained awake and alert and denies any chest pain shortness of breath palpitation no fever chills abdominal pain nausea vomiting diarrhea.  Most of the history obtained from the daughter and son-in-law but patient also contributed.     REVIEW OF SYSTEMS  All systems reviewed and negative except for those discussed in HPI.      PHYSICAL EXAM     General: Awake and alert no acute distress.  HENT: NCAT, PERRL, Nares patent.  Eyes: no scleral icterus.  Neck: trachea midline, no ROM limitations.  CV: Irregularly irregular S1-S2  Respiratory: normal effort, CTAB.  Abdomen: soft, nondistended, nontender bowel sounds positive  Musculoskeletal: no deformity.  Neuro: alert, moves all extremities, follows commands.  Skin: warm, dry.  Right foot: Patient has ulceration on the lateral base of the right fifth toe, erythema and swelling extending into the dorsum of the foot, no erythema but swelling of the right calf, no palpable cords, negative Homans.     LAB RESULTS  Lab Results (last 24 hours)       Procedure Component Value Units Date/Time    Blood Culture - Blood, Arm, Left [[id number]]  (Normal) Collected: 07/06/23 1235    Specimen: Blood from Arm, Left Updated: 07/07/23 1245     Blood Culture No growth at 24 hours    Blood Culture - Blood, Arm, Right [[id number]]  (Normal) Collected:  07/06/23 1219    Specimen: Blood from Arm, Right Updated: 07/07/23 1230     Blood Culture No growth at 24 hours    POC Glucose Once [[id number]]  (Abnormal) Collected: 07/07/23 1143    Specimen: Blood Updated: 07/07/23 1145     Glucose 194 mg/dL      Comment: Meter: [id number] : [id number] Abhijit HER       MRSA Screen, PCR (Inpatient) - Swab, Nares [[id number]]  (Normal) Collected: 07/07/23 0921    Specimen: Swab from Nares Updated: 07/07/23 1039     MRSA PCR No MRSA Detected    Narrative:      The negative predictive value of this diagnostic test is high and should only be used to consider de-escalating anti-MRSA therapy. A positive result may indicate colonization with MRSA and must be correlated clinically.    POC Glucose Once [[id number]]  (Abnormal) Collected: 07/07/23 0747    Specimen: Blood Updated: 07/07/23 0749     Glucose 145 mg/dL      Comment: Meter: [id number] : [id number] Abhijit HER       TSH [[id number]]  (Normal) Collected: 07/07/23 0419    Specimen: Blood Updated: 07/07/23 0508     TSH 3.870 uIU/mL     Protime-INR [[id number]]  (Abnormal) Collected: 07/07/23 0419    Specimen: Blood Updated: 07/07/23 0506     Protime 26.4 Seconds      INR 2.37    Comprehensive Metabolic Panel [[id number]]  (Abnormal) Collected: 07/07/23 0419    Specimen: Blood Updated: 07/07/23 0505     Glucose 143 mg/dL      BUN 26 mg/dL      Creatinine 1.29 mg/dL      Sodium 144 mmol/L      Potassium 3.7 mmol/L      Chloride 106 mmol/L      CO2 28.0 mmol/L      Calcium 9.6 mg/dL      Total Protein 6.8 g/dL      Albumin 3.6 g/dL      ALT (SGPT) 18 U/L      AST (SGOT) 19 U/L      Alkaline Phosphatase 85 U/L      Total Bilirubin 0.3 mg/dL      Globulin 3.2 gm/dL      A/G Ratio 1.1 g/dL      BUN/Creatinine Ratio 20.2     Anion Gap 10.0 mmol/L      eGFR 39.3 mL/min/1.73     Narrative:      GFR Normal >60  Chronic Kidney Disease <60  Kidney Failure <15    The GFR formula is only valid for adults with stable  renal function between ages 18 and 70.    Lipid Panel [[id number]]  (Abnormal) Collected: 07/07/23 0419    Specimen: Blood Updated: 07/07/23 0502     Total Cholesterol 161 mg/dL      Triglycerides 242 mg/dL      HDL Cholesterol 27 mg/dL      LDL Cholesterol  93 mg/dL      VLDL Cholesterol 41 mg/dL      LDL/HDL Ratio 3.17    Narrative:      Cholesterol Reference Ranges  (U.S. Department of Health and Human Services ATP III Classifications)    Desirable          <200 mg/dL  Borderline High    200-239 mg/dL  High Risk          >240 mg/dL      Triglyceride Reference Ranges  (U.S. Department of Health and Human Services ATP III Classifications)    Normal           <150 mg/dL  Borderline High  150-199 mg/dL  High             200-499 mg/dL  Very High        >500 mg/dL    HDL Reference Ranges  (U.S. Department of Health and Human Services ATP III Classifications)    Low     <40 mg/dl (major risk factor for CHD)  High    >60 mg/dl ('negative' risk factor for CHD)        LDL Reference Ranges  (U.S. Department of Health and Human Services ATP III Classifications)    Optimal          <100 mg/dL  Near Optimal     100-129 mg/dL  Borderline High  130-159 mg/dL  High             160-189 mg/dL  Very High        >189 mg/dL    Hemoglobin A1c [[id number]]  (Abnormal) Collected: 07/07/23 0419    Specimen: Blood Updated: 07/07/23 0500     Hemoglobin A1C 7.30 %     Narrative:      Hemoglobin A1C Ranges:    Increased Risk for Diabetes  5.7% to 6.4%  Diabetes                     >= 6.5%  Diabetic Goal                < 7.0%    CBC & Differential [[id number]]  (Abnormal) Collected: 07/07/23 0419    Specimen: Blood Updated: 07/07/23 0444    Narrative:      The following orders were created for panel order CBC & Differential.  Procedure                               Abnormality         Status                     ---------                               -----------         ------                     CBC Auto Differential[[id number]]        Abnormal             Final result                 Please view results for these tests on the individual orders.    CBC Auto Differential [[id number]]  (Abnormal) Collected: 07/07/23 0419    Specimen: Blood Updated: 07/07/23 0444     WBC 10.15 10*3/mm3      RBC 3.84 10*6/mm3      Hemoglobin 12.9 g/dL      Hematocrit 37.7 %      MCV 98.2 fL      MCH 33.6 pg      MCHC 34.2 g/dL      RDW 12.7 %      RDW-SD 45.8 fl      MPV 9.5 fL      Platelets 256 10*3/mm3      Neutrophil % 47.7 %      Lymphocyte % 39.3 %      Monocyte % 10.0 %      Eosinophil % 2.0 %      Basophil % 0.5 %      Immature Grans % 0.5 %      Neutrophils, Absolute 4.84 10*3/mm3      Lymphocytes, Absolute 3.99 10*3/mm3      Monocytes, Absolute 1.02 10*3/mm3      Eosinophils, Absolute 0.20 10*3/mm3      Basophils, Absolute 0.05 10*3/mm3      Immature Grans, Absolute 0.05 10*3/mm3      nRBC 0.0 /100 WBC     POC Glucose Once [[id number]]  (Abnormal) Collected: 07/06/23 2034    Specimen: Blood Updated: 07/06/23 2034     Glucose 203 mg/dL      Comment: Meter: [id number] : [id number] Shahriar HER       BNP [[id number]]  (Abnormal) Collected: 07/06/23 1905    Specimen: Blood Updated: 07/06/23 2000     proBNP 2,224.0 pg/mL     Narrative:      Among patients with dyspnea, NT-proBNP is highly sensitive for the detection of acute congestive heart failure. In addition NT-proBNP of <300 pg/ml effectively rules out acute congestive heart failure with 99% negative predictive value.    Results may be falsely decreased if patient taking Biotin.      Protime-INR [[id number]]  (Abnormal) Collected: 07/06/23 1905    Specimen: Blood Updated: 07/06/23 1954     Protime 27.0 Seconds      INR 2.44    POC Glucose Once [[id number]]  (Normal) Collected: 07/06/23 1716    Specimen: Blood Updated: 07/06/23 1717     Glucose 124 mg/dL      Comment: Meter: [id number] : [id number] Rusher Ryleigh NA       Procalcitonin [[id number]]  (Normal) Collected: 07/06/23 1219    Specimen: Blood Updated:  "07/06/23 1316     Procalcitonin 0.04 ng/mL     Narrative:      As a Marker for Sepsis (Non-Neonates):    1. <0.5 ng/mL represents a low risk of severe sepsis and/or septic shock.  2. >2 ng/mL represents a high risk of severe sepsis and/or septic shock.    As a Marker for Lower Respiratory Tract Infections that require antibiotic therapy:    PCT on Admission    Antibiotic Therapy       6-12 Hrs later    >0.5                Strongly Recommended  >0.25 - <0.5        Recommended   0.1 - 0.25          Discouraged              Remeasure/reassess PCT  <0.1                Strongly Discouraged     Remeasure/reassess PCT    As 28 day mortality risk marker: \"Change in Procalcitonin Result\" (>80% or <=80%) if Day 0 (or Day 1) and Day 4 values are available. Refer to http://[email or web address]-pct-calculator.com    Change in PCT <=80%  A decrease of PCT levels below or equal to 80% defines a positive change in PCT test result representing a higher risk for 28-day all-cause mortality of patients diagnosed with severe sepsis for septic shock.    Change in PCT >80%  A decrease of PCT levels of more than 80% defines a negative change in PCT result representing a lower risk for 28-day all-cause mortality of patients diagnosed with severe sepsis or septic shock.             Imaging Results (Last 24 Hours)       Procedure Component Value Units Date/Time    CT Angio Abdominal Aorta Bilateral Iliofem Runoff [[id number]] Resulted: 07/07/23 1251     Updated: 07/07/23 1253           Interpretation Summary    Right Conclusion: The right YADIEL is severely reduced. Severe digital ischemia.    While the left ankle-brachial indices are within normal limits there is vessel incompressibility.  ABIs are likely falsely elevated.  Moderate digital ischemia noted.    Unable to determine level of disease due to vessel incompressibility but suspect multi level disease based upon waveforms.     Interpretation Summary    Right popliteal fossa fluid collection.    " Normal right lower extremity venous duplex scan.      Current Facility-Administered Medications:     aspirin chewable tablet 81 mg, 81 mg, Oral, Daily, Uziel Mcrae MD, 81 mg at 07/07/23 1059    cefTRIAXone (ROCEPHIN) 1 g in sodium chloride 0.9 % 100 mL IVPB-VTB, 1 g, Intravenous, Q24H, Uziel Mcrae MD    dextrose (D50W) (25 g/50 mL) IV injection 25 g, 25 g, Intravenous, Q15 Min PRN, Uziel Mcrae MD    dextrose (GLUTOSE) oral gel 15 g, 15 g, Oral, Q15 Min PRN, Uziel Mcrae MD    dilTIAZem CD (CARDIZEM CD) 24 hr capsule 180 mg, 180 mg, Oral, Q24H, Uziel Mcrae MD, 180 mg at 07/07/23 1059    famotidine (PEPCID) tablet 20 mg, 20 mg, Oral, BID, Uziel Mcrae MD, 20 mg at 07/07/23 0917    glucagon (GLUCAGEN) injection 1 mg, 1 mg, Intramuscular, Q15 Min PRN, Uziel Mcrae MD    insulin lispro (HUMALOG/ADMELOG) injection 2-7 Units, 2-7 Units, Subcutaneous, 4x Daily AC & at Bedtime, Uziel Mcrae MD, 2 Units at 07/07/23 1230    metoprolol tartrate (LOPRESSOR) tablet 12.5 mg, 12.5 mg, Oral, BID, Uziel Mcrae MD, 12.5 mg at 07/07/23 0917    Pharmacy to dose warfarin, , Does not apply, Continuous PRN, Uzeil Mcrae MD    pregabalin (LYRICA) capsule 100 mg, 100 mg, Oral, Nightly, Uziel Mcrae MD, 100 mg at 07/06/23 2113    sodium chloride 0.9 % infusion, 75 mL/hr, Intravenous, Continuous, Ayaan Sanches MD, Last Rate: 75 mL/hr at 07/07/23 0847, 75 mL/hr at 07/07/23 0847    warfarin (COUMADIN) tablet 3 mg, 3 mg, Oral, Once per day on Sun Tue Thu Sat, Uziel Mcrae MD, 3 mg at 07/06/23 1907    warfarin (COUMADIN) tablet 4.5 mg, 4.5 mg, Oral, Once per day on Mon Wed Fri, Uziel Mcrae MD     ASSESSMENT  Right foot infected wound with surrounding cellulitis who failed outpatient treatment  Peripheral artery disease  Diabetes mellitus  Hypertension  Hyperlipidemia  Chronic atrial fibrillation     PLAN  CPM  IVF  CT angiogram today  IV antibiotics per infectious disease  Vascular surgery consult  appreciated  Nephrology and infectious disease to follow patient  Continue home medications  Stress ulcer DVT prophylaxis  Supportive care  DNR  Discussed with family nursing staff  Follow closely further recommendation current hospital course    DAISHA CARLSON MD    Copied text in this note has been reviewed and is accurate as of 07/07/23

## 2023-07-07 NOTE — PROGRESS NOTES
"Name: Bessie Molina ADMIT: 2023   : 1932  PCP: Mireille La MD    MRN: [id number] LOS: 1 days   AGE/SEX: 89 y.o. female  ROOM: [address]    [place], Subsequent Hospital Care    Chief Complaint   Patient presents with   • Leg Pain     CC: Peripheral arterial disease with gangrene follow-up.  Subjective     89 y.o. female resting comfortably this morning.    Review of Systems    Objective     Scheduled Medications:   aspirin, 81 mg, Oral, Daily  cefTRIAXone, 1 g, Intravenous, Q24H  dilTIAZem CD, 180 mg, Oral, Q24H  famotidine, 20 mg, Oral, BID  insulin lispro, 2-7 Units, Subcutaneous, 4x Daily AC & at Bedtime  metoprolol tartrate, 12.5 mg, Oral, BID  pregabalin, 100 mg, Oral, Nightly  warfarin, 3 mg, Oral, Once per day on Sun Tue Thu Sat  warfarin, 4.5 mg, Oral, Once per day on         Active Infusions:  Pharmacy to dose warfarin,   sodium chloride, 75 mL/hr, Last Rate: 75 mL/hr (23 0847)        As Needed Medications:  •  dextrose  •  dextrose  •  glucagon (human recombinant)  •  Pharmacy to dose warfarin    Vital Signs  Vital Signs Patient Vitals for the past 24 hrs:   BP Temp Temp src Pulse Resp SpO2 Height Weight   23 0740 145/96 98.3 °F (36.8 °C) Oral 101 18 91 % -- --   23 2333 144/77 98.5 °F (36.9 °C) Oral 102 18 95 % -- --   23 -- -- -- 117 -- -- -- --   23 204 144/73 98.2 °F (36.8 °C) Oral -- 18 98 % -- --   23 1637 -- 97.6 °F (36.4 °C) Oral -- -- -- -- --   23 1550 136/75 -- -- 87 18 98 % -- --   23 1302 -- -- -- 88 -- 90 % -- --   23 1301 138/64 -- -- 86 17 92 % -- --   23 1233 -- -- -- 97 -- 100 % -- --   23 1231 151/80 -- -- -- -- -- -- --   23 1133 149/90 -- -- 102 -- -- -- --   23 1127 -- 97.6 °F (36.4 °C) Tympanic 84 16 97 % 157.5 cm (62\") 60.3 kg (133 lb)     I/O:  I/O last 3 completed shifts:  In: -   Out:  [Urine:1650]    Physical Exam:  Physical Exam     Results " Review:     CBC    Results from last 7 days   Lab Units 07/07/23  0419 07/06/23  1219   WBC 10*3/mm3 10.15 11.52*   HEMOGLOBIN g/dL 12.9 13.7   PLATELETS 10*3/mm3 256 287     BMP   Results from last 7 days   Lab Units 07/07/23  0419 07/06/23  1219   SODIUM mmol/L 144 136   POTASSIUM mmol/L 3.7 3.6   CHLORIDE mmol/L 106 98   CO2 mmol/L 28.0 24.5   BUN mg/dL 26* 31*   CREATININE mg/dL 1.29* 1.47*   GLUCOSE mg/dL 143* 256*     Cr Clearance Estimated Creatinine Clearance: 24.3 mL/min (A) (by C-G formula based on SCr of 1.29 mg/dL (H)).  Coag   Results from last 7 days   Lab Units 07/07/23  0419 07/06/23  1905 07/06/23  1219   INR  2.37* 2.44* 2.16*   APTT seconds  --   --  32.8     HbA1C   Lab Results   Component Value Date    HGBA1C 7.30 (H) 07/07/2023    HGBA1C 7.20 (H) 12/14/2022    HGBA1C 8.20 (H) 12/21/2021     Blood Glucose   Glucose   Date/Time Value Ref Range Status   07/07/2023 0747 145 (H) 70 - 130 mg/dL Final     Comment:     Meter: [id number] : [id number] Abhijit Brenda  NA   07/06/2023 2034 203 (H) 70 - 130 mg/dL Final     Comment:     Meter: [id number] : [id number] Shahriar Haider NA   07/06/2023 1716 124 70 - 130 mg/dL Final     Comment:     Meter: [id number] : [id number] Rusher Ryleigh NA     Infection   Results from last 7 days   Lab Units 07/06/23  1219   PROCALCITONIN ng/mL 0.04     CMP   Results from last 7 days   Lab Units 07/07/23  0419 07/06/23  1219   SODIUM mmol/L 144 136   POTASSIUM mmol/L 3.7 3.6   CHLORIDE mmol/L 106 98   CO2 mmol/L 28.0 24.5   BUN mg/dL 26* 31*   CREATININE mg/dL 1.29* 1.47*   GLUCOSE mg/dL 143* 256*   ALBUMIN g/dL 3.6 4.3   BILIRUBIN mg/dL 0.3 0.5   ALK PHOS U/L 85 105   AST (SGOT) U/L 19 29   ALT (SGPT) U/L 18 25     ABG      UA        Assessment & Plan     Assessment & Plan      Cellulitis of right foot            89 y.o. female severe peripheral vascular disease with gangrenous ulcer with surrounding cellulitis of her fifth lateral toe on the right foot.   She has had prior interventions done at [facility] in December 2022.  Based on her lower extremity arterial Doppler she has severe disease likely at multiple levels.  She has chronic kidney disease.  After her interventions in [facility] she had hospitalization for congestive heart failure.     7/6/2023: We will start simple Betadine dressing changes to her right foot wound.  I do not feel there is any overwhelming drivers of sepsis currently in her right foot.  We will need further work-up with CT angiogram with runoff given clear evidence of multi level disease on Doppler.  Prior to administration of contrast we will ask nephrology to see to optimize her and better understand risk of contrast-induced nephropathy in this patient.    7/7/2023: Appreciate nephrology's evaluation and assistance.  Will obtain CT angiogram today.  Afternoon update: CT angiogram reviewed.  Severe multilevel infrainguinal disease noted.  Heavily calcified vessels seen.  Plan for arteriogram Monday afternoon.  We will hold warfarin.  Would prefer prefer INR to be below 2 for procedure      Chacho Barbour MD  07/07/23  10:59 EDT    Please call my office with any question: [phone]    Active Hospital Problems    Diagnosis  POA   • **Cellulitis of right foot [L03.115]  Yes      Resolved Hospital Problems   No resolved problems to display.

## 2023-07-07 NOTE — PLAN OF CARE
Goal Outcome Evaluation:  Plan of Care Reviewed With: patient        Progress: no change  Outcome Evaluation: Maintained on R/A with sats primarily >90%. R outer foot and fifth toe reddened. Betadine paint to ulercation on side of fifth toe. Alert and cooperative, [place], hx of dementia, forgetful. IVF's and ABX as ordered. Safety maintianed.

## 2023-07-07 NOTE — PLAN OF CARE
Goal Outcome Evaluation:  Plan of Care Reviewed With: patient           Outcome Evaluation: Pt seen for OT bangal this AM. Pt admitted with R foot ulceration and cellulitis. PMHx includes dementia, afib, DM, HTN and peripheral vascular disease. She lives at home with her daughter and states she uses a rollator for mobility and has 1 MATTHEW home. She also reports she can dress herself with cues. Pt completed bed mobility with SBA and stood and ambulated short distance to chair with SBA and use of rwx. Pt required increased time due to pain in R foot with very slow gait. Pt sat UIC and performed grooming task with min A to complete and cues for task initiation and sequencing. Pt also [place]. Pt presents with increased R foot pain, decreased strength, endurance and activity tolerance. Pt to benefit from skilled OT to address deficits and maximize independence with ADLs.      Anticipated Discharge Disposition (OT): home with assist, home

## 2023-07-07 NOTE — THERAPY EVALUATION
Patient Name: Bessie Molina  : 1932    MRN: [id number]                              Today's Date: 2023       Admit Date: 2023    Visit Dx:     ICD-10-CM ICD-9-CM   1. Cellulitis of right foot  L03.115 682.7   2. Failure of outpatient treatment  Z78.9 V49.89   3. Leukocytosis, unspecified type  D72.829 288.60   4. ESR raised  R70.0 790.1   5. CRP elevated  R79.82 790.95   6. Hyperglycemia  R73.9 790.29   7. Chronic kidney disease, unspecified CKD stage  N18.9 585.9     Patient Active Problem List   Diagnosis    Atrial fibrillation, persistent    Chronic anticoagulation    Dementia    Abdominal pain    Acute respiratory failure with hypoxia    Pancreatic lesion    Chronic diastolic CHF (congestive heart failure)    Cellulitis of right foot     Past Medical History:   Diagnosis Date    Atrial fibrillation     Hypertension      History reviewed. No pertinent surgical history.   General Information       Row Name 23          Physical Therapy Time and Intention    Document Type evaluation  -CS     Mode of Treatment co-treatment;physical therapy  -CS       Row Name 23          General Information    Patient Profile Reviewed yes  -CS     Prior Level of Function independent:;all household mobility;gait;transfer;bed mobility  rollator  -CS     Existing Precautions/Restrictions fall  -CS     Barriers to Rehab none identified  -CS       Row Name 23          Living Environment    People in Home child(veena), adult  -CS       Row Name 23          Home Main Entrance    Number of Stairs, Main Entrance one  -CS       Row Name 23          Stairs Within Home, Primary    Number of Stairs, Within Home, Primary none  -CS       Row Name 23          Cognition    Orientation Status (Cognition) oriented to;person;place  -CS       Row Name 23          Safety Issues, Functional Mobility    Impairments Affecting Function (Mobility) endurance/activity  tolerance;strength;pain  -CS               User Key  (r) = Recorded By, (t) = Taken By, (c) = Cosigned By      Initials Name Provider Type    CS Raven Blevisn, PT Physical Therapist                   Mobility       Row Name 07/07/23 0947          Bed Mobility    Bed Mobility supine-sit;sit-supine  -CS     Supine-Sit [place] (Bed Mobility) standby assist  -CS     Sit-Supine [place] (Bed Mobility) not tested  -CS     Assistive Device (Bed Mobility) head of bed elevated  -CS     Comment, (Bed Mobility) [facility] at end of session  -CS       Row Name 07/07/23 0947          Sit-Stand Transfer    Sit-Stand [place] (Transfers) standby assist  -CS     Assistive Device (Sit-Stand Transfers) walker, front-wheeled  -CS       Row Name 07/07/23 0947          Gait/Stairs (Locomotion)    [place] Level (Gait) standby assist  -CS     Assistive Device (Gait) walker, front-wheeled  -CS     Distance in Feet (Gait) 15'  -CS     Deviations/Abnormal Patterns (Gait) antalgic;ele decreased;stride length decreased  -CS     Bilateral Gait Deviations forward flexed posture;heel strike decreased  -CS     Right Sided Gait Deviations weight shift ability decreased  -CS     [place] Level (Stairs) not tested  -CS     Comment, (Gait/Stairs) very slow antalgic gait; no LOB  -CS               User Key  (r) = Recorded By, (t) = Taken By, (c) = Cosigned By      Initials Name Provider Type    Raven Byrne, PT Physical Therapist                   Obj/Interventions       Row Name 07/07/23 0948          Range of Motion Comprehensive    General Range of Motion bilateral lower extremity ROM WFL  -       Row Name 07/07/23 0948          Strength Comprehensive (MMT)    General Manual Muscle Testing (MMT) Assessment other (see comments)  -CS     Comment, General Manual Muscle Testing (MMT) Assessment generalized weakness; B LE >/= 3/5  -CS       Row Name 07/07/23 0948          Balance    Balance Assessment sitting static  balance;sitting dynamic balance;standing static balance;standing dynamic balance  -CS     Static Sitting Balance standby assist  -CS     Dynamic Sitting Balance standby assist  -CS     Position, Sitting Balance unsupported;sitting edge of bed  -CS     Static Standing Balance standby assist  -CS     Dynamic Standing Balance standby assist  -CS     Position/Device Used, Standing Balance supported;walker, front-wheeled  -CS               User Key  (r) = Recorded By, (t) = Taken By, (c) = Cosigned By      Initials Name Provider Type    CS Raven Blevins, PT Physical Therapist                   Goals/Plan       Row Name 07/07/23 0953          Bed Mobility Goal 1 (PT)    Activity/Assistive Device (Bed Mobility Goal 1, PT) bed mobility activities, all  -CS     [place] Level/Cues Needed (Bed Mobility Goal 1, PT) supervision required  -CS     Time Frame (Bed Mobility Goal 1, PT) 1 week  -CS       Row Name 07/07/23 0953          Transfer Goal 1 (PT)    Activity/Assistive Device (Transfer Goal 1, PT) sit-to-stand/stand-to-sit;bed-to-chair/chair-to-bed  -CS     [place] Level/Cues Needed (Transfer Goal 1, PT) supervision required  -CS     Time Frame (Transfer Goal 1, PT) 1 week  -CS       Row Name 07/07/23 0953          Gait Training Goal 1 (PT)    Activity/Assistive Device (Gait Training Goal 1, PT) gait (walking locomotion);assistive device use;decrease fall risk;diminish gait deviation  -CS     [place] Level (Gait Training Goal 1, PT) standby assist  -CS     Distance (Gait Training Goal 1, PT) 50'  -CS     Time Frame (Gait Training Goal 1, PT) 1 week  -CS               User Key  (r) = Recorded By, (t) = Taken By, (c) = Cosigned By      Initials Name Provider Type    CS Raven Blevins, PT Physical Therapist                   Clinical Impression       Row Name 07/07/23 0949          Pain    Pretreatment Pain Rating 0/10 - no pain  -CS     Posttreatment Pain Rating 5/10  -CS     Pain Location - Side/Orientation  Right  -CS     Pain Location - foot  -CS     Pain Intervention(s) Rest;Repositioned;Ambulation/increased activity  -CS       Row Name 07/07/23 0949          Plan of Care Review    Plan of Care Reviewed With patient  -CS     Outcome Evaluation Pt is a 89 y/o F admitted to [facility] with right foot infected wound with surrounding cellulitis who failed outpatient treatment. Pt has a past med hx of A fib, DM, PAD, HTN, and dementia. Pt received in bed upon arrival and agreeable to PT eval. Pt reports she lives with her daughter with 1 MATTHEW and uses a rollator for mobility at [place]. Pt presents to PT with generalized weakness, R foot pain, and decreased endurance. Pt completed supine to sit, STS transfer, and ambulated 15' c RW requiring SBA. Pt demo's a very slow antalgic gait but no LOB. PT encouraged pt to sit UIC and ambulate for bathroom needs with staff. PT recommends home with assist and HHPT at D/C.  -CS       Row Name 07/07/23 0949          Therapy Assessment/Plan (PT)    Rehab Potential (PT) good, to achieve stated therapy goals  -CS     Criteria for Skilled Interventions Met (PT) yes;meets criteria  -CS     Therapy Frequency (PT) 5 times/wk  -CS       Row Name 07/07/23 0949          Positioning and Restraints    Pre-Treatment Position in bed  -CS     Post Treatment Position chair  -CS     In Chair reclined;call light within reach;encouraged to call for assist;exit alarm on;with OT;with nsg  -CS               User Key  (r) = Recorded By, (t) = Taken By, (c) = Cosigned By      Initials Name Provider Type    CS Raven Blevins, PT Physical Therapist                   Outcome Measures       Row Name 07/07/23 0954          How much help from another person do you currently need...    Turning from your back to your side while in flat bed without using bedrails? 4  -CS     Moving from lying on back to sitting on the side of a flat bed without bedrails? 3  -CS     Moving to and from a bed to a chair (including a wheelchair)?  4  -CS     Standing up from a chair using your arms (e.g., wheelchair, bedside chair)? 4  -CS     Climbing 3-5 steps with a railing? 3  -CS     To walk in hospital room? 4  -CS     AM-PAC 6 Clicks Score (PT) 22  -CS     Highest level of mobility 7 --> Walked 25 feet or more  -CS       Row Name 07/07/23 0954          Functional Assessment    Outcome Measure Options AM-PAC 6 Clicks Basic Mobility (PT)  -               User Key  (r) = Recorded By, (t) = Taken By, (c) = Cosigned By      Initials Name Provider Type    CS Raven Blevins PT Physical Therapist                                 Physical Therapy Education       Title: PT OT SLP Therapies (In Progress)       Topic: Physical Therapy (In Progress)       Point: Mobility training (Done)       Learning Progress Summary             Patient Acceptance, E,TB, VU,DU by  at 7/7/2023 0954                         Point: Home exercise program (Not Started)       Learner Progress:  Not documented in this visit.              Point: Body mechanics (Done)       Learning Progress Summary             Patient Acceptance, E,TB, VU,DU by  at 7/7/2023 0954                         Point: Precautions (Done)       Learning Progress Summary             Patient Acceptance, E,TB, VU,DU by  at 7/7/2023 0954                                         User Key       Initials Effective Dates Name Provider Type Discipline     09/22/22 -  Raven Blevins PT Physical Therapist PT                  PT Recommendation and Plan     Plan of Care Reviewed With: patient  Outcome Evaluation: Pt is a 89 y/o F admitted to [facility] with right foot infected wound with surrounding cellulitis who failed outpatient treatment. Pt has a past med hx of A fib, DM, PAD, HTN, and dementia. Pt received in bed upon arrival and agreeable to PT eval. Pt reports she lives with her daughter with 1 MATTHEW and uses a rollator for mobility at [place]. Pt presents to PT with generalized weakness, R foot pain, and decreased  endurance. Pt completed supine to sit, STS transfer, and ambulated 15' c RW requiring SBA. Pt demo's a very slow antalgic gait but no LOB. PT encouraged pt to sit UIC and ambulate for bathroom needs with staff. PT recommends home with assist and HHPT at D/C.     Time Calculation:    PT Charges       Row Name 07/07/23 0954             Time Calculation    Start Time 0830  -CS      Stop Time 0847  -CS      Time Calculation (min) 17 min  -CS      PT Received On 07/07/23  -CS      PT - Next Appointment 07/10/23  -CS      PT Goal Re-Cert Due Date 07/14/23  -CS         Time Calculation- PT    Total Timed Code Minutes- PT 14 minute(s)  -CS         Timed Charges    76818 - PT Therapeutic Activity Minutes 14  -CS         Total Minutes    Timed Charges Total Minutes 14  -CS       Total Minutes 14  -CS                User Key  (r) = Recorded By, (t) = Taken By, (c) = Cosigned By      Initials Name Provider Type    CS Raven Blevins, PT Physical Therapist                  Therapy Charges for Today       Code Description Service Date Service Provider Modifiers Qty    35214750660  PT THERAPEUTIC ACT EA 15 MIN 7/7/2023 Raven Blevins, PT GP 1    75569159228 HC PT EVAL MOD COMPLEXITY 3 7/7/2023 Raven Blevins, PT GP 1            PT G-Codes  Outcome Measure Options: AM-PAC 6 Clicks Basic Mobility (PT)  AM-PAC 6 Clicks Score (PT): 22  PT Discharge Summary  Anticipated Discharge Disposition (PT): home with assist, home with home health    Raven Blevins PT  7/7/2023

## 2023-07-07 NOTE — CONSULTS
[facility] Consult Note      Patient Name: Bessie Molina  : 1932  MRN: [id number]  Primary Care Physician:  Mireille La MD  Referring Physician: Uziel Mcrae MD  Date of admission: 2023    Subjective     Reason for Consult:  CKD3    HPI:   Bessie Molina is a 89 y.o. female with CKD stage 3B followed by Dr Tom, HTN, DM2, PAF, severe PVD who presented with right foot redness and swelling.  Seen by vascular surgery, she needs CTA.  Cr stable 1.4 yesterday, 1.3 today, with baseline approx 1.5 mg/dL.  Lasix has been held and gentle IVF started to reduce contrast risks.  She did develop vol overload while hospitalized in Dec 2022.  Echo then showed EF 55 to 60% and indeterminate diastolic fcn.  She denies dyspnea or swelling.  No nausea or vomiting.      Review of Systems:   14 point review of systems is otherwise negative except for mentioned above on HPI    Personal History     Past Medical History:   Diagnosis Date    Atrial fibrillation     Hypertension        History reviewed. No pertinent surgical history.    Family History: family history is not on file.    Social History:  reports that she has never smoked. She has never used smokeless tobacco. She reports current alcohol use. She reports that she does not use drugs.    Home Medications:  Prior to Admission medications    Medication Sig Start Date End Date Taking? Authorizing Provider   acetaminophen (TYLENOL) 325 MG tablet Take 2 tablets by mouth Every 4 (Four) Hours As Needed for Mild Pain. 22  Yes Haroldo Burks MD   Ascorbic Acid (Vitamin C) 500 MG capsule Take 1 tablet by mouth Daily.   Yes ProviderRina MD   aspirin 81 MG chewable tablet Chew 1 tablet Daily.   Yes Rian Lanza MD   B Complex Vitamins (VITAMIN B COMPLEX PO) Take 1 tablet by mouth Daily.   Yes Rina Lanza MD   Cholecalciferol (Vitamin D3) 50 MCG ( UT) capsule Take 1 capsule by mouth Daily.    Yes Rina Lanza MD   dilTIAZem CD (CARDIZEM CD) 180 MG 24 hr capsule Take 1 capsule by mouth Daily.   Yes Rina Lanza MD   docusate sodium (COLACE) 100 MG capsule Take 1 capsule by mouth Daily As Needed for Constipation (constipation).   Yes Rina Lanza MD   ezetimibe (ZETIA) 10 MG tablet Take 1 tablet by mouth Daily. 5/23/22  Yes Rina Lanza MD   fish oil-omega-3 fatty acids 1000 MG capsule Take 1 capsule by mouth Daily.   Yes Rina Lanza MD   furosemide (LASIX) 40 MG tablet Take 1 tablet by mouth Daily. 12/21/22  Yes Haroldo Burks MD   glipizide (GLUCOTROL XL) 10 MG 24 hr tablet Take 1 tablet by mouth Every Morning.   Yes Rina Lanza MD   icosapent ethyl (VASCEPA) 1 g capsule capsule Take 2 g by mouth 2 (Two) Times a Day. 11/12/21  Yes Rina Lanza MD   levothyroxine (SYNTHROID, LEVOTHROID) 100 MCG tablet Take 1 tablet by mouth Every Other Day.   Yes Rina Lanza MD   levothyroxine (SYNTHROID, LEVOTHROID) 88 MCG tablet Take 1 tablet by mouth Every Other Day.   Yes Rina Lanza MD   metoprolol tartrate (LOPRESSOR) 25 MG tablet Take 12.5 mg by mouth 2 (Two) Times a Day. If sbp >110   Yes Rina Lanza MD   multivitamin (THERAGRAN) tablet tablet Take 1 tablet by mouth Daily.   Yes Rina Lanza MD   pantoprazole (PROTONIX) 40 MG EC tablet 1 tablet Daily. 3/13/23  Yes Rina Lanza MD   potassium chloride 10 MEQ CR tablet Take 1 tablet by mouth Daily. 11/2/22  Yes Jag Farias MD   pregabalin (LYRICA) 100 MG capsule Take 1 capsule by mouth Every Night. 12/20/22  Yes Haroldo Burks MD   SITagliptin (JANUVIA) 100 MG tablet Take 1 tablet by mouth Every Evening. 4/7/21 7/6/23 Yes Rina Lanza MD   warfarin (COUMADIN) 3 MG tablet Take 1 tablet by mouth Every Night. 3 mg Tues, Thurs, Sat, Sun; 4.5 MG Mon, Wed, Fri 1/5/23  Yes Rina Lanza MD       Allergies:  Allergies    Allergen Reactions    Bee Venom Anaphylaxis    Atorvastatin Nausea Only    Gemfibrozil Nausea Only    Haemophilus Influenzae Unknown - High Severity    Influenza Vac Split Quad Unknown - High Severity    Isosorbide Unknown - High Severity    Metformin Unknown - High Severity    Propoxyphene Unknown - High Severity    Simvastatin Nausea Only       Objective     Vitals:   Temp:  [97.6 °F (36.4 °C)-98.5 °F (36.9 °C)] 98.5 °F (36.9 °C)  Heart Rate:  [] 102  Resp:  [16-18] 18  BP: (136-151)/(64-90) 144/77    Intake/Output Summary (Last 24 hours) at 7/7/2023 0803  Last data filed at 7/7/2023 0616  Gross per 24 hour   Intake --   Output 1650 ml   Net -1650 ml       Physical Exam:    General Appearance: pleasant elderly WF comfortable/alert  Skin: warm and dry  HEENT: oral mucosa normal, nonicteric sclera  Neck: supple, no JVD  Lungs: CTA bilat no rales  Heart: RRR, normal S1 and S2  Abdomen: soft, nontender, nondistended  : no palpable bladder  Extremities: right foot erythema and discoloration of 4th and 5th toes with eschar on 5th  Neuro: normal speech and mental status     Scheduled Meds:     aspirin, 81 mg, Oral, Daily  cefTRIAXone, 1 g, Intravenous, Q24H  dilTIAZem CD, 180 mg, Oral, Q24H  famotidine, 20 mg, Oral, BID  insulin lispro, 2-7 Units, Subcutaneous, 4x Daily AC & at Bedtime  metoprolol tartrate, 25 mg, Oral, BID  pregabalin, 100 mg, Oral, Nightly  warfarin, 3 mg, Oral, Once per day on Sun Tue Thu Sat  warfarin, 4.5 mg, Oral, Once per day on Mon Wed Fri      IV Meds:   Pharmacy to dose warfarin,   sodium chloride 0.9 % with KCl 20 mEq, 75 mL/hr, Last Rate: 75 mL/hr (07/06/23 1909)        Results Reviewed:   I have personally reviewed the results from the time of this admission to 7/7/2023 08:03 EDT     Lab Results   Component Value Date    GLUCOSE 143 (H) 07/07/2023    CALCIUM 9.6 07/07/2023     07/07/2023    K 3.7 07/07/2023    CO2 28.0 07/07/2023     07/07/2023    BUN 26 (H) 07/07/2023     CREATININE 1.29 (H) 07/07/2023    EGFRIFNONA 33 (L) 01/25/2022    BCR 20.2 07/07/2023    ANIONGAP 10.0 07/07/2023      Lab Results   Component Value Date    MG 2.1 12/17/2022    ALBUMIN 3.6 07/07/2023           Assessment / Plan     ASSESSMENT:  CKD stage 3B - Cr stable 1.4 -> 1.3 (BL ~ 1.5), at moderate risk of contrast nephropathy with needed CTA.  On proph IVF, will need to monitor vol status closely as developed vol overload back in DEC.  Tolerating well thus far, no dyspnea.  Lytes stable, remove KCL from IVF  PVD + right foot cellulitis - vascular surg & ID following, on rocephin; CTA today  HTN - BP adequate on metop and cardizem (also for rate control)  PAF - on coumadin, INR 2.3  Dm2, mgmt per Dr Mcrae,  this AM; A1c good 7.3    PLAN:  Agree with current rate IVF, will remove K from next bag and stop IVF 4h post CTA  Ok to proceed with CTA today  Hold lasix, monitor vol status closely, anticipate will need resumption next 24 to 48 hours     Thank you Dr Mcrae for involving us in the care of Bessie Molina.  Please feel free to call with any questions.  Discussed with RN    Ayaan Sanches MD  07/07/23  08:03 EDT    [facility]  [phone]

## 2023-07-07 NOTE — PLAN OF CARE
Goal Outcome Evaluation:   Diuretic in Use: none  Response to Diuretics (Output greater than intake): output greater  Daily Weight (up or down): patient admitted today, no repeated weights  O2 Requirements: room air  Functional Status (Activity level, tolerance and respiratory symptoms): not out of bed since arriving on unit  Discharge Plans:  unknown, possibly home with family in 4 days.  Symptoms of dementia.  Hard of hearing.

## 2023-07-07 NOTE — CONSULTS
CONSULT NOTE    Infectious Diseases - Rosa Gallegos MD  [facility]       Patient Identification:  Name: Bessie Molina  Age: 89 y.o.  Sex: female  :  1932  MRN: [id number]             Date of Consultation: Cellulitis 2023      Primary Care Physician: Mireille La MD                               Requesting Physician: Dr. Mcrae  Reason for Consultation: Cellulitis of the leg.    Impression: Patient is a 89-year-old female who cannot provide detailed history because of underlying dementia has complicated past medical history due to chronic atrial fibrillation, diabetes mellitus hypertension dyslipidemia and peripheral vascular disease and has been cared for at home by her daughter was brought to the emergency room earlier today with increasing redness swelling and pain of the right foot with symptoms ongoing off-and-on for the last month despite being on oral antibiotic therapy used to attempt to treat cellulitis.  Patient has infection and redness involving the right fifth toe failing outpatient antibiotic treatment.  Patient has been started on ceftriaxone, blood cultures are drawn and ankle-brachial index performed earlier showing evidence of severe peripheral vascular disease.  Patient has history of known peripheral vascular disease and history of prior intervention.  Vascular surgery service have evaluated the patient and further work-up is being planned to assess the severity of peripheral vascular disease involving the lower extremities and type of intervention but will be required based on her comorbidities and overall tolerance of the procedure.  Patient at present denies any fever or chills.  This presentation in the above context of feeling out of hospital oral antibiotic treatment for ongoing swelling of the right foot and redness and swelling of the right fifth toe with nonhealing wound on the right fifth toe is consistent with:  1-ischemic/diabetic ulcer/cellulitis  of the right foot due to peripheral vascular disease rendering antibiotic therapy unsuccessful with significant risk of toe/foot/limb loss and significant risk of contiguous focus osteomyelitis.  2-peripheral vascular disease  3-hypertension  4-atrial fibrillation  5-chronic kidney disease  6-diabetes mellitus  7-other diagnoses per primary team.    Recommendations/Discussions:  Her failure to respond to antibiotic therapy was likely due to underlying peripheral vascular disease and continued use of broad-spectrum antibiotic therapy without addressing the circulatory issues would render her at risk of side effects of antibiotic therapy without providing any benefit no matter how long the antibiotic therapy is used.  Vascular surgery plans to identify correctable vascular disease noted.  Pending those decisions I agree with the care plan consisting of IV Rocephin with close monitoring of her clinical course and side effects of antibiotic therapy.  Get MRSA screen and escalate or de-escalate antibiotic therapy based on her clinical course.  Duration of antibiotic treatment is currently open ended.  Thank you Dr. Bay for letting me be the part of your patient care please see above impression and recommendations    History of Present Illness:   Patient is a 89-year-old female who cannot provide detailed history because of underlying dementia has complicated past medical history due to chronic atrial fibrillation, diabetes mellitus hypertension dyslipidemia and peripheral vascular disease and has been cared for at home by her daughter was brought to the emergency room earlier today with increasing redness swelling and pain of the right foot with symptoms ongoing off-and-on for the last month despite being on oral antibiotic therapy used to attempt to treat cellulitis.  Patient has infection and redness involving the right fifth toe failing outpatient antibiotic treatment.  Patient has been started on ceftriaxone, blood  cultures are drawn and ankle-brachial index performed earlier showing evidence of severe peripheral vascular disease.  Patient has history of known peripheral vascular disease and history of prior intervention.  Vascular surgery service have evaluated the patient and further work-up is being planned to assess the severity of peripheral vascular disease involving the lower extremities and type of intervention but will be required based on her comorbidities and overall tolerance of the procedure.  Patient at present denies any fever or chills.      Past Medical History:  Past Medical History:   Diagnosis Date    Atrial fibrillation     Hypertension      Past Surgical History:  History reviewed. No pertinent surgical history.   Home Meds:  Medications Prior to Admission   Medication Sig Dispense Refill Last Dose    acetaminophen (TYLENOL) 325 MG tablet Take 2 tablets by mouth Every 4 (Four) Hours As Needed for Mild Pain.   7/6/2023    Ascorbic Acid (Vitamin C) 500 MG capsule Take 1 tablet by mouth Daily.   7/5/2023    aspirin 81 MG chewable tablet Chew 1 tablet Daily.   7/6/2023    B Complex Vitamins (VITAMIN B COMPLEX PO) Take 1 tablet by mouth Daily.   7/6/2023    Cholecalciferol (Vitamin D3) 50 MCG (2000 UT) capsule Take 1 capsule by mouth Daily.   7/6/2023    dilTIAZem CD (CARDIZEM CD) 180 MG 24 hr capsule Take 1 capsule by mouth Daily.   7/6/2023    docusate sodium (COLACE) 100 MG capsule Take 1 capsule by mouth Daily As Needed for Constipation (constipation).   Past Month    ezetimibe (ZETIA) 10 MG tablet Take 1 tablet by mouth Daily.   7/5/2023    fish oil-omega-3 fatty acids 1000 MG capsule Take 1 capsule by mouth Daily.   7/5/2023    furosemide (LASIX) 40 MG tablet Take 1 tablet by mouth Daily.   7/6/2023    glipizide (GLUCOTROL XL) 10 MG 24 hr tablet Take 1 tablet by mouth Every Morning.   7/6/2023    icosapent ethyl (VASCEPA) 1 g capsule capsule Take 2 g by mouth 2 (Two) Times a Day.   7/6/2023     levothyroxine (SYNTHROID, LEVOTHROID) 100 MCG tablet Take 1 tablet by mouth Every Other Day.   7/6/2023    levothyroxine (SYNTHROID, LEVOTHROID) 88 MCG tablet Take 1 tablet by mouth Every Other Day.   7/5/2023    metoprolol tartrate (LOPRESSOR) 25 MG tablet Take 12.5 mg by mouth 2 (Two) Times a Day.   7/6/2023    multivitamin (THERAGRAN) tablet tablet Take 1 tablet by mouth Daily.   7/6/2023    pantoprazole (PROTONIX) 40 MG EC tablet 1 tablet Daily.   7/6/2023    potassium chloride 10 MEQ CR tablet Take 1 tablet by mouth Daily. 90 tablet 3 7/5/2023    pregabalin (LYRICA) 100 MG capsule Take 1 capsule by mouth Every Night. 5 capsule 0 7/5/2023    SITagliptin (JANUVIA) 100 MG tablet Take 1 tablet by mouth Every Evening.   7/5/2023    warfarin (COUMADIN) 3 MG tablet Take 1 tablet by mouth Every Night. 3 mg Tues, Thurs, Sat, Sun; 4.5 MG Mon, Wed, Fri 7/5/2023     Current Meds:     Current Facility-Administered Medications:     aspirin chewable tablet 81 mg, 81 mg, Oral, Daily, Uziel Mcrae MD    [START ON 7/7/2023] cefTRIAXone (ROCEPHIN) 1 g in sodium chloride 0.9 % 100 mL IVPB-VTB, 1 g, Intravenous, Q24H, Uziel Mcrae MD    dextrose (D50W) (25 g/50 mL) IV injection 25 g, 25 g, Intravenous, Q15 Min PRN, Uziel Mcrae MD    dextrose (GLUTOSE) oral gel 15 g, 15 g, Oral, Q15 Min PRN, Uziel Mcrae MD    dilTIAZem CD (CARDIZEM CD) 24 hr capsule 180 mg, 180 mg, Oral, Q24H, Uziel Mcrae MD    famotidine (PEPCID) tablet 20 mg, 20 mg, Oral, BID, Uziel Mcrae MD    glucagon (GLUCAGEN) injection 1 mg, 1 mg, Intramuscular, Q15 Min PRN, Uziel Mcrae MD    insulin lispro (HUMALOG/ADMELOG) injection 2-7 Units, 2-7 Units, Subcutaneous, 4x Daily AC & at Bedtime, Uziel Mcrae MD    metoprolol tartrate (LOPRESSOR) tablet 25 mg, 25 mg, Oral, BID, Uziel Mcrae MD    Pharmacy to dose warfarin, , Does not apply, Continuous PRN, Uziel Mcrae MD    pregabalin (LYRICA) capsule 100 mg, 100 mg, Oral, Nightly, Uziel Mcrae MD    sodium  chloride 0.9 % with KCl 20 mEq/L infusion, 75 mL/hr, Intravenous, Continuous, Uziel Mcrae MD, Last Rate: 75 mL/hr at 07/06/23 1909, 75 mL/hr at 07/06/23 1909    warfarin (COUMADIN) tablet 3 mg, 3 mg, Oral, Once per day on Sun Tue Thu Sat, Uziel Mcrae MD, 3 mg at 07/06/23 1907    [START ON 7/7/2023] warfarin (COUMADIN) tablet 4.5 mg, 4.5 mg, Oral, Once per day on Mon Wed Fri, Uziel Mcrae MD  Allergies:  Allergies   Allergen Reactions    Bee Venom Anaphylaxis    Atorvastatin Nausea Only    Gemfibrozil Nausea Only    Haemophilus Influenzae Unknown - High Severity    Influenza Vac Split Quad Unknown - High Severity    Isosorbide Unknown - High Severity    Metformin Unknown - High Severity    Propoxyphene Unknown - High Severity    Simvastatin Nausea Only     Social History:   Social History     Tobacco Use    Smoking status: Never    Smokeless tobacco: Never   Substance Use Topics    Alcohol use: Yes     Comment: rare/caffeine use       Family History:  History reviewed. No pertinent family history.       Review of Systems  See history of present illness and past medical history.  Patient denies headache, dizziness, syncope, falls, trauma, change in vision, change in hearing, change in taste, changes in weight, changes in appetite, focal weakness, numbness, or paresthesia.  Patient denies chest pain, palpitations, dyspnea, orthopnea, PND, cough, sinus pressure, rhinorrhea, epistaxis, hemoptysis, nausea, vomiting,hematemesis, diarrhea, constipation or hematchezia.  Denies cold or heat intolerance, polydipsia, polyuria, polyphagia. Denies hematuria, pyuria, dysuria, hesitancy, frequency or urgency. Denies consumption of raw and under cooked meats foods or change in water source.  Denies fever, chills, sweats, night sweats.  Denies missing any routine medications. Remainder of ROS is negative.      Vitals:   /73 (BP Location: Left arm, Patient Position: Sitting)   Pulse 87   Temp 98.2 °F (36.8 °C) (Oral)    "Resp 18   Ht 157.5 cm (62\")   Wt 60.3 kg (133 lb)   SpO2 98%   BMI 24.33 kg/m²   I/O: No intake or output data in the 24 hours ending 07/06/23 2047  Exam:  Patient is examined using the personal protective equipment as per guidelines from infection control for this particular patient as enacted.  Hand washing was performed before and after patient interaction.  General Appearance:    Alert, cooperative, no distress, appears stated age   Head:    Normocephalic, without obvious abnormality, atraumatic   Eyes:    PERRL, conjunctivae/corneas clear, EOM's intact, both eyes   Ears:    Normal external ear canals, both ears   Nose:   Nares normal, septum midline, mucosa normal, no drainage    or sinus tenderness   Throat:   Lips, tongue, gums normal; oral mucosa pink and moist   Neck:   Supple, symmetrical, trachea midline, no adenopathy;     thyroid:  no enlargement/tenderness/nodules; no carotid    bruit or JVD   Back:     Symmetric, no curvature, ROM normal, no CVA tenderness   Lungs:     Clear to auscultation bilaterally, respirations unlabored   Chest Wall:    No tenderness or deformity    Heart:    Regular rate and rhythm, S1 and S2 normal, no murmur, rub   or gallop   Abdomen:   Soft nontender comfortable chronically ill elderly nontoxic-appearing female   Extremities: Changes in the lower extremities due to chronic ischemia and ongoing infection.       Skin:      Neurologic: Grossly nonfocal       Data Review:    I reviewed the patient's new clinical results.  Results from last 7 days   Lab Units 07/06/23  1219   WBC 10*3/mm3 11.52*   HEMOGLOBIN g/dL 13.7   PLATELETS 10*3/mm3 287     Results from last 7 days   Lab Units 07/06/23  1219   SODIUM mmol/L 136   POTASSIUM mmol/L 3.6   CHLORIDE mmol/L 98   CO2 mmol/L 24.5   BUN mg/dL 31*   CREATININE mg/dL 1.47*   CALCIUM mg/dL 9.7*   GLUCOSE mg/dL 256*     No results found for: CULTURE]    Assessment:  Active Hospital Problems    Diagnosis  POA    **Cellulitis of " right foot [L03.115]  Yes      Resolved Hospital Problems   No resolved problems to display.         Plan:  See above  Rosa Tang MD   7/6/2023  20:47 EDT    Parts of this note may be an electronic transcription/translation of spoken language to printed text using the Dragon dictation system.

## 2023-07-07 NOTE — CASE MANAGEMENT/SOCIAL WORK
Discharge Planning Assessment  [facility]     Patient Name: Bessie Molina  MRN: [id number]  Today's Date: 7/7/2023    Admit Date: 7/6/2023    Plan: Home, family to transport   Discharge Needs Assessment       Row Name 07/07/23 0955       Living Environment    People in Home child(veena), adult    Name(s) of People in Home tuyet trujillo [phone]    Current Living Arrangements home    Potentially Unsafe Housing Conditions none    Primary Care Provided by self    Provides Primary Care For no one    Family Caregiver if Needed child(veena), adult    Family Caregiver Names Bessie Trujillo    Quality of Family Relationships helpful;involved;supportive    Able to Return to Prior Arrangements yes       Resource/Environmental Concerns    Resource/Environmental Concerns none    Transportation Concerns none       Food Insecurity    Within the past 12 months, you worried that your food would run out before you got the money to buy more. Never true    Within the past 12 months, the food you bought just didn't last and you didn't have money to get more. Never true       Transition Planning    Patient/Family Anticipates Transition to home;home with family    Patient/Family Anticipated Services at Transition none    Transportation Anticipated family or friend will provide       Discharge Needs Assessment    Equipment Currently Used at Home cane, quad;wheelchair;rollator    Concerns to be Addressed no discharge needs identified;denies needs/concerns at this time    Anticipated Changes Related to Illness none    Equipment Needed After Discharge none    Provided Post Acute Provider List? N/A    Provided Post Acute Provider Quality & Resource List? N/A                   Discharge Plan       Row Name 07/07/23 0957       Plan    Plan Home, family to transport    Plan Comments Met with pt at bedside. Introduced self and explained role of . Pt requested that I contact her daughter, Tuyet, as she lives with her and she  "\"has all the answers\". Contacted daughter via telephone. Explained role of  to Tuyet. Pt lives with Tuyet, who assist her mother with care needs and ADL's. Tuyet provides transportation for pt. Pt uses a cane/rollator/wc for mobility when needed. Pt has 3 steps to maneuver to enter the home, and is able to do so with assistance. Pt PCP is Mireille Persaud. Face sheet verified. Daughter stated no difficulty paying for medications, and obtains medications from [facility]. Pt has had caretenders in the past, and has been to [facility] in the past. Depending upon clinical course, daughter thinks pt may need home health, but unsure at this point. Upon discharge, family will transport home. Explained that CCP would follow to assess for discharge needs.                  Continued Care and Services - Admitted Since 7/6/2023    Coordination has not been started for this encounter.       Expected Discharge Date and Time       Expected Discharge Date Expected Discharge Time    Jul 10, 2023            Demographic Summary    No documentation.                  Functional Status    No documentation.                  Psychosocial    No documentation.                  Abuse/Neglect    No documentation.                  Legal    No documentation.                  Substance Abuse    No documentation.                  Patient Forms    No documentation.                     Lorrie Melissa RN    "

## 2023-07-08 LAB
ALBUMIN SERPL-MCNC: 3.6 G/DL (ref 3.5–5.2)
ANION GAP SERPL CALCULATED.3IONS-SCNC: 9.6 MMOL/L (ref 5–15)
BASOPHILS # BLD AUTO: 0.05 10*3/MM3 (ref 0–0.2)
BASOPHILS NFR BLD AUTO: 0.5 % (ref 0–1.5)
BUN SERPL-MCNC: 25 MG/DL (ref 8–23)
BUN/CREAT SERPL: 22.1 (ref 7–25)
CALCIUM SPEC-SCNC: 9.8 MG/DL (ref 8.2–9.6)
CHLORIDE SERPL-SCNC: 108 MMOL/L (ref 98–107)
CO2 SERPL-SCNC: 27.4 MMOL/L (ref 22–29)
CREAT SERPL-MCNC: 1.13 MG/DL (ref 0.57–1)
DEPRECATED RDW RBC AUTO: 48.7 FL (ref 37–54)
EGFRCR SERPLBLD CKD-EPI 2021: 46 ML/MIN/1.73
EOSINOPHIL # BLD AUTO: 0.16 10*3/MM3 (ref 0–0.4)
EOSINOPHIL NFR BLD AUTO: 1.6 % (ref 0.3–6.2)
ERYTHROCYTE [DISTWIDTH] IN BLOOD BY AUTOMATED COUNT: 13.1 % (ref 12.3–15.4)
GLUCOSE BLDC GLUCOMTR-MCNC: 110 MG/DL (ref 70–130)
GLUCOSE BLDC GLUCOMTR-MCNC: 128 MG/DL (ref 70–130)
GLUCOSE BLDC GLUCOMTR-MCNC: 208 MG/DL (ref 70–130)
GLUCOSE BLDC GLUCOMTR-MCNC: 269 MG/DL (ref 70–130)
GLUCOSE SERPL-MCNC: 94 MG/DL (ref 65–99)
HCT VFR BLD AUTO: 37.2 % (ref 34–46.6)
HGB BLD-MCNC: 12.5 G/DL (ref 12–15.9)
IMM GRANULOCYTES # BLD AUTO: 0.06 10*3/MM3 (ref 0–0.05)
IMM GRANULOCYTES NFR BLD AUTO: 0.6 % (ref 0–0.5)
INR PPP: 2.19 (ref 0.9–1.1)
LYMPHOCYTES # BLD AUTO: 4.02 10*3/MM3 (ref 0.7–3.1)
LYMPHOCYTES NFR BLD AUTO: 40.1 % (ref 19.6–45.3)
MCH RBC QN AUTO: 33.7 PG (ref 26.6–33)
MCHC RBC AUTO-ENTMCNC: 33.6 G/DL (ref 31.5–35.7)
MCV RBC AUTO: 100.3 FL (ref 79–97)
MONOCYTES # BLD AUTO: 1 10*3/MM3 (ref 0.1–0.9)
MONOCYTES NFR BLD AUTO: 10 % (ref 5–12)
NEUTROPHILS NFR BLD AUTO: 4.73 10*3/MM3 (ref 1.7–7)
NEUTROPHILS NFR BLD AUTO: 47.2 % (ref 42.7–76)
NRBC BLD AUTO-RTO: 0 /100 WBC (ref 0–0.2)
PHOSPHATE SERPL-MCNC: 3.4 MG/DL (ref 2.5–4.5)
PLATELET # BLD AUTO: 260 10*3/MM3 (ref 140–450)
PMV BLD AUTO: 9.4 FL (ref 6–12)
POTASSIUM SERPL-SCNC: 4 MMOL/L (ref 3.5–5.2)
PROTHROMBIN TIME: 24.8 SECONDS (ref 11.7–14.2)
RBC # BLD AUTO: 3.71 10*6/MM3 (ref 3.77–5.28)
SODIUM SERPL-SCNC: 145 MMOL/L (ref 136–145)
WBC NRBC COR # BLD: 10.02 10*3/MM3 (ref 3.4–10.8)

## 2023-07-08 PROCEDURE — 85610 PROTHROMBIN TIME: CPT | Performed by: HOSPITALIST

## 2023-07-08 PROCEDURE — 25010000002 CEFTRIAXONE PER 250 MG: Performed by: HOSPITALIST

## 2023-07-08 PROCEDURE — 63710000001 INSULIN LISPRO (HUMAN) PER 5 UNITS: Performed by: HOSPITALIST

## 2023-07-08 PROCEDURE — 85025 COMPLETE CBC W/AUTO DIFF WBC: CPT | Performed by: INTERNAL MEDICINE

## 2023-07-08 PROCEDURE — 80069 RENAL FUNCTION PANEL: CPT | Performed by: INTERNAL MEDICINE

## 2023-07-08 PROCEDURE — 82948 REAGENT STRIP/BLOOD GLUCOSE: CPT

## 2023-07-08 RX ORDER — SODIUM CHLORIDE 450 MG/100ML
50 INJECTION, SOLUTION INTRAVENOUS CONTINUOUS
Status: DISCONTINUED | OUTPATIENT
Start: 2023-07-08 | End: 2023-07-11

## 2023-07-08 RX ADMIN — FAMOTIDINE 20 MG: 20 TABLET, FILM COATED ORAL at 09:48

## 2023-07-08 RX ADMIN — INSULIN LISPRO 4 UNITS: 100 INJECTION, SOLUTION INTRAVENOUS; SUBCUTANEOUS at 14:20

## 2023-07-08 RX ADMIN — INSULIN LISPRO 3 UNITS: 100 INJECTION, SOLUTION INTRAVENOUS; SUBCUTANEOUS at 20:31

## 2023-07-08 RX ADMIN — PREGABALIN 100 MG: 100 CAPSULE ORAL at 20:24

## 2023-07-08 RX ADMIN — DILTIAZEM HYDROCHLORIDE 180 MG: 180 CAPSULE, COATED, EXTENDED RELEASE ORAL at 09:48

## 2023-07-08 RX ADMIN — METOPROLOL TARTRATE 25 MG: 25 TABLET, FILM COATED ORAL at 20:24

## 2023-07-08 RX ADMIN — FAMOTIDINE 20 MG: 20 TABLET, FILM COATED ORAL at 20:24

## 2023-07-08 RX ADMIN — ASPIRIN 81 MG: 81 TABLET, CHEWABLE ORAL at 09:48

## 2023-07-08 RX ADMIN — CEFTRIAXONE SODIUM 1 G: 1 INJECTION, POWDER, FOR SOLUTION INTRAMUSCULAR; INTRAVENOUS at 16:18

## 2023-07-08 RX ADMIN — SODIUM CHLORIDE 50 ML/HR: 4.5 INJECTION, SOLUTION INTRAVENOUS at 16:18

## 2023-07-08 RX ADMIN — METOPROLOL TARTRATE 25 MG: 25 TABLET, FILM COATED ORAL at 09:48

## 2023-07-08 NOTE — PROGRESS NOTES
[facility] Progress Note      Patient Name: Bessie Molina  : 1932  MRN: [id number]  Primary Care Physician:  Mireille La MD  Date of admission: 2023    Subjective     Interval History:     Seen and examined.  Confused.  Currently on room air.  Denies any complaints.  Having her breakfast.    Review of Systems:   As noted above    Objective     Vitals:   Temp:  [98.1 °F (36.7 °C)-98.4 °F (36.9 °C)] 98.4 °F (36.9 °C)  Heart Rate:  [83-96] 87  Resp:  [16-18] 17  BP: (120-156)/(60-85) 156/85    Intake/Output Summary (Last 24 hours) at 2023 0834  Last data filed at 2023 0734  Gross per 24 hour   Intake 480 ml   Output 1300 ml   Net -820 ml       Physical Exam:    General Appearance: alert,  no acute distress   Skin: warm and dry  HEENT: oral mucosa normal, nonicteric sclera  Neck: supple, no JVD  Lungs: CTA  Heart: RRR, normal S1 and S2  Abdomen: soft, nontender, nondistended  : no palpable bladder  Extremities: no edema, cyanosis or clubbing    Scheduled Meds:     aspirin, 81 mg, Oral, Daily  cefTRIAXone, 1 g, Intravenous, Q24H  dilTIAZem CD, 180 mg, Oral, Q24H  famotidine, 20 mg, Oral, BID  insulin lispro, 2-7 Units, Subcutaneous, 4x Daily AC & at Bedtime  metoprolol tartrate, 25 mg, Oral, BID  pregabalin, 100 mg, Oral, Nightly      IV Meds:   Pharmacy to dose warfarin,   sodium chloride, 75 mL/hr, Last Rate: 75 mL/hr (23 3624)        Results Reviewed:   I have personally reviewed the results from the time of this admission to 2023 08:34 EDT     Results from last 7 days   Lab Units 23  0409 23  0419 23  1219   SODIUM mmol/L 145 144 136   POTASSIUM mmol/L 4.0 3.7 3.6   CHLORIDE mmol/L 108* 106 98   CO2 mmol/L 27.4 28.0 24.5   BUN mg/dL 25* 26* 31*   CREATININE mg/dL 1.13* 1.29* 1.47*   CALCIUM mg/dL 9.8* 9.6 9.7*   BILIRUBIN mg/dL  --  0.3 0.5   ALK PHOS U/L  --  85 105   ALT (SGPT) U/L  --  18 25   AST (SGOT) U/L  --  19 29    GLUCOSE mg/dL 94 143* 256*     Estimated Creatinine Clearance: 27.7 mL/min (A) (by C-G formula based on SCr of 1.13 mg/dL (H)).  Results from last 7 days   Lab Units 07/08/23  0409   PHOSPHORUS mg/dL 3.4         Results from last 7 days   Lab Units 07/08/23  0409 07/07/23  0419 07/06/23  1219   WBC 10*3/mm3 10.02 10.15 11.52*   HEMOGLOBIN g/dL 12.5 12.9 13.7   PLATELETS 10*3/mm3 260 256 287     Results from last 7 days   Lab Units 07/08/23  0409 07/07/23  0419 07/06/23  1905 07/06/23  1219   INR  2.19* 2.37* 2.44* 2.16*       Assessment / Plan     ASSESSMENT:    CKD stage 3B - Cr stable 1.4 -> 1.3 (BL ~ 1.5), at moderate risk of contrast nephropathy with needed CTA.  On proph IVF, will need to monitor vol status closely as developed vol overload back in DEC.  Tolerating well thus far, no dyspnea.  Lytes stable, remove KCL from IVF  PVD + right foot cellulitis - vascular surg & ID following, on rocephin; CTA today  HTN - BP adequate on metop and cardizem (also for rate control)  PAF - on coumadin, INR 2.3  Dm2, mgmt per Dr Mcrae,  this AM; A1c good 7.3     PLAN:      Hold on any further IV fluids and increase oral intake  Surveillance labs    Thank you for involving us in the care of Bessie Molina.  Please feel free to call with any questions.    Shannon Sheets MD  07/08/23  08:34 EDT    [facility]  [phone]

## 2023-07-08 NOTE — PROGRESS NOTES
"  Infectious Diseases Progress Note    Rosa Tang MD     [facility]  [place]: 1 day  Patient Identification:  Name: Bessie Molina  Age: 89 y.o.  Sex: female  :  1932  MRN: [id number]         Primary Care Physician: Mireille La MD        Subjective: Feeling somewhat better.  Interval History: See consultation note    Objective:    Scheduled Meds:aspirin, 81 mg, Oral, Daily  cefTRIAXone, 1 g, Intravenous, Q24H  dilTIAZem CD, 180 mg, Oral, Q24H  famotidine, 20 mg, Oral, BID  insulin lispro, 2-7 Units, Subcutaneous, 4x Daily AC & at Bedtime  metoprolol tartrate, 25 mg, Oral, BID  pregabalin, 100 mg, Oral, Nightly      Continuous Infusions:Pharmacy to dose warfarin,   sodium chloride, 75 mL/hr, Last Rate: 75 mL/hr (23 1734)        Vital signs in last 24 hours:  Temp:  [98.1 °F (36.7 °C)-98.5 °F (36.9 °C)] 98.1 °F (36.7 °C)  Heart Rate:  [] 96  Resp:  [16-18] 16  BP: (120-146)/(60-96) 120/60    Intake/Output:    Intake/Output Summary (Last 24 hours) at 2023  Last data filed at 2023 1500  Gross per 24 hour   Intake 480 ml   Output 1750 ml   Net -1270 ml       Exam:  /60 (BP Location: Left arm, Patient Position: Sitting)   Pulse 96   Temp 98.1 °F (36.7 °C) (Oral)   Resp 16   Ht 157.5 cm (62\")   Wt 60.3 kg (133 lb)   SpO2 98%   BMI 24.33 kg/m²   Patient is examined using the personal protective equipment as per guidelines from infection control for this particular patient as enacted.  Hand washing was performed before and after patient interaction.  General Appearance:  Awake interactive                          Head:    Normocephalic, without obvious abnormality, atraumatic                           Eyes:    PERRL, conjunctivae/corneas clear, EOM's intact, both eyes                         Throat:   Lips, tongue, gums normal; oral mucosa pink and moist                           Neck:   Supple, symmetrical, trachea midline, no JVD                         " Lungs:    Clear to auscultation bilaterally, respirations unlabored                 Chest Wall:    No tenderness or deformity                          Heart:  Irregularly irregular                  Abdomen:   Soft nontender                 Extremities:                               Skin: Cellulitic changes involving the right foot slightly better                  Neurologic: Awake and interactive       Data Review:    I reviewed the patient's new clinical results.  Results from last 7 days   Lab Units 07/07/23  0419 07/06/23  1219   WBC 10*3/mm3 10.15 11.52*   HEMOGLOBIN g/dL 12.9 13.7   PLATELETS 10*3/mm3 256 287     Results from last 7 days   Lab Units 07/07/23  0419 07/06/23  1219   SODIUM mmol/L 144 136   POTASSIUM mmol/L 3.7 3.6   CHLORIDE mmol/L 106 98   CO2 mmol/L 28.0 24.5   BUN mg/dL 26* 31*   CREATININE mg/dL 1.29* 1.47*   CALCIUM mg/dL 9.6 9.7*   GLUCOSE mg/dL 143* 256*     Microbiology Results (last 10 days)       Procedure Component Value - Date/Time    MRSA Screen, PCR (Inpatient) - Swab, Nares [[id number]]  (Normal) Collected: 07/07/23 0921    Lab Status: Final result Specimen: Swab from Nares Updated: 07/07/23 1039     MRSA PCR No MRSA Detected    Narrative:      The negative predictive value of this diagnostic test is high and should only be used to consider de-escalating anti-MRSA therapy. A positive result may indicate colonization with MRSA and must be correlated clinically.    Blood Culture - Blood, Arm, Left [[id number]]  (Normal) Collected: 07/06/23 1235    Lab Status: Preliminary result Specimen: Blood from Arm, Left Updated: 07/07/23 1245     Blood Culture No growth at 24 hours    Blood Culture - Blood, Arm, Right [[id number]]  (Normal) Collected: 07/06/23 1219    Lab Status: Preliminary result Specimen: Blood from Arm, Right Updated: 07/07/23 1230     Blood Culture No growth at 24 hours              Assessment:    Cellulitis of right foot  1-ischemic/diabetic ulcer/cellulitis of the right foot  due to peripheral vascular disease rendering antibiotic therapy unsuccessful with significant risk of toe/foot/limb loss and significant risk of contiguous focus osteomyelitis.  2-peripheral vascular disease  3-hypertension  4-atrial fibrillation  5-chronic kidney disease  6-diabetes mellitus  7-other diagnoses per primary team.     Recommendations/Discussions:  See my discussion and recommendations on 7/6/2023 noted   Vascular surgery plans noted  Continue present antibiotic therapy until definitive intervention is performed.  Rosa Tang MD  7/7/2023  20:24 EDT    Parts of this note may be an electronic transcription/translation of spoken language to printed text using the Dragon dictation system.

## 2023-07-08 NOTE — NURSING NOTE
Pt has pulled out her IV and pulled off her O2 sat probe and refuses to let us touch her. She is being uncooperative and is convinced she is suppose to be going home tonight. Safety maintained at this point. Tried to call family. No answer.

## 2023-07-08 NOTE — PROGRESS NOTES
[facility] Clinical Pharmacy Services: Warfarin Dosing/Monitoring Consult    Bessie Molina is a 89 y.o. female, estimated creatinine clearance is 27.7 mL/min (A) (by C-G formula based on SCr of 1.13 mg/dL (H)). weighing 60.3 kg (133 lb).    Results from last 7 days   Lab Units 07/08/23  0409 07/07/23  0419 07/06/23  1905 07/06/23  1219   INR  2.19* 2.37* 2.44* 2.16*   APTT seconds  --   --   --  32.8   HEMOGLOBIN g/dL 12.5 12.9  --  13.7   HEMATOCRIT % 37.2 37.7  --  41.6   PLATELETS 10*3/mm3 260 256  --  287     Prior to admission anticoagulation: Per med reconciliation, the patient's home warfarin regimen is 4.5 mg on Mon/Wed/Fri and 3 mg on all other days of the week (25.5 mg/week).    Hospital Anticoagulation:  Consulting provider: Dr. Mcrae  Start date: 7/6/23  Indication: Atrial fibrillation  Target INR: 2 - 3  Expected duration: Lifelong  Bridge Therapy: No    Potential food or drug interactions:   • Ceftriaxone (moderate-new med in hospital)-concurrent use may enhance the anticoagulant effect of warfarin by platelet inhibition which can lead to irreversible platelet dysfunction.      Education complete?/Date: No; plan for follow up TBD    INR Assessment:  Date INR Dose   7/6 2.44 3 mg   7/7 2.37 Hold   7/8 2.19 Hold     Assessment/Plan:  1. INR is currently therapeutic at 2.19. Will continue to hold for upcoming procedure per Dr. Barbour's note.  2. Monitor for any signs or symptoms of bleeding.  3. Follow up daily INRs and dose adjustments.    Pharmacy will continue to follow until discharge or discontinuation of warfarin.     Yoli Fitzgerald, Pharm.D., [facility]   Clinical Pharmacist

## 2023-07-08 NOTE — PROGRESS NOTES
"Daily progress note    Primary care physician  Dr. JASSO    Chief complaint  Doing same with no new complaints and making slow progress    History of present illness  89-year-old white female with history of chronic atrial fibrillation diabetes mellitus hypertension hyperlipidemia peripheral artery disease and dementia who lives at home with her daughter brought to the emergency room with right foot redness swelling pain for last 1 month which is treated by primary care physician with oral antibiotics without any improvement.  Patient has infection around right fifth toe with surrounding cellulitis admitted for management.  Patient remained awake and alert and denies any chest pain shortness of breath palpitation no fever chills abdominal pain nausea vomiting diarrhea.  Most of the history obtained from the daughter and son-in-law but patient also contributed.     REVIEW OF SYSTEMS  Unremarkable except generalized weakness     PHYSICAL EXAM   Blood pressure 114/76, pulse 95, temperature 98.7 °F (37.1 °C), temperature source Oral, resp. rate 17, height 157.5 cm (62\"), weight 60.3 kg (133 lb), SpO2 99 %.    General: Awake and alert no acute distress.  HENT: NCAT, PERRL, Nares patent.  Eyes: no scleral icterus.  Neck: trachea midline, no ROM limitations.  CV: Irregularly irregular S1-S2  Respiratory: normal effort, CTAB.  Abdomen: soft, nondistended, nontender bowel sounds positive  Musculoskeletal: no deformity.  Neuro: alert, moves all extremities, follows commands.  Skin: warm, dry.  Right foot: Patient has ulceration on the lateral base of the right fifth toe, erythema and swelling extending into the dorsum of the foot, no erythema but swelling of the right calf, no palpable cords, negative Homans.     LAB RESULTS  Lab Results (last 24 hours)       Procedure Component Value Units Date/Time    Blood Culture - Blood, Arm, Left [[id number]]  (Normal) Collected: 07/06/23 1235    Specimen: Blood from Arm, Left Updated: " 07/08/23 1245     Blood Culture No growth at 2 days    Blood Culture - Blood, Arm, Right [[id number]]  (Normal) Collected: 07/06/23 1219    Specimen: Blood from Arm, Right Updated: 07/08/23 1230     Blood Culture No growth at 2 days    POC Glucose Once [[id number]]  (Abnormal) Collected: 07/08/23 1208    Specimen: Blood Updated: 07/08/23 1209     Glucose 269 mg/dL      Comment: Meter: [id number] : [id number] Butler Autumn NA       POC Glucose Once [[id number]]  (Normal) Collected: 07/08/23 0759    Specimen: Blood Updated: 07/08/23 0800     Glucose 110 mg/dL      Comment: Meter: [id number] : [id number] Butler Autumn NA       Protime-INR [[id number]]  (Abnormal) Collected: 07/08/23 0409    Specimen: Blood from Arm, Left Updated: 07/08/23 0509     Protime 24.8 Seconds      INR 2.19    Renal Function Panel [[id number]]  (Abnormal) Collected: 07/08/23 0409    Specimen: Blood Updated: 07/08/23 0458     Glucose 94 mg/dL      BUN 25 mg/dL      Creatinine 1.13 mg/dL      Sodium 145 mmol/L      Potassium 4.0 mmol/L      Chloride 108 mmol/L      CO2 27.4 mmol/L      Calcium 9.8 mg/dL      Albumin 3.6 g/dL      Phosphorus 3.4 mg/dL      Anion Gap 9.6 mmol/L      BUN/Creatinine Ratio 22.1     eGFR 46.0 mL/min/1.73     Narrative:      GFR Normal >60  Chronic Kidney Disease <60  Kidney Failure <15    The GFR formula is only valid for adults with stable renal function between ages 18 and 70.    CBC & Differential [[id number]]  (Abnormal) Collected: 07/08/23 0409    Specimen: Blood Updated: 07/08/23 0439    Narrative:      The following orders were created for panel order CBC & Differential.  Procedure                               Abnormality         Status                     ---------                               -----------         ------                     CBC Auto Differential[[id number]]        Abnormal            Final result                 Please view results for these tests on the individual orders.    CBC Auto Differential  [[id number]]  (Abnormal) Collected: 07/08/23 0409    Specimen: Blood Updated: 07/08/23 0439     WBC 10.02 10*3/mm3      RBC 3.71 10*6/mm3      Hemoglobin 12.5 g/dL      Hematocrit 37.2 %      .3 fL      MCH 33.7 pg      MCHC 33.6 g/dL      RDW 13.1 %      RDW-SD 48.7 fl      MPV 9.4 fL      Platelets 260 10*3/mm3      Neutrophil % 47.2 %      Lymphocyte % 40.1 %      Monocyte % 10.0 %      Eosinophil % 1.6 %      Basophil % 0.5 %      Immature Grans % 0.6 %      Neutrophils, Absolute 4.73 10*3/mm3      Lymphocytes, Absolute 4.02 10*3/mm3      Monocytes, Absolute 1.00 10*3/mm3      Eosinophils, Absolute 0.16 10*3/mm3      Basophils, Absolute 0.05 10*3/mm3      Immature Grans, Absolute 0.06 10*3/mm3      nRBC 0.0 /100 WBC     POC Glucose Once [[id number]]  (Abnormal) Collected: 07/07/23 2201    Specimen: Blood Updated: 07/07/23 2203     Glucose 236 mg/dL      Comment: Meter: [id number] : [id number] Henry HER       POC Glucose Once [[id number]]  (Abnormal) Collected: 07/07/23 1643    Specimen: Blood Updated: 07/07/23 1644     Glucose 213 mg/dL      Comment: Meter: [id number] : [id number] Abhijit HER             Imaging Results (Last 24 Hours)       Procedure Component Value Units Date/Time    CT Angio Abdominal Aorta Bilateral Iliofem Runoff [[id number]] Collected: 07/07/23 1615     Updated: 07/07/23 1615    Narrative:      CT ANGIOGRAM OF THE ABDOMEN AND PELVIS INCLUDING BILATERAL LOWER  EXTREMITY RUNOFFS 07/07/2023     HISTORY: Peripheral artery disease with right foot ulcer.     Following the intravenous contrast injection, CT angiography was  performed through the abdomen, pelvis and bilateral lower extremities.     There is atherosclerotic calcification of the abdominal aorta with no  evidence of aneurysm or occlusion. Bilateral common iliac and bilateral  internal and external iliac arteries show some mild atherosclerotic  calcification but appear patent. The bilateral common femoral  arteries  show some atherosclerotic calcification but appear patent. The profunda  femoral and superficial femoral arteries appear patent. There is  bilateral superficial femoral artery atherosclerotic calcification. On  images 1185 through 1192 there appears to be a tiny filling defect in  the right superficial femoral artery which may represent a tiny  nonocclusive thrombus. There is moderately severe narrowing of the left  superficial femoral artery at its origin best seen on axial images 951  through 963. Bilateral superficial femoral arteries appear patent to the  popliteal artery. There is narrowing of the right superficial femoral  artery compared to the left just above the knee. Both popliteal arteries  show some atherosclerotic calcification but appear patent. There is  moderately severe narrowing of the right popliteal artery seen on axial  images 1842 through 1889. There also is narrowing of the most distal  aspect of the popliteal artery on the right on axial images 1979 through  2002. Three trifurcation arteries are seen which appear somewhat small  on the right with at least 2 of these coursing into the ankle. On the  left there is mild irregularity and narrowing of the popliteal artery  with 3 trifurcation arteries seen. Two small trifurcation arteries  course into the ankle on the left. There is mild soft tissue edema of  both ankles more severe on the right.     No definite abscess is seen. Bilateral renal arteries appear patent.  There is narrowing at the origin of the celiac artery which appears  patent distal to this narrowing. Mild narrowing of the proximal superior  mesenteric artery is seen. Intramesenteric artery appears patent.     Small amount of high-attenuation material is seen in the gallbladder  which may represent sludge or faintly opaque stones. The liver and  spleen appear unremarkable. There is an approximately 1.9 cm low-density  lesion arising from the anterior aspect of the  pancreatic head/body  junction. This finding appears similar to its appearance on the  12/14/2022 study but better seen on today's study given IV contrast.     Both kidneys enhance normally. Adrenal glands are not enlarged. No bowel  wall thickening or bowel dilatation is seen. Uterus has been removed.  Urinary bladder is unremarkable.       Impression:      1. Atherosclerotic disease of the abdominal aorta and bilateral lower  extremities discussed in more detail above.  2. Low-density pancreatic lesion probably stable since the 12/14/2022  study.  3. Status post hysterectomy.  4. Small amount of high-attenuation material in the gallbladder may  represent hyperdense bile or possibly some very tiny faintly opaque  stones. No gallbladder inflammatory changes are seen.     Radiation dose reduction techniques were utilized, including automated  exposure control and exposure modulation based on body size.                 Interpretation Summary    Right Conclusion: The right YADIEL is severely reduced. Severe digital ischemia.    While the left ankle-brachial indices are within normal limits there is vessel incompressibility.  ABIs are likely falsely elevated.  Moderate digital ischemia noted.    Unable to determine level of disease due to vessel incompressibility but suspect multi level disease based upon waveforms.     Interpretation Summary    Right popliteal fossa fluid collection.    Normal right lower extremity venous duplex scan.      Current Facility-Administered Medications:     aspirin chewable tablet 81 mg, 81 mg, Oral, Daily, Uziel Mcrae MD, 81 mg at 07/08/23 0948    cefTRIAXone (ROCEPHIN) 1 g in sodium chloride 0.9 % 100 mL IVPB-VTB, 1 g, Intravenous, Q24H, Uziel Mcrae MD, Last Rate: 200 mL/hr at 07/07/23 1732, 1 g at 07/07/23 1732    dextrose (D50W) (25 g/50 mL) IV injection 25 g, 25 g, Intravenous, Q15 Min PRN, Uziel Mcrae MD    dextrose (GLUTOSE) oral gel 15 g, 15 g, Oral, Q15 Min PRN, Uziel Mcrae  MD    dilTIAZem CD (CARDIZEM CD) 24 hr capsule 180 mg, 180 mg, Oral, Q24H, Daisha Carlson MD, 180 mg at 07/08/23 0948    famotidine (PEPCID) tablet 20 mg, 20 mg, Oral, BID, Daisha Carlson MD, 20 mg at 07/08/23 0948    glucagon (GLUCAGEN) injection 1 mg, 1 mg, Intramuscular, Q15 Min PRN, Daisha Carlson MD    insulin lispro (HUMALOG/ADMELOG) injection 2-7 Units, 2-7 Units, Subcutaneous, 4x Daily AC & at Bedtime, Daisha Carlson MD, 4 Units at 07/08/23 1420    metoprolol tartrate (LOPRESSOR) tablet 25 mg, 25 mg, Oral, BID, Daisha Carlson MD, 25 mg at 07/08/23 0948    Pharmacy to dose warfarin, , Does not apply, Continuous PRN, Daisha Carlson MD    pregabalin (LYRICA) capsule 100 mg, 100 mg, Oral, Nightly, Daisha Carlson MD, 100 mg at 07/07/23 2114    sodium chloride 0.9 % infusion, 75 mL/hr, Intravenous, Continuous, Ayaan Sanches MD, Last Rate: 75 mL/hr at 07/07/23 1734, 75 mL/hr at 07/07/23 1734     ASSESSMENT  Right foot infected wound with surrounding cellulitis   Peripheral artery disease  Diabetes mellitus  Hypertension  Hyperlipidemia  Chronic atrial fibrillation     PLAN  CPM  IVF  Arteriogram on Monday  IV antibiotics per infectious disease  Vascular surgery consult appreciated  Nephrology and infectious disease to follow patient  Continue home medications  Stress ulcer DVT prophylaxis  Supportive care  DNR  Discussed with family nursing staff  Follow closely further recommendation current hospital course    DAISHA CARLSON MD    Copied text in this note has been reviewed and is accurate as of 07/08/23

## 2023-07-08 NOTE — PLAN OF CARE
Goal Outcome Evaluation:  Plan of Care Reviewed With: patient         No acute distress noted this shift, safety maintained, confused and uncooperative at times, did sleep some, continue plan of care

## 2023-07-08 NOTE — PROGRESS NOTES
Name: Bessie Molina ADMIT: 2023   : 1932  PCP: Mireille La MD    MRN: [id number] LOS: 2 days   AGE/SEX: 89 y.o. female  ROOM: [address]    [place], Subsequent Hospital Care    Chief Complaint   Patient presents with    Leg Pain     CC: Peripheral arterial disease with gangrene follow-up.  Subjective     89 y.o. female resting comfortably this morning.  Somewhat disoriented, trying to hug the aide.    Review of Systems complains of pain in the foot on the right    Objective     Scheduled Medications:   aspirin, 81 mg, Oral, Daily  cefTRIAXone, 1 g, Intravenous, Q24H  dilTIAZem CD, 180 mg, Oral, Q24H  famotidine, 20 mg, Oral, BID  insulin lispro, 2-7 Units, Subcutaneous, 4x Daily AC & at Bedtime  metoprolol tartrate, 25 mg, Oral, BID  pregabalin, 100 mg, Oral, Nightly        Active Infusions:  Pharmacy to dose warfarin,   sodium chloride, 75 mL/hr, Last Rate: 75 mL/hr (23 1734)        As Needed Medications:    dextrose    dextrose    glucagon (human recombinant)    Pharmacy to dose warfarin    Vital Signs  Vital Signs Patient Vitals for the past 24 hrs:   BP Temp Temp src Pulse Resp SpO2   23 0734 156/85 98.4 °F (36.9 °C) Oral 87 17 98 %   23 2305 151/80 -- Oral 96 16 97 %   23 1944 120/60 -- Oral 96 16 --   23 1325 146/70 98.1 °F (36.7 °C) Oral 83 18 98 %       I/O:  I/O last 3 completed shifts:  In: 480 [P.O.:480]  Out: 2850 [Urine:2850]    Physical Exam:  Physical Exam   Lungs coarse but equal  Heart irregular  Abdomen benign  Extremities viable with right lateral foot redness tenderness and dry eschar to the right lateral toe fifth toe site dry gangrene in nature    Results Review:     CBC    Results from last 7 days   Lab Units 23  0409 23  0419 23  1219   WBC 10*3/mm3 10.02 10.15 11.52*   HEMOGLOBIN g/dL 12.5 12.9 13.7   PLATELETS 10*3/mm3 260 256 287       BMP   Results from last 7 days   Lab Units 23  5498  07/07/23  0419 07/06/23  1219   SODIUM mmol/L 145 144 136   POTASSIUM mmol/L 4.0 3.7 3.6   CHLORIDE mmol/L 108* 106 98   CO2 mmol/L 27.4 28.0 24.5   BUN mg/dL 25* 26* 31*   CREATININE mg/dL 1.13* 1.29* 1.47*   GLUCOSE mg/dL 94 143* 256*   PHOSPHORUS mg/dL 3.4  --   --        Cr Clearance Estimated Creatinine Clearance: 27.7 mL/min (A) (by C-G formula based on SCr of 1.13 mg/dL (H)).  Coag   Results from last 7 days   Lab Units 07/08/23  0409 07/07/23  0419 07/06/23  1905 07/06/23  1219   INR  2.19* 2.37* 2.44* 2.16*   APTT seconds  --   --   --  32.8       HbA1C   Lab Results   Component Value Date    HGBA1C 7.30 (H) 07/07/2023    HGBA1C 7.20 (H) 12/14/2022    HGBA1C 8.20 (H) 12/21/2021     Blood Glucose   Glucose   Date/Time Value Ref Range Status   07/08/2023 1208 269 (H) 70 - 130 mg/dL Final     Comment:     Meter: [id number] : [id number] Luke Leyva NA   07/08/2023 0759 110 70 - 130 mg/dL Final     Comment:     Meter: [id number] : [id number] Luke Leyva NA   07/07/2023 2201 236 (H) 70 - 130 mg/dL Final     Comment:     Meter: [id number] : [id number] Figueroaleonidas Gallegos NA   07/07/2023 1643 213 (H) 70 - 130 mg/dL Final     Comment:     Meter: [id number] : [id number] Abhijit Olya  NA   07/07/2023 1143 194 (H) 70 - 130 mg/dL Final     Comment:     Meter: [id number] : [id number] Abhijit Olya  NA   07/07/2023 0747 145 (H) 70 - 130 mg/dL Final     Comment:     Meter: [id number] : [id number] Abhijit Olya  NA   07/06/2023 2034 203 (H) 70 - 130 mg/dL Final     Comment:     Meter: [id number] : [id number] Jeanneroque Haider ARDEN   07/06/2023 1716 124 70 - 130 mg/dL Final     Comment:     Meter: [id number] : [id number] Rusher Ryleigh NA     Infection   Results from last 7 days   Lab Units 07/06/23  1235 07/06/23  1219   BLOODCX  No growth at 2 days No growth at 2 days   PROCALCITONIN ng/mL  --  0.04       CMP   Results from last 7 days   Lab Units 07/08/23  0409 07/07/23  0410  07/06/23  1219   SODIUM mmol/L 145 144 136   POTASSIUM mmol/L 4.0 3.7 3.6   CHLORIDE mmol/L 108* 106 98   CO2 mmol/L 27.4 28.0 24.5   BUN mg/dL 25* 26* 31*   CREATININE mg/dL 1.13* 1.29* 1.47*   GLUCOSE mg/dL 94 143* 256*   ALBUMIN g/dL 3.6 3.6 4.3   BILIRUBIN mg/dL  --  0.3 0.5   ALK PHOS U/L  --  85 105   AST (SGOT) U/L  --  19 29   ALT (SGPT) U/L  --  18 25       ABG      UA        Assessment & Plan     Assessment & Plan      Cellulitis of right foot            89 y.o. female severe peripheral vascular disease with gangrenous ulcer with surrounding cellulitis of her fifth lateral toe on the right foot.  She has had prior interventions done at [facility] in December 2022.  Based on her lower extremity arterial Doppler she has severe disease likely at multiple levels.  She has chronic kidney disease.  After her interventions in [facility] she had hospitalization for congestive heart failure.     7/6/2023: We will start simple Betadine dressing changes to her right foot wound.  I do not feel there is any overwhelming drivers of sepsis currently in her right foot.  We will need further work-up with CT angiogram with runoff given clear evidence of multi level disease on Doppler.  Prior to administration of contrast we will ask nephrology to see to optimize her and better understand risk of contrast-induced nephropathy in this patient.    7/7/2023: Appreciate nephrology's evaluation and assistance.  Will obtain CT angiogram today.  Afternoon update: CT angiogram reviewed.  Severe multilevel infrainguinal disease noted.  Heavily calcified vessels seen.  Plan for arteriogram Monday afternoon.  We will hold warfarin.  Would prefer prefer INR to be below 2 for procedure    7/8/2023: Patient still has pain and redness in the right lateral foot.  Wound is not weeping or expanding.  Plans for arteriogram on Monday.  May have to give a small amount of vitamin K if INR does not continue to drop.    Beau Villalba,  MD  07/08/23  12:50 EDT    Please call my office with any question: [phone]    Active Hospital Problems    Diagnosis  POA    **Cellulitis of right foot [L03.115]  Yes      Resolved Hospital Problems   No resolved problems to display.

## 2023-07-09 LAB
ABO GROUP BLD: NORMAL
ANION GAP SERPL CALCULATED.3IONS-SCNC: 10 MMOL/L (ref 5–15)
BASOPHILS # BLD AUTO: 0.1 10*3/MM3 (ref 0–0.2)
BASOPHILS NFR BLD AUTO: 1 % (ref 0–1.5)
BLD GP AB SCN SERPL QL: NEGATIVE
BUN SERPL-MCNC: 25 MG/DL (ref 8–23)
BUN/CREAT SERPL: 17.6 (ref 7–25)
CALCIUM SPEC-SCNC: 9.6 MG/DL (ref 8.2–9.6)
CHLORIDE SERPL-SCNC: 108 MMOL/L (ref 98–107)
CO2 SERPL-SCNC: 26 MMOL/L (ref 22–29)
CREAT SERPL-MCNC: 1.42 MG/DL (ref 0.57–1)
DEPRECATED RDW RBC AUTO: 47.4 FL (ref 37–54)
EGFRCR SERPLBLD CKD-EPI 2021: 35 ML/MIN/1.73
EOSINOPHIL # BLD AUTO: 0.3 10*3/MM3 (ref 0–0.4)
EOSINOPHIL NFR BLD AUTO: 3 % (ref 0.3–6.2)
ERYTHROCYTE [DISTWIDTH] IN BLOOD BY AUTOMATED COUNT: 12.8 % (ref 12.3–15.4)
GLUCOSE BLDC GLUCOMTR-MCNC: 114 MG/DL (ref 70–130)
GLUCOSE BLDC GLUCOMTR-MCNC: 128 MG/DL (ref 70–130)
GLUCOSE BLDC GLUCOMTR-MCNC: 219 MG/DL (ref 70–130)
GLUCOSE BLDC GLUCOMTR-MCNC: 248 MG/DL (ref 70–130)
GLUCOSE SERPL-MCNC: 103 MG/DL (ref 65–99)
HCT VFR BLD AUTO: 36.9 % (ref 34–46.6)
HGB BLD-MCNC: 12 G/DL (ref 12–15.9)
IMM GRANULOCYTES # BLD AUTO: 0.05 10*3/MM3 (ref 0–0.05)
IMM GRANULOCYTES NFR BLD AUTO: 0.5 % (ref 0–0.5)
INR PPP: 1.92 (ref 0.9–1.1)
LYMPHOCYTES # BLD AUTO: 4.51 10*3/MM3 (ref 0.7–3.1)
LYMPHOCYTES NFR BLD AUTO: 44.6 % (ref 19.6–45.3)
MCH RBC QN AUTO: 32.6 PG (ref 26.6–33)
MCHC RBC AUTO-ENTMCNC: 32.5 G/DL (ref 31.5–35.7)
MCV RBC AUTO: 100.3 FL (ref 79–97)
MONOCYTES # BLD AUTO: 0.99 10*3/MM3 (ref 0.1–0.9)
MONOCYTES NFR BLD AUTO: 9.8 % (ref 5–12)
NEUTROPHILS NFR BLD AUTO: 4.16 10*3/MM3 (ref 1.7–7)
NEUTROPHILS NFR BLD AUTO: 41.1 % (ref 42.7–76)
NRBC BLD AUTO-RTO: 0 /100 WBC (ref 0–0.2)
PLATELET # BLD AUTO: 253 10*3/MM3 (ref 140–450)
PMV BLD AUTO: 9.6 FL (ref 6–12)
POTASSIUM SERPL-SCNC: 4.6 MMOL/L (ref 3.5–5.2)
PROTHROMBIN TIME: 22.4 SECONDS (ref 11.7–14.2)
RBC # BLD AUTO: 3.68 10*6/MM3 (ref 3.77–5.28)
RH BLD: POSITIVE
SODIUM SERPL-SCNC: 144 MMOL/L (ref 136–145)
T&S EXPIRATION DATE: NORMAL
WBC NRBC COR # BLD: 10.11 10*3/MM3 (ref 3.4–10.8)

## 2023-07-09 PROCEDURE — 82948 REAGENT STRIP/BLOOD GLUCOSE: CPT

## 2023-07-09 PROCEDURE — 86900 BLOOD TYPING SEROLOGIC ABO: CPT | Performed by: SURGERY

## 2023-07-09 PROCEDURE — 86901 BLOOD TYPING SEROLOGIC RH(D): CPT | Performed by: SURGERY

## 2023-07-09 PROCEDURE — 85610 PROTHROMBIN TIME: CPT | Performed by: HOSPITALIST

## 2023-07-09 PROCEDURE — 86850 RBC ANTIBODY SCREEN: CPT | Performed by: SURGERY

## 2023-07-09 PROCEDURE — 63710000001 INSULIN LISPRO (HUMAN) PER 5 UNITS: Performed by: HOSPITALIST

## 2023-07-09 PROCEDURE — 25010000002 CEFTRIAXONE PER 250 MG: Performed by: HOSPITALIST

## 2023-07-09 PROCEDURE — 85025 COMPLETE CBC W/AUTO DIFF WBC: CPT | Performed by: HOSPITALIST

## 2023-07-09 PROCEDURE — 80048 BASIC METABOLIC PNL TOTAL CA: CPT | Performed by: HOSPITALIST

## 2023-07-09 RX ORDER — ACETAMINOPHEN 325 MG/1
650 TABLET ORAL EVERY 4 HOURS PRN
Status: DISCONTINUED | OUTPATIENT
Start: 2023-07-09 | End: 2023-07-18

## 2023-07-09 RX ADMIN — PREGABALIN 100 MG: 100 CAPSULE ORAL at 21:10

## 2023-07-09 RX ADMIN — FAMOTIDINE 20 MG: 20 TABLET, FILM COATED ORAL at 12:32

## 2023-07-09 RX ADMIN — CEFTRIAXONE SODIUM 1 G: 1 INJECTION, POWDER, FOR SOLUTION INTRAMUSCULAR; INTRAVENOUS at 18:45

## 2023-07-09 RX ADMIN — METOPROLOL TARTRATE 25 MG: 25 TABLET, FILM COATED ORAL at 12:32

## 2023-07-09 RX ADMIN — FAMOTIDINE 20 MG: 20 TABLET, FILM COATED ORAL at 21:10

## 2023-07-09 RX ADMIN — ASPIRIN 81 MG: 81 TABLET, CHEWABLE ORAL at 12:32

## 2023-07-09 RX ADMIN — METOPROLOL TARTRATE 25 MG: 25 TABLET, FILM COATED ORAL at 21:10

## 2023-07-09 RX ADMIN — INSULIN LISPRO 3 UNITS: 100 INJECTION, SOLUTION INTRAVENOUS; SUBCUTANEOUS at 21:10

## 2023-07-09 RX ADMIN — DILTIAZEM HYDROCHLORIDE 180 MG: 180 CAPSULE, COATED, EXTENDED RELEASE ORAL at 12:32

## 2023-07-09 RX ADMIN — INSULIN LISPRO 3 UNITS: 100 INJECTION, SOLUTION INTRAVENOUS; SUBCUTANEOUS at 12:32

## 2023-07-09 NOTE — PROGRESS NOTES
"Daily progress note    Primary care physician  Dr. JASSO    Chief complaint  Awake and alert and feeling better with no specific complaints.    History of present illness  89-year-old white female with history of chronic atrial fibrillation diabetes mellitus hypertension hyperlipidemia peripheral artery disease and dementia who lives at home with her daughter brought to the emergency room with right foot redness swelling pain for last 1 month which is treated by primary care physician with oral antibiotics without any improvement.  Patient has infection around right fifth toe with surrounding cellulitis admitted for management.  Patient remained awake and alert and denies any chest pain shortness of breath palpitation no fever chills abdominal pain nausea vomiting diarrhea.  Most of the history obtained from the daughter and son-in-law but patient also contributed.     REVIEW OF SYSTEMS  Unremarkable except generalized weakness     PHYSICAL EXAM   Blood pressure 151/75, pulse 96, temperature 98.1 °F (36.7 °C), temperature source Oral, resp. rate 17, height 157.5 cm (62\"), weight 66.7 kg (147 lb), SpO2 95 %.    General: Awake and alert no acute distress.  HENT: NCAT, PERRL, Nares patent.  Eyes: no scleral icterus.  Neck: trachea midline, no ROM limitations.  CV: Irregularly irregular S1-S2  Respiratory: normal effort, CTAB.  Abdomen: soft, nondistended, nontender bowel sounds positive  Musculoskeletal: no deformity.  Neuro: alert, moves all extremities, follows commands.  Skin: warm, dry.  Right foot: Patient has ulceration on the lateral base of the right fifth toe, erythema and swelling extending into the dorsum of the foot, no erythema but swelling of the right calf, no palpable cords, negative Homans.     LAB RESULTS  Lab Results (last 24 hours)       Procedure Component Value Units Date/Time    Blood Culture - Blood, Arm, Left [[id number]]  (Normal) Collected: 07/06/23 1235    Specimen: Blood from Arm, Left " Updated: 07/09/23 1245     Blood Culture No growth at 3 days    Blood Culture - Blood, Arm, Right [[id number]]  (Normal) Collected: 07/06/23 1219    Specimen: Blood from Arm, Right Updated: 07/09/23 1230     Blood Culture No growth at 3 days    POC Glucose Once [[id number]]  (Abnormal) Collected: 07/09/23 1132    Specimen: Blood Updated: 07/09/23 1135     Glucose 248 mg/dL      Comment: Meter: [id number] : [id number] [employer] NA       POC Glucose Once [[id number]]  (Normal) Collected: 07/09/23 0822    Specimen: Blood Updated: 07/09/23 0824     Glucose 114 mg/dL      Comment: Meter: [id number] : [id number] [employer] NA       Basic Metabolic Panel [[id number]]  (Abnormal) Collected: 07/09/23 0426    Specimen: Blood Updated: 07/09/23 0540     Glucose 103 mg/dL      BUN 25 mg/dL      Creatinine 1.42 mg/dL      Sodium 144 mmol/L      Potassium 4.6 mmol/L      Chloride 108 mmol/L      CO2 26.0 mmol/L      Calcium 9.6 mg/dL      BUN/Creatinine Ratio 17.6     Anion Gap 10.0 mmol/L      eGFR 35.0 mL/min/1.73     Narrative:      GFR Normal >60  Chronic Kidney Disease <60  Kidney Failure <15    The GFR formula is only valid for adults with stable renal function between ages 18 and 70.    Protime-INR [[id number]]  (Abnormal) Collected: 07/09/23 0426    Specimen: Blood Updated: 07/09/23 0530     Protime 22.4 Seconds      INR 1.92    CBC & Differential [[id number]]  (Abnormal) Collected: 07/09/23 0426    Specimen: Blood Updated: 07/09/23 0523    Narrative:      The following orders were created for panel order CBC & Differential.  Procedure                               Abnormality         Status                     ---------                               -----------         ------                     CBC Auto Differential[[id number]]        Abnormal            Final result                 Please view results for these tests on the individual orders.    CBC Auto Differential [[id number]]  (Abnormal) Collected: 07/09/23 0426     Specimen: Blood Updated: 07/09/23 0523     WBC 10.11 10*3/mm3      RBC 3.68 10*6/mm3      Hemoglobin 12.0 g/dL      Hematocrit 36.9 %      .3 fL      MCH 32.6 pg      MCHC 32.5 g/dL      RDW 12.8 %      RDW-SD 47.4 fl      MPV 9.6 fL      Platelets 253 10*3/mm3      Neutrophil % 41.1 %      Lymphocyte % 44.6 %      Monocyte % 9.8 %      Eosinophil % 3.0 %      Basophil % 1.0 %      Immature Grans % 0.5 %      Neutrophils, Absolute 4.16 10*3/mm3      Lymphocytes, Absolute 4.51 10*3/mm3      Monocytes, Absolute 0.99 10*3/mm3      Eosinophils, Absolute 0.30 10*3/mm3      Basophils, Absolute 0.10 10*3/mm3      Immature Grans, Absolute 0.05 10*3/mm3      nRBC 0.0 /100 WBC     POC Glucose Once [[id number]]  (Abnormal) Collected: 07/08/23 2024    Specimen: Blood Updated: 07/08/23 2025     Glucose 208 mg/dL      Comment: Meter: [id number] : [id number] Norbertodavide Jensen NING       POC Glucose Once [[id number]]  (Normal) Collected: 07/08/23 1614    Specimen: Blood Updated: 07/08/23 1616     Glucose 128 mg/dL      Comment: Meter: [id number] : [id number] Luke HER             Imaging Results (Last 24 Hours)       Procedure Component Value Units Date/Time    CT Angio Abdominal Aorta Bilateral Iliofem Runoff [[id number]] Collected: 07/07/23 1615     Updated: 07/09/23 1405    Narrative:      CT ANGIOGRAM OF THE ABDOMEN AND PELVIS INCLUDING BILATERAL LOWER  EXTREMITY RUNOFFS 07/07/2023     HISTORY: Peripheral artery disease with right foot ulcer.     Following the intravenous contrast injection, CT angiography was  performed through the abdomen, pelvis and bilateral lower extremities.     There is atherosclerotic calcification of the abdominal aorta with no  evidence of aneurysm or occlusion. Bilateral common iliac and bilateral  internal and external iliac arteries show some mild atherosclerotic  calcification but appear patent. The bilateral common femoral arteries  show some atherosclerotic calcification but appear  patent. The profunda  femoral and superficial femoral arteries appear patent. There is  bilateral superficial femoral artery atherosclerotic calcification. On  images 1185 through 1192 there appears to be a tiny filling defect in  the right superficial femoral artery which may represent a tiny  nonocclusive thrombus. There is moderately severe narrowing of the left  superficial femoral artery at its origin best seen on axial images 951  through 963. Bilateral superficial femoral arteries appear patent to the  popliteal artery. There is narrowing of the right superficial femoral  artery compared to the left just above the knee. Both popliteal arteries  show some atherosclerotic calcification but appear patent. There is  moderately severe narrowing of the right popliteal artery seen on axial  images 1842 through 1889. There also is narrowing of the most distal  aspect of the popliteal artery on the right on axial images 1979 through  2002. Three trifurcation arteries are seen which appear somewhat small  on the right with at least 2 of these coursing into the ankle. On the  left there is mild irregularity and narrowing of the popliteal artery  with 3 trifurcation arteries seen. Two small trifurcation arteries  course into the ankle on the left. There is mild soft tissue edema of  both ankles more severe on the right.     No definite abscess is seen. Bilateral renal arteries appear patent.  There is narrowing at the origin of the celiac artery which appears  patent distal to this narrowing. Mild narrowing of the proximal superior  mesenteric artery is seen. Intramesenteric artery appears patent.     Small amount of high-attenuation material is seen in the gallbladder  which may represent sludge or faintly opaque stones. The liver and  spleen appear unremarkable. There is an approximately 1.9 cm low-density  lesion arising from the anterior aspect of the pancreatic head/body  junction. This finding appears similar to  its appearance on the  12/14/2022 study but better seen on today's study given IV contrast.     Both kidneys enhance normally. Adrenal glands are not enlarged. No bowel  wall thickening or bowel dilatation is seen. Uterus has been removed.  Urinary bladder is unremarkable.       Impression:      1. Atherosclerotic disease of the abdominal aorta and bilateral lower  extremities discussed in more detail above.  2. Low-density pancreatic lesion probably stable since the 12/14/2022  study.  3. Status post hysterectomy.  4. Small amount of high-attenuation material in the gallbladder may  represent hyperdense bile or possibly some very tiny faintly opaque  stones. No gallbladder inflammatory changes are seen.     Radiation dose reduction techniques were utilized, including automated  exposure control and exposure modulation based on body size.     This report was finalized on 7/9/2023 2:02 PM by Dr. Sarkis Tobar M.D.              Interpretation Summary    Right Conclusion: The right YADIEL is severely reduced. Severe digital ischemia.    While the left ankle-brachial indices are within normal limits there is vessel incompressibility.  ABIs are likely falsely elevated.  Moderate digital ischemia noted.    Unable to determine level of disease due to vessel incompressibility but suspect multi level disease based upon waveforms.     Interpretation Summary    Right popliteal fossa fluid collection.    Normal right lower extremity venous duplex scan.      Current Facility-Administered Medications:     aspirin chewable tablet 81 mg, 81 mg, Oral, Daily, Uziel Mcrae MD, 81 mg at 07/09/23 1232    cefTRIAXone (ROCEPHIN) 1 g in sodium chloride 0.9 % 100 mL IVPB-VTB, 1 g, Intravenous, Q24H, Uziel Mcrae MD, Last Rate: 200 mL/hr at 07/08/23 1618, 1 g at 07/08/23 1618    dextrose (D50W) (25 g/50 mL) IV injection 25 g, 25 g, Intravenous, Q15 Min PRN, Uziel Mcrae MD    dextrose (GLUTOSE) oral gel 15 g, 15 g, Oral, Q15 Min PRN,  Daisha Mcrae MD    dilTIAZem CD (CARDIZEM CD) 24 hr capsule 180 mg, 180 mg, Oral, Q24H, Daisha Mcrae MD, 180 mg at 07/09/23 1232    famotidine (PEPCID) tablet 20 mg, 20 mg, Oral, BID, Daisha Mcrae MD, 20 mg at 07/09/23 1232    glucagon (GLUCAGEN) injection 1 mg, 1 mg, Intramuscular, Q15 Min PRN, Daisha Mcrae MD    insulin lispro (HUMALOG/ADMELOG) injection 2-7 Units, 2-7 Units, Subcutaneous, 4x Daily AC & at Bedtime, Daisha Mcrae MD, 3 Units at 07/09/23 1232    metoprolol tartrate (LOPRESSOR) tablet 25 mg, 25 mg, Oral, BID, Daisha Mcrae MD, 25 mg at 07/09/23 1232    Pharmacy to dose warfarin, , Does not apply, Continuous PRN, Daisha Mcrae MD    pregabalin (LYRICA) capsule 100 mg, 100 mg, Oral, Nightly, Daisha Mcrae MD, 100 mg at 07/08/23 2024    sodium chloride 0.45 % infusion, 50 mL/hr, Intravenous, Continuous, Daisha Mcrae MD, Last Rate: 50 mL/hr at 07/08/23 1618, 50 mL/hr at 07/08/23 1618     ASSESSMENT  Right foot infected wound with surrounding cellulitis   Peripheral artery disease  Diabetes mellitus  Hypertension  Hyperlipidemia  Chronic atrial fibrillation     PLAN  CPM  Continue IVF  Arteriogram in a.m.  Continue IV antibiotics per infectious disease  Vascular surgery consult appreciated  Nephrology and infectious disease to follow patient  Continue home medications  Stress ulcer DVT prophylaxis  Supportive care  DNR  Discussed with family nursing staff  Follow closely further recommendation current hospital course    DAISHA MCRAE MD    Copied text in this note has been reviewed and is accurate as of 07/09/23

## 2023-07-09 NOTE — PROGRESS NOTES
Name: Bessie Molina ADMIT: 2023   : 1932  PCP: Mireille La MD    MRN: [id number] LOS: 3 days   AGE/SEX: 89 y.o. female  ROOM: [address]    [place], Subsequent Hospital Care    Chief Complaint   Patient presents with    Leg Pain     CC: Peripheral arterial disease with gangrene follow-up.  Subjective     89 y.o. female very sleepy this morning but does arouse.  Review of Systems denies current pain in the foot      Objective     Scheduled Medications:   aspirin, 81 mg, Oral, Daily  cefTRIAXone, 1 g, Intravenous, Q24H  dilTIAZem CD, 180 mg, Oral, Q24H  famotidine, 20 mg, Oral, BID  insulin lispro, 2-7 Units, Subcutaneous, 4x Daily AC & at Bedtime  metoprolol tartrate, 25 mg, Oral, BID  pregabalin, 100 mg, Oral, Nightly        Active Infusions:  Pharmacy to dose warfarin,   sodium chloride, 50 mL/hr, Last Rate: 50 mL/hr (23 1618)        As Needed Medications:    dextrose    dextrose    glucagon (human recombinant)    Pharmacy to dose warfarin    Vital Signs  Vital Signs Patient Vitals for the past 24 hrs:   BP Temp Temp src Pulse Resp SpO2 Weight   23 0733 151/75 98.1 °F (36.7 °C) Oral 96 17 95 % --   23 0508 -- -- -- -- -- -- 66.7 kg (147 lb)   23 2315 123/62 98.7 °F (37.1 °C) Oral 85 18 99 % --   23 157/74 97.8 °F (36.6 °C) Oral 92 18 -- --   23 1300 114/76 98.7 °F (37.1 °C) Oral 95 17 99 % --       I/O:  I/O last 3 completed shifts:  In: 0   Out:  [Urine:]    Physical Exam:  Physical Exam   Lungs coarse but equal  Heart irregular  Abdomen benign  Extremities viable with right lateral foot redness tenderness improved with persistent dry eschar to the right lateral toe fifth toe site dry gangrene in nature    Results Review:     CBC    Results from last 7 days   Lab Units 23  0426 23  0409 23  0419 23  1219   WBC 10*3/mm3 10.11 10.02 10.15 11.52*   HEMOGLOBIN g/dL 12.0 12.5 12.9 13.7   PLATELETS 10*3/mm3  253 260 256 287       BMP   Results from last 7 days   Lab Units 07/09/23  0426 07/08/23  0409 07/07/23  0419 07/06/23  1219   SODIUM mmol/L 144 145 144 136   POTASSIUM mmol/L 4.6 4.0 3.7 3.6   CHLORIDE mmol/L 108* 108* 106 98   CO2 mmol/L 26.0 27.4 28.0 24.5   BUN mg/dL 25* 25* 26* 31*   CREATININE mg/dL 1.42* 1.13* 1.29* 1.47*   GLUCOSE mg/dL 103* 94 143* 256*   PHOSPHORUS mg/dL  --  3.4  --   --        Cr Clearance Estimated Creatinine Clearance: 23.1 mL/min (A) (by C-G formula based on SCr of 1.42 mg/dL (H)).  Coag   Results from last 7 days   Lab Units 07/09/23  0426 07/08/23  0409 07/07/23  0419 07/06/23  1905 07/06/23  1219   INR  1.92* 2.19* 2.37* 2.44* 2.16*   APTT seconds  --   --   --   --  32.8       HbA1C   Lab Results   Component Value Date    HGBA1C 7.30 (H) 07/07/2023    HGBA1C 7.20 (H) 12/14/2022    HGBA1C 8.20 (H) 12/21/2021     Blood Glucose   Glucose   Date/Time Value Ref Range Status   07/09/2023 0822 114 70 - 130 mg/dL Final     Comment:     Meter: [id number] : [id number] Luke Leyva NA   07/08/2023 2024 208 (H) 70 - 130 mg/dL Final     Comment:     Meter: [id number] : [id number] Norberto Jensen NING   07/08/2023 1614 128 70 - 130 mg/dL Final     Comment:     Meter: [id number] : [id number] Butler Autumn NA   07/08/2023 1208 269 (H) 70 - 130 mg/dL Final     Comment:     Meter: [id number] : [id number] Luke Moyae NA   07/08/2023 0759 110 70 - 130 mg/dL Final     Comment:     Meter: [id number] : [id number] Butler Autumn NA   07/07/2023 2201 236 (H) 70 - 130 mg/dL Final     Comment:     Meter: [id number] : [id number] Figueroaleonidas Gallegos NA   07/07/2023 1643 213 (H) 70 - 130 mg/dL Final     Comment:     Meter: [id number] : [id number] Abhijit HER   07/07/2023 1143 194 (H) 70 - 130 mg/dL Final     Comment:     Meter: [id number] : [id number] Abhijit HER     Infection   Results from last 7 days   Lab Units 07/06/23  1235 07/06/23  1219   BLOODCX  No growth at 2 days  No growth at 2 days   PROCALCITONIN ng/mL  --  0.04       CMP   Results from last 7 days   Lab Units 07/09/23  0426 07/08/23  0409 07/07/23  0419 07/06/23  1219   SODIUM mmol/L 144 145 144 136   POTASSIUM mmol/L 4.6 4.0 3.7 3.6   CHLORIDE mmol/L 108* 108* 106 98   CO2 mmol/L 26.0 27.4 28.0 24.5   BUN mg/dL 25* 25* 26* 31*   CREATININE mg/dL 1.42* 1.13* 1.29* 1.47*   GLUCOSE mg/dL 103* 94 143* 256*   ALBUMIN g/dL  --  3.6 3.6 4.3   BILIRUBIN mg/dL  --   --  0.3 0.5   ALK PHOS U/L  --   --  85 105   AST (SGOT) U/L  --   --  19 29   ALT (SGPT) U/L  --   --  18 25       ABG      UA        Assessment & Plan     Assessment & Plan      Cellulitis of right foot            89 y.o. female severe peripheral vascular disease with gangrenous ulcer with surrounding cellulitis of her fifth lateral toe on the right foot.  She has had prior interventions done at [facility] in December 2022.  Based on her lower extremity arterial Doppler she has severe disease likely at multiple levels.  She has chronic kidney disease.  After her interventions in [facility] she had hospitalization for congestive heart failure.     7/6/2023: We will start simple Betadine dressing changes to her right foot wound.  I do not feel there is any overwhelming drivers of sepsis currently in her right foot.  We will need further work-up with CT angiogram with runoff given clear evidence of multi level disease on Doppler.  Prior to administration of contrast we will ask nephrology to see to optimize her and better understand risk of contrast-induced nephropathy in this patient.    7/7/2023: Appreciate nephrology's evaluation and assistance.  Will obtain CT angiogram today.  Afternoon update: CT angiogram reviewed.  Severe multilevel infrainguinal disease noted.  Heavily calcified vessels seen.  Plan for arteriogram Monday afternoon.  We will hold warfarin.  Would prefer prefer INR to be below 2 for procedure    7/8/2023: Patient still has pain and  redness in the right lateral foot.  Wound is not weeping or expanding.  Plans for arteriogram on Monday.  May have to give a small amount of vitamin K if INR does not continue to drop.    7/9/2023: INR has come down to 1.9.  Expected to fall further tomorrow morning.  Patient is now typed and screened if FFP is needed.  We will not pursue vitamin K.  She is preop for arteriogram with right leg intervention tomorrow afternoon and depending on her INR in the morning we will make a decision on FFP.  We will keep her n.p.o. after midnight and preop with consent likely needed from family.    Beau Villalba MD  07/09/23  11:23 EDT    Please call my office with any question: [phone]    Active Hospital Problems    Diagnosis  POA    **Cellulitis of right foot [L03.115]  Yes      Resolved Hospital Problems   No resolved problems to display.

## 2023-07-09 NOTE — PROGRESS NOTES
[facility] Clinical Pharmacy Services: Warfarin Dosing/Monitoring Consult    Bessie Molina is a 89 y.o. female, estimated creatinine clearance is 23.1 mL/min (A) (by C-G formula based on SCr of 1.42 mg/dL (H)). weighing 66.7 kg (147 lb).    Results from last 7 days   Lab Units 07/09/23  0426 07/08/23  0409 07/07/23  0419 07/06/23  1905 07/06/23  1219   INR  1.92* 2.19* 2.37* 2.44* 2.16*   APTT seconds  --   --   --   --  32.8   HEMOGLOBIN g/dL 12.0 12.5 12.9  --  13.7   HEMATOCRIT % 36.9 37.2 37.7  --  41.6   PLATELETS 10*3/mm3 253 260 256  --  287     Prior to admission anticoagulation: Per med reconciliation, the patient's home warfarin regimen is 4.5 mg on Mon/Wed/Fri and 3 mg on all other days of the week.    Hospital Anticoagulation:  Consulting provider: Dr. Mcrae  Start date: 7/6/23  Indication: Atrial fibrillation  Target INR: 2 - 3  Expected duration: Lifelong  Bridge Therapy: No    Potential food or drug interactions:   Ceftriaxone (moderate-new med in hospital)-concurrent use may enhance the anticoagulant effect of warfarin by platelet inhibition which can lead to irreversible platelet dysfunction.      Education complete?/Date: No; plan for follow up TBD    INR Assessment:  Date INR Dose   7/6 2.44 3 mg   7/7 2.37 Hold   7/8 2.19 Hold   7/9 1.92 Hold     Assessment/Plan:  INR is currently subtherapeutic at 1.92. Will continue to hold for upcoming procedure per Dr. Barbour's note.  Monitor for any signs or symptoms of bleeding.  Follow up daily INRs and dose adjustments.    Pharmacy will continue to follow until discharge or discontinuation of warfarin.     Yoli Fitzgerald, Pharm.D., [facility]   Clinical Pharmacist

## 2023-07-09 NOTE — PROGRESS NOTES
[facility] Progress Note      Patient Name: Bessie Molina  : 1932  MRN: [id number]  Primary Care Physician:  Mireille La MD  Date of admission: 2023    Subjective     Interval History:     Seen and examined.  Having her breakfast.  Appears to be better and less confused.  Answered all question properly.    Review of Systems:   As noted above    Objective     Vitals:   Temp:  [97.8 °F (36.6 °C)-98.7 °F (37.1 °C)] 98.1 °F (36.7 °C)  Heart Rate:  [85-96] 96  Resp:  [17-18] 17  BP: (114-157)/(62-76) 151/75    Intake/Output Summary (Last 24 hours) at 2023 0827  Last data filed at 2023 0733  Gross per 24 hour   Intake 0 ml   Output 2000 ml   Net -2000 ml       Physical Exam:    General Appearance: alert,  no acute distress   Skin: warm and dry  HEENT: oral mucosa normal, nonicteric sclera  Neck: supple, no JVD  Lungs: CTA  Heart: RRR, normal S1 and S2  Abdomen: soft, nontender, nondistended  : no palpable bladder  Extremities: no edema, cyanosis or clubbing    Scheduled Meds:     aspirin, 81 mg, Oral, Daily  cefTRIAXone, 1 g, Intravenous, Q24H  dilTIAZem CD, 180 mg, Oral, Q24H  famotidine, 20 mg, Oral, BID  insulin lispro, 2-7 Units, Subcutaneous, 4x Daily AC & at Bedtime  metoprolol tartrate, 25 mg, Oral, BID  pregabalin, 100 mg, Oral, Nightly      IV Meds:   Pharmacy to dose warfarin,   sodium chloride, 50 mL/hr, Last Rate: 50 mL/hr (23 1618)        Results Reviewed:   I have personally reviewed the results from the time of this admission to 2023 08:27 EDT     Results from last 7 days   Lab Units 23  0426 23  0409 23  0419 23  1219   SODIUM mmol/L 144 145 144 136   POTASSIUM mmol/L 4.6 4.0 3.7 3.6   CHLORIDE mmol/L 108* 108* 106 98   CO2 mmol/L 26.0 27.4 28.0 24.5   BUN mg/dL 25* 25* 26* 31*   CREATININE mg/dL 1.42* 1.13* 1.29* 1.47*   CALCIUM mg/dL 9.6 9.8* 9.6 9.7*   BILIRUBIN mg/dL  --   --  0.3 0.5   ALK PHOS U/L  --   --   85 105   ALT (SGPT) U/L  --   --  18 25   AST (SGOT) U/L  --   --  19 29   GLUCOSE mg/dL 103* 94 143* 256*     Estimated Creatinine Clearance: 23.1 mL/min (A) (by C-G formula based on SCr of 1.42 mg/dL (H)).  Results from last 7 days   Lab Units 07/08/23  0409   PHOSPHORUS mg/dL 3.4         Results from last 7 days   Lab Units 07/09/23  0426 07/08/23  0409 07/07/23  0419 07/06/23  1219   WBC 10*3/mm3 10.11 10.02 10.15 11.52*   HEMOGLOBIN g/dL 12.0 12.5 12.9 13.7   PLATELETS 10*3/mm3 253 260 256 287     Results from last 7 days   Lab Units 07/09/23  0426 07/08/23  0409 07/07/23  0419 07/06/23  1905 07/06/23  1219   INR  1.92* 2.19* 2.37* 2.44* 2.16*       Assessment / Plan     ASSESSMENT:    CKD stage 3B - Cr stable 1.4 -> 1.3 (BL ~ 1.5), at moderate risk of contrast nephropathy with needed CTA.  Volume and lytes are acceptable and kidney function at baseline  PVD + right foot cellulitis - vascular surg & ID following, on rocephin;  HTN - BP adequate on metop and cardizem (also for rate control)  PAF - on coumadin  Dm2, mgmt per primary     PLAN:      Continue current management.  Kidney function stable at baseline.  Surveillance labs    Thank you for involving us in the care of Bessie Molina.  Please feel free to call with any questions.    Sahnnon Sheets MD  07/09/23  08:27 EDT    [facility]  [phone]              No

## 2023-07-09 NOTE — PLAN OF CARE
Goal Outcome Evaluation:  Plan of Care Reviewed With: patient            No acute distress noted this shift, safety maintained, continue plan of care

## 2023-07-09 NOTE — PROGRESS NOTES
"  Infectious Diseases Progress Note    Rosa Tang MD     [facility]  [place]: 2 days  Patient Identification:  Name: Bessie Molina  Age: 89 y.o.  Sex: female  :  1932  MRN: [id number]         Primary Care Physician: Mireille La MD        Subjective: No new complaints.  Interval History: See consultation note    Objective:    Scheduled Meds:aspirin, 81 mg, Oral, Daily  cefTRIAXone, 1 g, Intravenous, Q24H  dilTIAZem CD, 180 mg, Oral, Q24H  famotidine, 20 mg, Oral, BID  insulin lispro, 2-7 Units, Subcutaneous, 4x Daily AC & at Bedtime  metoprolol tartrate, 25 mg, Oral, BID  pregabalin, 100 mg, Oral, Nightly      Continuous Infusions:Pharmacy to dose warfarin,   sodium chloride, 50 mL/hr, Last Rate: 50 mL/hr (23 1618)        Vital signs in last 24 hours:  Temp:  [97.8 °F (36.6 °C)-98.7 °F (37.1 °C)] 97.8 °F (36.6 °C)  Heart Rate:  [87-96] 92  Resp:  [16-18] 18  BP: (114-157)/(74-85) 157/74    Intake/Output:    Intake/Output Summary (Last 24 hours) at 20233  Last data filed at 2023 1700  Gross per 24 hour   Intake --   Output 1100 ml   Net -1100 ml       Exam:  /74 (BP Location: Right arm, Patient Position: Sitting)   Pulse 92   Temp 97.8 °F (36.6 °C) (Oral)   Resp 18   Ht 157.5 cm (62\")   Wt 60.3 kg (133 lb)   SpO2 99%   BMI 24.33 kg/m²   Patient is examined using the personal protective equipment as per guidelines from infection control for this particular patient as enacted.  Hand washing was performed before and after patient interaction.  General Appearance:  Awake interactive                          Head:    Normocephalic, without obvious abnormality, atraumatic                           Eyes:    PERRL, conjunctivae/corneas clear, EOM's intact, both eyes                         Throat:   Lips, tongue, gums normal; oral mucosa pink and moist                           Neck:   Supple, symmetrical, trachea midline, no JVD                         Lungs:    " Clear to auscultation bilaterally, respirations unlabored                 Chest Wall:    No tenderness or deformity                          Heart:  Irregularly irregular                  Abdomen:   Soft nontender                 Extremities:                               Skin: Cellulitic changes involving the right foot slightly better                  Neurologic: Awake and interactive       Data Review:    I reviewed the patient's new clinical results.  Results from last 7 days   Lab Units 07/08/23  0409 07/07/23  0419 07/06/23  1219   WBC 10*3/mm3 10.02 10.15 11.52*   HEMOGLOBIN g/dL 12.5 12.9 13.7   PLATELETS 10*3/mm3 260 256 287     Results from last 7 days   Lab Units 07/08/23  0409 07/07/23  0419 07/06/23  1219   SODIUM mmol/L 145 144 136   POTASSIUM mmol/L 4.0 3.7 3.6   CHLORIDE mmol/L 108* 106 98   CO2 mmol/L 27.4 28.0 24.5   BUN mg/dL 25* 26* 31*   CREATININE mg/dL 1.13* 1.29* 1.47*   CALCIUM mg/dL 9.8* 9.6 9.7*   GLUCOSE mg/dL 94 143* 256*     Microbiology Results (last 10 days)       Procedure Component Value - Date/Time    MRSA Screen, PCR (Inpatient) - Swab, Nares [[id number]]  (Normal) Collected: 07/07/23 0921    Lab Status: Final result Specimen: Swab from Nares Updated: 07/07/23 1039     MRSA PCR No MRSA Detected    Narrative:      The negative predictive value of this diagnostic test is high and should only be used to consider de-escalating anti-MRSA therapy. A positive result may indicate colonization with MRSA and must be correlated clinically.    Blood Culture - Blood, Arm, Left [[id number]]  (Normal) Collected: 07/06/23 1235    Lab Status: Preliminary result Specimen: Blood from Arm, Left Updated: 07/08/23 1245     Blood Culture No growth at 2 days    Blood Culture - Blood, Arm, Right [[id number]]  (Normal) Collected: 07/06/23 1219    Lab Status: Preliminary result Specimen: Blood from Arm, Right Updated: 07/08/23 1230     Blood Culture No growth at 2 days              Assessment:    Cellulitis of  right foot  1-ischemic/diabetic ulcer/cellulitis of the right foot due to peripheral vascular disease rendering antibiotic therapy unsuccessful with significant risk of toe/foot/limb loss and significant risk of contiguous focus osteomyelitis.  2-peripheral vascular disease  3-hypertension  4-atrial fibrillation  5-chronic kidney disease  6-diabetes mellitus  7-other diagnoses per primary team.     Recommendations/Discussions:  See my discussion and recommendations on 7/6/2023 noted   Vascular surgery plans noted  Continue present antibiotic therapy until definitive intervention is performed.  Rosa Tang MD  7/8/2023  21:33 EDT    Parts of this note may be an electronic transcription/translation of spoken language to printed text using the Dragon dictation system.

## 2023-07-10 ENCOUNTER — APPOINTMENT (OUTPATIENT)
Dept: GENERAL RADIOLOGY | Facility: HOSPITAL | Age: 88
DRG: 271 | End: 2023-07-10
Payer: MEDICARE

## 2023-07-10 LAB
ANION GAP SERPL CALCULATED.3IONS-SCNC: 11.6 MMOL/L (ref 5–15)
BASOPHILS # BLD AUTO: 0.07 10*3/MM3 (ref 0–0.2)
BASOPHILS NFR BLD AUTO: 0.7 % (ref 0–1.5)
BH CV LOW VAS RIGHT LESSER SAPH VESSEL: 1
BH CV LOWER VASCULAR LEFT COMMON FEMORAL AUGMENT: NORMAL
BH CV LOWER VASCULAR LEFT COMMON FEMORAL COMPETENT: NORMAL
BH CV LOWER VASCULAR LEFT COMMON FEMORAL COMPRESS: NORMAL
BH CV LOWER VASCULAR LEFT COMMON FEMORAL PHASIC: NORMAL
BH CV LOWER VASCULAR LEFT COMMON FEMORAL SPONT: NORMAL
BH CV LOWER VASCULAR RIGHT COMMON FEMORAL AUGMENT: NORMAL
BH CV LOWER VASCULAR RIGHT COMMON FEMORAL COMPETENT: NORMAL
BH CV LOWER VASCULAR RIGHT COMMON FEMORAL COMPRESS: NORMAL
BH CV LOWER VASCULAR RIGHT COMMON FEMORAL PHASIC: NORMAL
BH CV LOWER VASCULAR RIGHT COMMON FEMORAL SPONT: NORMAL
BH CV LOWER VASCULAR RIGHT DISTAL FEMORAL COMPRESS: NORMAL
BH CV LOWER VASCULAR RIGHT GASTRONEMIUS COMPRESS: NORMAL
BH CV LOWER VASCULAR RIGHT GREATER SAPH AK COMPRESS: NORMAL
BH CV LOWER VASCULAR RIGHT GREATER SAPH BK COMPRESS: NORMAL
BH CV LOWER VASCULAR RIGHT LESSER SAPH COMPRESS: NORMAL
BH CV LOWER VASCULAR RIGHT LESSER SAPH THROMBUS: NORMAL
BH CV LOWER VASCULAR RIGHT MID FEMORAL AUGMENT: NORMAL
BH CV LOWER VASCULAR RIGHT MID FEMORAL COMPETENT: NORMAL
BH CV LOWER VASCULAR RIGHT MID FEMORAL COMPRESS: NORMAL
BH CV LOWER VASCULAR RIGHT MID FEMORAL PHASIC: NORMAL
BH CV LOWER VASCULAR RIGHT MID FEMORAL SPONT: NORMAL
BH CV LOWER VASCULAR RIGHT PERONEAL COMPRESS: NORMAL
BH CV LOWER VASCULAR RIGHT POPLITEAL AUGMENT: NORMAL
BH CV LOWER VASCULAR RIGHT POPLITEAL COMPETENT: NORMAL
BH CV LOWER VASCULAR RIGHT POPLITEAL COMPRESS: NORMAL
BH CV LOWER VASCULAR RIGHT POPLITEAL PHASIC: NORMAL
BH CV LOWER VASCULAR RIGHT POPLITEAL SPONT: NORMAL
BH CV LOWER VASCULAR RIGHT POSTERIOR TIBIAL COMPRESS: NORMAL
BH CV LOWER VASCULAR RIGHT PROFUNDA FEMORAL COMPRESS: NORMAL
BH CV LOWER VASCULAR RIGHT PROXIMAL FEMORAL COMPRESS: NORMAL
BH CV LOWER VASCULAR RIGHT SAPHENOFEMORAL JUNCTION COMPRESS: NORMAL
BH CV VAS POP FLUID COLLECTED: 1
BUN SERPL-MCNC: 23 MG/DL (ref 8–23)
BUN/CREAT SERPL: 19.5 (ref 7–25)
CALCIUM SPEC-SCNC: 9 MG/DL (ref 8.2–9.6)
CHLORIDE SERPL-SCNC: 106 MMOL/L (ref 98–107)
CO2 SERPL-SCNC: 23.4 MMOL/L (ref 22–29)
CREAT SERPL-MCNC: 1.18 MG/DL (ref 0.57–1)
DEPRECATED RDW RBC AUTO: 48.7 FL (ref 37–54)
EGFRCR SERPLBLD CKD-EPI 2021: 43.7 ML/MIN/1.73
EOSINOPHIL # BLD AUTO: 0.22 10*3/MM3 (ref 0–0.4)
EOSINOPHIL NFR BLD AUTO: 2.3 % (ref 0.3–6.2)
ERYTHROCYTE [DISTWIDTH] IN BLOOD BY AUTOMATED COUNT: 13.1 % (ref 12.3–15.4)
GLUCOSE BLDC GLUCOMTR-MCNC: 127 MG/DL (ref 70–130)
GLUCOSE BLDC GLUCOMTR-MCNC: 184 MG/DL (ref 70–130)
GLUCOSE BLDC GLUCOMTR-MCNC: 226 MG/DL (ref 70–130)
GLUCOSE SERPL-MCNC: 116 MG/DL (ref 65–99)
HCT VFR BLD AUTO: 34.3 % (ref 34–46.6)
HGB BLD-MCNC: 11.4 G/DL (ref 12–15.9)
IMM GRANULOCYTES # BLD AUTO: 0.04 10*3/MM3 (ref 0–0.05)
IMM GRANULOCYTES NFR BLD AUTO: 0.4 % (ref 0–0.5)
INR PPP: 1.58 (ref 0.9–1.1)
LYMPHOCYTES # BLD AUTO: 3.92 10*3/MM3 (ref 0.7–3.1)
LYMPHOCYTES NFR BLD AUTO: 41.8 % (ref 19.6–45.3)
MCH RBC QN AUTO: 33.2 PG (ref 26.6–33)
MCHC RBC AUTO-ENTMCNC: 33.2 G/DL (ref 31.5–35.7)
MCV RBC AUTO: 100 FL (ref 79–97)
MONOCYTES # BLD AUTO: 0.91 10*3/MM3 (ref 0.1–0.9)
MONOCYTES NFR BLD AUTO: 9.7 % (ref 5–12)
NEUTROPHILS NFR BLD AUTO: 4.22 10*3/MM3 (ref 1.7–7)
NEUTROPHILS NFR BLD AUTO: 45.1 % (ref 42.7–76)
NRBC BLD AUTO-RTO: 0 /100 WBC (ref 0–0.2)
PLATELET # BLD AUTO: 247 10*3/MM3 (ref 140–450)
PMV BLD AUTO: 9.5 FL (ref 6–12)
POTASSIUM SERPL-SCNC: 3.6 MMOL/L (ref 3.5–5.2)
PROTHROMBIN TIME: 19.2 SECONDS (ref 11.7–14.2)
RBC # BLD AUTO: 3.43 10*6/MM3 (ref 3.77–5.28)
SODIUM SERPL-SCNC: 141 MMOL/L (ref 136–145)
WBC NRBC COR # BLD: 9.38 10*3/MM3 (ref 3.4–10.8)

## 2023-07-10 PROCEDURE — 25010000002 FENTANYL CITRATE (PF) 50 MCG/ML SOLUTION: Performed by: ANESTHESIOLOGY

## 2023-07-10 PROCEDURE — 0 BUPIVACAINE (PF) 0.25 % SOLUTION 10 ML VIAL: Performed by: SURGERY

## 2023-07-10 PROCEDURE — C1887 CATHETER, GUIDING: HCPCS | Performed by: SURGERY

## 2023-07-10 PROCEDURE — C1894 INTRO/SHEATH, NON-LASER: HCPCS | Performed by: SURGERY

## 2023-07-10 PROCEDURE — 25010000002 CEFAZOLIN IN DEXTROSE 2-4 GM/100ML-% SOLUTION: Performed by: SURGERY

## 2023-07-10 PROCEDURE — 85025 COMPLETE CBC W/AUTO DIFF WBC: CPT | Performed by: HOSPITALIST

## 2023-07-10 PROCEDURE — 0 LIDOCAINE 1 % SOLUTION 20 ML VIAL: Performed by: SURGERY

## 2023-07-10 PROCEDURE — C2623 CATH, TRANSLUMIN, DRUG-COAT: HCPCS | Performed by: SURGERY

## 2023-07-10 PROCEDURE — 25010000002 HEPARIN (PORCINE) PER 1000 UNITS: Performed by: SURGERY

## 2023-07-10 PROCEDURE — 25010000002 CEFTRIAXONE PER 250 MG: Performed by: SURGERY

## 2023-07-10 PROCEDURE — 04CP3ZZ EXTIRPATION OF MATTER FROM RIGHT ANTERIOR TIBIAL ARTERY, PERCUTANEOUS APPROACH: ICD-10-PCS | Performed by: SURGERY

## 2023-07-10 PROCEDURE — 04CM3ZZ EXTIRPATION OF MATTER FROM RIGHT POPLITEAL ARTERY, PERCUTANEOUS APPROACH: ICD-10-PCS | Performed by: SURGERY

## 2023-07-10 PROCEDURE — C1876 STENT, NON-COA/NON-COV W/DEL: HCPCS | Performed by: SURGERY

## 2023-07-10 PROCEDURE — 047K3DZ DILATION OF RIGHT FEMORAL ARTERY WITH INTRALUMINAL DEVICE, PERCUTANEOUS APPROACH: ICD-10-PCS | Performed by: SURGERY

## 2023-07-10 PROCEDURE — C1760 CLOSURE DEV, VASC: HCPCS | Performed by: SURGERY

## 2023-07-10 PROCEDURE — 80048 BASIC METABOLIC PNL TOTAL CA: CPT | Performed by: HOSPITALIST

## 2023-07-10 PROCEDURE — B41F1ZZ FLUOROSCOPY OF RIGHT LOWER EXTREMITY ARTERIES USING LOW OSMOLAR CONTRAST: ICD-10-PCS | Performed by: SURGERY

## 2023-07-10 PROCEDURE — C1725 CATH, TRANSLUMIN NON-LASER: HCPCS | Performed by: SURGERY

## 2023-07-10 PROCEDURE — 25510000001 IOPAMIDOL PER 1 ML: Performed by: SURGERY

## 2023-07-10 PROCEDURE — 25010000002 DROPERIDOL PER 5 MG: Performed by: ANESTHESIOLOGY

## 2023-07-10 PROCEDURE — 047M3ZZ DILATION OF RIGHT POPLITEAL ARTERY, PERCUTANEOUS APPROACH: ICD-10-PCS | Performed by: SURGERY

## 2023-07-10 PROCEDURE — 25010000002 ONDANSETRON PER 1 MG: Performed by: ANESTHESIOLOGY

## 2023-07-10 PROCEDURE — 85610 PROTHROMBIN TIME: CPT | Performed by: HOSPITALIST

## 2023-07-10 PROCEDURE — 82948 REAGENT STRIP/BLOOD GLUCOSE: CPT

## 2023-07-10 PROCEDURE — C1724 CATH, TRANS ATHEREC,ROTATION: HCPCS | Performed by: SURGERY

## 2023-07-10 PROCEDURE — C1769 GUIDE WIRE: HCPCS | Performed by: SURGERY

## 2023-07-10 PROCEDURE — 75710 ARTERY X-RAYS ARM/LEG: CPT

## 2023-07-10 PROCEDURE — 047P3ZZ DILATION OF RIGHT ANTERIOR TIBIAL ARTERY, PERCUTANEOUS APPROACH: ICD-10-PCS | Performed by: SURGERY

## 2023-07-10 DEVICE — STNT PROTEGE EVERFLX SEXP.035 6X60 120: Type: IMPLANTABLE DEVICE | Site: ARTERY FEMORAL | Status: FUNCTIONAL

## 2023-07-10 RX ORDER — SODIUM CHLORIDE, SODIUM LACTATE, POTASSIUM CHLORIDE, CALCIUM CHLORIDE 600; 310; 30; 20 MG/100ML; MG/100ML; MG/100ML; MG/100ML
9 INJECTION, SOLUTION INTRAVENOUS CONTINUOUS
Status: DISCONTINUED | OUTPATIENT
Start: 2023-07-10 | End: 2023-07-10

## 2023-07-10 RX ORDER — SODIUM CHLORIDE 0.9 % (FLUSH) 0.9 %
3 SYRINGE (ML) INJECTION EVERY 12 HOURS SCHEDULED
Status: DISCONTINUED | OUTPATIENT
Start: 2023-07-10 | End: 2023-07-10 | Stop reason: HOSPADM

## 2023-07-10 RX ORDER — WARFARIN SODIUM 3 MG/1
3 TABLET ORAL
Status: COMPLETED | OUTPATIENT
Start: 2023-07-10 | End: 2023-07-10

## 2023-07-10 RX ORDER — PROMETHAZINE HYDROCHLORIDE 25 MG/1
25 TABLET ORAL ONCE AS NEEDED
Status: DISCONTINUED | OUTPATIENT
Start: 2023-07-10 | End: 2023-07-10 | Stop reason: HOSPADM

## 2023-07-10 RX ORDER — NALOXONE HCL 0.4 MG/ML
0.2 VIAL (ML) INJECTION AS NEEDED
Status: DISCONTINUED | OUTPATIENT
Start: 2023-07-10 | End: 2023-07-10 | Stop reason: HOSPADM

## 2023-07-10 RX ORDER — EPHEDRINE SULFATE 50 MG/ML
5 INJECTION, SOLUTION INTRAVENOUS ONCE AS NEEDED
Status: DISCONTINUED | OUTPATIENT
Start: 2023-07-10 | End: 2023-07-10 | Stop reason: HOSPADM

## 2023-07-10 RX ORDER — HYDROCODONE BITARTRATE AND ACETAMINOPHEN 7.5; 325 MG/1; MG/1
1 TABLET ORAL EVERY 4 HOURS PRN
Status: DISCONTINUED | OUTPATIENT
Start: 2023-07-10 | End: 2023-07-10 | Stop reason: HOSPADM

## 2023-07-10 RX ORDER — ONDANSETRON 2 MG/ML
4 INJECTION INTRAMUSCULAR; INTRAVENOUS ONCE AS NEEDED
Status: COMPLETED | OUTPATIENT
Start: 2023-07-10 | End: 2023-07-10

## 2023-07-10 RX ORDER — DIPHENHYDRAMINE HYDROCHLORIDE 50 MG/ML
12.5 INJECTION INTRAMUSCULAR; INTRAVENOUS
Status: DISCONTINUED | OUTPATIENT
Start: 2023-07-10 | End: 2023-07-10 | Stop reason: HOSPADM

## 2023-07-10 RX ORDER — LIDOCAINE HYDROCHLORIDE 10 MG/ML
0.5 INJECTION, SOLUTION EPIDURAL; INFILTRATION; INTRACAUDAL; PERINEURAL ONCE AS NEEDED
Status: DISCONTINUED | OUTPATIENT
Start: 2023-07-10 | End: 2023-07-10 | Stop reason: HOSPADM

## 2023-07-10 RX ORDER — FLUMAZENIL 0.1 MG/ML
0.2 INJECTION INTRAVENOUS AS NEEDED
Status: DISCONTINUED | OUTPATIENT
Start: 2023-07-10 | End: 2023-07-10 | Stop reason: HOSPADM

## 2023-07-10 RX ORDER — CEFAZOLIN SODIUM 2 G/100ML
2 INJECTION, SOLUTION INTRAVENOUS ONCE
Status: COMPLETED | OUTPATIENT
Start: 2023-07-10 | End: 2023-07-10

## 2023-07-10 RX ORDER — HYDROCODONE BITARTRATE AND ACETAMINOPHEN 5; 325 MG/1; MG/1
1 TABLET ORAL ONCE AS NEEDED
Status: DISCONTINUED | OUTPATIENT
Start: 2023-07-10 | End: 2023-07-10 | Stop reason: HOSPADM

## 2023-07-10 RX ORDER — LABETALOL HYDROCHLORIDE 5 MG/ML
5 INJECTION, SOLUTION INTRAVENOUS
Status: DISCONTINUED | OUTPATIENT
Start: 2023-07-10 | End: 2023-07-10 | Stop reason: HOSPADM

## 2023-07-10 RX ORDER — HYDRALAZINE HYDROCHLORIDE 20 MG/ML
5 INJECTION INTRAMUSCULAR; INTRAVENOUS
Status: DISCONTINUED | OUTPATIENT
Start: 2023-07-10 | End: 2023-07-10 | Stop reason: HOSPADM

## 2023-07-10 RX ORDER — DROPERIDOL 2.5 MG/ML
0.62 INJECTION, SOLUTION INTRAMUSCULAR; INTRAVENOUS
Status: DISCONTINUED | OUTPATIENT
Start: 2023-07-10 | End: 2023-07-10 | Stop reason: HOSPADM

## 2023-07-10 RX ORDER — IPRATROPIUM BROMIDE AND ALBUTEROL SULFATE 2.5; .5 MG/3ML; MG/3ML
3 SOLUTION RESPIRATORY (INHALATION) ONCE AS NEEDED
Status: DISCONTINUED | OUTPATIENT
Start: 2023-07-10 | End: 2023-07-10 | Stop reason: HOSPADM

## 2023-07-10 RX ORDER — FENTANYL CITRATE 50 UG/ML
50 INJECTION, SOLUTION INTRAMUSCULAR; INTRAVENOUS ONCE AS NEEDED
Status: DISCONTINUED | OUTPATIENT
Start: 2023-07-10 | End: 2023-07-10 | Stop reason: HOSPADM

## 2023-07-10 RX ORDER — SODIUM CHLORIDE 0.9 % (FLUSH) 0.9 %
3-10 SYRINGE (ML) INJECTION AS NEEDED
Status: DISCONTINUED | OUTPATIENT
Start: 2023-07-10 | End: 2023-07-10 | Stop reason: HOSPADM

## 2023-07-10 RX ORDER — PROMETHAZINE HYDROCHLORIDE 25 MG/1
25 SUPPOSITORY RECTAL ONCE AS NEEDED
Status: DISCONTINUED | OUTPATIENT
Start: 2023-07-10 | End: 2023-07-10 | Stop reason: HOSPADM

## 2023-07-10 RX ORDER — FENTANYL CITRATE 50 UG/ML
25 INJECTION, SOLUTION INTRAMUSCULAR; INTRAVENOUS
Status: DISCONTINUED | OUTPATIENT
Start: 2023-07-10 | End: 2023-07-10 | Stop reason: HOSPADM

## 2023-07-10 RX ADMIN — CEFAZOLIN SODIUM 2 G: 2 INJECTION, SOLUTION INTRAVENOUS at 15:49

## 2023-07-10 RX ADMIN — DROPERIDOL 0.62 MG: 2.5 INJECTION, SOLUTION INTRAMUSCULAR; INTRAVENOUS at 18:35

## 2023-07-10 RX ADMIN — WARFARIN 3 MG: 3 TABLET ORAL at 22:05

## 2023-07-10 RX ADMIN — SODIUM CHLORIDE, POTASSIUM CHLORIDE, SODIUM LACTATE AND CALCIUM CHLORIDE 9 ML/HR: 600; 310; 30; 20 INJECTION, SOLUTION INTRAVENOUS at 14:28

## 2023-07-10 RX ADMIN — METOPROLOL TARTRATE 25 MG: 25 TABLET, FILM COATED ORAL at 22:05

## 2023-07-10 RX ADMIN — FAMOTIDINE 20 MG: 20 TABLET, FILM COATED ORAL at 22:05

## 2023-07-10 RX ADMIN — FENTANYL CITRATE 25 MCG: 50 INJECTION, SOLUTION INTRAMUSCULAR; INTRAVENOUS at 19:47

## 2023-07-10 RX ADMIN — FENTANYL CITRATE 25 MCG: 50 INJECTION, SOLUTION INTRAMUSCULAR; INTRAVENOUS at 18:40

## 2023-07-10 RX ADMIN — FENTANYL CITRATE 25 MCG: 50 INJECTION, SOLUTION INTRAMUSCULAR; INTRAVENOUS at 17:35

## 2023-07-10 RX ADMIN — CEFTRIAXONE SODIUM 1 G: 1 INJECTION, POWDER, FOR SOLUTION INTRAMUSCULAR; INTRAVENOUS at 23:07

## 2023-07-10 RX ADMIN — IOPAMIDOL 66 ML: 510 INJECTION, SOLUTION INTRAVASCULAR at 17:31

## 2023-07-10 RX ADMIN — PREGABALIN 100 MG: 100 CAPSULE ORAL at 22:05

## 2023-07-10 RX ADMIN — DILTIAZEM HYDROCHLORIDE 180 MG: 180 CAPSULE, COATED, EXTENDED RELEASE ORAL at 22:05

## 2023-07-10 RX ADMIN — ONDANSETRON 4 MG: 2 INJECTION INTRAMUSCULAR; INTRAVENOUS at 18:21

## 2023-07-10 RX ADMIN — FENTANYL CITRATE 25 MCG: 50 INJECTION, SOLUTION INTRAMUSCULAR; INTRAVENOUS at 17:46

## 2023-07-10 RX ADMIN — METOPROLOL TARTRATE 25 MG: 25 TABLET, FILM COATED ORAL at 09:36

## 2023-07-10 NOTE — PROGRESS NOTES
[facility] Clinical Pharmacy Services: Warfarin Dosing/Monitoring Consult    Bessie Molina is a 89 y.o. female, estimated creatinine clearance is 23.1 mL/min (A) (by C-G formula based on SCr of 1.42 mg/dL (H)). weighing 66.7 kg (147 lb).    Results from last 7 days   Lab Units 07/09/23  0426 07/08/23  0409 07/07/23  0419 07/06/23  1905 07/06/23  1219   INR  1.92* 2.19* 2.37* 2.44* 2.16*   APTT seconds  --   --   --   --  32.8   HEMOGLOBIN g/dL 12.0 12.5 12.9  --  13.7   HEMATOCRIT % 36.9 37.2 37.7  --  41.6   PLATELETS 10*3/mm3 253 260 256  --  287     Prior to admission anticoagulation: Per med reconciliation, the patient's home warfarin regimen is 4.5 mg on Mon/Wed/Fri and 3 mg on all other days of the week.    Hospital Anticoagulation:  Consulting provider: Dr. Mcrae  Start date: 7/6/23  Indication: Atrial fibrillation  Target INR: 2 - 3  Expected duration: Lifelong  Bridge Therapy: No    Potential food or drug interactions:   Ceftriaxone (moderate-new med in hospital)-concurrent use may enhance the anticoagulant effect of warfarin by platelet inhibition which can lead to irreversible platelet dysfunction.      Education complete?/Date: No; plan for follow up TBD    INR Assessment:  Date INR Dose   7/7 2.37 Hold   7/8 2.19 Hold   7/9 1.92 Hold   7/10 1.58      Assessment/Plan:  INR is currently subtherapeutic at 1.58, and trending down due to being held for three days for surgery. Will start warfarin back tonight at 3 mg once, then assess INR tomorrow morning to determine further dosing. INR most likely will drop tomorrow as well.  Monitor for any signs or symptoms of bleeding.  Follow up daily INRs and dose adjustments.    Pharmacy will continue to follow until discharge or discontinuation of warfarin.     Yoli Fitzgerald, Pharm.D., [facility]   Clinical Pharmacist

## 2023-07-10 NOTE — SIGNIFICANT NOTE
07/10/23 1258   OTHER   Discipline physical therapy assistant   Rehab Time/Intention   Session Not Performed patient unavailable for treatment  (Pt heading [facility] for arteriogram. PT will follow up tomorrow.)   Recommendation   PT - Next Appointment 07/11/23

## 2023-07-10 NOTE — OP NOTE
Date of Admission:  7/6/2023  07/10/23  Chacho Barbour MD  [facility]    Preoperative Diagnosis:   Peripheral arterial disease with minor tissue loss and diabetes.    Postoperative Diagnosis:   Same    Procedure Performed:   Ultrasound-guided left common femoral artery percutaneous access.  Right lower extremity runoff arteriogram.    Right superficial femoral artery angioplasty and stent placement 6 mm self-expanding stent.  Right popliteal artery atherectomy with 1.7 mm Spectranetics catheter and 4 mm drug-coated balloon angioplasty.  Right tibioperoneal trunk atherectomy with 1.7 mm Spectranetics catheter and 3 mm angioplasty.    CPT:  44147 Fem/Pop atherectomy with stent  36583 Tibial atherectomy initial vessel  05234-06 Unilateral extremity runoff arteriogram (no diagnostic quality arterial imaging prior or significant change in vascular status)  30038 Ultrasound-guided percutaneous vascular access.    Surgeon:   Chacho Barbour MD    Assistant:    Anna ATKINS, Provided critical assistance in exposure, retraction, and suction that overall decrease blood loss and operative time.    Anesthesia:   MAC with local    Estimated Blood Loss:   Minimal    Findings:    Successful ultrasound-guided left common femoral artery percutaneous access.    Aortoiliac and femoral angiogram without severe stenosis identified.    Right lower extremity runoff arteriogram widely patent common and deep femoral artery.  Superficial femoral artery at its proximal portion is widely patent.  There is severe stenosis in the mid SFA.  This was ultimately treated with a 6 mm x 60 mm long self-expanding stent with 5 mm post angioplasty.  Main problem was in the popliteal artery where there was a long segment distal popliteal artery occlusion.  I was able to navigate across this with a microcatheter 1.7 mm atherectomy with a 4 mm angioplasty with a drug-coated balloon was performed.  There is severe focal mid tibioperoneal  trunk stenosis that was atherectomized with a 1.7 mm laser catheter and 3 mm angioplasty.  This provided inline flow through the anterior tibial artery down to the ankle.  Posterior tibial artery was small but patent down to the ankle as well.    Implants:    Implant Name Type Inv. Item Serial No.  Lot No. LRB No. Used Action   STNT PROTEGE EVERFLX SEXP.035 [id number] 120 - [id number] Stent STNT PROTEGE EVERFLX SEXP.035 6X60 120  EV3  A [employer] Return [id number] Right 1 Implanted       Specimen:   none    Complications:   none    Access:  Left 6 Faroese destination.    Closure:  Perclose closure device    Dispo:  To PACU    Indication for procedure:   89 y.o. female with severe peripheral vascular disease with ischemic ulcer overlying her fifth lateral toe.  I discussed with her the risk benefits alternatives undergoing endovascular invention.  Informed consent obtained.        Description of procedure:   The patient was taken to the operating room.  Anesthesia was administered via IV in a monitored setting.   Surgical sites were prepped and draped in the usual sterile manner.  A full surgical time out was performed.  Ultrasound guided left common femoral artery access performed.  MicroSheath placed.  Oblique angiogram of the access showed good common femoral artery access for closure device at case completion.  Ultimately, upsizing to a 6-Faroese sheath. Flush catheter placed up into the pararenal aorta and an AIF arteriogram performed.  Up-and-over wire canalization performed and a right leg angiogram performed.  A 6 x 65 Destination sheath placed up-and-over the aortoiliac bifurcation, and 7500 mg of heparin administered.   The above intervention was performed with a moderate amount of difficulty.  I felt satisfied with the results that we were able to get her inline flow back into her anterior tibial up to the dorsalis pedis artery.  At this point, all wires, catheters and sheaths were removed.  ProGlide  suture-mediated closure device used for closure.  The patient tolerated the procedure well.  No Protamine administered.        Chacho Barbour MD  07/10/23    Active Hospital Problems    Diagnosis  POA    **Cellulitis of right foot [L03.115]  Yes      Resolved Hospital Problems   No resolved problems to display.

## 2023-07-10 NOTE — PROGRESS NOTES
[facility] Progress Note      Patient Name: Bessie Molina  : 1932  MRN: [id number]  Primary Care Physician:  Mireille La MD  Date of admission: 2023    Subjective     Interval History:   Chart review only; patient still in OR  2.1 L UOP yesterday    Review of Systems:   As noted above    Objective     Vitals:   Temp:  [97.3 °F (36.3 °C)-98.8 °F (37.1 °C)] 98.8 °F (37.1 °C)  Heart Rate:  [79-92] 88  Resp:  [13-20] 19  BP: (126-153)/() 141/100  Flow (L/min):  [2] 2    Intake/Output Summary (Last 24 hours) at 7/10/2023 181  Last data filed at 7/10/2023 1718  Gross per 24 hour   Intake 300 ml   Output 1250 ml   Net -950 ml         Physical Exam:    Unable to examine patient as she is in the OR     Scheduled Meds:     [MAR Hold] aspirin, 81 mg, Oral, Daily  cefTRIAXone, 1 g, Intravenous, Q24H  dilTIAZem CD, 180 mg, Oral, Q24H  famotidine, 20 mg, Oral, BID  [MAR Hold] insulin lispro, 2-7 Units, Subcutaneous, 4x Daily AC & at Bedtime  metoprolol tartrate, 25 mg, Oral, BID  pregabalin, 100 mg, Oral, Nightly  warfarin, 3 mg, Oral, Once      IV Meds:   lactated ringers, 9 mL/hr, Last Rate: 9 mL/hr (07/10/23 1428)  Pharmacy to dose warfarin,   sodium chloride, 50 mL/hr, Last Rate: 50 mL/hr (23 1618)        Results Reviewed:   I have personally reviewed the results from the time of this admission to 7/10/2023 18:11 EDT     Results from last 7 days   Lab Units 07/10/23  0346 23  0426 23  0409 23  0419 23  1219   SODIUM mmol/L 141 144 145 144 136   POTASSIUM mmol/L 3.6 4.6 4.0 3.7 3.6   CHLORIDE mmol/L 106 108* 108* 106 98   CO2 mmol/L 23.4 26.0 27.4 28.0 24.5   BUN mg/dL 23 25* 25* 26* 31*   CREATININE mg/dL 1.18* 1.42* 1.13* 1.29* 1.47*   CALCIUM mg/dL 9.0 9.6 9.8* 9.6 9.7*   BILIRUBIN mg/dL  --   --   --  0.3 0.5   ALK PHOS U/L  --   --   --  85 105   ALT (SGPT) U/L  --   --   --  18 25   AST (SGOT) U/L  --   --   --  19 29   GLUCOSE mg/dL  116* 103* 94 143* 256*       Estimated Creatinine Clearance: 26.9 mL/min (A) (by C-G formula based on SCr of 1.18 mg/dL (H)).  Results from last 7 days   Lab Units 07/08/23  0409   PHOSPHORUS mg/dL 3.4           Results from last 7 days   Lab Units 07/10/23  0346 07/09/23  0426 07/08/23  0409 07/07/23  0419 07/06/23  1219   WBC 10*3/mm3 9.38 10.11 10.02 10.15 11.52*   HEMOGLOBIN g/dL 11.4* 12.0 12.5 12.9 13.7   PLATELETS 10*3/mm3 247 253 260 256 287       Results from last 7 days   Lab Units 07/10/23  0346 07/09/23  0426 07/08/23  0409 07/07/23  0419 07/06/23  1905   INR  1.58* 1.92* 2.19* 2.37* 2.44*         Assessment / Plan     ASSESSMENT:  CKD3b, with renal function stable and at baseline.  Electrolytes are stable.  Low-moderate risk for CURRY following arteriogram  PVD + right foot cellulitis - vascular surg & ID following, on rocephin  HTN - BP control acceptable for now  PAF - on coumadin  Dm2, mgmt per primary     PLAN:  Already had IVF pre-arteriogram  Surveillance labs    Thank you for involving us in the care of Bessie Molina.  Please feel free to call with any questions.    Jose Ren MD  07/10/23  18:11 EDT    [facility]  [phone]

## 2023-07-10 NOTE — PLAN OF CARE
Goal Outcome Evaluation:  Plan of Care Reviewed With: patient        Progress: no change  Outcome Evaluation: VSS. Up in chair today. Betadine to R little toe. IV abx continue. Very [place], but pleasant. Arteriogram tomorrow. NPO after midnight tonight. Attempted to get consent signed by daughter, but she has questions for MD. Daughter will be here tomorrow for the surgery. Safety measures in place.

## 2023-07-10 NOTE — PLAN OF CARE
Goal Outcome Evaluation:  Plan of Care Reviewed With: patient        Progress: no change  Outcome Evaluation: NPO for am vasc procedure. Safety maintained.

## 2023-07-10 NOTE — PROGRESS NOTES
"Daily progress note    Primary care physician  Dr. JASSO    Chief complaint  Awake and alert and feeling better with no specific complaints.  Patient family at bedside    History of present illness  89-year-old white female with history of chronic atrial fibrillation diabetes mellitus hypertension hyperlipidemia peripheral artery disease and dementia who lives at home with her daughter brought to the emergency room with right foot redness swelling pain for last 1 month which is treated by primary care physician with oral antibiotics without any improvement.  Patient has infection around right fifth toe with surrounding cellulitis admitted for management.  Patient remained awake and alert and denies any chest pain shortness of breath palpitation no fever chills abdominal pain nausea vomiting diarrhea.  Most of the history obtained from the daughter and son-in-law but patient also contributed.     REVIEW OF SYSTEMS  Unremarkable except generalized weakness     PHYSICAL EXAM   Blood pressure 153/77, pulse 88, temperature 98 °F (36.7 °C), temperature source Oral, resp. rate 16, height 157.5 cm (62\"), weight 62.1 kg (136 lb 14.5 oz), SpO2 96 %.    General: Awake and alert no acute distress.  HENT: NCAT, PERRL, Nares patent.  Eyes: no scleral icterus.  Neck: trachea midline, no ROM limitations.  CV: Irregularly irregular S1-S2  Respiratory: normal effort, CTAB.  Abdomen: soft, nondistended, nontender bowel sounds positive  Musculoskeletal: no deformity.  Neuro: alert, moves all extremities, follows commands.  Skin: warm, dry.  Right foot: Patient has ulceration on the lateral base of the right fifth toe, erythema and swelling extending into the dorsum of the foot, no erythema but swelling of the right calf, no palpable cords, negative Homans.     LAB RESULTS  Lab Results (last 24 hours)       Procedure Component Value Units Date/Time    Blood Culture - Blood, Arm, Left [[id number]]  (Normal) Collected: 07/06/23 1235    " Specimen: Blood from Arm, Left Updated: 07/10/23 1245     Blood Culture No growth at 4 days    Blood Culture - Blood, Arm, Right [[id number]]  (Normal) Collected: 07/06/23 1219    Specimen: Blood from Arm, Right Updated: 07/10/23 1230     Blood Culture No growth at 4 days    POC Glucose Once [[id number]]  (Abnormal) Collected: 07/10/23 1135    Specimen: Blood Updated: 07/10/23 1138     Glucose 184 mg/dL      Comment: Meter: [id number] : [id number] Abhijit Olya  NA       POC Glucose Once [[id number]]  (Normal) Collected: 07/10/23 0807    Specimen: Blood Updated: 07/10/23 0808     Glucose 127 mg/dL      Comment: Meter: [id number] : [id number] Abhijit Olya  NA       Protime-INR [[id number]]  (Abnormal) Collected: 07/10/23 0346    Specimen: Blood Updated: 07/10/23 0442     Protime 19.2 Seconds      INR 1.58    Basic Metabolic Panel [[id number]]  (Abnormal) Collected: 07/10/23 0346    Specimen: Blood Updated: 07/10/23 0440     Glucose 116 mg/dL      BUN 23 mg/dL      Creatinine 1.18 mg/dL      Sodium 141 mmol/L      Potassium 3.6 mmol/L      Chloride 106 mmol/L      CO2 23.4 mmol/L      Calcium 9.0 mg/dL      BUN/Creatinine Ratio 19.5     Anion Gap 11.6 mmol/L      eGFR 43.7 mL/min/1.73     Narrative:      GFR Normal >60  Chronic Kidney Disease <60  Kidney Failure <15    The GFR formula is only valid for adults with stable renal function between ages 18 and 70.    CBC & Differential [[id number]]  (Abnormal) Collected: 07/10/23 0346    Specimen: Blood Updated: 07/10/23 0425    Narrative:      The following orders were created for panel order CBC & Differential.  Procedure                               Abnormality         Status                     ---------                               -----------         ------                     CBC Auto Differential[[id number]]        Abnormal            Final result                 Please view results for these tests on the individual orders.    CBC Auto Differential  [[id number]]  (Abnormal) Collected: 07/10/23 0346    Specimen: Blood Updated: 07/10/23 0425     WBC 9.38 10*3/mm3      RBC 3.43 10*6/mm3      Hemoglobin 11.4 g/dL      Hematocrit 34.3 %      .0 fL      MCH 33.2 pg      MCHC 33.2 g/dL      RDW 13.1 %      RDW-SD 48.7 fl      MPV 9.5 fL      Platelets 247 10*3/mm3      Neutrophil % 45.1 %      Lymphocyte % 41.8 %      Monocyte % 9.7 %      Eosinophil % 2.3 %      Basophil % 0.7 %      Immature Grans % 0.4 %      Neutrophils, Absolute 4.22 10*3/mm3      Lymphocytes, Absolute 3.92 10*3/mm3      Monocytes, Absolute 0.91 10*3/mm3      Eosinophils, Absolute 0.22 10*3/mm3      Basophils, Absolute 0.07 10*3/mm3      Immature Grans, Absolute 0.04 10*3/mm3      nRBC 0.0 /100 WBC     POC Glucose Once [[id number]]  (Abnormal) Collected: 07/09/23 2101    Specimen: Blood Updated: 07/09/23 2103     Glucose 219 mg/dL      Comment: Meter: [id number] : [id number] Markus HER       POC Glucose Once [[id number]]  (Normal) Collected: 07/09/23 1711    Specimen: Blood Updated: 07/09/23 1713     Glucose 128 mg/dL      Comment: Meter: [id number] : [id number] Rusher Ryleigh NA             Imaging Results (Last 24 Hours)       ** No results found for the last 24 hours. **           Interpretation Summary    Right Conclusion: The right YADIEL is severely reduced. Severe digital ischemia.    While the left ankle-brachial indices are within normal limits there is vessel incompressibility.  ABIs are likely falsely elevated.  Moderate digital ischemia noted.    Unable to determine level of disease due to vessel incompressibility but suspect multi level disease based upon waveforms.     Interpretation Summary    Right popliteal fossa fluid collection.    Normal right lower extremity venous duplex scan.      Current Facility-Administered Medications:     [MAR Hold] acetaminophen (TYLENOL) tablet 650 mg, 650 mg, Oral, Q4H PRN, Uziel Mcrae MD    [MAR Hold] aspirin chewable tablet 81  mg, 81 mg, Oral, Daily, Uziel Mcrae MD, 81 mg at 07/09/23 1232    ceFAZolin in dextrose (ANCEF) IVPB solution 2 g, 2 g, Intravenous, Once, Beau Villalba MD    cefTRIAXone (ROCEPHIN) 1 g in sodium chloride 0.9 % 100 mL IVPB-VTB, 1 g, Intravenous, Q24H, Uziel Mcrae MD, Last Rate: 200 mL/hr at 07/09/23 1845, 1 g at 07/09/23 1845    [MAR Hold] dextrose (D50W) (25 g/50 mL) IV injection 25 g, 25 g, Intravenous, Q15 Min PRN, Uziel Mcrae MD    [MAR Hold] dextrose (GLUTOSE) oral gel 15 g, 15 g, Oral, Q15 Min PRN, Uziel Mcrae MD    dilTIAZem CD (CARDIZEM CD) 24 hr capsule 180 mg, 180 mg, Oral, Q24H, Uziel Mcrae MD, 180 mg at 07/09/23 1232    famotidine (PEPCID) tablet 20 mg, 20 mg, Oral, BID, Uziel Mcrae MD, 20 mg at 07/09/23 2110    fentaNYL citrate (PF) (SUBLIMAZE) injection 50 mcg, 50 mcg, Intravenous, Once PRN, Mk Campuzano MD    [MAR Hold] glucagon (GLUCAGEN) injection 1 mg, 1 mg, Intramuscular, Q15 Min PRN, Uziel Mcrae MD    [MAR Hold] insulin lispro (HUMALOG/ADMELOG) injection 2-7 Units, 2-7 Units, Subcutaneous, 4x Daily AC & at Bedtime, Uziel Mcrae MD, 3 Units at 07/09/23 2110    lactated ringers infusion, 9 mL/hr, Intravenous, Continuous, Mk Campuzano MD    lidocaine PF 1% (XYLOCAINE) injection 0.5 mL, 0.5 mL, Injection, Once PRN, Mk Campuzano MD    metoprolol tartrate (LOPRESSOR) tablet 25 mg, 25 mg, Oral, BID, Uziel Mcrae MD, 25 mg at 07/10/23 0936    Pharmacy to dose warfarin, , Does not apply, Continuous PRN, Uziel Mcrae MD    pregabalin (LYRICA) capsule 100 mg, 100 mg, Oral, Nightly, Uziel Mcrae MD, 100 mg at 07/09/23 2110    sodium chloride 0.45 % infusion, 50 mL/hr, Intravenous, Continuous, Uziel Mcrae MD, Last Rate: 50 mL/hr at 07/08/23 1618, 50 mL/hr at 07/08/23 1618    sodium chloride 0.9 % flush 3 mL, 3 mL, Intravenous, Q12H, Mk Campuzano MD    sodium chloride 0.9 % flush 3-10 mL, 3-10 mL, Intravenous, PRN, Mk Campuzano MD     ASSESSMENT  Right foot  infected wound with surrounding cellulitis   Peripheral artery disease  Diabetes mellitus  Hypertension  Hyperlipidemia  Chronic atrial fibrillation     PLAN  CPM  Continue IVF  Arteriogram today  Continue IV antibiotics   Vascular surgery consult appreciated  Nephrology and infectious disease to follow patient  Continue home medications  Stress ulcer DVT prophylaxis  Supportive care  DNR  Discussed with family nursing staff  Follow closely further recommendation current hospital course    DAISHA CARLSON MD    Copied text in this note has been reviewed and is accurate as of 07/10/23

## 2023-07-10 NOTE — PLAN OF CARE
Goal Outcome Evaluation:  Plan of Care Reviewed With: (P) patient        Progress: (P) no change  Outcome Evaluation: (P) Patient admitted on 7/6 for cellulitis of right foot.  Patient has been in surgery for most of shift.

## 2023-07-11 LAB
ANION GAP SERPL CALCULATED.3IONS-SCNC: 12.2 MMOL/L (ref 5–15)
BACTERIA SPEC AEROBE CULT: NORMAL
BACTERIA SPEC AEROBE CULT: NORMAL
BASOPHILS # BLD AUTO: 0.05 10*3/MM3 (ref 0–0.2)
BASOPHILS NFR BLD AUTO: 0.4 % (ref 0–1.5)
BUN SERPL-MCNC: 18 MG/DL (ref 8–23)
BUN/CREAT SERPL: 16.8 (ref 7–25)
CALCIUM SPEC-SCNC: 9.8 MG/DL (ref 8.2–9.6)
CHLORIDE SERPL-SCNC: 104 MMOL/L (ref 98–107)
CO2 SERPL-SCNC: 25.8 MMOL/L (ref 22–29)
CREAT SERPL-MCNC: 1.07 MG/DL (ref 0.57–1)
DEPRECATED RDW RBC AUTO: 47.4 FL (ref 37–54)
EGFRCR SERPLBLD CKD-EPI 2021: 49.1 ML/MIN/1.73
EOSINOPHIL # BLD AUTO: 0.01 10*3/MM3 (ref 0–0.4)
EOSINOPHIL NFR BLD AUTO: 0.1 % (ref 0.3–6.2)
ERYTHROCYTE [DISTWIDTH] IN BLOOD BY AUTOMATED COUNT: 12.9 % (ref 12.3–15.4)
GLUCOSE BLDC GLUCOMTR-MCNC: 130 MG/DL (ref 70–130)
GLUCOSE BLDC GLUCOMTR-MCNC: 175 MG/DL (ref 70–130)
GLUCOSE BLDC GLUCOMTR-MCNC: 192 MG/DL (ref 70–130)
GLUCOSE BLDC GLUCOMTR-MCNC: 201 MG/DL (ref 70–130)
GLUCOSE SERPL-MCNC: 152 MG/DL (ref 65–99)
HCT VFR BLD AUTO: 39.2 % (ref 34–46.6)
HGB BLD-MCNC: 13.1 G/DL (ref 12–15.9)
IMM GRANULOCYTES # BLD AUTO: 0.07 10*3/MM3 (ref 0–0.05)
IMM GRANULOCYTES NFR BLD AUTO: 0.6 % (ref 0–0.5)
INR PPP: 1.22 (ref 0.9–1.1)
LYMPHOCYTES # BLD AUTO: 3.54 10*3/MM3 (ref 0.7–3.1)
LYMPHOCYTES NFR BLD AUTO: 30.1 % (ref 19.6–45.3)
MCH RBC QN AUTO: 33.6 PG (ref 26.6–33)
MCHC RBC AUTO-ENTMCNC: 33.4 G/DL (ref 31.5–35.7)
MCV RBC AUTO: 100.5 FL (ref 79–97)
MONOCYTES # BLD AUTO: 0.89 10*3/MM3 (ref 0.1–0.9)
MONOCYTES NFR BLD AUTO: 7.6 % (ref 5–12)
NEUTROPHILS NFR BLD AUTO: 61.2 % (ref 42.7–76)
NEUTROPHILS NFR BLD AUTO: 7.22 10*3/MM3 (ref 1.7–7)
NRBC BLD AUTO-RTO: 0 /100 WBC (ref 0–0.2)
PLATELET # BLD AUTO: 296 10*3/MM3 (ref 140–450)
PMV BLD AUTO: 9.5 FL (ref 6–12)
POTASSIUM SERPL-SCNC: 4.6 MMOL/L (ref 3.5–5.2)
PROTHROMBIN TIME: 15.5 SECONDS (ref 11.7–14.2)
RBC # BLD AUTO: 3.9 10*6/MM3 (ref 3.77–5.28)
SODIUM SERPL-SCNC: 142 MMOL/L (ref 136–145)
WBC NRBC COR # BLD: 11.78 10*3/MM3 (ref 3.4–10.8)

## 2023-07-11 PROCEDURE — 85610 PROTHROMBIN TIME: CPT | Performed by: SURGERY

## 2023-07-11 PROCEDURE — 85025 COMPLETE CBC W/AUTO DIFF WBC: CPT | Performed by: SURGERY

## 2023-07-11 PROCEDURE — 25010000002 CEFTRIAXONE PER 250 MG: Performed by: SURGERY

## 2023-07-11 PROCEDURE — 63710000001 INSULIN LISPRO (HUMAN) PER 5 UNITS: Performed by: SURGERY

## 2023-07-11 PROCEDURE — 80048 BASIC METABOLIC PNL TOTAL CA: CPT | Performed by: SURGERY

## 2023-07-11 PROCEDURE — 97116 GAIT TRAINING THERAPY: CPT

## 2023-07-11 PROCEDURE — 82948 REAGENT STRIP/BLOOD GLUCOSE: CPT

## 2023-07-11 RX ORDER — WARFARIN SODIUM 3 MG/1
3 TABLET ORAL
Status: COMPLETED | OUTPATIENT
Start: 2023-07-11 | End: 2023-07-11

## 2023-07-11 RX ORDER — FAMOTIDINE 20 MG/1
20 TABLET, FILM COATED ORAL DAILY
Status: DISCONTINUED | OUTPATIENT
Start: 2023-07-12 | End: 2023-07-18 | Stop reason: HOSPADM

## 2023-07-11 RX ADMIN — CEFTRIAXONE SODIUM 1 G: 1 INJECTION, POWDER, FOR SOLUTION INTRAMUSCULAR; INTRAVENOUS at 16:56

## 2023-07-11 RX ADMIN — INSULIN LISPRO 3 UNITS: 100 INJECTION, SOLUTION INTRAVENOUS; SUBCUTANEOUS at 11:30

## 2023-07-11 RX ADMIN — ASPIRIN 81 MG: 81 TABLET, CHEWABLE ORAL at 08:13

## 2023-07-11 RX ADMIN — INSULIN LISPRO 2 UNITS: 100 INJECTION, SOLUTION INTRAVENOUS; SUBCUTANEOUS at 21:00

## 2023-07-11 RX ADMIN — FAMOTIDINE 20 MG: 20 TABLET, FILM COATED ORAL at 08:13

## 2023-07-11 RX ADMIN — INSULIN LISPRO 2 UNITS: 100 INJECTION, SOLUTION INTRAVENOUS; SUBCUTANEOUS at 16:54

## 2023-07-11 RX ADMIN — PREGABALIN 100 MG: 100 CAPSULE ORAL at 20:13

## 2023-07-11 RX ADMIN — DILTIAZEM HYDROCHLORIDE 180 MG: 180 CAPSULE, COATED, EXTENDED RELEASE ORAL at 10:55

## 2023-07-11 RX ADMIN — WARFARIN 3 MG: 3 TABLET ORAL at 17:03

## 2023-07-11 RX ADMIN — METOPROLOL TARTRATE 25 MG: 25 TABLET, FILM COATED ORAL at 08:13

## 2023-07-11 RX ADMIN — METOPROLOL TARTRATE 25 MG: 25 TABLET, FILM COATED ORAL at 20:13

## 2023-07-11 NOTE — CASE MANAGEMENT/SOCIAL WORK
Continued Stay Note  [facility]     Patient Name: Bessie Molina  MRN: [id number]  Today's Date: 7/11/2023    Admit Date: 7/6/2023    Plan: Home with HH   Discharge Plan       Row Name 07/11/23 1434       Plan    Plan Home with HH    Plan Comments Met with pt and daughter at bedside who confirm plan to return home at discharge.  Daughter requests referral to [facility].  No further needs identified.  LLUVIA Jacques RN                   Discharge Codes    No documentation.                 Expected Discharge Date and Time       Expected Discharge Date Expected Discharge Time    Jul 10, 2023               Temitope Jacques RN  Continued Stay Note  [facility]     Patient Name: Bessie Molina  MRN: [id number]  Today's Date: 7/11/2023    Admit Date: 7/6/2023    Plan: Home with HH   Discharge Plan       Row Name 07/11/23 1434       Plan    Plan Home with HH    Plan Comments Met with pt and daughter at bedside who confirm plan to return home at discharge.  Daughter requests referral to [facility].  No further needs identified.  LLUVIA Jacques RN                   Discharge Codes    No documentation.                 Expected Discharge Date and Time       Expected Discharge Date Expected Discharge Time    Jul 10, 2023               Temitope Jacques RN

## 2023-07-11 NOTE — PLAN OF CARE
Problem: Skin Injury Risk Increased  Goal: Skin Health and Integrity  Outcome: Ongoing, Progressing   Goal Outcome Evaluation:  Plan of Care Reviewed With: patient        Progress: improving  Outcome Evaluation: VSS. No c/o pain. Up with assist, up in chair. IV abx cont'd. Will monitor.

## 2023-07-11 NOTE — PROGRESS NOTES
[facility] Progress Note      Patient Name: Bessie Molina  : 1932  MRN: [id number]  Primary Care Physician:  Mireille La MD  Date of admission: 2023    Subjective     Interval History:   Sitting on side of bed, working with PT  Right foot is painful, but no worse than yesterday  0.7 L UOP yesterday    Review of Systems:   As noted above    Objective     Vitals:   Temp:  [97.2 °F (36.2 °C)-98.8 °F (37.1 °C)] 98.4 °F (36.9 °C)  Heart Rate:  [] 94  Resp:  [13-20] 16  BP: (103-181)/() 140/77  Flow (L/min):  [2] 2    Intake/Output Summary (Last 24 hours) at 2023 1101  Last data filed at 2023 0500  Gross per 24 hour   Intake 410 ml   Output 250 ml   Net 160 ml         Physical Exam:    NAD; pleasant; hard of hearing; looks stated age  MMM; no scleral icterus  No JVD; no carotid bruits  Decreased breath sounds in bases; not labored on 2 L/min  Irregularly irregular, tachycardic, no rub  Soft, NT, ND, BS+  Trace edema  No clubbing  No asterixis  Moves all extremities     Scheduled Meds:     aspirin, 81 mg, Oral, Daily  cefTRIAXone, 1 g, Intravenous, Q24H  dilTIAZem CD, 180 mg, Oral, Q24H  [START ON 2023] famotidine, 20 mg, Oral, Daily  insulin lispro, 2-7 Units, Subcutaneous, 4x Daily AC & at Bedtime  metoprolol tartrate, 25 mg, Oral, BID  pregabalin, 100 mg, Oral, Nightly  warfarin, 3 mg, Oral, Once      IV Meds:   Pharmacy to dose warfarin,   sodium chloride, 50 mL/hr, Last Rate: 50 mL/hr (23 1618)        Results Reviewed:   I have personally reviewed the results from the time of this admission to 2023 11:01 EDT     Results from last 7 days   Lab Units 23  0505 07/10/23  0346 23  0426 23  0409 23  0419 23  1219   SODIUM mmol/L 142 141 144   < > 144 136   POTASSIUM mmol/L 4.6 3.6 4.6   < > 3.7 3.6   CHLORIDE mmol/L 104 106 108*   < > 106 98   CO2 mmol/L 25.8 23.4 26.0   < > 28.0 24.5   BUN mg/dL 18 23 25*    < > 26* 31*   CREATININE mg/dL 1.07* 1.18* 1.42*   < > 1.29* 1.47*   CALCIUM mg/dL 9.8* 9.0 9.6   < > 9.6 9.7*   BILIRUBIN mg/dL  --   --   --   --  0.3 0.5   ALK PHOS U/L  --   --   --   --  85 105   ALT (SGPT) U/L  --   --   --   --  18 25   AST (SGOT) U/L  --   --   --   --  19 29   GLUCOSE mg/dL 152* 116* 103*   < > 143* 256*    < > = values in this interval not displayed.       Estimated Creatinine Clearance: 30.7 mL/min (A) (by C-G formula based on SCr of 1.07 mg/dL (H)).  Results from last 7 days   Lab Units 07/08/23  0409   PHOSPHORUS mg/dL 3.4           Results from last 7 days   Lab Units 07/11/23  0505 07/10/23  0346 07/09/23  0426 07/08/23  0409 07/07/23  0419   WBC 10*3/mm3 11.78* 9.38 10.11 10.02 10.15   HEMOGLOBIN g/dL 13.1 11.4* 12.0 12.5 12.9   PLATELETS 10*3/mm3 296 247 253 260 256       Results from last 7 days   Lab Units 07/11/23  0505 07/10/23  0346 07/09/23  0426 07/08/23  0409 07/07/23  0419   INR  1.22* 1.58* 1.92* 2.19* 2.37*         Assessment / Plan     ASSESSMENT:  CKD3b, with renal function stable after arteriogram yesterday and SCR currently at baseline.  Electrolytes are stable. PVD + right foot cellulitis - vascular surg & ID following, on rocephin  HTN - BP control acceptable for now  PAF - on coumadin  Dm2, mgmt per primary     PLAN:  Stop IVF  Surveillance labs    Thank you for involving us in the care of Bessie Molina.  Please feel free to call with any questions.    Jose Ren MD  07/11/23  11:01 EDT    [facility]  [phone]

## 2023-07-11 NOTE — DISCHARGE PLACEMENT REQUEST
"Rukhsana Molina (89 y.o. Female)       Date of Birth   01/22/1932    Social Security Number       Address   [address] [place] [address]    Home Phone   [phone]    MRN   [id number]       Lutheran   None    Marital Status                               Admission Date   7/6/23    Admission Type   Emergency    Admitting Provider   Uziel Mcrae MD    Attending Provider   Uziel Mcrae MD    Department, Room/Bed   [address], E549/1       Discharge Date       Discharge Disposition       Discharge Destination                                 Attending Provider: Uziel Mcrae MD    Allergies: Bee Venom, Atorvastatin, Gemfibrozil, Haemophilus Influenzae, Influenza Vac Split Quad, Isosorbide, Metformin, Propoxyphene, Simvastatin    Isolation: None   Infection: None   Code Status: No CPR    Ht: 157.5 cm (62\")   Wt: 66.5 kg (146 lb 9.7 oz)    Admission Cmt: None   Principal Problem: Cellulitis of right foot [L03.115]                   Active Insurance as of 7/6/2023       Primary Coverage       Payor Plan Insurance Group Employer/Plan Group    MEDICARE MEDICARE A & B        Payor Plan Address Payor Plan Phone Number Payor Plan Fax Number Effective Dates    [address] [phone]  1/1/1997 - None Entered    [facility] [id number]         Subscriber Name Subscriber Birth Date Member ID       RUKHSANA MOLINA 1/22/1932 [id number]               Secondary Coverage       Payor Plan Insurance Group Employer/Plan Group     FOR LIFE  FOR LIFE MC SUP         Payor Plan Address Payor Plan Phone Number Payor Plan Fax Number Effective Dates    [address] [phone]  7/2/2021 - None Entered    [facility] [id number]         Subscriber Name Subscriber Birth Date Member ID       RUKHSANA MOLINA 1/22/1932 [id number]                     Emergency Contacts        (Rel.) Home Phone Work Phone Mobile Phone    ELI RICCI [facility] (Daughter) [phone] -- " [phone]    VALENCIA RICCI SON IN LAW (Relative) [phone] -- [phone]              Emergency Contact Information       Name Relation Home Work Mobile    ELI RICCI [facility] Daughter [phone]    VALENCIA RICCI SON IN LAW Relative [phone]

## 2023-07-11 NOTE — THERAPY TREATMENT NOTE
Patient Name: Bessie Molina  : 1932    MRN: [id number]                              Today's Date: 2023       Admit Date: 2023    Visit Dx:     ICD-10-CM ICD-9-CM   1. Cellulitis of right foot  L03.115 682.7   2. Failure of outpatient treatment  Z78.9 V49.89   3. Leukocytosis, unspecified type  D72.829 288.60   4. ESR raised  R70.0 790.1   5. CRP elevated  R79.82 790.95   6. Hyperglycemia  R73.9 790.29   7. Chronic kidney disease, unspecified CKD stage  N18.9 585.9     Patient Active Problem List   Diagnosis    Atrial fibrillation, persistent    Chronic anticoagulation    Dementia    Abdominal pain    Acute respiratory failure with hypoxia    Pancreatic lesion    Chronic diastolic CHF (congestive heart failure)    Cellulitis of right foot     Past Medical History:   Diagnosis Date    Atrial fibrillation     CVA (cerebral vascular accident)     left temporal stroke    Hypertension      Past Surgical History:   Procedure Laterality Date    APPENDECTOMY      ARTERIOGRAM Bilateral     ATHRECTOMY ILIAC, FEMORAL, TIBIAL ARTERY Right 7/10/2023    Procedure: RIGHT LEG ANGIOGRAM WITH LASER;  Surgeon: Chacho Barbour MD;  Location: [facility] ;  Service: Vascular;  Laterality: Right;    CATARACT EXTRACTION Left     CERVICAL DISCECTOMY ANTERIOR      HYSTERECTOMY      LUMBAR DISCECTOMY      TONSILLECTOMY        General Information       Row Name 23 1121          Physical Therapy Time and Intention    Document Type therapy note (daily note)  -EB     Mode of Treatment physical therapy;individual therapy  -EB       Row Name 23 1121          General Information    Patient Profile Reviewed yes  -EB     Existing Precautions/Restrictions fall  -EB     Barriers to Rehab hearing deficit  [place]  -EB       Row Name 23 1121          Cognition    Orientation Status (Cognition) oriented to;person;place  -EB       Row Name 23 1121          Safety Issues, Functional Mobility    Safety  Issues Affecting Function (Mobility) ability to follow commands  -EB     Impairments Affecting Function (Mobility) endurance/activity tolerance;strength;pain  -EB               User Key  (r) = Recorded By, (t) = Taken By, (c) = Cosigned By      Initials Name Provider Type    Ophelia Hoskins PTA Physical Therapist Assistant                   Mobility       Row Name 07/11/23 1122          Bed Mobility    Supine-Sit [place] (Bed Mobility) standby assist  -EB     Sit-Supine [place] (Bed Mobility) not tested  -EB     Assistive Device (Bed Mobility) bed rails;head of bed elevated  -EB       Row Name 07/11/23 1122          Sit-Stand Transfer    Sit-Stand [place] (Transfers) contact guard;standby assist  -EB     Assistive Device (Sit-Stand Transfers) walker, front-wheeled  -EB       Row Name 07/11/23 1122          Gait/Stairs (Locomotion)    [place] Level (Gait) contact guard  -EB     Assistive Device (Gait) walker, front-wheeled  -EB     Distance in Feet (Gait) 12ft  -EB     Deviations/Abnormal Patterns (Gait) antalgic;ele decreased;stride length decreased  -EB     Bilateral Gait Deviations forward flexed posture;heel strike decreased  -EB     Right Sided Gait Deviations weight shift ability decreased  -EB               User Key  (r) = Recorded By, (t) = Taken By, (c) = Cosigned By      Initials Name Provider Type    Ophelia Hoskins PTA Physical Therapist Assistant                   Obj/Interventions    No documentation.                  Goals/Plan    No documentation.                  Clinical Impression       Row Name 07/11/23 1125          Plan of Care Review    Plan of Care Reviewed With patient  -EB     Progress improving  -EB     Outcome Evaluation Pt seen for PT treatment today. pt c/o pain of right fott with wbing but only minimally. Pt is SBA with bed mobility and CG/SBA with STS transfers. Pt was able to [id number] with rwx,  CGA.  a little unsteady at times but no LOB. Will continue  to progress pt as able.  -EB       Row Name 07/11/23 1125          Therapy Assessment/Plan (PT)    Therapy Frequency (PT) 5 times/wk  -EB       Row Name 07/11/23 1125          Positioning and Restraints    Pre-Treatment Position in bed  -EB     Post Treatment Position chair  -EB     In Chair reclined;call light within reach;encouraged to call for assist;exit alarm on  -EB               User Key  (r) = Recorded By, (t) = Taken By, (c) = Cosigned By      Initials Name Provider Type    Ophelia Hoskins PTA Physical Therapist Assistant                   Outcome Measures       Row Name 07/11/23 1128          How much help from another person do you currently need...    Turning from your back to your side while in flat bed without using bedrails? 4  -EB     Moving from lying on back to sitting on the side of a flat bed without bedrails? 3  -EB     Moving to and from a bed to a chair (including a wheelchair)? 3  -EB     Standing up from a chair using your arms (e.g., wheelchair, bedside chair)? 3  -EB     Climbing 3-5 steps with a railing? 3  -EB     To walk in hospital room? 3  -EB     AM-PAC 6 Clicks Score (PT) 19  -EB     Highest level of mobility 6 --> Walked 10 steps or more  -               User Key  (r) = Recorded By, (t) = Taken By, (c) = Cosigned By      Initials Name Provider Type    Ophelia Hoskins PTA Physical Therapist Assistant                                 Physical Therapy Education       Title: PT OT SLP Therapies (In Progress)       Topic: Physical Therapy (In Progress)       Point: Mobility training (Done)       Learning Progress Summary             Patient Acceptance, E, VU,NR by DIAMOND at 7/11/2023 1128    Acceptance, E,TB, VU,DU by  at 7/7/2023 0954                         Point: Home exercise program (Not Started)       Learner Progress:  Not documented in this visit.              Point: Body mechanics (Done)       Learning Progress Summary             Patient Acceptance, E, VU,NR by DIAMOND at  7/11/2023 1128    Acceptance, E,TB, VU,DU by CS at 7/7/2023 0954                         Point: Precautions (Done)       Learning Progress Summary             Patient Acceptance, E, VU,NR by EB at 7/11/2023 1128    Acceptance, E,TB, VU,DU by CS at 7/7/2023 0954                                         User Key       Initials Effective Dates Name Provider Type Discipline    EB 02/14/23 -  Ophelia Callejas PTA Physical Therapist Assistant PT    CS 09/22/22 -  Raven Blevins PT Physical Therapist PT                  PT Recommendation and Plan     Plan of Care Reviewed With: patient  Progress: improving  Outcome Evaluation: Pt seen for PT treatment today. pt c/o pain of right fott with wbing but only minimally. Pt is SBA with bed mobility and CG/SBA with STS transfers. Pt was able to [id number] with rwx,  CGA.  a little unsteady at times but no LOB. Will continue to progress pt as able.     Time Calculation:    PT Charges       Row Name 07/11/23 1120             Time Calculation    Start Time 0855  -EB      Stop Time 0911  -EB      Time Calculation (min) 16 min  -EB      PT Received On 07/11/23  -EB      PT - Next Appointment 07/12/23  -EB         Time Calculation- PT    Total Timed Code Minutes- PT 16 minute(s)  -EB                User Key  (r) = Recorded By, (t) = Taken By, (c) = Cosigned By      Initials Name Provider Type    EB Ophelia Callejas PTA Physical Therapist Assistant                  Therapy Charges for Today       Code Description Service Date Service Provider Modifiers Qty    [id number] HC GAIT TRAINING EA 15 MIN 7/11/2023 Ophelia Callejas PTA GP 1            PT G-Codes  Outcome Measure Options: AM-PAC 6 Clicks Daily Activity (OT)  AM-PAC 6 Clicks Score (PT): 19  AM-PAC 6 Clicks Score (OT): 20       Ophelia Callejas PTA  7/11/2023

## 2023-07-11 NOTE — PROGRESS NOTES
[facility] Clinical Pharmacy Services: Warfarin Dosing/Monitoring Consult    Bessie Molina is a 89 y.o. female, estimated creatinine clearance is 30.7 mL/min (A) (by C-G formula based on SCr of 1.07 mg/dL (H)). weighing 66.5 kg (146 lb 9.7 oz).    Results from last 7 days   Lab Units 07/11/23  0505 07/10/23  0346 07/09/23  0426 07/08/23  0409 07/07/23  0419 07/06/23  1905 07/06/23  1219   INR  1.22* 1.58* 1.92* 2.19* 2.37*   < > 2.16*   APTT seconds  --   --   --   --   --   --  32.8   HEMOGLOBIN g/dL 13.1 11.4* 12.0 12.5 12.9  --  13.7   HEMATOCRIT % 39.2 34.3 36.9 37.2 37.7  --  41.6   PLATELETS 10*3/mm3 296 247 253 260 256  --  287    < > = values in this interval not displayed.     Prior to admission anticoagulation: Per med reconciliation, the patient's home warfarin regimen is 4.5 mg on Mon/Wed/Fri and 3 mg on all other days of the week.    Hospital Anticoagulation:  Consulting provider: Dr. Mcrae  Start date: 7/6/23  Indication: Atrial fibrillation  Target INR: 2 - 3  Expected duration: Lifelong  Bridge Therapy: No    Potential food or drug interactions:   Ceftriaxone (moderate-new med in hospital)-concurrent use may enhance the anticoagulant effect of warfarin by platelet inhibition which can lead to irreversible platelet dysfunction.      Education complete?/Date: No; plan for follow up TBD    INR Assessment:  Date INR Dose   7/7 2.37 Hold   7/8 2.19 Hold   7/9 1.92 Hold   7/10 1.58 3 mg   7/11 1.22      Assessment/Plan:  Warfarin 3 mg PO x1 dose again today. INR decreased today after the first 3 mg dose was given, as expected. Consider increased dose tomorrow if INR continues to decrease.  Monitor for any signs or symptoms of bleeding.  Follow up daily INRs and dose adjustments. INR ordered daily with AM labs while inpatient.    Pharmacy will continue to follow until discharge or discontinuation of warfarin.     Ayaan Kunz, PharmD, FRANK, BCPS, BCCCP  07/11/23 09:32 EDT

## 2023-07-11 NOTE — PROGRESS NOTES
Name: Bessie Molina ADMIT: 2023   : 1932  PCP: Mireille La MD    MRN: [id number] LOS: 5 days   AGE/SEX: 89 y.o. female  ROOM: [address]    [place], Subsequent Hospital Care    Chief Complaint   Patient presents with    Leg Pain     CC: Peripheral arterial disease follow-up.  Subjective     89 y.o. female patient resting comfortably.    Review of Systems    Objective     Scheduled Medications:   aspirin, 81 mg, Oral, Daily  cefTRIAXone, 1 g, Intravenous, Q24H  dilTIAZem CD, 180 mg, Oral, Q24H  famotidine, 20 mg, Oral, BID  insulin lispro, 2-7 Units, Subcutaneous, 4x Daily AC & at Bedtime  metoprolol tartrate, 25 mg, Oral, BID  pregabalin, 100 mg, Oral, Nightly        Active Infusions:  Pharmacy to dose warfarin,   sodium chloride, 50 mL/hr, Last Rate: 50 mL/hr (23 1618)        As Needed Medications:    acetaminophen    dextrose    dextrose    glucagon (human recombinant)    Pharmacy to dose warfarin    Vital Signs  Vital Signs Patient Vitals for the past 24 hrs:   BP Temp Temp src Pulse Resp SpO2 Weight   23 0700 -- 98.4 °F (36.9 °C) Oral -- 16 -- --   23 0600 148/87 -- -- 93 -- 92 % --   23 0513 159/90 -- -- 101 -- 96 % --   23 0500 -- -- -- -- -- -- 66.5 kg (146 lb 9.7 oz)   23 0400 129/66 -- -- 69 -- 100 % --   23 0300 103/49 -- -- 82 -- 99 % --   23 0200 104/56 -- -- 90 -- 99 % --   23 0100 149/79 -- -- 114 -- 97 % --   23 0001 160/98 -- -- (!) 139 -- 97 % --   07/10/23 2322 -- 97.2 °F (36.2 °C) Oral 115 16 100 % --   07/10/23 2300 117/78 -- -- 103 -- 99 % --   07/10/23 2200 138/81 -- -- 108 -- 98 % --   07/10/23 2100 (!) 181/96 97.4 °F (36.3 °C) Oral 118 16 98 % --   07/10/23 2030 154/64 97.7 °F (36.5 °C) Oral (!) 128 -- 100 % --   07/10/23 2015 158/68 -- -- 112 -- 97 % --   07/10/23 2000 160/81 -- -- 114 -- 97 % --   07/10/23 194 105/91 -- -- 117 -- 98 % --   07/10/23 1930 149/60 -- -- 105 -- 99 % --    07/10/23 1915 159/76 -- -- 94 -- 99 % --   07/10/23 1900 156/73 -- -- 91 -- 98 % --   07/10/23 1845 162/79 -- -- 93 -- 97 % --   07/10/23 1830 160/84 -- -- 94 -- 98 % --   07/10/23 1815 163/80 -- -- 86 -- 93 % --   07/10/23 1753 141/100 -- -- 88 19 100 % --   07/10/23 1742 137/90 -- -- 83 19 100 % --   07/10/23 1730 126/64 -- -- 92 20 100 % --   07/10/23 1725 135/67 98.8 °F (37.1 °C) Oral 84 13 100 % --   07/10/23 1317 153/77 98 °F (36.7 °C) Oral 88 16 96 % --     I/O:  I/O last 3 completed shifts:  In: 410 [P.O.:10; I.V.:400]  Out: 1500 [Urine:1500]    Physical Exam:  Physical Exam     Results Review:     CBC    Results from last 7 days   Lab Units 07/11/23  0505 07/10/23  0346 07/09/23  0426 07/08/23  0409 07/07/23  0419 07/06/23  1219   WBC 10*3/mm3 11.78* 9.38 10.11 10.02 10.15 11.52*   HEMOGLOBIN g/dL 13.1 11.4* 12.0 12.5 12.9 13.7   PLATELETS 10*3/mm3 296 247 253 260 256 287     BMP   Results from last 7 days   Lab Units 07/11/23  0505 07/10/23  0346 07/09/23  0426 07/08/23  0409 07/07/23  0419 07/06/23  1219   SODIUM mmol/L 142 141 144 145 144 136   POTASSIUM mmol/L 4.6 3.6 4.6 4.0 3.7 3.6   CHLORIDE mmol/L 104 106 108* 108* 106 98   CO2 mmol/L 25.8 23.4 26.0 27.4 28.0 24.5   BUN mg/dL 18 23 25* 25* 26* 31*   CREATININE mg/dL 1.07* 1.18* 1.42* 1.13* 1.29* 1.47*   GLUCOSE mg/dL 152* 116* 103* 94 143* 256*   PHOSPHORUS mg/dL  --   --   --  3.4  --   --        Assessment & Plan     Assessment & Plan      Cellulitis of right foot        89 y.o. female severe peripheral arterial disease with ischemic ulcer to the right foot.    7/10/2023: Right SFA stent/angioplasty, popliteal artery laser atherectomy and drug-coated balloon angioplasty, and tibioperoneal trunk atherectomy with angioplasty.    11/20/2023: Resting comfortably post procedure day #1.  Recommend warfarin and aspirin long-term.  Patient intolerant to statins previously.  She has a strong Doppler signal in her dorsalis pedis and posterior tibial  artery on the foot.  Her foot is warm and well-perfused.  Her access site is clean dry without signs of complication.  From vascular standpoint okay for discharge.  She is to continue Betadine paints to the fifth toe ulcer.  She is to be back in the office in 2 weeks.  Please feel free to call with questions.      Chacho Barbour MD  07/11/23  08:03 EDT    Please call my office with any question: [phone]    Active Hospital Problems    Diagnosis  POA    **Cellulitis of right foot [L03.115]  Yes      Resolved Hospital Problems   No resolved problems to display.

## 2023-07-11 NOTE — PLAN OF CARE
Goal Outcome Evaluation:  Plan of Care Reviewed With: patient        Progress: improving  Outcome Evaluation: Pt seen for PT treatment today. pt c/o pain of right fott with wbing but only minimally. Pt is SBA with bed mobility and CG/SBA with STS transfers. Pt was able to [id number] with rwx,  CGA.  a little unsteady at times but no LOB. Will continue to progress pt as able.

## 2023-07-11 NOTE — DISCHARGE INSTRUCTIONS
[facility]  Jorge Salcido, Myles, Km Villalba,Partha, Karel  [address]  [place] [id number] [phone]      Discharge Instructions for Angiogram    Go home, rest and take it easy today.      You may experience some dizziness or memory loss from the anesthesia.  This may last for the next 24 hours.  Someone should plan on staying with you for the first 24 hours for your safety.    Do not make any important legal decisions or sign any legal papers for the next 24 hours.      Eat and drink lightly today.  Start off with liquids, jello, soup, crackers or other bland foods at first. You may advance your diet tomorrow as tolerated as long as you do not experience any nausea or vomiting.    You may resume routine medications including blood thinners. However, Glucophage should be not be started for 72 hours after the dye is given.      No lifting over 10 pounds and no strenuous activity for the next 2-3 days.    Try not to strain or bear down when your bowels move or when you empty your bladder.    Limit going up and down steps for 2 days.    No driving for the remainder of the day.  You may resume limited driving tomorrow if necessary.      You may shower tomorrow.  No bath or hot tubs for at least 2 days after the procedure.          Leave the pressure dressing on until tomorrow morning.  You may then take this off, as well as the small see through dressing with gauze tomorrow.  If it doesn’t come off easily, do not pull this off.  If it is stuck, shower and let the warm water loosen it before removal.       Check your groin dressing regularly for bleeding through the dressing (under the pressure dressing).  A small amount of blood contained by the gauze is normal; the whole dressing should not be filled with blood or leaking out under the sides.     If you experience bleeding through the gauze/clear sticky dressing, lay flat and have someone apply direct pressure for 15  minutes.  If bleeding has stopped after this, you may put a clean gauze and tape over the area.  Continue to lie flat for 1-2 hours.  If bleeding continues after 15 minutes of pressure, call us at the number listed above.  There is an MD available after hours.      If you experience heavy bleeding or large swelling, continue to hold firm pressure and              call 911.  Do not call the MD on call.      If you experience pain or discomfort you may take Tylenol or Ibuprofen, whichever you normally use for minor discomfort, unless otherwise instructed.        If the MD gives you a prescription for narcotic pain pills (Tylenol #3, Vicodin, Hydrocodone, Oxycodone or Percocet), you cannot drive a vehicle or operate machinery while taking these.     Severe pain is not expected after this procedure.  If you experience severe pain, please call our office at [phone].     Remember to contact our office for any of the following:    Fever > 101 degrees  Severe pain that cannot be controlled by taking your pain pills  Severe nausea or vomiting   Significant bleeding of your incisions  Drainage that has a bad smell or is yellow or green in appearance  Any other questions or concerns

## 2023-07-11 NOTE — PROGRESS NOTES
"  Infectious Diseases Progress Note    Rosa Tang MD     [facility]  [place]: 4 days  Patient Identification:  Name: Bessie Molina  Age: 89 y.o.  Sex: female  :  1932  MRN: [id number]         Primary Care Physician: Mireille La MD        Subjective: Patient still in the OR and has not back to the floor.  Chart is reviewed.  Interval History: See consultation note  7/10/2023: She underwent:  Ultrasound-guided left common femoral artery percutaneous access.  Right lower extremity runoff arteriogram.  Right superficial femoral artery angioplasty and stent placement 6 mm self-expanding stent.  Right popliteal artery atherectomy with 1.7 mm [employer]ran[employer] catheter and 4 mm drug-coated balloon angioplasty.  Right tibioperoneal trunk atherectomy with 1.7 mm [employer] catheter and 3 mm angioplasty.  Objective:    Scheduled Meds:[MAR Hold] aspirin, 81 mg, Oral, Daily  cefTRIAXone, 1 g, Intravenous, Q24H  dilTIAZem CD, 180 mg, Oral, Q24H  famotidine, 20 mg, Oral, BID  [MAR Hold] insulin lispro, 2-7 Units, Subcutaneous, 4x Daily AC & at Bedtime  metoprolol tartrate, 25 mg, Oral, BID  pregabalin, 100 mg, Oral, Nightly  warfarin, 3 mg, Oral, Once      Continuous Infusions:lactated ringers, 9 mL/hr, Last Rate: 9 mL/hr (07/10/23 1428)  Pharmacy to dose warfarin,   sodium chloride, 50 mL/hr, Last Rate: 50 mL/hr (23 1618)        Vital signs in last 24 hours:  Temp:  [98 °F (36.7 °C)-98.8 °F (37.1 °C)] 98.8 °F (37.1 °C)  Heart Rate:  [] 105  Resp:  [13-20] 19  BP: (126-163)/() 149/60    Intake/Output:    Intake/Output Summary (Last 24 hours) at 7/10/2023 2004  Last data filed at 7/10/2023 1718  Gross per 24 hour   Intake 300 ml   Output 1250 ml   Net -950 ml       Exam:  /60   Pulse 105   Temp 98.8 °F (37.1 °C) (Oral)   Resp 19   Ht 157.5 cm (62\")   Wt 62.1 kg (136 lb 14.5 oz)   SpO2 99%   BMI 25.04 kg/m²   Patient is examined using the personal protective equipment as " per guidelines from infection control for this particular patient as enacted.  Hand washing was performed before and after patient interaction.  Patient not examined today    Data Review:    I reviewed the patient's new clinical results.  Results from last 7 days   Lab Units 07/10/23  0346 07/09/23  0426 07/08/23  0409 07/07/23  0419 07/06/23  1219   WBC 10*3/mm3 9.38 10.11 10.02 10.15 11.52*   HEMOGLOBIN g/dL 11.4* 12.0 12.5 12.9 13.7   PLATELETS 10*3/mm3 247 253 260 256 287     Results from last 7 days   Lab Units 07/10/23  0346 07/09/23  0426 07/08/23  0409 07/07/23 0419 07/06/23  1219   SODIUM mmol/L 141 144 145 144 136   POTASSIUM mmol/L 3.6 4.6 4.0 3.7 3.6   CHLORIDE mmol/L 106 108* 108* 106 98   CO2 mmol/L 23.4 26.0 27.4 28.0 24.5   BUN mg/dL 23 25* 25* 26* 31*   CREATININE mg/dL 1.18* 1.42* 1.13* 1.29* 1.47*   CALCIUM mg/dL 9.0 9.6 9.8* 9.6 9.7*   GLUCOSE mg/dL 116* 103* 94 143* 256*     Microbiology Results (last 10 days)       Procedure Component Value - Date/Time    MRSA Screen, PCR (Inpatient) - Swab, Nares [[id number]]  (Normal) Collected: 07/07/23 0921    Lab Status: Final result Specimen: Swab from Nares Updated: 07/07/23 1039     MRSA PCR No MRSA Detected    Narrative:      The negative predictive value of this diagnostic test is high and should only be used to consider de-escalating anti-MRSA therapy. A positive result may indicate colonization with MRSA and must be correlated clinically.    Blood Culture - Blood, Arm, Left [[id number]]  (Normal) Collected: 07/06/23 1235    Lab Status: Preliminary result Specimen: Blood from Arm, Left Updated: 07/10/23 1245     Blood Culture No growth at 4 days    Blood Culture - Blood, Arm, Right [[id number]]  (Normal) Collected: 07/06/23 1219    Lab Status: Preliminary result Specimen: Blood from Arm, Right Updated: 07/10/23 1230     Blood Culture No growth at 4 days              Assessment:    Cellulitis of right foot  1-ischemic/diabetic ulcer/cellulitis of the  right foot due to peripheral vascular disease rendering antibiotic therapy unsuccessful with significant risk of toe/foot/limb loss and significant risk of contiguous focus osteomyelitis.  2-peripheral vascular disease  3-hypertension  4-atrial fibrillation  5-chronic kidney disease  6-diabetes mellitus  7-other diagnoses per primary team.     Recommendations/Discussions:  See my discussion and recommendations on 7/6/2023 noted   Continue present antibiotic therapy and follow subsequent vascular surgery plans for her right foot ischemic/diabetic wound with cellulitis including MRI of the foot.  Rosa Tang MD  7/10/2023  20:04 EDT    Parts of this note may be an electronic transcription/translation of spoken language to printed text using the Dragon dictation system.

## 2023-07-11 NOTE — PROGRESS NOTES
"Daily progress note    Primary care physician  Dr. JASSO    Chief complaint  S/p right femoral artery angioplasty and stent placement and right popliteal artery atherectomy and angioplasty and also right tibioperoneal trunk atherectomy and angioplasty Patient is feeling better with no new complaints and making good progress.    History of present illness  89-year-old white female with history of chronic atrial fibrillation diabetes mellitus hypertension hyperlipidemia peripheral artery disease and dementia who lives at home with her daughter brought to the emergency room with right foot redness swelling pain for last 1 month which is treated by primary care physician with oral antibiotics without any improvement.  Patient has infection around right fifth toe with surrounding cellulitis admitted for management.  Patient remained awake and alert and denies any chest pain shortness of breath palpitation no fever chills abdominal pain nausea vomiting diarrhea.  Most of the history obtained from the daughter and son-in-law but patient also contributed.     REVIEW OF SYSTEMS  Unremarkable except generalized weakness     PHYSICAL EXAM   Blood pressure 123/62, pulse 94, temperature 97.5 °F (36.4 °C), temperature source Oral, resp. rate 16, height 157.5 cm (62\"), weight 66.5 kg (146 lb 9.7 oz), SpO2 92 %.    General: Awake and alert no acute distress.  HENT: NCAT, PERRL, Nares patent.  Eyes: no scleral icterus.  Neck: trachea midline, no ROM limitations.  CV: Irregularly irregular S1-S2  Respiratory: normal effort, CTAB.  Abdomen: soft, nondistended, nontender bowel sounds positive  Musculoskeletal: no deformity.  Neuro: alert, moves all extremities, follows commands.  Skin: warm, dry.  Right foot: Patient has ulceration on the lateral base of the right fifth toe, erythema and swelling extending into the dorsum of the foot, no erythema but swelling of the right calf, no palpable cords, negative Homans.     LAB RESULTS  Lab " Results (last 24 hours)       Procedure Component Value Units Date/Time    POC Glucose Once [[id number]]  (Abnormal) Collected: 07/11/23 1527    Specimen: Blood Updated: 07/11/23 1528     Glucose 175 mg/dL      Comment: Meter: [id number] : [id number] Keith HER       Blood Culture - Blood, Arm, Left [[id number]]  (Normal) Collected: 07/06/23 1235    Specimen: Blood from Arm, Left Updated: 07/11/23 1245     Blood Culture No growth at 5 days    Blood Culture - Blood, Arm, Right [[id number]]  (Normal) Collected: 07/06/23 1219    Specimen: Blood from Arm, Right Updated: 07/11/23 1230     Blood Culture No growth at 5 days    POC Glucose Once [[id number]]  (Abnormal) Collected: 07/11/23 1111    Specimen: Blood Updated: 07/11/23 1113     Glucose 201 mg/dL      Comment: Meter: [id number] : [id number] Keith HER       POC Glucose Once [[id number]]  (Normal) Collected: 07/11/23 0621    Specimen: Blood Updated: 07/11/23 0623     Glucose 130 mg/dL      Comment: Meter: [id number] : [id number] Patti HER       Basic Metabolic Panel [[id number]]  (Abnormal) Collected: 07/11/23 0505    Specimen: Blood Updated: 07/11/23 0615     Glucose 152 mg/dL      BUN 18 mg/dL      Creatinine 1.07 mg/dL      Sodium 142 mmol/L      Potassium 4.6 mmol/L      Comment: Slight hemolysis detected by analyzer. Results may be affected.        Chloride 104 mmol/L      CO2 25.8 mmol/L      Calcium 9.8 mg/dL      BUN/Creatinine Ratio 16.8     Anion Gap 12.2 mmol/L      eGFR 49.1 mL/min/1.73     Narrative:      GFR Normal >60  Chronic Kidney Disease <60  Kidney Failure <15    The GFR formula is only valid for adults with stable renal function between ages 18 and 70.    Protime-INR [[id number]]  (Abnormal) Collected: 07/11/23 0505    Specimen: Blood Updated: 07/11/23 0553     Protime 15.5 Seconds      INR 1.22    CBC & Differential [[id number]]  (Abnormal) Collected: 07/11/23 0505    Specimen: Blood Updated: 07/11/23 0546     Narrative:      The following orders were created for panel order CBC & Differential.  Procedure                               Abnormality         Status                     ---------                               -----------         ------                     CBC Auto Differential[[id number]]        Abnormal            Final result                 Please view results for these tests on the individual orders.    CBC Auto Differential [[id number]]  (Abnormal) Collected: 07/11/23 0505    Specimen: Blood Updated: 07/11/23 0546     WBC 11.78 10*3/mm3      RBC 3.90 10*6/mm3      Hemoglobin 13.1 g/dL      Hematocrit 39.2 %      .5 fL      MCH 33.6 pg      MCHC 33.4 g/dL      RDW 12.9 %      RDW-SD 47.4 fl      MPV 9.5 fL      Platelets 296 10*3/mm3      Neutrophil % 61.2 %      Lymphocyte % 30.1 %      Monocyte % 7.6 %      Eosinophil % 0.1 %      Basophil % 0.4 %      Immature Grans % 0.6 %      Neutrophils, Absolute 7.22 10*3/mm3      Lymphocytes, Absolute 3.54 10*3/mm3      Monocytes, Absolute 0.89 10*3/mm3      Eosinophils, Absolute 0.01 10*3/mm3      Basophils, Absolute 0.05 10*3/mm3      Immature Grans, Absolute 0.07 10*3/mm3      nRBC 0.0 /100 WBC     POC Glucose Once [[id number]]  (Abnormal) Collected: 07/10/23 2103    Specimen: Blood Updated: 07/10/23 2104     Glucose 226 mg/dL      Comment: Meter: [id number] : [id number] Patti HER             Imaging Results (Last 24 Hours)       Procedure Component Value Units Date/Time    Arteriogram (Autofinalize) [[id number]] Resulted: 07/10/23 1731     Updated: 07/10/23 1731    Narrative:      This procedure was auto-finalized with no dictation required.           Interpretation Summary    Right Conclusion: The right YADIEL is severely reduced. Severe digital ischemia.    While the left ankle-brachial indices are within normal limits there is vessel incompressibility.  ABIs are likely falsely elevated.  Moderate digital ischemia noted.    Unable to determine  level of disease due to vessel incompressibility but suspect multi level disease based upon waveforms.     Interpretation Summary    Right popliteal fossa fluid collection.    Normal right lower extremity venous duplex scan.      Current Facility-Administered Medications:     acetaminophen (TYLENOL) tablet 650 mg, 650 mg, Oral, Q4H PRN, Chacho Barbour MD    aspirin chewable tablet 81 mg, 81 mg, Oral, Daily, Chacho Barbour MD, 81 mg at 07/11/23 0813    cefTRIAXone (ROCEPHIN) 1 g in sodium chloride 0.9 % 100 mL IVPB-VTB, 1 g, Intravenous, Q24H, Chacho Barbour MD, Last Rate: 200 mL/hr at 07/10/23 2307, 1 g at 07/10/23 2307    dextrose (D50W) (25 g/50 mL) IV injection 25 g, 25 g, Intravenous, Q15 Min PRN, Chacho Barbour MD    dextrose (GLUTOSE) oral gel 15 g, 15 g, Oral, Q15 Min PRN, Chacho Barbour MD    dilTIAZem CD (CARDIZEM CD) 24 hr capsule 180 mg, 180 mg, Oral, Q24H, Chacho Barbour MD, 180 mg at 07/11/23 1055    [START ON 7/12/2023] famotidine (PEPCID) tablet 20 mg, 20 mg, Oral, Daily, Chacho Barbour MD    glucagon (GLUCAGEN) injection 1 mg, 1 mg, Intramuscular, Q15 Min PRN, Chacho Barbour MD    insulin lispro (HUMALOG/ADMELOG) injection 2-7 Units, 2-7 Units, Subcutaneous, 4x Daily AC & at Bedtime, Chacho Barbour MD, 3 Units at 07/11/23 1130    metoprolol tartrate (LOPRESSOR) tablet 25 mg, 25 mg, Oral, BID, Chacho Barbour MD, 25 mg at 07/11/23 0813    Pharmacy to dose warfarin, , Does not apply, Continuous PRN, Chacho Barbour MD    pregabalin (LYRICA) capsule 100 mg, 100 mg, Oral, Nightly, Chacho Barbour MD, 100 mg at 07/10/23 2205    warfarin (COUMADIN) tablet 3 mg, 3 mg, Oral, Once, Chacho Barbour MD     ASSESSMENT  Right foot infected wound with surrounding cellulitis   Status post right femoral artery angioplasty and stent  Right popliteal artery atherectomy and angioplasty and right tibioperoneal atherectomy and angioplasty  Peripheral artery disease  Diabetes  mellitus  Hypertension  Hyperlipidemia  Chronic atrial fibrillation     PLAN  CPM  Postop care  Continue IV antibiotics   Vascular surgery consult appreciated  Nephrology and infectious disease to follow patient  Continue home medications  Stress ulcer DVT prophylaxis  Supportive care  PT/OT  DNR  Discussed with family nursing staff  Follow closely further recommendation current hospital course    DAISHA CARLSON MD    Copied text in this note has been reviewed and is accurate as of 07/11/23

## 2023-07-11 NOTE — PLAN OF CARE
Goal Outcome Evaluation:   VSS. Pleasantly confused. To remain on bedrest tonight. Left groin site with drainage to [facility], has not gotten worse since arrival to floor. Continues on IV abt. Pulses present with doppler. Call light in reach.

## 2023-07-12 ENCOUNTER — APPOINTMENT (OUTPATIENT)
Dept: MRI IMAGING | Facility: HOSPITAL | Age: 88
DRG: 271 | End: 2023-07-12
Payer: MEDICARE

## 2023-07-12 LAB
ALBUMIN SERPL-MCNC: 3.5 G/DL (ref 3.5–5.2)
ANION GAP SERPL CALCULATED.3IONS-SCNC: 8 MMOL/L (ref 5–15)
BASOPHILS # BLD AUTO: 0.04 10*3/MM3 (ref 0–0.2)
BASOPHILS NFR BLD AUTO: 0.3 % (ref 0–1.5)
BUN SERPL-MCNC: 23 MG/DL (ref 8–23)
BUN/CREAT SERPL: 20.5 (ref 7–25)
CALCIUM SPEC-SCNC: 9.4 MG/DL (ref 8.2–9.6)
CHLORIDE SERPL-SCNC: 105 MMOL/L (ref 98–107)
CO2 SERPL-SCNC: 29 MMOL/L (ref 22–29)
CREAT SERPL-MCNC: 1.12 MG/DL (ref 0.57–1)
DEPRECATED RDW RBC AUTO: 47.9 FL (ref 37–54)
EGFRCR SERPLBLD CKD-EPI 2021: 46.5 ML/MIN/1.73
EOSINOPHIL # BLD AUTO: 0.17 10*3/MM3 (ref 0–0.4)
EOSINOPHIL NFR BLD AUTO: 1.4 % (ref 0.3–6.2)
ERYTHROCYTE [DISTWIDTH] IN BLOOD BY AUTOMATED COUNT: 12.8 % (ref 12.3–15.4)
GLUCOSE BLDC GLUCOMTR-MCNC: 129 MG/DL (ref 70–130)
GLUCOSE BLDC GLUCOMTR-MCNC: 171 MG/DL (ref 70–130)
GLUCOSE BLDC GLUCOMTR-MCNC: 177 MG/DL (ref 70–130)
GLUCOSE BLDC GLUCOMTR-MCNC: 189 MG/DL (ref 70–130)
GLUCOSE SERPL-MCNC: 113 MG/DL (ref 65–99)
HCT VFR BLD AUTO: 35.8 % (ref 34–46.6)
HGB BLD-MCNC: 11.8 G/DL (ref 12–15.9)
IMM GRANULOCYTES # BLD AUTO: 0.08 10*3/MM3 (ref 0–0.05)
IMM GRANULOCYTES NFR BLD AUTO: 0.7 % (ref 0–0.5)
INR PPP: 1.37 (ref 0.9–1.1)
LYMPHOCYTES # BLD AUTO: 3.6 10*3/MM3 (ref 0.7–3.1)
LYMPHOCYTES NFR BLD AUTO: 29.4 % (ref 19.6–45.3)
MAGNESIUM SERPL-MCNC: 1.9 MG/DL (ref 1.7–2.3)
MCH RBC QN AUTO: 33.5 PG (ref 26.6–33)
MCHC RBC AUTO-ENTMCNC: 33 G/DL (ref 31.5–35.7)
MCV RBC AUTO: 101.7 FL (ref 79–97)
MONOCYTES # BLD AUTO: 1.12 10*3/MM3 (ref 0.1–0.9)
MONOCYTES NFR BLD AUTO: 9.2 % (ref 5–12)
NEUTROPHILS NFR BLD AUTO: 59 % (ref 42.7–76)
NEUTROPHILS NFR BLD AUTO: 7.22 10*3/MM3 (ref 1.7–7)
NRBC BLD AUTO-RTO: 0 /100 WBC (ref 0–0.2)
PHOSPHATE SERPL-MCNC: 3.1 MG/DL (ref 2.5–4.5)
PLATELET # BLD AUTO: 229 10*3/MM3 (ref 140–450)
PMV BLD AUTO: 9.3 FL (ref 6–12)
POTASSIUM SERPL-SCNC: 4.5 MMOL/L (ref 3.5–5.2)
PROTHROMBIN TIME: 17 SECONDS (ref 11.7–14.2)
RBC # BLD AUTO: 3.52 10*6/MM3 (ref 3.77–5.28)
SODIUM SERPL-SCNC: 142 MMOL/L (ref 136–145)
WBC NRBC COR # BLD: 12.23 10*3/MM3 (ref 3.4–10.8)

## 2023-07-12 PROCEDURE — 80069 RENAL FUNCTION PANEL: CPT | Performed by: INTERNAL MEDICINE

## 2023-07-12 PROCEDURE — 85610 PROTHROMBIN TIME: CPT | Performed by: SURGERY

## 2023-07-12 PROCEDURE — 25010000002 CEFTRIAXONE PER 250 MG: Performed by: SURGERY

## 2023-07-12 PROCEDURE — 97530 THERAPEUTIC ACTIVITIES: CPT

## 2023-07-12 PROCEDURE — 83735 ASSAY OF MAGNESIUM: CPT | Performed by: INTERNAL MEDICINE

## 2023-07-12 PROCEDURE — 85025 COMPLETE CBC W/AUTO DIFF WBC: CPT | Performed by: HOSPITALIST

## 2023-07-12 PROCEDURE — 73720 MRI LWR EXTREMITY W/O&W/DYE: CPT

## 2023-07-12 PROCEDURE — 97116 GAIT TRAINING THERAPY: CPT

## 2023-07-12 PROCEDURE — A9577 INJ MULTIHANCE: HCPCS | Performed by: HOSPITALIST

## 2023-07-12 PROCEDURE — 63710000001 INSULIN LISPRO (HUMAN) PER 5 UNITS: Performed by: SURGERY

## 2023-07-12 PROCEDURE — 82948 REAGENT STRIP/BLOOD GLUCOSE: CPT

## 2023-07-12 PROCEDURE — 0 GADOBENATE DIMEGLUMINE 529 MG/ML SOLUTION: Performed by: HOSPITALIST

## 2023-07-12 RX ADMIN — INSULIN LISPRO 2 UNITS: 100 INJECTION, SOLUTION INTRAVENOUS; SUBCUTANEOUS at 22:00

## 2023-07-12 RX ADMIN — DILTIAZEM HYDROCHLORIDE 180 MG: 180 CAPSULE, COATED, EXTENDED RELEASE ORAL at 08:56

## 2023-07-12 RX ADMIN — GADOBENATE DIMEGLUMINE 14 ML: 529 INJECTION, SOLUTION INTRAVENOUS at 19:48

## 2023-07-12 RX ADMIN — CEFTRIAXONE SODIUM 1 G: 1 INJECTION, POWDER, FOR SOLUTION INTRAMUSCULAR; INTRAVENOUS at 16:30

## 2023-07-12 RX ADMIN — WARFARIN 4.5 MG: 2.5 TABLET ORAL at 17:27

## 2023-07-12 RX ADMIN — INSULIN LISPRO 2 UNITS: 100 INJECTION, SOLUTION INTRAVENOUS; SUBCUTANEOUS at 17:26

## 2023-07-12 RX ADMIN — METOPROLOL TARTRATE 25 MG: 25 TABLET, FILM COATED ORAL at 21:44

## 2023-07-12 RX ADMIN — PREGABALIN 100 MG: 100 CAPSULE ORAL at 21:44

## 2023-07-12 RX ADMIN — INSULIN LISPRO 2 UNITS: 100 INJECTION, SOLUTION INTRAVENOUS; SUBCUTANEOUS at 12:29

## 2023-07-12 RX ADMIN — METOPROLOL TARTRATE 25 MG: 25 TABLET, FILM COATED ORAL at 08:56

## 2023-07-12 RX ADMIN — FAMOTIDINE 20 MG: 20 TABLET, FILM COATED ORAL at 08:56

## 2023-07-12 RX ADMIN — ASPIRIN 81 MG: 81 TABLET, CHEWABLE ORAL at 08:56

## 2023-07-12 NOTE — PLAN OF CARE
Progress: improving  Outcome Evaluation: Pt was Alert and oriented to person and place. Pt was sitting in recliner w/ daughter present during session. Pt c/o fatigue from PT session but still agreeable. Pt was SBA for 10x STS from recliner chair using armrest to push up from and for B UE support. Pt req 1 VC in loud tone d/t [place] for proper technique initially. Pt performed AROM for B UE shoulder elevation/depression, flex/ext 10 reps and B UE Elbow flex/ext 10 reps while seated supported in chair. Pt required rest breaks and water following each exercise to allow recovery d/t fatigue. Pt was in chair w/ call light and alarm at session end w/ daughter present.      Anticipated Discharge Disposition (OT): home with assist, home

## 2023-07-12 NOTE — THERAPY TREATMENT NOTE
Patient Name: Bessie Molina  : 1932    MRN: [id number]                              Today's Date: 2023       Admit Date: 2023    Visit Dx:     ICD-10-CM ICD-9-CM   1. Cellulitis of right foot  L03.115 682.7   2. Failure of outpatient treatment  Z78.9 V49.89   3. Leukocytosis, unspecified type  D72.829 288.60   4. ESR raised  R70.0 790.1   5. CRP elevated  R79.82 790.95   6. Hyperglycemia  R73.9 790.29   7. Chronic kidney disease, unspecified CKD stage  N18.9 585.9     Patient Active Problem List   Diagnosis    Atrial fibrillation, persistent    Chronic anticoagulation    Dementia    Abdominal pain    Acute respiratory failure with hypoxia    Pancreatic lesion    Chronic diastolic CHF (congestive heart failure)    Cellulitis of right foot     Past Medical History:   Diagnosis Date    Atrial fibrillation     CVA (cerebral vascular accident)     left temporal stroke    Hypertension      Past Surgical History:   Procedure Laterality Date    APPENDECTOMY      ARTERIOGRAM Bilateral     ATHRECTOMY ILIAC, FEMORAL, TIBIAL ARTERY Right 7/10/2023    Procedure: RIGHT LEG ANGIOGRAM WITH LASER;  Surgeon: Chacho Barbour MD;  Location: [facility] ;  Service: Vascular;  Laterality: Right;    CATARACT EXTRACTION Left     CERVICAL DISCECTOMY ANTERIOR      HYSTERECTOMY      LUMBAR DISCECTOMY      TONSILLECTOMY        General Information       Row Name 23 1452          OT Time and Intention    Document Type therapy note (daily note)  -PP     Mode of Treatment occupational therapy;individual therapy  -PP       Row Name 23 1452          General Information    Patient Profile Reviewed yes  -PP     Existing Precautions/Restrictions fall  -PP       Row Name 23 1452          Cognition    Orientation Status (Cognition) oriented to;person;place  -PP               User Key  (r) = Recorded By, (t) = Taken By, (c) = Cosigned By      Initials Name Provider Type    PP Sharonda Bueno OT  Occupational Therapist                     Mobility/ADL's       Row Name 07/12/23 1510          Bed Mobility    Comment, (Bed Mobility) UIC at session start  -PP       Row Name 07/12/23 1510          Transfers    Transfers sit-stand transfer;stand-sit transfer  -PP     Comment, (Transfers) w/o AD  -PP       Row Name 07/12/23 1510          Sit-Stand Transfer    Sit-Stand [place] (Transfers) standby assist  -PP       Row Name 07/12/23 1510          Stand-Sit Transfer    Stand-Sit [place] (Transfers) standby assist  -PP       Row Name 07/12/23 1510          Functional Mobility    Functional Mobility- Comment Pt declined d/t fatigue from previous PT session, per family she ambulated around nurses station 2x and family can assist w/ mobility at home.  -PP       Row Name 07/12/23 1510          Activities of Daily Living    BADL Assessment/Intervention feeding  -PP       Row Name 07/12/23 1510          Self-Feeding Assessment/Training    [place] Level (Feeding) liquids to mouth;independent  -PP     Position (Self-Feeding) supported sitting  -PP     Comment, (Feeding) Pt able to drink thread straw into cup and drink water during rest breaks w/o assist  -PP               User Key  (r) = Recorded By, (t) = Taken By, (c) = Cosigned By      Initials Name Provider Type    PP Sharonda Bueno OT Occupational Therapist                   Obj/Interventions       Row Name 07/12/23 1514          Shoulder (Therapeutic Exercise)    Shoulder (Therapeutic Exercise) AROM (active range of motion)  -PP     Shoulder AROM (Therapeutic Exercise) bilateral;scapular elevation;flexion;extension;10 repetitions  -PP       Row Name 07/12/23 1514          Elbow/Forearm (Therapeutic Exercise)    Elbow/Forearm (Therapeutic Exercise) AROM (active range of motion)  -PP     Elbow/Forearm AROM (Therapeutic Exercise) bilateral;flexion;extension;10 repetitions  -PP       Row Name 07/12/23 1514          Motor Skills    Therapeutic Exercise  shoulder;elbow/forearm  -PP       Row Name 07/12/23 1514          Balance    Balance Assessment sitting static balance;sit to stand dynamic balance;standing static balance  -PP     Static Sitting Balance standby assist  -PP     Sit to Stand Dynamic Balance standby assist  -PP     Static Standing Balance standby assist  -PP     Dynamic Standing Balance standby assist  -PP     Position/Device Used, Standing Balance supported  support from arm of chair  -PP     Balance Interventions sitting;standing;sit to stand;supported;dynamic  -PP     Comment, Balance Pt able able to CTS from chair using UE support from armed chair w/ 1 vc for initial technique  -PP               User Key  (r) = Recorded By, (t) = Taken By, (c) = Cosigned By      Initials Name Provider Type    PP Sharonda Bueno OT Occupational Therapist                   Goals/Plan    No documentation.                  Clinical Impression       Row Name 07/12/23 1517          Pain Assessment    Pretreatment Pain Rating 0/10 - no pain  -PP     Posttreatment Pain Rating 0/10 - no pain  -PP     Pre/Posttreatment Pain Comment no c/o pain at this time, pt c/o fatigue after PT session.  -PP       Row Name 07/12/23 1517          Plan of Care Review    Progress improving  -PP     Outcome Evaluation Pt was Alert and oriented to person and place. Pt was sitting in recliner w/ daughter present during session. Pt c/o fatigue from PT session but still agreeable. Pt was SBA for 10x STS from recliner chair using armrest to push up from and for B UE support. Pt req 1 VC in loud tone d/t [place] for proper technique initially. Pt performed AROM for B UE shoulder elevation/depression, flex/ext 10 reps and B UE Elbow flex/ext 10 reps while seated supported in chair. Pt required rest breaks and water following each exercise to allow recovery d/t fatigue. Pt was in chair w/ call light and alarm at session end w/ daughter present.  -PP       Row Name 07/12/23 1514          Therapy  Assessment/Plan (OT)    Patient/Family Therapy Goal Statement (OT) --  -PP       Row Name 07/12/23 1517          Therapy Plan Review/Discharge Plan (OT)    Anticipated Discharge Disposition (OT) home with assist;home  -PP       Row Name 07/12/23 1517          Vital Signs    O2 Delivery Pre Treatment room air  -PP     Pre Patient Position Sitting  -PP     Intra Patient Position Standing  -PP     Post Patient Position Sitting  -PP       Row Name 07/12/23 1517          Positioning and Restraints    Pre-Treatment Position sitting in chair/recliner  -PP     Post Treatment Position chair  -PP     In Chair with family/caregiver;call light within reach;encouraged to call for assist;exit alarm on  -PP               User Key  (r) = Recorded By, (t) = Taken By, (c) = Cosigned By      Initials Name Provider Type    Sharonda Welch OT Occupational Therapist                   Outcome Measures       Row Name 07/12/23 1532          How much help from another is currently needed...    Putting on and taking off regular lower body clothing? 3  -PP     Bathing (including washing, rinsing, and drying) 3  -PP     Toileting (which includes using toilet bed pan or urinal) 2  purwick in place this date  -PP     Putting on and taking off regular upper body clothing 4  -PP     Taking care of personal grooming (such as brushing teeth) 3  -PP     Eating meals 4  -PP     AM-PAC 6 Clicks Score (OT) 19  -PP       Row Name 07/12/23 0830          How much help from another person do you currently need...    Turning from your back to your side while in flat bed without using bedrails? 4  -JK     Moving from lying on back to sitting on the side of a flat bed without bedrails? 3  -JK     Moving to and from a bed to a chair (including a wheelchair)? 3  -JK     Standing up from a chair using your arms (e.g., wheelchair, bedside chair)? 3  -JK     Climbing 3-5 steps with a railing? 3  -JK     To walk in hospital room? 3  -JK     AM-PAC 6 Clicks  Score (PT) 19  -JK     Highest level of mobility 6 --> Walked 10 steps or more  -MACIK       Row Name 07/12/23 1532          Functional Assessment    Outcome Measure Options AM-PAC 6 Clicks Daily Activity (OT)  -PP               User Key  (r) = Recorded By, (t) = Taken By, (c) = Cosigned By      Initials Name Provider Type    Clarisse Cole, RN Registered Nurse    Sharonda Welch OT Occupational Therapist                    Occupational Therapy Education       Title: PT OT SLP Therapies (In Progress)       Topic: Occupational Therapy (Done)       Point: ADL training (Done)       Description:   Instruct learner(s) on proper safety adaptation and remediation techniques during self care or transfers.   Instruct in proper use of assistive devices.                  Learning Progress Summary             Patient Acceptance, E, VU,DU by JESSA at 7/7/2023 1033                         Point: Home exercise program (Done)       Description:   Instruct learner(s) on appropriate technique for monitoring, assisting and/or progressing therapeutic exercises/activities.                  Learning Progress Summary             Patient Acceptance, E, VU,DU by JESSA at 7/7/2023 1033                         Point: Precautions (Done)       Description:   Instruct learner(s) on prescribed precautions during self-care and functional transfers.                  Learning Progress Summary             Patient Acceptance, E,D, VU,DU by PP at 7/12/2023 1537    Comment: Pt ED in safe STS xfer technique from chair, using arms to push up from surface and to reach back to surface before sitting.   Family Acceptance, E,D, VU,DU by PP at 7/12/2023 1537    Comment: Pt ED in safe STS xfer technique from chair, using arms to push up from surface and to reach back to surface before sitting.                                         User Key       Initials Effective Dates Name Provider Type Discipline    JESSA 09/22/22 -  Jewell Acevedo, LEE Occupational  Therapist OT    PP 06/09/23 -  Sharonda Bueno OT Occupational Therapist OT                  OT Recommendation and Plan     Plan of Care Review  Progress: improving  Outcome Evaluation: Pt was Alert and oriented to person and place. Pt was sitting in recliner w/ daughter present during session. Pt c/o fatigue from PT session but still agreeable. Pt was SBA for 10x STS from recliner chair using armrest to push up from and for B UE support. Pt req 1 VC in loud tone d/t [place] for proper technique initially. Pt performed AROM for B UE shoulder elevation/depression, flex/ext 10 reps and B UE Elbow flex/ext 10 reps while seated supported in chair. Pt required rest breaks and water following each exercise to allow recovery d/t fatigue. Pt was in chair w/ call light and alarm at session end w/ daughter present.     Time Calculation:    Time Calculation- OT       Row Name 07/12/23 1538             Time Calculation- OT    OT Start Time 1425  -PP      OT Stop Time 1448  -PP      OT Time Calculation (min) 23 min  -PP      Total Timed Code Minutes- OT 23 minute(s)  -PP      OT Received On 07/12/23  -PP      OT - Next Appointment 07/13/23  -PP         Timed Charges    24829 - OT Therapeutic Activity Minutes 23  -PP         Total Minutes    Timed Charges Total Minutes 23  -PP       Total Minutes 23  -PP                User Key  (r) = Recorded By, (t) = Taken By, (c) = Cosigned By      Initials Name Provider Type    PP Sharonda Bueno, OT Occupational Therapist                  Therapy Charges for Today       Code Description Service Date Service Provider Modifiers Qty    03005928170  OT THERAPEUTIC ACT EA 15 MIN 7/12/2023 Sharonda Bueno OT GO 2                 Sharonda Bueno, OT  7/12/2023

## 2023-07-12 NOTE — PROGRESS NOTES
[facility] Progress Note      Patient Name: Bessie Molina  : 1932  MRN: [id number]  Primary Care Physician:  Mireille La MD  Date of admission: 2023    Subjective     Interval History:   Breathing is fine on room air; appetite is fair  Right foot is painful but less so than yesterday  1.5 L UOP yesterday    Review of Systems:   As noted above    Objective     Vitals:   Temp:  [97.5 °F (36.4 °C)-98.8 °F (37.1 °C)] 98.7 °F (37.1 °C)  Heart Rate:  [] 107  Resp:  [16] 16  BP: (123-153)/(62-85) 143/65    Intake/Output Summary (Last 24 hours) at 2023 0956  Last data filed at 2023 0700  Gross per 24 hour   Intake 480 ml   Output 1450 ml   Net -970 ml         Physical Exam:    NAD; pleasant; hard of hearing; looks stated age  MMM; no scleral icterus  No JVD; no carotid bruits  Decreased breath sounds in bases; not labored on RA  Irregularly irregular, tachycardic, no rub  Soft, NT, ND, BS+  Trace edema, R > L  No clubbing  No asterixis  Moves all extremities     Scheduled Meds:     aspirin, 81 mg, Oral, Daily  cefTRIAXone, 1 g, Intravenous, Q24H  dilTIAZem CD, 180 mg, Oral, Q24H  famotidine, 20 mg, Oral, Daily  insulin lispro, 2-7 Units, Subcutaneous, 4x Daily AC & at Bedtime  metoprolol tartrate, 25 mg, Oral, BID  pregabalin, 100 mg, Oral, Nightly      IV Meds:   Pharmacy to dose warfarin,         Results Reviewed:   I have personally reviewed the results from the time of this admission to 2023 09:56 EDT     Results from last 7 days   Lab Units 23  0457 23  0505 07/10/23  0346 23  0409 23  0419 23  1219   SODIUM mmol/L 142 142 141   < > 144 136   POTASSIUM mmol/L 4.5 4.6 3.6   < > 3.7 3.6   CHLORIDE mmol/L 105 104 106   < > 106 98   CO2 mmol/L 29.0 25.8 23.4   < > 28.0 24.5   BUN mg/dL 23 18 23   < > 26* 31*   CREATININE mg/dL 1.12* 1.07* 1.18*   < > 1.29* 1.47*   CALCIUM mg/dL 9.4 9.8* 9.0   < > 9.6 9.7*   BILIRUBIN  mg/dL  --   --   --   --  0.3 0.5   ALK PHOS U/L  --   --   --   --  85 105   ALT (SGPT) U/L  --   --   --   --  18 25   AST (SGOT) U/L  --   --   --   --  19 29   GLUCOSE mg/dL 113* 152* 116*   < > 143* 256*    < > = values in this interval not displayed.       Estimated Creatinine Clearance: 29.3 mL/min (A) (by C-G formula based on SCr of 1.12 mg/dL (H)).  Results from last 7 days   Lab Units 07/12/23  0457 07/08/23  0409   MAGNESIUM mg/dL 1.9  --    PHOSPHORUS mg/dL 3.1 3.4           Results from last 7 days   Lab Units 07/12/23  0457 07/11/23  0505 07/10/23  0346 07/09/23  0426 07/08/23  0409   WBC 10*3/mm3 12.23* 11.78* 9.38 10.11 10.02   HEMOGLOBIN g/dL 11.8* 13.1 11.4* 12.0 12.5   PLATELETS 10*3/mm3 229 296 247 253 260       Results from last 7 days   Lab Units 07/12/23  0457 07/11/23  0505 07/10/23  0346 07/09/23  0426 07/08/23  0409   INR  1.37* 1.22* 1.58* 1.92* 2.19*         Assessment / Plan     ASSESSMENT:  CKD3b, with renal function stable after arteriogram 7/10, and SCR currently at baseline.  Electrolytes are stable. PVD + right foot cellulitis - vascular surg & ID following, on rocephin  HTN - BP control acceptable for now  PAF - on coumadin  Dm2, mgmt per primary     PLAN:  Home anytime from renal view  Surveillance labs    Thank you for involving us in the care of Bessie Molina.  Please feel free to call with any questions.    Jose Ren MD  07/12/23  09:56 EDT    [facility]  [phone]

## 2023-07-12 NOTE — PLAN OF CARE
Goal Outcome Evaluation:  Plan of Care Reviewed With: patient        Progress: improving  Outcome Evaluation: pt disoriented x2-3, vss, on ra, afib on monitor, on coumadin, asst.x1 to chair, no c/o pain at this time, IV abx given per mar, family at bedside

## 2023-07-12 NOTE — THERAPY TREATMENT NOTE
Patient Name: Bessie Molina  : 1932    MRN: [id number]                              Today's Date: 2023       Admit Date: 2023    Visit Dx:     ICD-10-CM ICD-9-CM   1. Cellulitis of right foot  L03.115 682.7   2. Failure of outpatient treatment  Z78.9 V49.89   3. Leukocytosis, unspecified type  D72.829 288.60   4. ESR raised  R70.0 790.1   5. CRP elevated  R79.82 790.95   6. Hyperglycemia  R73.9 790.29   7. Chronic kidney disease, unspecified CKD stage  N18.9 585.9     Patient Active Problem List   Diagnosis    Atrial fibrillation, persistent    Chronic anticoagulation    Dementia    Abdominal pain    Acute respiratory failure with hypoxia    Pancreatic lesion    Chronic diastolic CHF (congestive heart failure)    Cellulitis of right foot     Past Medical History:   Diagnosis Date    Atrial fibrillation     CVA (cerebral vascular accident)     left temporal stroke    Hypertension      Past Surgical History:   Procedure Laterality Date    APPENDECTOMY      ARTERIOGRAM Bilateral     ATHRECTOMY ILIAC, FEMORAL, TIBIAL ARTERY Right 7/10/2023    Procedure: RIGHT LEG ANGIOGRAM WITH LASER;  Surgeon: Chacho Barbour MD;  Location: [facility] ;  Service: Vascular;  Laterality: Right;    CATARACT EXTRACTION Left     CERVICAL DISCECTOMY ANTERIOR      HYSTERECTOMY      LUMBAR DISCECTOMY      TONSILLECTOMY        General Information       Row Name 23 1556          Physical Therapy Time and Intention    Document Type therapy note (daily note)  -EB     Mode of Treatment individual therapy;physical therapy  -EB       Row Name 23 1556          General Information    Patient Profile Reviewed yes  -EB     Existing Precautions/Restrictions fall  -EB     Barriers to Rehab --  very [place]  -EB       Row Name 23 1556          Cognition    Orientation Status (Cognition) oriented to;person;place  -EB       Row Name 23 1557          Safety Issues, Functional Mobility    Safety Issues  Affecting Function (Mobility) ability to follow commands  -EB     Impairments Affecting Function (Mobility) endurance/activity tolerance;strength  -EB               User Key  (r) = Recorded By, (t) = Taken By, (c) = Cosigned By      Initials Name Provider Type    Ophelia Hoskins PTA Physical Therapist Assistant                   Mobility       Row Name 07/12/23 1558          Bed Mobility    Comment, (Bed Mobility) NT-UIC  -EB       Row Name 07/12/23 1558          Sit-Stand Transfer    Sit-Stand [place] (Transfers) minimum assist (75% patient effort)  -EB     Assistive Device (Sit-Stand Transfers) walker, front-wheeled  -EB       Row Name 07/12/23 1558          Gait/Stairs (Locomotion)    [place] Level (Gait) contact guard;minimum assist (75% patient effort)  -EB     Assistive Device (Gait) walker, front-wheeled  -EB     Distance in Feet (Gait) 30ftX2  -EB     Deviations/Abnormal Patterns (Gait) antalgic;ele decreased;stride length decreased  -EB     Bilateral Gait Deviations forward flexed posture;heel strike decreased  -EB     Right Sided Gait Deviations weight shift ability decreased  -EB     Comment, (Gait/Stairs) slow gait pace, fatigues quickly and needed a rest break. pt difficulty following commands due to [place]. cues for walker placement and posture correction.  -EB               User Key  (r) = Recorded By, (t) = Taken By, (c) = Cosigned By      Initials Name Provider Type    Ophelia Hoskins PTA Physical Therapist Assistant                   Obj/Interventions    No documentation.                  Goals/Plan    No documentation.                  Clinical Impression       Row Name 07/12/23 1600          Plan of Care Review    Plan of Care Reviewed With patient  -EB     Progress improving  -EB     Outcome Evaluation Pt seen for PT treatment today. Pt is [place] and has difficulty following commands. Pt required [place] with STS transfers from recliner. Pt able to ambulate 30ftX2 with rwx, CGA/Reyna. Pt  had no unsteadiness but did fatigue quickly. pt needed cues for posture correction and wallker placement. Will continue to follow pt for d/c needs.  -EB       Row Name 07/12/23 1600          Therapy Assessment/Plan (PT)    Therapy Frequency (PT) 5 times/wk  -EB       Row Name 07/12/23 1600          Positioning and Restraints    Pre-Treatment Position sitting in chair/recliner  -EB     Post Treatment Position chair  -EB     In Chair reclined;call light within reach;exit alarm on;encouraged to call for assist  -EB               User Key  (r) = Recorded By, (t) = Taken By, (c) = Cosigned By      Initials Name Provider Type    Ophelia Hoskins PTA Physical Therapist Assistant                   Outcome Measures       Row Name 07/12/23 1604 07/12/23 0830       How much help from another person do you currently need...    Turning from your back to your side while in flat bed without using bedrails? 4  -EB 4  -JK    Moving from lying on back to sitting on the side of a flat bed without bedrails? 3  -EB 3  -JK    Moving to and from a bed to a chair (including a wheelchair)? 3  -EB 3  -JK    Standing up from a chair using your arms (e.g., wheelchair, bedside chair)? 3  -EB 3  -JK    Climbing 3-5 steps with a railing? 2  -EB 3  -JK    To walk in hospital room? 3  -EB 3  -JK    AM-PAC 6 Clicks Score (PT) 18  -EB 19  -JK    Highest level of mobility 6 --> Walked 10 steps or more  -EB 6 --> Walked 10 steps or more  -JK      Row Name 07/12/23 1532          Functional Assessment    Outcome Measure Options AM-PAC 6 Clicks Daily Activity (OT)  -PP               User Key  (r) = Recorded By, (t) = Taken By, (c) = Cosigned By      Initials Name Provider Type    Ophelia Hoskins PTA Physical Therapist Assistant    Clarisse Cole RN Registered Nurse    Sharonda Welch, LEE Occupational Therapist                                 Physical Therapy Education       Title: PT OT SLP Therapies (In Progress)       Topic: Physical  Therapy (In Progress)       Point: Mobility training (Done)       Learning Progress Summary             Patient Acceptance, E, VU,NR by EB at 7/12/2023 1605    Acceptance, E, VU,NR by EB at 7/11/2023 1128    Acceptance, E,TB, VU,DU by  at 7/7/2023 0954                         Point: Home exercise program (Not Started)       Learner Progress:  Not documented in this visit.              Point: Body mechanics (Done)       Learning Progress Summary             Patient Acceptance, E, VU,NR by EB at 7/12/2023 1605    Acceptance, E, VU,NR by EB at 7/11/2023 1128    Acceptance, E,TB, VU,DU by CS at 7/7/2023 0954                         Point: Precautions (Done)       Learning Progress Summary             Patient Acceptance, E, VU,NR by EB at 7/11/2023 1128    Acceptance, E,TB, VU,DU by  at 7/7/2023 0954                                         User Key       Initials Effective Dates Name Provider Type Discipline    EB 02/14/23 -  Ophelia Callejas PTA Physical Therapist Assistant PT     09/22/22 -  Raven Blevins, PT Physical Therapist PT                  PT Recommendation and Plan     Plan of Care Reviewed With: patient  Progress: improving  Outcome Evaluation: Pt seen for PT treatment today. Pt is [place] and has difficulty following commands. Pt required Reyna with STS transfers from recliner. Pt able to ambulate 30ftX2 with rwx, CGA/Reyna. Pt had no unsteadiness but did fatigue quickly. pt needed cues for posture correction and wallker placement. Will continue to follow pt for d/c needs.     Time Calculation:    PT Charges       Row Name 07/12/23 1555             Time Calculation    Start Time 1340  -EB      Stop Time 1403  -EB      Time Calculation (min) 23 min  -EB      PT Received On 07/12/23  -EB      PT - Next Appointment 07/13/23  -EB         Time Calculation- PT    Total Timed Code Minutes- PT 23 minute(s)  -EB                User Key  (r) = Recorded By, (t) = Taken By, (c) = Cosigned By      Initials Name Provider  Type    EB Ophelia Callejas PTA Physical Therapist Assistant                  Therapy Charges for Today       Code Description Service Date Service Provider Modifiers Qty    01413196409 HC GAIT TRAINING EA 15 MIN 7/11/2023 Ophelia Callejas, YAEL GP 1    12179794639 HC GAIT TRAINING EA 15 MIN 7/12/2023 Ophelia Callejas, YAEL GP 1    23422599515 HC PT THERAPEUTIC ACT EA 15 MIN 7/12/2023 Ophelia Callejas, YAEL GP 1            PT G-Codes  Outcome Measure Options: AM-PAC 6 Clicks Daily Activity (OT)  AM-PAC 6 Clicks Score (PT): 18  AM-PAC 6 Clicks Score (OT): 19       Ophelia Callejas PTA  7/12/2023

## 2023-07-12 NOTE — PROGRESS NOTES
"  Infectious Diseases Progress Note    Rosa Tang MD     [facility]  [place]: 5 days  Patient Identification:  Name: Bessie Molina  Age: 89 y.o.  Sex: female  :  1932  MRN: [id number]         Primary Care Physician: Mireille La MD        Subjective: Feeling somewhat better..  Interval History: See consultation note  7/10/2023: She underwent:  Ultrasound-guided left common femoral artery percutaneous access.  Right lower extremity runoff arteriogram.  Right superficial femoral artery angioplasty and stent placement 6 mm self-expanding stent.  Right popliteal artery atherectomy with 1.7 mm [employer] catheter and 4 mm drug-coated balloon angioplasty.  Right tibioperoneal trunk atherectomy with 1.7 mm [employer] catheter and 3 mm angioplasty.  Objective:    Scheduled Meds:aspirin, 81 mg, Oral, Daily  cefTRIAXone, 1 g, Intravenous, Q24H  dilTIAZem CD, 180 mg, Oral, Q24H  [START ON 2023] famotidine, 20 mg, Oral, Daily  insulin lispro, 2-7 Units, Subcutaneous, 4x Daily AC & at Bedtime  metoprolol tartrate, 25 mg, Oral, BID  pregabalin, 100 mg, Oral, Nightly      Continuous Infusions:Pharmacy to dose warfarin,         Vital signs in last 24 hours:  Temp:  [97.2 °F (36.2 °C)-98.8 °F (37.1 °C)] 98.8 °F (37.1 °C)  Heart Rate:  [] 92  Resp:  [16] 16  BP: (103-160)/(49-98) 139/76    Intake/Output:    Intake/Output Summary (Last 24 hours) at 20230  Last data filed at 2023 1924  Gross per 24 hour   Intake 240 ml   Output 1100 ml   Net -860 ml       Exam:  /76 (BP Location: Right arm, Patient Position: Lying)   Pulse 92   Temp 98.8 °F (37.1 °C) (Oral)   Resp 16   Ht 157.5 cm (62\")   Wt 66.5 kg (146 lb 9.7 oz)   SpO2 92%   BMI 26.81 kg/m²   Patient is examined using the personal protective equipment as per guidelines from infection control for this particular patient as enacted.  Hand washing was performed before and after patient interaction.  HEENT: " Remarkable for no acute lesions  Neck: Supple  Chest: Bilateral air entry decreased breath sounds at the bases  Heart: S1-S2 regular  Abdomen: Soft nontender  Extremity examination shows:    Data Review:    I reviewed the patient's new clinical results.  Results from last 7 days   Lab Units 07/11/23  0505 07/10/23  0346 07/09/23  0426 07/08/23  0409 07/07/23  0419 07/06/23  1219   WBC 10*3/mm3 11.78* 9.38 10.11 10.02 10.15 11.52*   HEMOGLOBIN g/dL 13.1 11.4* 12.0 12.5 12.9 13.7   PLATELETS 10*3/mm3 296 247 253 260 256 287     Results from last 7 days   Lab Units 07/11/23  0505 07/10/23  0346 07/09/23  0426 07/08/23  0409 07/07/23  0419 07/06/23  1219   SODIUM mmol/L 142 141 144 145 144 136   POTASSIUM mmol/L 4.6 3.6 4.6 4.0 3.7 3.6   CHLORIDE mmol/L 104 106 108* 108* 106 98   CO2 mmol/L 25.8 23.4 26.0 27.4 28.0 24.5   BUN mg/dL 18 23 25* 25* 26* 31*   CREATININE mg/dL 1.07* 1.18* 1.42* 1.13* 1.29* 1.47*   CALCIUM mg/dL 9.8* 9.0 9.6 9.8* 9.6 9.7*   GLUCOSE mg/dL 152* 116* 103* 94 143* 256*     Microbiology Results (last 10 days)       Procedure Component Value - Date/Time    MRSA Screen, PCR (Inpatient) - Swab, Nares [[id number]]  (Normal) Collected: 07/07/23 0921    Lab Status: Final result Specimen: Swab from Nares Updated: 07/07/23 1039     MRSA PCR No MRSA Detected    Narrative:      The negative predictive value of this diagnostic test is high and should only be used to consider de-escalating anti-MRSA therapy. A positive result may indicate colonization with MRSA and must be correlated clinically.    Blood Culture - Blood, Arm, Left [[id number]]  (Normal) Collected: 07/06/23 1235    Lab Status: Final result Specimen: Blood from Arm, Left Updated: 07/11/23 1245     Blood Culture No growth at 5 days    Blood Culture - Blood, Arm, Right [[id number]]  (Normal) Collected: 07/06/23 1219    Lab Status: Final result Specimen: Blood from Arm, Right Updated: 07/11/23 1230     Blood Culture No growth at 5 days               Assessment:    Cellulitis of right foot  1-ischemic/diabetic ulcer/cellulitis of the right foot due to peripheral vascular disease rendering antibiotic therapy unsuccessful with significant risk of toe/foot/limb loss and significant risk of contiguous focus osteomyelitis.  2-peripheral vascular disease  3-hypertension  4-atrial fibrillation  5-chronic kidney disease  6-diabetes mellitus  7-other diagnoses per primary team.     Recommendations/Discussions:  See my discussion and recommendations on 7/6/2023 noted   Continue present antibiotic therapy and follow subsequent vascular surgery plans for her right foot ischemic/diabetic wound with cellulitis including MRI of the foot.  Rosa Tang MD  7/11/2023  22:30 EDT    Parts of this note may be an electronic transcription/translation of spoken language to printed text using the Dragon dictation system.

## 2023-07-12 NOTE — PLAN OF CARE
Goal Outcome Evaluation:   VSS. Disoriented to situation. No c/o pain or discomfort. Up with assist. Purewick in place. Call light in reach.

## 2023-07-12 NOTE — PROGRESS NOTES
"Nutrition Services    Patient Name:  Bessie Molina  YOB: 1932  MRN: [id number]  Admit Date:  7/6/2023    FOLLOW UP - CLINICAL NUTRITION    Assessment Date:  07/12/23    Encounter Information         Reason for Encounter Follow up    Current Issues POD#2 s/p R SFA stent/angioplasty, popliteal artery laser atherectomy and drug-coated balloon angioplasty, and tibioperoneal trunk atherectomy with angioplasty. Vascular surgery ok to d/c pt. Plans for home with HH. Pt tolerating ConsCHO diet well with % po intake of meals. Last BM 7/10     Current Nutrition Orders & Evaluation of Intake       Oral Nutrition     Current PO Diet Diet: Regular/House Diet, Diabetic Diets; Consistent Carbohydrate; Texture: Regular Texture (IDDSI 7); Fluid Consistency: Thin (IDDSI 0)   Supplement n/a   PO Evaluation     % PO Intake %    # of Days Evaluated 5    Factors Affecting Intake  No factors at this time   --  Anthropometrics          Height    Weight Height: 157.5 cm (62\")  Weight: 66.5 kg (146 lb 9.7 oz) (07/11/23 0500)    BMI kg/m2 Body mass index is 26.81 kg/m².  Overweight (25 - 29.9)    Weight trend Stable     Labs        Pertinent Labs Reviewed, listed below     Results from last 7 days   Lab Units 07/12/23  0457 07/11/23  0505 07/10/23  0346 07/08/23  0409 07/07/23  0419 07/06/23  1219   SODIUM mmol/L 142 142 141   < > 144 136   POTASSIUM mmol/L 4.5 4.6 3.6   < > 3.7 3.6   CHLORIDE mmol/L 105 104 106   < > 106 98   CO2 mmol/L 29.0 25.8 23.4   < > 28.0 24.5   BUN mg/dL 23 18 23   < > 26* 31*   CREATININE mg/dL 1.12* 1.07* 1.18*   < > 1.29* 1.47*   CALCIUM mg/dL 9.4 9.8* 9.0   < > 9.6 9.7*   BILIRUBIN mg/dL  --   --   --   --  0.3 0.5   ALK PHOS U/L  --   --   --   --  85 105   ALT (SGPT) U/L  --   --   --   --  18 25   AST (SGOT) U/L  --   --   --   --  19 29   GLUCOSE mg/dL 113* 152* 116*   < > 143* 256*    < > = values in this interval not displayed.     Results from last 7 days   Lab Units " 07/12/23  0457 07/08/23  0409 07/07/23  0419   MAGNESIUM mg/dL 1.9  --   --    PHOSPHORUS mg/dL 3.1   < >  --    HEMOGLOBIN g/dL 11.8*   < > 12.9   HEMATOCRIT % 35.8   < > 37.7   WBC 10*3/mm3 12.23*   < > 10.15   TRIGLYCERIDES mg/dL  --   --  242*   ALBUMIN g/dL 3.5   < > 3.6    < > = values in this interval not displayed.     Results from last 7 days   Lab Units 07/12/23  0457 07/11/23  0505 07/10/23  0346 07/09/23  0426 07/08/23  0409 07/06/23  1905 07/06/23  1219   INR  1.37* 1.22* 1.58* 1.92* 2.19*   < > 2.16*   APTT seconds  --   --   --   --   --   --  32.8   PLATELETS 10*3/mm3 229 296 247 253 260   < > 287    < > = values in this interval not displayed.     COVID19   Date Value Ref Range Status   12/13/2022 Not Detected Not Detected - Ref. Range Final     Lab Results   Component Value Date    HGBA1C 7.30 (H) 07/07/2023          Medications            Scheduled Medications aspirin, 81 mg, Oral, Daily  cefTRIAXone, 1 g, Intravenous, Q24H  dilTIAZem CD, 180 mg, Oral, Q24H  famotidine, 20 mg, Oral, Daily  insulin lispro, 2-7 Units, Subcutaneous, 4x Daily AC & at Bedtime  metoprolol tartrate, 25 mg, Oral, BID  pregabalin, 100 mg, Oral, Nightly  warfarin, 4.5 mg, Oral, Once        Infusions Pharmacy to dose warfarin,         PRN Medications   acetaminophen    Pharmacy to dose warfarin     Physical Findings          Physical Appearance alert, disoriented, hard of hearing, overweight, room air   Oral/Mouth Cavity dentures   Edema  lower extremity , 1+ (trace)   Gastrointestinal non-distended , normoactive, last bowel movement: 7/10   Skin  non-healing wound(s), surgical incision   Tubes/Drains/Lines none   NFPE Not indicated at this time   --  NUTRITION INTERVENTION / PLAN OF CARE  Intervention Goal         Intervention Goal(s) Maintain nutrition status, Reduce/improve symptoms, Meet estimated needs, Disease management/therapy, Tolerate PO , and No significant weight loss     Nutrition Intervention         RD Action  Follow Tx Progress and Care plan reviewed     Nutrition Prescription         Diet Prescription     Supplement Prescription n/a   EN/PN Prescription    New Prescription Ordered? Continue same per protocol   --  Monitor/Evaluation        Monitor Per protocol   Discharge Needs Pending clinical course   Education Will instruct as appropriate   --    RD to follow up per protocol.    Electronically signed by:  Sharona Moreno RD  07/12/23 17:03 EDT

## 2023-07-12 NOTE — PLAN OF CARE
Goal Outcome Evaluation:  Plan of Care Reviewed With: patient        Progress: improving  Outcome Evaluation: Pt seen for PT treatment today. Pt is [place] and has difficulty following commands. Pt required Reyna with STS transfers from recliner. Pt able to ambulate 30ftX2 with rwx, CGA/Reyna. Pt had no unsteadiness but did fatigue quickly. pt needed cues for posture correction and wallker placement. Will continue to follow pt for d/c needs.

## 2023-07-12 NOTE — PROGRESS NOTES
[facility] Clinical Pharmacy Services: Warfarin Dosing/Monitoring Consult    Bessie Molina is a 89 y.o. female, estimated creatinine clearance is 29.3 mL/min (A) (by C-G formula based on SCr of 1.12 mg/dL (H)). weighing 66.5 kg (146 lb 9.7 oz).    Results from last 7 days   Lab Units 07/12/23  0457 07/11/23  0505 07/10/23  0346 07/09/23  0426 07/08/23  0409 07/06/23  1905 07/06/23  1219   INR  1.37* 1.22* 1.58* 1.92* 2.19*   < > 2.16*   APTT seconds  --   --   --   --   --   --  32.8   HEMOGLOBIN g/dL 11.8* 13.1 11.4* 12.0 12.5   < > 13.7   HEMATOCRIT % 35.8 39.2 34.3 36.9 37.2   < > 41.6   PLATELETS 10*3/mm3 229 296 247 253 260   < > 287    < > = values in this interval not displayed.     Prior to admission anticoagulation: Per med reconciliation, the patient's home warfarin regimen is 4.5 mg on Mon/Wed/Fri and 3 mg on all other days of the week.    Hospital Anticoagulation:  Consulting provider: Dr. Mcare  Start date: 7/6/23  Indication: Atrial fibrillation  Target INR: 2 - 3  Expected duration: Lifelong  Bridge Therapy: No    Potential food or drug interactions:   Ceftriaxone (moderate-new med in hospital)-concurrent use may enhance the anticoagulant effect of warfarin by platelet inhibition which can lead to irreversible platelet dysfunction.      Education complete?/Date: No; plan for follow up TBD    Assessment/Plan:  INR subtherapeutic at 1.37 (Goal 2-3) but is seemingly trending back up. Plan for warfarin 4.5 mg x 1 this evening, which is inline with typical home regimen.   Monitor for any signs or symptoms of bleeding.  Follow up daily INRs and dose adjustments. INR ordered daily with AM labs while inpatient.    Pharmacy will continue to follow until discharge or discontinuation of warfarin.     Cheyenne Gowers, [facility]  07/12/23 14:44 EDT

## 2023-07-13 LAB
ALBUMIN SERPL-MCNC: 2.9 G/DL (ref 3.5–5.2)
ANION GAP SERPL CALCULATED.3IONS-SCNC: 8.7 MMOL/L (ref 5–15)
BASOPHILS # BLD AUTO: 0.06 10*3/MM3 (ref 0–0.2)
BASOPHILS NFR BLD AUTO: 0.5 % (ref 0–1.5)
BUN SERPL-MCNC: 20 MG/DL (ref 8–23)
BUN/CREAT SERPL: 18.9 (ref 7–25)
CALCIUM SPEC-SCNC: 9 MG/DL (ref 8.2–9.6)
CHLORIDE SERPL-SCNC: 106 MMOL/L (ref 98–107)
CO2 SERPL-SCNC: 25.3 MMOL/L (ref 22–29)
CREAT SERPL-MCNC: 1.06 MG/DL (ref 0.57–1)
DEPRECATED RDW RBC AUTO: 46.4 FL (ref 37–54)
EGFRCR SERPLBLD CKD-EPI 2021: 49.7 ML/MIN/1.73
EOSINOPHIL # BLD AUTO: 0.23 10*3/MM3 (ref 0–0.4)
EOSINOPHIL NFR BLD AUTO: 1.8 % (ref 0.3–6.2)
ERYTHROCYTE [DISTWIDTH] IN BLOOD BY AUTOMATED COUNT: 12.6 % (ref 12.3–15.4)
GLUCOSE BLDC GLUCOMTR-MCNC: 108 MG/DL (ref 70–130)
GLUCOSE BLDC GLUCOMTR-MCNC: 157 MG/DL (ref 70–130)
GLUCOSE BLDC GLUCOMTR-MCNC: 165 MG/DL (ref 70–130)
GLUCOSE BLDC GLUCOMTR-MCNC: 203 MG/DL (ref 70–130)
GLUCOSE SERPL-MCNC: 99 MG/DL (ref 65–99)
HCT VFR BLD AUTO: 33.2 % (ref 34–46.6)
HGB BLD-MCNC: 11.1 G/DL (ref 12–15.9)
IMM GRANULOCYTES # BLD AUTO: 0.1 10*3/MM3 (ref 0–0.05)
IMM GRANULOCYTES NFR BLD AUTO: 0.8 % (ref 0–0.5)
INR PPP: 1.45 (ref 0.9–1.1)
LYMPHOCYTES # BLD AUTO: 3.3 10*3/MM3 (ref 0.7–3.1)
LYMPHOCYTES NFR BLD AUTO: 26.4 % (ref 19.6–45.3)
MCH RBC QN AUTO: 33.4 PG (ref 26.6–33)
MCHC RBC AUTO-ENTMCNC: 33.4 G/DL (ref 31.5–35.7)
MCV RBC AUTO: 100 FL (ref 79–97)
MONOCYTES # BLD AUTO: 1.27 10*3/MM3 (ref 0.1–0.9)
MONOCYTES NFR BLD AUTO: 10.2 % (ref 5–12)
NEUTROPHILS NFR BLD AUTO: 60.3 % (ref 42.7–76)
NEUTROPHILS NFR BLD AUTO: 7.55 10*3/MM3 (ref 1.7–7)
NRBC BLD AUTO-RTO: 0 /100 WBC (ref 0–0.2)
PHOSPHATE SERPL-MCNC: 2.9 MG/DL (ref 2.5–4.5)
PLATELET # BLD AUTO: 222 10*3/MM3 (ref 140–450)
PMV BLD AUTO: 9.8 FL (ref 6–12)
POTASSIUM SERPL-SCNC: 4.3 MMOL/L (ref 3.5–5.2)
PROTHROMBIN TIME: 17.9 SECONDS (ref 11.7–14.2)
RBC # BLD AUTO: 3.32 10*6/MM3 (ref 3.77–5.28)
SODIUM SERPL-SCNC: 140 MMOL/L (ref 136–145)
WBC NRBC COR # BLD: 12.51 10*3/MM3 (ref 3.4–10.8)

## 2023-07-13 PROCEDURE — 85610 PROTHROMBIN TIME: CPT | Performed by: SURGERY

## 2023-07-13 PROCEDURE — 80069 RENAL FUNCTION PANEL: CPT | Performed by: INTERNAL MEDICINE

## 2023-07-13 PROCEDURE — 97116 GAIT TRAINING THERAPY: CPT

## 2023-07-13 PROCEDURE — 85025 COMPLETE CBC W/AUTO DIFF WBC: CPT | Performed by: HOSPITALIST

## 2023-07-13 PROCEDURE — 82948 REAGENT STRIP/BLOOD GLUCOSE: CPT

## 2023-07-13 PROCEDURE — 25010000002 CEFTRIAXONE PER 250 MG: Performed by: HOSPITALIST

## 2023-07-13 PROCEDURE — 63710000001 INSULIN LISPRO (HUMAN) PER 5 UNITS: Performed by: SURGERY

## 2023-07-13 RX ORDER — WARFARIN SODIUM 3 MG/1
3 TABLET ORAL
Status: DISCONTINUED | OUTPATIENT
Start: 2023-07-13 | End: 2023-07-13

## 2023-07-13 RX ORDER — ICOSAPENT ETHYL 1000 MG/1
2 CAPSULE ORAL 2 TIMES DAILY
Status: DISCONTINUED | OUTPATIENT
Start: 2023-07-13 | End: 2023-07-18 | Stop reason: HOSPADM

## 2023-07-13 RX ADMIN — ASPIRIN 81 MG: 81 TABLET, CHEWABLE ORAL at 08:46

## 2023-07-13 RX ADMIN — INSULIN LISPRO 3 UNITS: 100 INJECTION, SOLUTION INTRAVENOUS; SUBCUTANEOUS at 18:00

## 2023-07-13 RX ADMIN — FAMOTIDINE 20 MG: 20 TABLET, FILM COATED ORAL at 08:46

## 2023-07-13 RX ADMIN — PREGABALIN 100 MG: 100 CAPSULE ORAL at 21:37

## 2023-07-13 RX ADMIN — METOPROLOL TARTRATE 25 MG: 25 TABLET, FILM COATED ORAL at 21:39

## 2023-07-13 RX ADMIN — ICOSAPENT ETHYL 2 G: 1000 CAPSULE ORAL at 21:39

## 2023-07-13 RX ADMIN — DILTIAZEM HYDROCHLORIDE 180 MG: 180 CAPSULE, COATED, EXTENDED RELEASE ORAL at 08:46

## 2023-07-13 RX ADMIN — METOPROLOL TARTRATE 25 MG: 25 TABLET, FILM COATED ORAL at 08:46

## 2023-07-13 RX ADMIN — CEFTRIAXONE SODIUM 1 G: 1 INJECTION, POWDER, FOR SOLUTION INTRAMUSCULAR; INTRAVENOUS at 18:00

## 2023-07-13 RX ADMIN — INSULIN LISPRO 2 UNITS: 100 INJECTION, SOLUTION INTRAVENOUS; SUBCUTANEOUS at 21:37

## 2023-07-13 NOTE — PLAN OF CARE
Goal Outcome Evaluation:  Plan of Care Reviewed With: patient        Progress: improving  Outcome Evaluation: Pt seen for PT treatment today. pt required SBA with bed mobility and CGA/Reyna with STS transfers. pt ambulated 30ft with rwx, CGA. Cues for posture correction and walker placement. Pt fatigues quickly. Will continue to follow pt for d/c needs.

## 2023-07-13 NOTE — PROGRESS NOTES
[facility] Clinical Pharmacy Services: Warfarin Dosing/Monitoring Consult    Bessie Molina is a 89 y.o. female, estimated creatinine clearance is 30.7 mL/min (A) (by C-G formula based on SCr of 1.06 mg/dL (H)). weighing 65.6 kg (144 lb 10 oz).    Results from last 7 days   Lab Units 07/13/23  0530 07/12/23  0457 07/11/23  0505 07/10/23  0346 07/09/23  0426   INR  1.45* 1.37* 1.22* 1.58* 1.92*   HEMOGLOBIN g/dL 11.1* 11.8* 13.1 11.4* 12.0   HEMATOCRIT % 33.2* 35.8 39.2 34.3 36.9   PLATELETS 10*3/mm3 222 229 296 247 253     Prior to admission anticoagulation: Per med reconciliation, the patient's home warfarin regimen is 4.5 mg on Mon/Wed/Fri and 3 mg on all other days of the week.    Hospital Anticoagulation:  Consulting provider: Dr. Mcrae  Start date: 7/6/23  Indication: Atrial fibrillation  Target INR: 2 - 3  Expected duration: Lifelong  Bridge Therapy: No    Potential food or drug interactions:   Ceftriaxone (moderate-new med in hospital)-concurrent use may enhance the anticoagulant effect of warfarin by platelet inhibition which can lead to irreversible platelet dysfunction.      Education complete?/Date: No; plan for follow up TBD    Assessment/Plan:  INR subtherapeutic at 1.45 (Goal 2-3) but continues to trend up nicely. Resume scheduled home regimen of warfarin 4.5 mg MWF and 3 mg all other days. Patient to receive warfarin 3 mg this evening.  Monitor for any signs or symptoms of bleeding.  Follow up daily INRs and dose adjustments. INR ordered daily with AM labs while inpatient.    Pharmacy will continue to follow until discharge or discontinuation of warfarin.     Cheyenne Gowers, RPH  07/13/23 14:07 EDT

## 2023-07-13 NOTE — PLAN OF CARE
Goal Outcome Evaluation:   VSS. Disoriented to situation. Up with SBA. No c/o pain or discomfort. Pulses present with doppler. MRI of foot completed overnight. Possible d/c today. Call light in reach.

## 2023-07-13 NOTE — PROGRESS NOTES
Name: Bessie Molina ADMIT: 2023   : 1932  PCP: Mireille La MD    MRN: [id number] LOS: 7 days   AGE/SEX: 89 y.o. female  ROOM: [address]    [place], Subsequent Hospital Care    Chief Complaint   Patient presents with    Leg Pain     CC: Peripheral arterial disease follow-up.  Subjective     89 y.o. female patient resting comfortably.    Review of Systems    Objective     Scheduled Medications:   aspirin, 81 mg, Oral, Daily  cefTRIAXone, 1 g, Intravenous, Q24H  dilTIAZem CD, 180 mg, Oral, Q24H  famotidine, 20 mg, Oral, Daily  insulin lispro, 2-7 Units, Subcutaneous, 4x Daily AC & at Bedtime  metoprolol tartrate, 25 mg, Oral, BID  pregabalin, 100 mg, Oral, Nightly        Active Infusions:  Pharmacy to dose warfarin,         As Needed Medications:    acetaminophen    Pharmacy to dose warfarin    Vital Signs  Vital Signs Patient Vitals for the past 24 hrs:   BP Temp Temp src Pulse Resp SpO2 Weight   23 0720 135/63 99 °F (37.2 °C) Oral 90 18 96 % --   23 0500 -- -- -- -- -- -- 65.6 kg (144 lb 10 oz)   23 2300 152/71 98.4 °F (36.9 °C) Oral -- 16 -- --   23 1500 129/46 98.4 °F (36.9 °C) Oral -- 16 -- --       I/O:  I/O last 3 completed shifts:  In: 480 [P.O.:480]  Out: 1550 [Urine:1550]    Physical Exam:  Physical Exam     Results Review:     CBC    Results from last 7 days   Lab Units 23  0530 23  0457 23  0505 07/10/23  0346 23  0426 23  0409 23  0419   WBC 10*3/mm3 12.51* 12.23* 11.78* 9.38 10.11 10.02 10.15   HEMOGLOBIN g/dL 11.1* 11.8* 13.1 11.4* 12.0 12.5 12.9   PLATELETS 10*3/mm3 222 229 296 247 253 260 256       BMP   Results from last 7 days   Lab Units 23  0530 23  0457 23  0505 07/10/23  0346 23  0426 23  0409 23  0419   SODIUM mmol/L 140 142 142 141 144 145 144   POTASSIUM mmol/L 4.3 4.5 4.6 3.6 4.6 4.0 3.7   CHLORIDE mmol/L 106 105 104 106 108* 108* 106   CO2 mmol/L 25.3 29.0  25.8 23.4 26.0 27.4 28.0   BUN mg/dL 20 23 18 23 25* 25* 26*   CREATININE mg/dL 1.06* 1.12* 1.07* 1.18* 1.42* 1.13* 1.29*   GLUCOSE mg/dL 99 113* 152* 116* 103* 94 143*   MAGNESIUM mg/dL  --  1.9  --   --   --   --   --    PHOSPHORUS mg/dL 2.9 3.1  --   --   --  3.4  --          Assessment & Plan     Assessment & Plan      Cellulitis of right foot        89 y.o. female severe peripheral arterial disease with ischemic ulcer to the right foot.    7/10/2023: Right SFA stent/angioplasty, popliteal artery laser atherectomy and drug-coated balloon angioplasty, and tibioperoneal trunk atherectomy with angioplasty.    7/11//2023: Resting comfortably post procedure day #1.  Recommend warfarin and aspirin long-term.  Patient intolerant to statins previously.  She has a strong Doppler signal in her dorsalis pedis and posterior tibial artery on the foot.  Her foot is warm and well-perfused.  Her access site is clean dry without signs of complication.  From vascular standpoint okay for discharge.  She is to continue Betadine paints to the fifth toe ulcer.  She is to be back in the office in 2 weeks.  Please feel free to call with questions.    7/13/2023: Case discussed with infectious disease.  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  I called and discussed this with the patient's daughter.  We will plan on fifth toe amputation tomorrow under regional anesthesia.        Chacho Barbour MD  07/13/23  11:23 EDT    Please call my office with any question: [phone]    Active Hospital Problems    Diagnosis  POA    **Cellulitis of right foot [L03.115]  Yes      Resolved Hospital Problems   No resolved problems to display.

## 2023-07-13 NOTE — PROGRESS NOTES
"  Infectious Diseases Progress Note    Rosa Tang MD     [facility]  [place]: 7 days  Patient Identification:  Name: Bessie Molina  Age: 89 y.o.  Sex: female  :  1932  MRN: [id number]         Primary Care Physician: Mireille La MD        Subjective: Continues to feel well and denies any new complaints.  Right foot feels about the same..  Interval History: See consultation note  7/10/2023: She underwent:  Ultrasound-guided left common femoral artery percutaneous access.  Right lower extremity runoff arteriogram.  Right superficial femoral artery angioplasty and stent placement 6 mm self-expanding stent.  Right popliteal artery atherectomy with 1.7 mm [employer]ran[employer] catheter and 4 mm drug-coated balloon angioplasty.  Right tibioperoneal trunk atherectomy with 1.7 mm [employer]ran[employer] catheter and 3 mm angioplasty.  Objective:    Scheduled Meds:aspirin, 81 mg, Oral, Daily  cefTRIAXone, 1 g, Intravenous, Q24H  dilTIAZem CD, 180 mg, Oral, Q24H  famotidine, 20 mg, Oral, Daily  insulin lispro, 2-7 Units, Subcutaneous, 4x Daily AC & at Bedtime  metoprolol tartrate, 25 mg, Oral, BID  pregabalin, 100 mg, Oral, Nightly      Continuous Infusions:Pharmacy to dose warfarin,         Vital signs in last 24 hours:  Temp:  [97.3 °F (36.3 °C)-99 °F (37.2 °C)] 98.4 °F (36.9 °C)  Resp:  [16] 16  BP: (100-152)/(46-90) 152/71    Intake/Output:    Intake/Output Summary (Last 24 hours) at 2023 0718  Last data filed at 2023 0500  Gross per 24 hour   Intake 240 ml   Output 700 ml   Net -460 ml       Exam:  /71 (BP Location: Right arm, Patient Position: Lying)   Pulse 107   Temp 98.4 °F (36.9 °C) (Oral)   Resp 16   Ht 157.5 cm (62\")   Wt 65.6 kg (144 lb 10 oz)   SpO2 98%   BMI 26.45 kg/m²   Patient is examined using the personal protective equipment as per guidelines from infection control for this particular patient as enacted.  Hand washing was performed before and after patient " interaction.  HEENT: Remarkable for no acute lesions  Neck: Supple  Chest: Bilateral air entry decreased breath sounds at the bases  Heart: S1-S2 regular  Abdomen: Soft nontender  Extremity examination shows:    Data Review:    I reviewed the patient's new clinical results.  Results from last 7 days   Lab Units 07/13/23  0530 07/12/23  0457 07/11/23  0505 07/10/23  0346 07/09/23  0426 07/08/23  0409 07/07/23  0419   WBC 10*3/mm3 12.51* 12.23* 11.78* 9.38 10.11 10.02 10.15   HEMOGLOBIN g/dL 11.1* 11.8* 13.1 11.4* 12.0 12.5 12.9   PLATELETS 10*3/mm3 222 229 296 247 253 260 256     Results from last 7 days   Lab Units 07/13/23  0530 07/12/23  0457 07/11/23  0505 07/10/23  0346 07/09/23  0426 07/08/23  0409 07/07/23  0419   SODIUM mmol/L 140 142 142 141 144 145 144   POTASSIUM mmol/L 4.3 4.5 4.6 3.6 4.6 4.0 3.7   CHLORIDE mmol/L 106 105 104 106 108* 108* 106   CO2 mmol/L 25.3 29.0 25.8 23.4 26.0 27.4 28.0   BUN mg/dL 20 23 18 23 25* 25* 26*   CREATININE mg/dL 1.06* 1.12* 1.07* 1.18* 1.42* 1.13* 1.29*   CALCIUM mg/dL 9.0 9.4 9.8* 9.0 9.6 9.8* 9.6   GLUCOSE mg/dL 99 113* 152* 116* 103* 94 143*     Microbiology Results (last 10 days)       Procedure Component Value - Date/Time    MRSA Screen, PCR (Inpatient) - Swab, Nares [[id number]]  (Normal) Collected: 07/07/23 0921    Lab Status: Final result Specimen: Swab from Nares Updated: 07/07/23 1039     MRSA PCR No MRSA Detected    Narrative:      The negative predictive value of this diagnostic test is high and should only be used to consider de-escalating anti-MRSA therapy. A positive result may indicate colonization with MRSA and must be correlated clinically.    Blood Culture - Blood, Arm, Left [[id number]]  (Normal) Collected: 07/06/23 1235    Lab Status: Final result Specimen: Blood from Arm, Left Updated: 07/11/23 1245     Blood Culture No growth at 5 days    Blood Culture - Blood, Arm, Right [[id number]]  (Normal) Collected: 07/06/23 1219    Lab Status: Final result  Specimen: Blood from Arm, Right Updated: 07/11/23 1230     Blood Culture No growth at 5 days          XR Foot 3+ View Right    Result Date: 7/6/2023   As described.  This report was finalized on 7/6/2023 12:34 PM by Dr. Jorge Frye M.D.      CT Angio Abdominal Aorta Bilateral Iliofem Runoff    Result Date: 7/9/2023  1. Atherosclerotic disease of the abdominal aorta and bilateral lower extremities discussed in more detail above. 2. Low-density pancreatic lesion probably stable since the 12/14/2022 study. 3. Status post hysterectomy. 4. Small amount of high-attenuation material in the gallbladder may represent hyperdense bile or possibly some very tiny faintly opaque stones. No gallbladder inflammatory changes are seen.  Radiation dose reduction techniques were utilized, including automated exposure control and exposure modulation based on body size.  This report was finalized on 7/9/2023 2:02 PM by Dr. Sarkis Tobar M.D.      MRI Foot Right With & Without Contrast    Result Date: 7/13/2023  1. Soft tissue wound/ulcer at the lateral forefoot with small ulcer that approaches the cortex of the head of the 5th proximal phalanx. There is underlying bone marrow edema and enhancement within the distal shaft and head of the 5th proximal phalanx as well as within the fused 5th middle and distal phalanges consistent with osteomyelitis. Suspect septic arthritis at the 5th PIP joint. 2. Generalized forefoot soft tissue swelling with edematous dorsal subcutaneous fat consistent with cellulitis.  This report was finalized on 7/13/2023 8:31 AM by Dr. Daniel Edmond M.D.      Duplex venous lower extremity right    Addendum Date: 7/10/2023      Chronic right lower extremity superficial thrombophlebitis noted in the small saphenous.   Right popliteal fossa fluid collection.   Normal right lower extremity venous duplex scan.        Assessment:    Cellulitis of right foot  1-ischemic/diabetic ulcer/cellulitis of the right  foot due to peripheral vascular disease rendering antibiotic therapy unsuccessful with significant risk of toe/foot/limb loss-status post vascular intervention with improvement in circulation and no MRI evidence of acute osteomyelitis involving fifth proximal phalanx and distal phalanx as well as septic arthritis of the fifth PIP joint.  2-peripheral vascular disease  3-hypertension  4-atrial fibrillation  5-chronic kidney disease  6-diabetes mellitus  7-other diagnoses per primary team.     Recommendations/Discussions:  Continue present antibiotic therapy  We will reach out to our vascular colleagues about the role of definitive intervention based on MRI findings to avoid long-term antibiotic therapy in this complication in this elderly patient.  Rosa Tang MD  7/13/2023  07:18 EDT    Parts of this note may be an electronic transcription/translation of spoken language to printed text using the Dragon dictation system.

## 2023-07-13 NOTE — PROGRESS NOTES
"Daily progress note    Primary care physician  Dr. JASSO    Chief complaint  Doing better with no new complaints    History of present illness  89-year-old white female with history of chronic atrial fibrillation diabetes mellitus hypertension hyperlipidemia peripheral artery disease and dementia who lives at home with her daughter brought to the emergency room with right foot redness swelling pain for last 1 month which is treated by primary care physician with oral antibiotics without any improvement.  Patient has infection around right fifth toe with surrounding cellulitis admitted for management.  Patient remained awake and alert and denies any chest pain shortness of breath palpitation no fever chills abdominal pain nausea vomiting diarrhea.  Most of the history obtained from the daughter and son-in-law but patient also contributed.     REVIEW OF SYSTEMS  Unremarkable except generalized weakness     PHYSICAL EXAM   Blood pressure 139/58, pulse 75, temperature 98.5 °F (36.9 °C), temperature source Oral, resp. rate 18, height 157.5 cm (62\"), weight 65.6 kg (144 lb 10 oz), SpO2 97 %.    General: Awake and alert no acute distress.  HENT: NCAT, PERRL, Nares patent.  Eyes: no scleral icterus.  Neck: trachea midline, no ROM limitations.  CV: Irregularly irregular S1-S2  Respiratory: normal effort, CTAB.  Abdomen: soft, nondistended, nontender bowel sounds positive  Musculoskeletal: no deformity.  Neuro: alert, moves all extremities, follows commands.  Skin: warm, dry.  Right foot: Patient has ulceration on the lateral base of the right fifth toe, erythema and swelling extending into the dorsum of the foot, no erythema but swelling of the right calf, no palpable cords, negative Homans.     LAB RESULTS  Lab Results (last 24 hours)       Procedure Component Value Units Date/Time    POC Glucose Once [[id number]]  (Abnormal) Collected: 07/13/23 1133    Specimen: Blood Updated: 07/13/23 1136     Glucose 165 mg/dL      " Comment: Meter: [id number] : [id number] Jina HER       Renal Function Panel [[id number]]  (Abnormal) Collected: 07/13/23 0530    Specimen: Blood Updated: 07/13/23 0653     Glucose 99 mg/dL      BUN 20 mg/dL      Creatinine 1.06 mg/dL      Sodium 140 mmol/L      Potassium 4.3 mmol/L      Chloride 106 mmol/L      CO2 25.3 mmol/L      Calcium 9.0 mg/dL      Albumin 2.9 g/dL      Phosphorus 2.9 mg/dL      Anion Gap 8.7 mmol/L      BUN/Creatinine Ratio 18.9     eGFR 49.7 mL/min/1.73     Narrative:      GFR Normal >60  Chronic Kidney Disease <60  Kidney Failure <15    The GFR formula is only valid for adults with stable renal function between ages 18 and 70.    CBC & Differential [[id number]]  (Abnormal) Collected: 07/13/23 0530    Specimen: Blood Updated: 07/13/23 0648    Narrative:      The following orders were created for panel order CBC & Differential.  Procedure                               Abnormality         Status                     ---------                               -----------         ------                     CBC Auto Differential[[id number]]        Abnormal            Final result                 Please view results for these tests on the individual orders.    CBC Auto Differential [[id number]]  (Abnormal) Collected: 07/13/23 0530    Specimen: Blood Updated: 07/13/23 0648     WBC 12.51 10*3/mm3      RBC 3.32 10*6/mm3      Hemoglobin 11.1 g/dL      Hematocrit 33.2 %      .0 fL      MCH 33.4 pg      MCHC 33.4 g/dL      RDW 12.6 %      RDW-SD 46.4 fl      MPV 9.8 fL      Platelets 222 10*3/mm3      Neutrophil % 60.3 %      Lymphocyte % 26.4 %      Monocyte % 10.2 %      Eosinophil % 1.8 %      Basophil % 0.5 %      Immature Grans % 0.8 %      Neutrophils, Absolute 7.55 10*3/mm3      Lymphocytes, Absolute 3.30 10*3/mm3      Monocytes, Absolute 1.27 10*3/mm3      Eosinophils, Absolute 0.23 10*3/mm3      Basophils, Absolute 0.06 10*3/mm3      Immature Grans, Absolute 0.10 10*3/mm3      nRBC  0.0 /100 WBC     Protime-INR [[id number]]  (Abnormal) Collected: 07/13/23 0530    Specimen: Blood Updated: 07/13/23 0648     Protime 17.9 Seconds      INR 1.45    POC Glucose Once [[id number]]  (Normal) Collected: 07/13/23 0611    Specimen: Blood Updated: 07/13/23 0613     Glucose 108 mg/dL      Comment: Meter: [id number] : [id number] Teddy HER       POC Glucose Once [[id number]]  (Abnormal) Collected: 07/12/23 2223    Specimen: Blood Updated: 07/12/23 2225     Glucose 171 mg/dL      Comment: Meter: [id number] : [id number] Rin Gaye HER             Imaging Results (Last 24 Hours)       Procedure Component Value Units Date/Time    MRI Foot Right With & Without Contrast [[id number]] Collected: 07/13/23 0754     Updated: 07/13/23 0834    Narrative:      MRI RIGHT FOREFOOT WITH AND WITHOUT CONTRAST     HISTORY: Soft tissue wound. Infection.     TECHNIQUE: MRI right forefoot includes short axis coronal T1, T1  fat-sat, T1 fat-sat as well as axial T1, STIR and sagittal PD  fat-saturated sequences. Gadolinium was administered intravenously  followed by axial, coronal and sagittal T1 fat-saturated sequences.     COMPARISON: Right foot x-rays 07/06/2023.     FINDINGS: A gelcap was placed at the site of clinical interest and this  gelcap is on the skin surface dorsal to the 5th MTP joint and 5tj  metatarsal head. Gelcapsule overlies the soft tissue wound that extends  into the subcutaneous fat measuring 1 cm in depth. There is loss of soft  tissue lateral to the head of the 5th proximal phalanx where there is  air approaching cortical bone. There is bone marrow edema and  enhancement within the head of the 5th proximal phalanx that is  consistent with osteomyelitis, though there is no cortical loss. There  is suspected early septic arthritis of the 1st IP joint with bone marrow  edema and enhancement within the first, middle and distal phalanges.  There is no evidence for osteomyelitis of the base of the 5th  proximal  phalanx or of the 5th metatarsal. There is no evidence of osteomyelitis  of the 1st through 4th metatarsals or toes.     There is generalized edema in the subcutaneous fat about the midfoot and  forefoot with soft tissue swelling greatest dorsally, consistent with  cellulitis. Midfoot alignment appears normal. There are mild chronic  degenerative changes at the 1st MTP joint.       Impression:      1. Soft tissue wound/ulcer at the lateral forefoot with small ulcer that  approaches the cortex of the head of the 5th proximal phalanx. There is  underlying bone marrow edema and enhancement within the distal shaft and  head of the 5th proximal phalanx as well as within the fused 5th middle  and distal phalanges consistent with osteomyelitis. Suspect septic  arthritis at the 5th PIP joint.  2. Generalized forefoot soft tissue swelling with edematous dorsal  subcutaneous fat consistent with cellulitis.     This report was finalized on 7/13/2023 8:31 AM by Dr. Daniel Edmond M.D.              Interpretation Summary    Right Conclusion: The right YADIEL is severely reduced. Severe digital ischemia.    While the left ankle-brachial indices are within normal limits there is vessel incompressibility.  ABIs are likely falsely elevated.  Moderate digital ischemia noted.    Unable to determine level of disease due to vessel incompressibility but suspect multi level disease based upon waveforms.     Interpretation Summary    Right popliteal fossa fluid collection.    Normal right lower extremity venous duplex scan.      Current Facility-Administered Medications:     acetaminophen (TYLENOL) tablet 650 mg, 650 mg, Oral, Q4H PRN, Chacho Barbour MD    aspirin chewable tablet 81 mg, 81 mg, Oral, Daily, Chacho Barbour MD, 81 mg at 07/13/23 0846    cefTRIAXone (ROCEPHIN) 1 g in sodium chloride 0.9 % 100 mL IVPB-VTB, 1 g, Intravenous, Q24H, Chacho Barbour MD, Last Rate: 200 mL/hr at 07/12/23 1630, 1 g at 07/12/23 1630     dilTIAZem CD (CARDIZEM CD) 24 hr capsule 180 mg, 180 mg, Oral, Q24H, Chacho Barbour MD, 180 mg at 07/13/23 0846    famotidine (PEPCID) tablet 20 mg, 20 mg, Oral, Daily, Chacho Barbour MD, 20 mg at 07/13/23 0846    insulin lispro (HUMALOG/ADMELOG) injection 2-7 Units, 2-7 Units, Subcutaneous, 4x Daily AC & at Bedtime, Chacho Barbour MD, 2 Units at 07/12/23 2200    metoprolol tartrate (LOPRESSOR) tablet 25 mg, 25 mg, Oral, BID, Chacho Barbour MD, 25 mg at 07/13/23 0846    Pharmacy to dose warfarin, , Does not apply, Continuous PRN, Chacho Barbour MD    pregabalin (LYRICA) capsule 100 mg, 100 mg, Oral, Nightly, Chacho Barbour MD, 100 mg at 07/12/23 2144     ASSESSMENT  Right foot infected wound with surrounding cellulitis and osteomyelitis  Status post right femoral artery angioplasty and stent  Right popliteal artery atherectomy and angioplasty and right tibioperoneal atherectomy and angioplasty  Peripheral artery disease  Diabetes mellitus  Hypertension  Hyperlipidemia  Chronic atrial fibrillation     PLAN  CPM  Postop care  Continue IV antibiotics   Need right fifth toe amputation   Continue anticoagulation  Vascular surgery consult appreciated  Continue home medications  Stress ulcer DVT prophylaxis  Supportive care  PT/OT  DNR  Discussed with family nursing staff  Discharge planning    DAISHA CARLSON MD    Copied text in this note has been reviewed and is accurate as of 07/13/23

## 2023-07-13 NOTE — THERAPY TREATMENT NOTE
Patient Name: Bessie Molina  : 1932    MRN: [id number]                              Today's Date: 2023       Admit Date: 2023    Visit Dx:     ICD-10-CM ICD-9-CM   1. Cellulitis of right foot  L03.115 682.7   2. Failure of outpatient treatment  Z78.9 V49.89   3. Leukocytosis, unspecified type  D72.829 288.60   4. ESR raised  R70.0 790.1   5. CRP elevated  R79.82 790.95   6. Hyperglycemia  R73.9 790.29   7. Chronic kidney disease, unspecified CKD stage  N18.9 585.9     Patient Active Problem List   Diagnosis    Atrial fibrillation, persistent    Chronic anticoagulation    Dementia    Abdominal pain    Acute respiratory failure with hypoxia    Pancreatic lesion    Chronic diastolic CHF (congestive heart failure)    Cellulitis of right foot     Past Medical History:   Diagnosis Date    Atrial fibrillation     CVA (cerebral vascular accident)     left temporal stroke    Hypertension      Past Surgical History:   Procedure Laterality Date    APPENDECTOMY      ARTERIOGRAM Bilateral     ATHRECTOMY ILIAC, FEMORAL, TIBIAL ARTERY Right 7/10/2023    Procedure: RIGHT LEG ANGIOGRAM WITH LASER;  Surgeon: Chacho Barbour MD;  Location: [facility] ;  Service: Vascular;  Laterality: Right;    CATARACT EXTRACTION Left     CERVICAL DISCECTOMY ANTERIOR      HYSTERECTOMY      LUMBAR DISCECTOMY      TONSILLECTOMY        General Information       Row Name 23 1528          Physical Therapy Time and Intention    Document Type therapy note (daily note)  -EB     Mode of Treatment individual therapy;physical therapy  -EB       Row Name 23 1528          General Information    Patient Profile Reviewed yes  -EB     Existing Precautions/Restrictions fall  -EB     Barriers to Rehab hearing deficit  very [place]  -EB       Row Name 23 1528          Cognition    Orientation Status (Cognition) oriented to;person;place  -EB       Row Name 23 1528          Safety Issues, Functional Mobility    Safety  Issues Affecting Function (Mobility) ability to follow commands  -EB     Impairments Affecting Function (Mobility) endurance/activity tolerance;strength  -EB               User Key  (r) = Recorded By, (t) = Taken By, (c) = Cosigned By      Initials Name Provider Type    Ophelia Hoskins PTA Physical Therapist Assistant                   Mobility       Row Name 07/13/23 1529          Bed Mobility    Supine-Sit [place] (Bed Mobility) standby assist  -EB     Sit-Supine [place] (Bed Mobility) not tested  -EB     Assistive Device (Bed Mobility) bed rails;head of bed elevated  -       Row Name 07/13/23 1529          Sit-Stand Transfer    Sit-Stand [place] (Transfers) contact guard;minimum assist (75% patient effort)  -     Assistive Device (Sit-Stand Transfers) walker, front-wheeled  -       Row Name 07/13/23 1529          Gait/Stairs (Locomotion)    [place] Level (Gait) contact guard  -     Assistive Device (Gait) walker, front-wheeled  -EB     Distance in Feet (Gait) 30ft  -EB     Deviations/Abnormal Patterns (Gait) antalgic;ele decreased;stride length decreased  -EB     Bilateral Gait Deviations forward flexed posture;heel strike decreased  -EB     Right Sided Gait Deviations weight shift ability decreased  -EB     Comment, (Gait/Stairs) slow gait pace, cues for walker placement and posture.  pt fatigues quickly.  -EB               User Key  (r) = Recorded By, (t) = Taken By, (c) = Cosigned By      Initials Name Provider Type    Ophelia Hoskins PTA Physical Therapist Assistant                   Obj/Interventions    No documentation.                  Goals/Plan    No documentation.                  Clinical Impression       Row Name 07/13/23 1531          Plan of Care Review    Plan of Care Reviewed With patient  -EB     Progress improving  -EB     Outcome Evaluation Pt seen for PT treatment today. pt required SBA with bed mobility and CGA/Reyna with STS transfers. pt ambulated 30ft  with rwx, CGA. Cues for posture correction and walker placement. Pt fatigues quickly. Will continue to follow pt for d/c needs.  -EB       Row Name 07/13/23 1531          Therapy Assessment/Plan (PT)    Therapy Frequency (PT) 5 times/wk  -EB       Row Name 07/13/23 1531          Positioning and Restraints    Pre-Treatment Position in bed  -EB     Post Treatment Position chair  -EB     In Chair reclined;call light within reach;encouraged to call for assist;exit alarm on  -EB               User Key  (r) = Recorded By, (t) = Taken By, (c) = Cosigned By      Initials Name Provider Type    Ophelia Hoskins PTA Physical Therapist Assistant                   Outcome Measures       Row Name 07/13/23 1533          How much help from another person do you currently need...    Turning from your back to your side while in flat bed without using bedrails? 4  -EB     Moving from lying on back to sitting on the side of a flat bed without bedrails? 3  -EB     Moving to and from a bed to a chair (including a wheelchair)? 3  -EB     Standing up from a chair using your arms (e.g., wheelchair, bedside chair)? 3  -EB     Climbing 3-5 steps with a railing? 2  -EB     To walk in hospital room? 3  -EB     AM-PAC 6 Clicks Score (PT) 18  -EB     Highest level of mobility 6 --> Walked 10 steps or more  -               User Key  (r) = Recorded By, (t) = Taken By, (c) = Cosigned By      Initials Name Provider Type    Ophelia Hoskins PTA Physical Therapist Assistant                                 Physical Therapy Education       Title: PT OT SLP Therapies (Done)       Topic: Physical Therapy (Done)       Point: Mobility training (Done)       Learning Progress Summary             Patient Acceptance, E, VU,NR by EB at 7/13/2023 1533    Acceptance, E, VU,NR by EB at 7/12/2023 1605    Acceptance, E, VU,NR by EB at 7/11/2023 1128    Acceptance, E,TB, VU,DU by  at 7/7/2023 0954                         Point: Home exercise program (Done)        Learning Progress Summary             Patient Acceptance, E, VU,NR by EB at 7/13/2023 1533                         Point: Body mechanics (Done)       Learning Progress Summary             Patient Acceptance, E, VU,NR by EB at 7/13/2023 1533    Acceptance, E, VU,NR by EB at 7/12/2023 1605    Acceptance, E, VU,NR by EB at 7/11/2023 1128    Acceptance, E,TB, VU,DU by  at 7/7/2023 0954                         Point: Precautions (Done)       Learning Progress Summary             Patient Acceptance, E, VU,NR by EB at 7/13/2023 1533    Acceptance, E, VU,NR by EB at 7/11/2023 1128    Acceptance, E,TB, VU,DU by  at 7/7/2023 0954                                         User Key       Initials Effective Dates Name Provider Type Discipline     02/14/23 -  Ophelia Callejas PTA Physical Therapist Assistant PT     09/22/22 -  Raven Blevins PT Physical Therapist PT                  PT Recommendation and Plan     Plan of Care Reviewed With: patient  Progress: improving  Outcome Evaluation: Pt seen for PT treatment today. pt required SBA with bed mobility and CGA/Reyna with STS transfers. pt ambulated 30ft with rwx, CGA. Cues for posture correction and walker placement. Pt fatigues quickly. Will continue to follow pt for d/c needs.     Time Calculation:    PT Charges       Row Name 07/13/23 1526             Time Calculation    Start Time 1327  -EB      Stop Time 1342  -EB      Time Calculation (min) 15 min  -      PT Received On 07/13/23  -EB      PT - Next Appointment 07/14/23  -EB         Time Calculation- PT    Total Timed Code Minutes- PT 15 minute(s)  -EB                User Key  (r) = Recorded By, (t) = Taken By, (c) = Cosigned By      Initials Name Provider Type    EB Ophelia Callejas PTA Physical Therapist Assistant                  Therapy Charges for Today       Code Description Service Date Service Provider Modifiers Qty    58019434161 HC GAIT TRAINING EA 15 MIN 7/12/2023 Ophelia Callejas PTA GP 1    07361931277 HC  PT THERAPEUTIC ACT EA 15 MIN 7/12/2023 Ophelia Callejas, YAEL GP 1    68511475880 HC GAIT TRAINING EA 15 MIN 7/13/2023 Ophelia Callejas, YAEL GP 1            PT G-Codes  Outcome Measure Options: AM-PAC 6 Clicks Daily Activity (OT)  AM-PAC 6 Clicks Score (PT): 18  AM-PAC 6 Clicks Score (OT): 19       Ophelia Callejas PTA  7/13/2023

## 2023-07-14 LAB
ALBUMIN SERPL-MCNC: 2.9 G/DL (ref 3.5–5.2)
ANION GAP SERPL CALCULATED.3IONS-SCNC: 9.6 MMOL/L (ref 5–15)
BUN SERPL-MCNC: 21 MG/DL (ref 8–23)
BUN/CREAT SERPL: 22.1 (ref 7–25)
CALCIUM SPEC-SCNC: 9.2 MG/DL (ref 8.2–9.6)
CHLORIDE SERPL-SCNC: 106 MMOL/L (ref 98–107)
CO2 SERPL-SCNC: 23.4 MMOL/L (ref 22–29)
CREAT SERPL-MCNC: 0.95 MG/DL (ref 0.57–1)
EGFRCR SERPLBLD CKD-EPI 2021: 56.7 ML/MIN/1.73
GLUCOSE BLDC GLUCOMTR-MCNC: 105 MG/DL (ref 70–130)
GLUCOSE BLDC GLUCOMTR-MCNC: 129 MG/DL (ref 70–130)
GLUCOSE BLDC GLUCOMTR-MCNC: 177 MG/DL (ref 70–130)
GLUCOSE SERPL-MCNC: 103 MG/DL (ref 65–99)
INR PPP: 1.37 (ref 0.9–1.1)
MAGNESIUM SERPL-MCNC: 2 MG/DL (ref 1.7–2.3)
PHOSPHATE SERPL-MCNC: 3.1 MG/DL (ref 2.5–4.5)
POTASSIUM SERPL-SCNC: 3.9 MMOL/L (ref 3.5–5.2)
PROTHROMBIN TIME: 17.1 SECONDS (ref 11.7–14.2)
SODIUM SERPL-SCNC: 139 MMOL/L (ref 136–145)

## 2023-07-14 PROCEDURE — 82948 REAGENT STRIP/BLOOD GLUCOSE: CPT

## 2023-07-14 PROCEDURE — 25010000002 FENTANYL CITRATE (PF) 50 MCG/ML SOLUTION: Performed by: STUDENT IN AN ORGANIZED HEALTH CARE EDUCATION/TRAINING PROGRAM

## 2023-07-14 PROCEDURE — 80069 RENAL FUNCTION PANEL: CPT | Performed by: INTERNAL MEDICINE

## 2023-07-14 PROCEDURE — 25010000002 CEFAZOLIN PER 500 MG: Performed by: SURGERY

## 2023-07-14 PROCEDURE — 63710000001 INSULIN LISPRO (HUMAN) PER 5 UNITS: Performed by: SURGERY

## 2023-07-14 PROCEDURE — 25010000002 CEFTRIAXONE PER 250 MG: Performed by: HOSPITALIST

## 2023-07-14 PROCEDURE — 87075 CULTR BACTERIA EXCEPT BLOOD: CPT | Performed by: SURGERY

## 2023-07-14 PROCEDURE — 88305 TISSUE EXAM BY PATHOLOGIST: CPT | Performed by: SURGERY

## 2023-07-14 PROCEDURE — 0QBQ0ZX EXCISION OF RIGHT TOE PHALANX, OPEN APPROACH, DIAGNOSTIC: ICD-10-PCS | Performed by: SURGERY

## 2023-07-14 PROCEDURE — 0Y6X0Z0 DETACHMENT AT RIGHT 5TH TOE, COMPLETE, OPEN APPROACH: ICD-10-PCS | Performed by: SURGERY

## 2023-07-14 PROCEDURE — 83735 ASSAY OF MAGNESIUM: CPT | Performed by: INTERNAL MEDICINE

## 2023-07-14 PROCEDURE — 85610 PROTHROMBIN TIME: CPT | Performed by: SURGERY

## 2023-07-14 RX ORDER — LABETALOL HYDROCHLORIDE 5 MG/ML
5 INJECTION, SOLUTION INTRAVENOUS
Status: DISCONTINUED | OUTPATIENT
Start: 2023-07-14 | End: 2023-07-14 | Stop reason: HOSPADM

## 2023-07-14 RX ORDER — SODIUM CHLORIDE 0.9 % (FLUSH) 0.9 %
3-10 SYRINGE (ML) INJECTION AS NEEDED
Status: DISCONTINUED | OUTPATIENT
Start: 2023-07-14 | End: 2023-07-14 | Stop reason: HOSPADM

## 2023-07-14 RX ORDER — FLUMAZENIL 0.1 MG/ML
0.2 INJECTION INTRAVENOUS AS NEEDED
Status: DISCONTINUED | OUTPATIENT
Start: 2023-07-14 | End: 2023-07-14 | Stop reason: HOSPADM

## 2023-07-14 RX ORDER — ONDANSETRON 2 MG/ML
4 INJECTION INTRAMUSCULAR; INTRAVENOUS ONCE AS NEEDED
Status: DISCONTINUED | OUTPATIENT
Start: 2023-07-14 | End: 2023-07-14 | Stop reason: HOSPADM

## 2023-07-14 RX ORDER — SODIUM CHLORIDE, SODIUM LACTATE, POTASSIUM CHLORIDE, CALCIUM CHLORIDE 600; 310; 30; 20 MG/100ML; MG/100ML; MG/100ML; MG/100ML
9 INJECTION, SOLUTION INTRAVENOUS CONTINUOUS
Status: DISCONTINUED | OUTPATIENT
Start: 2023-07-14 | End: 2023-07-14

## 2023-07-14 RX ORDER — LIDOCAINE HYDROCHLORIDE 10 MG/ML
0.5 INJECTION, SOLUTION EPIDURAL; INFILTRATION; INTRACAUDAL; PERINEURAL ONCE AS NEEDED
Status: DISCONTINUED | OUTPATIENT
Start: 2023-07-14 | End: 2023-07-14 | Stop reason: HOSPADM

## 2023-07-14 RX ORDER — NALOXONE HCL 0.4 MG/ML
0.2 VIAL (ML) INJECTION AS NEEDED
Status: DISCONTINUED | OUTPATIENT
Start: 2023-07-14 | End: 2023-07-14 | Stop reason: HOSPADM

## 2023-07-14 RX ORDER — DIPHENHYDRAMINE HYDROCHLORIDE 50 MG/ML
12.5 INJECTION INTRAMUSCULAR; INTRAVENOUS
Status: DISCONTINUED | OUTPATIENT
Start: 2023-07-14 | End: 2023-07-14 | Stop reason: HOSPADM

## 2023-07-14 RX ORDER — PROMETHAZINE HYDROCHLORIDE 25 MG/1
25 TABLET ORAL ONCE AS NEEDED
Status: DISCONTINUED | OUTPATIENT
Start: 2023-07-14 | End: 2023-07-14 | Stop reason: HOSPADM

## 2023-07-14 RX ORDER — HYDRALAZINE HYDROCHLORIDE 20 MG/ML
5 INJECTION INTRAMUSCULAR; INTRAVENOUS
Status: DISCONTINUED | OUTPATIENT
Start: 2023-07-14 | End: 2023-07-14 | Stop reason: HOSPADM

## 2023-07-14 RX ORDER — DROPERIDOL 2.5 MG/ML
0.62 INJECTION, SOLUTION INTRAMUSCULAR; INTRAVENOUS
Status: DISCONTINUED | OUTPATIENT
Start: 2023-07-14 | End: 2023-07-14 | Stop reason: HOSPADM

## 2023-07-14 RX ORDER — FENTANYL CITRATE 50 UG/ML
25 INJECTION, SOLUTION INTRAMUSCULAR; INTRAVENOUS
Status: DISCONTINUED | OUTPATIENT
Start: 2023-07-14 | End: 2023-07-14 | Stop reason: HOSPADM

## 2023-07-14 RX ORDER — HYDROCODONE BITARTRATE AND ACETAMINOPHEN 5; 325 MG/1; MG/1
1 TABLET ORAL EVERY 6 HOURS PRN
Status: DISCONTINUED | OUTPATIENT
Start: 2023-07-14 | End: 2023-07-18

## 2023-07-14 RX ORDER — HYDROCODONE BITARTRATE AND ACETAMINOPHEN 5; 325 MG/1; MG/1
1 TABLET ORAL ONCE AS NEEDED
Status: DISCONTINUED | OUTPATIENT
Start: 2023-07-14 | End: 2023-07-14 | Stop reason: HOSPADM

## 2023-07-14 RX ORDER — PROMETHAZINE HYDROCHLORIDE 25 MG/1
25 SUPPOSITORY RECTAL ONCE AS NEEDED
Status: DISCONTINUED | OUTPATIENT
Start: 2023-07-14 | End: 2023-07-14 | Stop reason: HOSPADM

## 2023-07-14 RX ORDER — EPHEDRINE SULFATE 50 MG/ML
5 INJECTION, SOLUTION INTRAVENOUS ONCE AS NEEDED
Status: DISCONTINUED | OUTPATIENT
Start: 2023-07-14 | End: 2023-07-14 | Stop reason: HOSPADM

## 2023-07-14 RX ORDER — HYDROCODONE BITARTRATE AND ACETAMINOPHEN 7.5; 325 MG/1; MG/1
1 TABLET ORAL EVERY 4 HOURS PRN
Status: DISCONTINUED | OUTPATIENT
Start: 2023-07-14 | End: 2023-07-14 | Stop reason: HOSPADM

## 2023-07-14 RX ORDER — HYDROMORPHONE HYDROCHLORIDE 1 MG/ML
0.25 INJECTION, SOLUTION INTRAMUSCULAR; INTRAVENOUS; SUBCUTANEOUS
Status: DISCONTINUED | OUTPATIENT
Start: 2023-07-14 | End: 2023-07-14 | Stop reason: HOSPADM

## 2023-07-14 RX ORDER — SODIUM CHLORIDE 0.9 % (FLUSH) 0.9 %
3 SYRINGE (ML) INJECTION EVERY 12 HOURS SCHEDULED
Status: DISCONTINUED | OUTPATIENT
Start: 2023-07-14 | End: 2023-07-14 | Stop reason: HOSPADM

## 2023-07-14 RX ORDER — FENTANYL CITRATE 50 UG/ML
25 INJECTION, SOLUTION INTRAMUSCULAR; INTRAVENOUS ONCE AS NEEDED
Status: DISCONTINUED | OUTPATIENT
Start: 2023-07-14 | End: 2023-07-14 | Stop reason: HOSPADM

## 2023-07-14 RX ORDER — IPRATROPIUM BROMIDE AND ALBUTEROL SULFATE 2.5; .5 MG/3ML; MG/3ML
3 SOLUTION RESPIRATORY (INHALATION) ONCE AS NEEDED
Status: DISCONTINUED | OUTPATIENT
Start: 2023-07-14 | End: 2023-07-14 | Stop reason: HOSPADM

## 2023-07-14 RX ADMIN — DILTIAZEM HYDROCHLORIDE 180 MG: 180 CAPSULE, COATED, EXTENDED RELEASE ORAL at 08:45

## 2023-07-14 RX ADMIN — CEFTRIAXONE SODIUM 1 G: 1 INJECTION, POWDER, FOR SOLUTION INTRAMUSCULAR; INTRAVENOUS at 16:56

## 2023-07-14 RX ADMIN — SODIUM CHLORIDE, POTASSIUM CHLORIDE, SODIUM LACTATE AND CALCIUM CHLORIDE 9 ML/HR: 600; 310; 30; 20 INJECTION, SOLUTION INTRAVENOUS at 13:40

## 2023-07-14 RX ADMIN — FENTANYL CITRATE 25 MCG: 50 INJECTION, SOLUTION INTRAMUSCULAR; INTRAVENOUS at 13:40

## 2023-07-14 RX ADMIN — METOPROLOL TARTRATE 25 MG: 25 TABLET, FILM COATED ORAL at 08:45

## 2023-07-14 RX ADMIN — METOPROLOL TARTRATE 25 MG: 25 TABLET, FILM COATED ORAL at 21:07

## 2023-07-14 RX ADMIN — ICOSAPENT ETHYL 2 G: 1000 CAPSULE ORAL at 08:45

## 2023-07-14 RX ADMIN — PREGABALIN 100 MG: 100 CAPSULE ORAL at 21:07

## 2023-07-14 RX ADMIN — FAMOTIDINE 20 MG: 20 TABLET, FILM COATED ORAL at 08:45

## 2023-07-14 RX ADMIN — INSULIN LISPRO 2 UNITS: 100 INJECTION, SOLUTION INTRAVENOUS; SUBCUTANEOUS at 21:54

## 2023-07-14 NOTE — OP NOTE
Date of Admission:  7/6/2023 07/14/23  Chacho Barbour MD  [facility]    Preoperative Diagnosis:   Osteomyelitis of the right fifth toe    Postoperative Diagnosis:   Same    Procedure Performed:   Right fifth toe amputation with dermal reapproximation.  Right fifth toe proximal phalanx bone biopsy for pathologic evaluation.    CPT:  48960 (1 times) Amputation, toe; interphalangeal joint (simple toe amputation, no resection of met head)  20240 superficial open bone biopsy (bone immediately beneath subcutaneous tissue)    Surgeon:   Chacho Barbour MD    Assistant:    Anna ATKINS, Provided critical assistance in exposure, retraction, and suction that overall decrease blood loss and operative time.    Anesthesia:   MAC with regional    Estimated Blood Loss:   Minimal    Findings:    Surgically non infected proximal bone margins  Surgically adequate blood supply for wound healing.    Implants:    Nothing was implanted during the procedure    Specimen:   Toe for pathology, Infected toe tissue for culture and sensitivity, and proximal bone biopsy for pathologic evaluation.    Complications:   none    Dispo:   to PACU    Indication for procedure:   89 y.o. female with peripheral arterial disease with septic arthritis and osteomyelitis of the right fifth toe.  Risk benefits alternatives discussed for toe amputation.  Informed consent obtained.    Description of procedure:   Patient was taken to the operating room. Anesthesia was administered through the IV without difficulty. Surgical site was prepped and draped in the usual sterile manner. A full surgical timeout was performed. A tennis racket incision was made overlying the base of the right 5th toe. Bone was transected using the bone cutter. Key elevator was used to elevate the soft tissue off the proximal phalanx. This was rongeured back and sent for pathological evaluation. Surgically this appeared to be free of any sort of ascending infection or  osteomyelitis. Wound was thoroughly irrigated with antibiotic-containing solution. Deep tissues were closed with Vicryl. Skin was reapproximated with Prolene's in a vertical mattress manner. Patient tolerated the procedure well. Sterile dressings were applied. All counts were correct x2.       Chacho Barbour MD  07/14/23    Active Hospital Problems    Diagnosis  POA    **Cellulitis of right foot [L03.115]  Yes      Resolved Hospital Problems   No resolved problems to display.

## 2023-07-14 NOTE — PROGRESS NOTES
Nephrology    Renal function steadily better, with SCR today 0.95 mg/dL  Other electrolytes normal  Will sign off, but please call if any questions    --Jose Ren MD

## 2023-07-14 NOTE — SIGNIFICANT NOTE
07/14/23 0948   OTHER   Discipline physical therapist   Rehab Time/Intention   Session Not Performed other (see comments)  (Pt is going to OR for R 5th toe amputation. Will f/u tomorrow.)   Recommendation   PT - Next Appointment 07/15/23

## 2023-07-14 NOTE — SIGNIFICANT NOTE
07/14/23 0949   OTHER   Discipline occupational therapist   Rehab Time/Intention   Session Not Performed other (see comments);patient unavailable for treatment  (Pt to OR for 5th toe amputation today. Will follow-up)   Therapy Assessment/Plan (PT)   Criteria for Skilled Interventions Met (PT) yes;meets criteria   Recommendation   OT - Next Appointment 07/17/23

## 2023-07-14 NOTE — PLAN OF CARE
Problem: Adult Inpatient Plan of Care  Goal: Plan of Care Review  Flowsheets (Taken 7/14/2023 0254)  Progress: improving  Plan of Care Reviewed With:   patient   daughter  Outcome Evaluation: very hard of hearing hard to make her understand npo after mn for surgery discussed time with daughter turned and repositioned pulses with doppler bilateral 5th toe on right foot slightly reddened with blackened area noted as well no complaints of pain   Goal Outcome Evaluation:  Plan of Care Reviewed With: patient, daughter        Progress: improving  Outcome Evaluation: very hard of hearing hard to make her understand npo after mn for surgery discussed time with daughter turned and repositioned pulses with doppler bilateral 5th toe on right foot slightly reddened with blackened area noted as well no complaints of pain

## 2023-07-14 NOTE — PROGRESS NOTES
"Daily progress note    Primary care physician  Dr. JASSO    Chief complaint  Doing same with no new complaints    History of present illness  89-year-old white female with history of chronic atrial fibrillation diabetes mellitus hypertension hyperlipidemia peripheral artery disease and dementia who lives at home with her daughter brought to the emergency room with right foot redness swelling pain for last 1 month which is treated by primary care physician with oral antibiotics without any improvement.  Patient has infection around right fifth toe with surrounding cellulitis admitted for management.  Patient remained awake and alert and denies any chest pain shortness of breath palpitation no fever chills abdominal pain nausea vomiting diarrhea.  Most of the history obtained from the daughter and son-in-law but patient also contributed.     REVIEW OF SYSTEMS  Unremarkable except generalized weakness     PHYSICAL EXAM   Blood pressure 165/67, pulse 87, temperature 98.9 °F (37.2 °C), temperature source Oral, resp. rate 20, height 157.5 cm (62\"), weight 64.3 kg (141 lb 12.1 oz), SpO2 97 %.    General: Awake and alert no acute distress.  HENT: NCAT, PERRL, Nares patent.  Eyes: no scleral icterus.  Neck: trachea midline, no ROM limitations.  CV: Irregularly irregular S1-S2  Respiratory: normal effort, CTAB.  Abdomen: soft, nondistended, nontender bowel sounds positive  Musculoskeletal: no deformity.  Neuro: alert, moves all extremities, follows commands.  Skin: warm, dry.  Right foot: Patient has ulceration on the lateral base of the right fifth toe, erythema and swelling extending into the dorsum of the foot, no erythema but swelling of the right calf, no palpable cords, negative Homans.     LAB RESULTS  Lab Results (last 24 hours)       Procedure Component Value Units Date/Time    POC Glucose Once [[id number]]  (Normal) Collected: 07/14/23 1127    Specimen: Blood Updated: 07/14/23 1129     Glucose 129 mg/dL      " Comment: Meter: [id number] : [id number] Joni HER       POC Glucose Once [[id number]]  (Normal) Collected: 07/14/23 0607    Specimen: Blood Updated: 07/14/23 0619     Glucose 105 mg/dL      Comment: Meter: [id number] : [id number] Guy MAC       Protime-INR [[id number]]  (Abnormal) Collected: 07/14/23 0517    Specimen: Blood Updated: 07/14/23 0602     Protime 17.1 Seconds      INR 1.37    Renal Function Panel [[id number]]  (Abnormal) Collected: 07/14/23 0517    Specimen: Blood Updated: 07/14/23 0557     Glucose 103 mg/dL      BUN 21 mg/dL      Creatinine 0.95 mg/dL      Sodium 139 mmol/L      Potassium 3.9 mmol/L      Chloride 106 mmol/L      CO2 23.4 mmol/L      Calcium 9.2 mg/dL      Albumin 2.9 g/dL      Phosphorus 3.1 mg/dL      Anion Gap 9.6 mmol/L      BUN/Creatinine Ratio 22.1     eGFR 56.7 mL/min/1.73     Narrative:      GFR Normal >60  Chronic Kidney Disease <60  Kidney Failure <15    The GFR formula is only valid for adults with stable renal function between ages 18 and 70.    Magnesium [[id number]]  (Normal) Collected: 07/14/23 0517    Specimen: Blood Updated: 07/14/23 0557     Magnesium 2.0 mg/dL     POC Glucose Once [[id number]]  (Abnormal) Collected: 07/13/23 2037    Specimen: Blood Updated: 07/13/23 2039     Glucose 157 mg/dL      Comment: Meter: [id number] : [id number] Rin Gaye NA       POC Glucose Once [[id number]]  (Abnormal) Collected: 07/13/23 1626    Specimen: Blood Updated: 07/13/23 1627     Glucose 203 mg/dL      Comment: Meter: [id number] : [id number] Rin Gaye NA             Imaging Results (Last 24 Hours)       ** No results found for the last 24 hours. **           Interpretation Summary    Right Conclusion: The right YADIEL is severely reduced. Severe digital ischemia.    While the left ankle-brachial indices are within normal limits there is vessel incompressibility.  ABIs are likely falsely elevated.  Moderate digital ischemia noted.    Unable to  determine level of disease due to vessel incompressibility but suspect multi level disease based upon waveforms.     Interpretation Summary    Right popliteal fossa fluid collection.    Normal right lower extremity venous duplex scan.      Current Facility-Administered Medications:     [MAR Hold] acetaminophen (TYLENOL) tablet 650 mg, 650 mg, Oral, Q4H PRN, Chacho Barbour MD    [MAR Hold] aspirin chewable tablet 81 mg, 81 mg, Oral, Daily, Chacho Barbour MD, 81 mg at 07/13/23 0846    cefTRIAXone (ROCEPHIN) 1 g in sodium chloride 0.9 % 100 mL IVPB-VTB, 1 g, Intravenous, Q24H, Uziel Mcrae MD, Last Rate: 200 mL/hr at 07/13/23 1800, 1 g at 07/13/23 1800    dilTIAZem CD (CARDIZEM CD) 24 hr capsule 180 mg, 180 mg, Oral, Q24H, Chacho Barbour MD, 180 mg at 07/14/23 0845    famotidine (PEPCID) tablet 20 mg, 20 mg, Oral, Daily, Chacho Barbour MD, 20 mg at 07/14/23 0845    fentaNYL citrate (PF) (SUBLIMAZE) injection 25 mcg, 25 mcg, Intravenous, Once PRN, Dennis Miller MD, 25 mcg at 07/14/23 1340    [MAR Hold] icosapent ethyl (VASCEPA) capsule 2 g, 2 g, Oral, BID, Uziel Mcrae MD, 2 g at 07/14/23 0845    [MAR Hold] insulin lispro (HUMALOG/ADMELOG) injection 2-7 Units, 2-7 Units, Subcutaneous, 4x Daily AC & at Bedtime, Chacho Barbour MD, 2 Units at 07/13/23 2137    lactated ringers infusion, 9 mL/hr, Intravenous, Continuous, Dennis Miller MD, Last Rate: 9 mL/hr at 07/14/23 1340, 9 mL/hr at 07/14/23 1340    lidocaine PF 1% (XYLOCAINE) injection 0.5 mL, 0.5 mL, Injection, Once PRN, Dennis Miller MD    metoprolol tartrate (LOPRESSOR) tablet 25 mg, 25 mg, Oral, BID, Chacho Barbour MD, 25 mg at 07/14/23 0845    Pharmacy to dose warfarin, , Does not apply, Continuous PRN, Chacho Barbour MD    pregabalin (LYRICA) capsule 100 mg, 100 mg, Oral, Nightly, Chacho Barbour MD, 100 mg at 07/13/23 5157    sodium chloride 0.9 % flush 3 mL, 3 mL, Intravenous, Q12H, Dennis Miller MD    sodium  chloride 0.9 % flush 3-10 mL, 3-10 mL, Intravenous, PRN, Dennis Miller MD     ASSESSMENT  Right foot infected wound with surrounding cellulitis and osteomyelitis  Status post right femoral artery angioplasty and stent  Right popliteal artery atherectomy and angioplasty and right tibioperoneal atherectomy and angioplasty  Peripheral artery disease  Diabetes mellitus  Hypertension  Hyperlipidemia  Chronic atrial fibrillation     PLAN  CPM  Postop care  Continue IV antibiotics   Right fifth toe amputation today  Continue anticoagulation  Vascular surgery consult appreciated  Continue home medications  Stress ulcer DVT prophylaxis  Supportive care  PT/OT  DNR  Discussed with family nursing staff and infectious disease  Discharge planning    DAISHA CARLSON MD    Copied text in this note has been reviewed and is accurate as of 07/14/23

## 2023-07-14 NOTE — PROGRESS NOTES
"  Infectious Diseases Progress Note    Rosa Tang MD     [facility]  [place]: 8 days  Patient Identification:  Name: Bessie Molina  Age: 89 y.o.  Sex: female  :  1932  MRN: [id number]         Primary Care Physician: Mireille La MD        Subjective: Denies any complaints.  Patient does not clearly remember having conversation with Dr. Barbour or myself yesterday.  But she is pleasant and wants to proceed with what ever the doctors recommend  Interval History: See consultation note  7/10/2023: She underwent:  Ultrasound-guided left common femoral artery percutaneous access.  Right lower extremity runoff arteriogram.  Right superficial femoral artery angioplasty and stent placement 6 mm self-expanding stent.  Right popliteal artery atherectomy with 1.7 mm Spectranetics catheter and 4 mm drug-coated balloon angioplasty.  Right tibioperoneal trunk atherectomy with 1.7 mm Spectranetics catheter and 3 mm angioplasty.  2023: After review of MRI right fifth toe amputation and debridement by vascular surgery service plan scheduled for surgery on 2023.  Objective:    Scheduled Meds:aspirin, 81 mg, Oral, Daily  cefTRIAXone, 1 g, Intravenous, Q24H  dilTIAZem CD, 180 mg, Oral, Q24H  famotidine, 20 mg, Oral, Daily  icosapent ethyl, 2 g, Oral, BID  insulin lispro, 2-7 Units, Subcutaneous, 4x Daily AC & at Bedtime  metoprolol tartrate, 25 mg, Oral, BID  pregabalin, 100 mg, Oral, Nightly      Continuous Infusions:Pharmacy to dose warfarin,         Vital signs in last 24 hours:  Temp:  [98.5 °F (36.9 °C)-99 °F (37.2 °C)] 98.5 °F (36.9 °C)  Heart Rate:  [] 87  Resp:  [18] 18  BP: (115-139)/(56-88) 134/56    Intake/Output:  No intake or output data in the 24 hours ending 23 0647      Exam:  /56 (BP Location: Right arm, Patient Position: Lying)   Pulse 87   Temp 98.5 °F (36.9 °C) (Oral)   Resp 18   Ht 157.5 cm (62\")   Wt 64.3 kg (141 lb 12.1 oz)   SpO2 96%   BMI 25.93 " kg/m²   Patient is examined using the personal protective equipment as per guidelines from infection control for this particular patient as enacted.  Hand washing was performed before and after patient interaction.  HEENT: Remarkable for no acute lesions  Neck: Supple  Chest: Bilateral air entry decreased breath sounds at the bases  Heart: S1-S2 regular  Abdomen: Soft nontender  Extremity examination shows:    Data Review:    I reviewed the patient's new clinical results.  Results from last 7 days   Lab Units 07/13/23  0530 07/12/23  0457 07/11/23  0505 07/10/23  0346 07/09/23  0426 07/08/23  0409   WBC 10*3/mm3 12.51* 12.23* 11.78* 9.38 10.11 10.02   HEMOGLOBIN g/dL 11.1* 11.8* 13.1 11.4* 12.0 12.5   PLATELETS 10*3/mm3 222 229 296 247 253 260     Results from last 7 days   Lab Units 07/14/23  0517 07/13/23  0530 07/12/23  0457 07/11/23  0505 07/10/23  0346 07/09/23  0426 07/08/23  0409   SODIUM mmol/L 139 140 142 142 141 144 145   POTASSIUM mmol/L 3.9 4.3 4.5 4.6 3.6 4.6 4.0   CHLORIDE mmol/L 106 106 105 104 106 108* 108*   CO2 mmol/L 23.4 25.3 29.0 25.8 23.4 26.0 27.4   BUN mg/dL 21 20 23 18 23 25* 25*   CREATININE mg/dL 0.95 1.06* 1.12* 1.07* 1.18* 1.42* 1.13*   CALCIUM mg/dL 9.2 9.0 9.4 9.8* 9.0 9.6 9.8*   GLUCOSE mg/dL 103* 99 113* 152* 116* 103* 94     Microbiology Results (last 10 days)       Procedure Component Value - Date/Time    MRSA Screen, PCR (Inpatient) - Swab, Nares [[id number]]  (Normal) Collected: 07/07/23 0921    Lab Status: Final result Specimen: Swab from Nares Updated: 07/07/23 1039     MRSA PCR No MRSA Detected    Narrative:      The negative predictive value of this diagnostic test is high and should only be used to consider de-escalating anti-MRSA therapy. A positive result may indicate colonization with MRSA and must be correlated clinically.    Blood Culture - Blood, Arm, Left [[id number]]  (Normal) Collected: 07/06/23 1235    Lab Status: Final result Specimen: Blood from Arm, Left Updated:  07/11/23 1245     Blood Culture No growth at 5 days    Blood Culture - Blood, Arm, Right [[id number]]  (Normal) Collected: 07/06/23 1219    Lab Status: Final result Specimen: Blood from Arm, Right Updated: 07/11/23 1230     Blood Culture No growth at 5 days          XR Foot 3+ View Right    Result Date: 7/6/2023   As described.  This report was finalized on 7/6/2023 12:34 PM by Dr. Jorge Frye M.D.      CT Angio Abdominal Aorta Bilateral Iliofem Runoff    Result Date: 7/9/2023  1. Atherosclerotic disease of the abdominal aorta and bilateral lower extremities discussed in more detail above. 2. Low-density pancreatic lesion probably stable since the 12/14/2022 study. 3. Status post hysterectomy. 4. Small amount of high-attenuation material in the gallbladder may represent hyperdense bile or possibly some very tiny faintly opaque stones. No gallbladder inflammatory changes are seen.  Radiation dose reduction techniques were utilized, including automated exposure control and exposure modulation based on body size.  This report was finalized on 7/9/2023 2:02 PM by Dr. Sarkis Tobar M.D.      MRI Foot Right With & Without Contrast    Result Date: 7/13/2023  1. Soft tissue wound/ulcer at the lateral forefoot with small ulcer that approaches the cortex of the head of the 5th proximal phalanx. There is underlying bone marrow edema and enhancement within the distal shaft and head of the 5th proximal phalanx as well as within the fused 5th middle and distal phalanges consistent with osteomyelitis. Suspect septic arthritis at the 5th PIP joint. 2. Generalized forefoot soft tissue swelling with edematous dorsal subcutaneous fat consistent with cellulitis.  This report was finalized on 7/13/2023 8:31 AM by Dr. Daniel Edmond M.D.      Duplex venous lower extremity right    Addendum Date: 7/10/2023      Chronic right lower extremity superficial thrombophlebitis noted in the small saphenous.   Right popliteal fossa  fluid collection.   Normal right lower extremity venous duplex scan.        Assessment:    Cellulitis of right foot  1-ischemic/diabetic ulcer/cellulitis of the right foot due to peripheral vascular disease rendering antibiotic therapy unsuccessful with significant risk of toe/foot/limb loss-status post vascular intervention with improvement in circulation and no MRI evidence of acute osteomyelitis involving fifth proximal phalanx and distal phalanx as well as septic arthritis of the fifth PIP joint.  2-peripheral vascular disease  3-hypertension  4-atrial fibrillation  5-chronic kidney disease  6-diabetes mellitus  7-other diagnoses per primary team.     Recommendations/Discussions:  Continue present antibiotic therapy  Plans for surgical intervention as per vascular surgery service noted.  If definitive resection is performed and there is no concern for residual osteomyelitis then I think short course of oral antibiotic therapy should be sufficient to address soft tissue infection in this patient who has recent revascularization of the right lower extremity.  Discussed earlier on 7/13/2023 with vascular surgery service and Dr. Bay.  Rosa Tang MD  7/14/2023  06:47 EDT    Parts of this note may be an electronic transcription/translation of spoken language to printed text using the Dragon dictation system.

## 2023-07-15 LAB
ANION GAP SERPL CALCULATED.3IONS-SCNC: 12 MMOL/L (ref 5–15)
BASOPHILS # BLD AUTO: 0.01 10*3/MM3 (ref 0–0.2)
BASOPHILS NFR BLD AUTO: 0.1 % (ref 0–1.5)
BUN SERPL-MCNC: 19 MG/DL (ref 8–23)
BUN/CREAT SERPL: 20 (ref 7–25)
CALCIUM SPEC-SCNC: 9.6 MG/DL (ref 8.2–9.6)
CHLORIDE SERPL-SCNC: 104 MMOL/L (ref 98–107)
CO2 SERPL-SCNC: 25 MMOL/L (ref 22–29)
CREAT SERPL-MCNC: 0.95 MG/DL (ref 0.57–1)
DEPRECATED RDW RBC AUTO: 46.5 FL (ref 37–54)
EGFRCR SERPLBLD CKD-EPI 2021: 56.7 ML/MIN/1.73
EOSINOPHIL # BLD AUTO: 0 10*3/MM3 (ref 0–0.4)
EOSINOPHIL NFR BLD AUTO: 0 % (ref 0.3–6.2)
ERYTHROCYTE [DISTWIDTH] IN BLOOD BY AUTOMATED COUNT: 12.7 % (ref 12.3–15.4)
GLUCOSE BLDC GLUCOMTR-MCNC: 147 MG/DL (ref 70–130)
GLUCOSE BLDC GLUCOMTR-MCNC: 204 MG/DL (ref 70–130)
GLUCOSE BLDC GLUCOMTR-MCNC: 254 MG/DL (ref 70–130)
GLUCOSE BLDC GLUCOMTR-MCNC: 323 MG/DL (ref 70–130)
GLUCOSE SERPL-MCNC: 140 MG/DL (ref 65–99)
HCT VFR BLD AUTO: 38.2 % (ref 34–46.6)
HGB BLD-MCNC: 13 G/DL (ref 12–15.9)
IMM GRANULOCYTES # BLD AUTO: 0.07 10*3/MM3 (ref 0–0.05)
IMM GRANULOCYTES NFR BLD AUTO: 0.8 % (ref 0–0.5)
INR PPP: 1.15 (ref 0.9–1.1)
LYMPHOCYTES # BLD AUTO: 1.4 10*3/MM3 (ref 0.7–3.1)
LYMPHOCYTES NFR BLD AUTO: 16.7 % (ref 19.6–45.3)
MCH RBC QN AUTO: 33.9 PG (ref 26.6–33)
MCHC RBC AUTO-ENTMCNC: 34 G/DL (ref 31.5–35.7)
MCV RBC AUTO: 99.7 FL (ref 79–97)
MONOCYTES # BLD AUTO: 0.1 10*3/MM3 (ref 0.1–0.9)
MONOCYTES NFR BLD AUTO: 1.2 % (ref 5–12)
NEUTROPHILS NFR BLD AUTO: 6.79 10*3/MM3 (ref 1.7–7)
NEUTROPHILS NFR BLD AUTO: 81.2 % (ref 42.7–76)
NRBC BLD AUTO-RTO: 0 /100 WBC (ref 0–0.2)
PLATELET # BLD AUTO: 272 10*3/MM3 (ref 140–450)
PMV BLD AUTO: 9.5 FL (ref 6–12)
POTASSIUM SERPL-SCNC: 4.4 MMOL/L (ref 3.5–5.2)
PROTHROMBIN TIME: 14.8 SECONDS (ref 11.7–14.2)
RBC # BLD AUTO: 3.83 10*6/MM3 (ref 3.77–5.28)
SODIUM SERPL-SCNC: 141 MMOL/L (ref 136–145)
WBC NRBC COR # BLD: 8.37 10*3/MM3 (ref 3.4–10.8)

## 2023-07-15 PROCEDURE — 82948 REAGENT STRIP/BLOOD GLUCOSE: CPT

## 2023-07-15 PROCEDURE — 85025 COMPLETE CBC W/AUTO DIFF WBC: CPT | Performed by: SURGERY

## 2023-07-15 PROCEDURE — 85610 PROTHROMBIN TIME: CPT | Performed by: SURGERY

## 2023-07-15 PROCEDURE — 25010000002 CEFTRIAXONE PER 250 MG: Performed by: SURGERY

## 2023-07-15 PROCEDURE — 63710000001 INSULIN LISPRO (HUMAN) PER 5 UNITS: Performed by: SURGERY

## 2023-07-15 PROCEDURE — 97530 THERAPEUTIC ACTIVITIES: CPT

## 2023-07-15 PROCEDURE — 80048 BASIC METABOLIC PNL TOTAL CA: CPT | Performed by: SURGERY

## 2023-07-15 RX ADMIN — DILTIAZEM HYDROCHLORIDE 180 MG: 180 CAPSULE, COATED, EXTENDED RELEASE ORAL at 09:56

## 2023-07-15 RX ADMIN — INSULIN LISPRO 4 UNITS: 100 INJECTION, SOLUTION INTRAVENOUS; SUBCUTANEOUS at 21:07

## 2023-07-15 RX ADMIN — FAMOTIDINE 20 MG: 20 TABLET, FILM COATED ORAL at 09:56

## 2023-07-15 RX ADMIN — ICOSAPENT ETHYL 2 G: 1000 CAPSULE ORAL at 10:05

## 2023-07-15 RX ADMIN — METOPROLOL TARTRATE 25 MG: 25 TABLET, FILM COATED ORAL at 21:08

## 2023-07-15 RX ADMIN — PREGABALIN 100 MG: 100 CAPSULE ORAL at 21:08

## 2023-07-15 RX ADMIN — CEFTRIAXONE SODIUM 1 G: 1 INJECTION, POWDER, FOR SOLUTION INTRAMUSCULAR; INTRAVENOUS at 17:05

## 2023-07-15 RX ADMIN — INSULIN LISPRO 5 UNITS: 100 INJECTION, SOLUTION INTRAVENOUS; SUBCUTANEOUS at 12:31

## 2023-07-15 RX ADMIN — ASPIRIN 81 MG: 81 TABLET, CHEWABLE ORAL at 09:56

## 2023-07-15 RX ADMIN — METOPROLOL TARTRATE 25 MG: 25 TABLET, FILM COATED ORAL at 09:56

## 2023-07-15 RX ADMIN — INSULIN LISPRO 3 UNITS: 100 INJECTION, SOLUTION INTRAVENOUS; SUBCUTANEOUS at 17:14

## 2023-07-15 NOTE — PROGRESS NOTES
"Daily progress note    Primary care physician  Dr. JASSO    Chief complaint  S/p right fifth toe amputation and doing same with no new complaints    History of present illness  89-year-old white female with history of chronic atrial fibrillation diabetes mellitus hypertension hyperlipidemia peripheral artery disease and dementia who lives at home with her daughter brought to the emergency room with right foot redness swelling pain for last 1 month which is treated by primary care physician with oral antibiotics without any improvement.  Patient has infection around right fifth toe with surrounding cellulitis admitted for management.  Patient remained awake and alert and denies any chest pain shortness of breath palpitation no fever chills abdominal pain nausea vomiting diarrhea.  Most of the history obtained from the daughter and son-in-law but patient also contributed.     REVIEW OF SYSTEMS  Unremarkable except generalized weakness     PHYSICAL EXAM   Blood pressure 136/75, pulse 82, temperature 97.5 °F (36.4 °C), temperature source Oral, resp. rate 18, height 157.5 cm (62\"), weight 64.3 kg (141 lb 12.1 oz), SpO2 100 %.    General: Awake and alert no acute distress.  HENT: NCAT, PERRL, Nares patent.  Eyes: no scleral icterus.  Neck: trachea midline, no ROM limitations.  CV: Irregularly irregular S1-S2  Respiratory: normal effort, CTAB.  Abdomen: soft, nondistended, nontender bowel sounds positive  Musculoskeletal: no deformity.  Neuro: alert, moves all extremities, follows commands.  Skin: warm, dry.  Right foot: Patient has ulceration on the lateral base of the right fifth toe, erythema and swelling extending into the dorsum of the foot, no erythema but swelling of the right calf, no palpable cords, negative Homans.     LAB RESULTS  Lab Results (last 24 hours)       Procedure Component Value Units Date/Time    POC Glucose Once [[id number]]  (Abnormal) Collected: 07/15/23 1144    Specimen: Blood Updated: 07/15/23 " 1146     Glucose 323 mg/dL      Comment: Meter: [id number] : [id number] Keith HER       Protime-INR [[id number]]  (Abnormal) Collected: 07/15/23 0520    Specimen: Blood Updated: 07/15/23 0638     Protime 14.8 Seconds      INR 1.15    POC Glucose Once [[id number]]  (Abnormal) Collected: 07/15/23 0621    Specimen: Blood Updated: 07/15/23 0622     Glucose 147 mg/dL      Comment: Meter: [id number] : [id number] Teddy HER       Basic Metabolic Panel [[id number]]  (Abnormal) Collected: 07/15/23 0520    Specimen: Blood Updated: 07/15/23 0606     Glucose 140 mg/dL      BUN 19 mg/dL      Creatinine 0.95 mg/dL      Sodium 141 mmol/L      Potassium 4.4 mmol/L      Chloride 104 mmol/L      CO2 25.0 mmol/L      Calcium 9.6 mg/dL      BUN/Creatinine Ratio 20.0     Anion Gap 12.0 mmol/L      eGFR 56.7 mL/min/1.73     Narrative:      GFR Normal >60  Chronic Kidney Disease <60  Kidney Failure <15    The GFR formula is only valid for adults with stable renal function between ages 18 and 70.    CBC & Differential [[id number]]  (Abnormal) Collected: 07/15/23 0520    Specimen: Blood Updated: 07/15/23 0549    Narrative:      The following orders were created for panel order CBC & Differential.  Procedure                               Abnormality         Status                     ---------                               -----------         ------                     CBC Auto Differential[[id number]]        Abnormal            Final result                 Please view results for these tests on the individual orders.    CBC Auto Differential [[id number]]  (Abnormal) Collected: 07/15/23 0520    Specimen: Blood Updated: 07/15/23 0549     WBC 8.37 10*3/mm3      RBC 3.83 10*6/mm3      Hemoglobin 13.0 g/dL      Hematocrit 38.2 %      MCV 99.7 fL      MCH 33.9 pg      MCHC 34.0 g/dL      RDW 12.7 %      RDW-SD 46.5 fl      MPV 9.5 fL      Platelets 272 10*3/mm3      Neutrophil % 81.2 %      Lymphocyte % 16.7 %      Monocyte % 1.2  %      Eosinophil % 0.0 %      Basophil % 0.1 %      Immature Grans % 0.8 %      Neutrophils, Absolute 6.79 10*3/mm3      Lymphocytes, Absolute 1.40 10*3/mm3      Monocytes, Absolute 0.10 10*3/mm3      Eosinophils, Absolute 0.00 10*3/mm3      Basophils, Absolute 0.01 10*3/mm3      Immature Grans, Absolute 0.07 10*3/mm3      nRBC 0.0 /100 WBC     Tissue Pathology Exam [[id number]] Collected: 07/14/23 1727    Specimen: Tissue from Toe, Right; Tissue from Toe, Right Updated: 07/14/23 2214    POC Glucose Once [[id number]]  (Abnormal) Collected: 07/14/23 2049    Specimen: Blood Updated: 07/14/23 2050     Glucose 177 mg/dL      Comment: Meter: [id number] : [id number] Teddy HER       Anaerobic Culture - Surgical Site, Toe, Right [[id number]] Collected: 07/14/23 1729    Specimen: Surgical Site from Toe, Right Updated: 07/14/23 1748          Imaging Results (Last 24 Hours)       ** No results found for the last 24 hours. **           Interpretation Summary    Right Conclusion: The right YADIEL is severely reduced. Severe digital ischemia.    While the left ankle-brachial indices are within normal limits there is vessel incompressibility.  ABIs are likely falsely elevated.  Moderate digital ischemia noted.    Unable to determine level of disease due to vessel incompressibility but suspect multi level disease based upon waveforms.     Interpretation Summary    Right popliteal fossa fluid collection.    Normal right lower extremity venous duplex scan.      Current Facility-Administered Medications:     acetaminophen (TYLENOL) tablet 650 mg, 650 mg, Oral, Q4H PRN, Chacho Barbour MD    aspirin chewable tablet 81 mg, 81 mg, Oral, Daily, Chacho Barbour MD, 81 mg at 07/15/23 0956    cefTRIAXone (ROCEPHIN) 1 g in sodium chloride 0.9 % 100 mL IVPB-VTB, 1 g, Intravenous, Q24H, Chacho Barbour MD, Last Rate: 200 mL/hr at 07/14/23 1656, 1 g at 07/14/23 1656    dilTIAZem CD (CARDIZEM CD) 24 hr capsule 180 mg, 180 mg, Oral,  Q24H, Chacho Barbour MD, 180 mg at 07/15/23 0956    famotidine (PEPCID) tablet 20 mg, 20 mg, Oral, Daily, Chacho Barbour MD, 20 mg at 07/15/23 0956    HYDROcodone-acetaminophen (NORCO) 5-325 MG per tablet 1 tablet, 1 tablet, Oral, Q6H PRN, Chacho Barbour MD    icosapent ethyl (VASCEPA) capsule 2 g, 2 g, Oral, BID, Chacho Barbour MD, 2 g at 07/15/23 1005    insulin lispro (HUMALOG/ADMELOG) injection 2-7 Units, 2-7 Units, Subcutaneous, 4x Daily AC & at Bedtime, Chacho Barbour MD, 5 Units at 07/15/23 1231    metoprolol tartrate (LOPRESSOR) tablet 25 mg, 25 mg, Oral, BID, Chacho Barbour MD, 25 mg at 07/15/23 0956    pregabalin (LYRICA) capsule 100 mg, 100 mg, Oral, Nightly, Chacho Barbour MD, 100 mg at 07/14/23 2107     ASSESSMENT  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  Right foot infected wound with surrounding cellulitis   Status post right femoral artery angioplasty and stent  Right popliteal artery atherectomy and angioplasty and right tibioperoneal atherectomy and angioplasty  Peripheral artery disease  Diabetes mellitus  Hypertension  Hyperlipidemia  Chronic atrial fibrillation     PLAN  CPM  Postop care  Continue IV antibiotics   Continue anticoagulation  Vascular surgery consult appreciated  Continue home medications  Stress ulcer DVT prophylaxis  Supportive care  PT/OT  DNR  Discussed with family nursing staff and infectious disease  Discharge planning    DAISHA CARLSON MD    Copied text in this note has been reviewed and is accurate as of 07/15/23

## 2023-07-15 NOTE — PLAN OF CARE
Goal Outcome Evaluation:   Pt did well overnight; on 2L of O2; ace wrap dry; pulses dopplered; very [place]; had c/o pain but did not request medication; VSS.

## 2023-07-15 NOTE — PLAN OF CARE
Goal Outcome Evaluation:  Plan of Care Reviewed With: patient           Outcome Evaluation: Pt sitting up in chair, agreeable to therapy, no c/o pain. Patient performed sit to stand with CGA, ambulated short distance in room with rwx and CGA, denies pain but demonstrates slow, antalgic gait. PT will continue to follow for continued gait training and strengthening.

## 2023-07-15 NOTE — THERAPY TREATMENT NOTE
Patient Name: Bessie Molina  : 1932    MRN: [id number]                              Today's Date: 7/15/2023       Admit Date: 2023    Visit Dx:     ICD-10-CM ICD-9-CM   1. Cellulitis of right foot  L03.115 682.7   2. Failure of outpatient treatment  Z78.9 V49.89   3. Leukocytosis, unspecified type  D72.829 288.60   4. ESR raised  R70.0 790.1   5. CRP elevated  R79.82 790.95   6. Hyperglycemia  R73.9 790.29   7. Chronic kidney disease, unspecified CKD stage  N18.9 585.9   8. Osteomyelitis  M86.9 730.20     Patient Active Problem List   Diagnosis    Atrial fibrillation, persistent    Chronic anticoagulation    Dementia    Abdominal pain    Acute respiratory failure with hypoxia    Pancreatic lesion    Chronic diastolic CHF (congestive heart failure)    Cellulitis of right foot     Past Medical History:   Diagnosis Date    Atrial fibrillation     CVA (cerebral vascular accident)     left temporal stroke    Hypertension      Past Surgical History:   Procedure Laterality Date    APPENDECTOMY      ARTERIOGRAM Bilateral     ATHRECTOMY ILIAC, FEMORAL, TIBIAL ARTERY Right 7/10/2023    Procedure: RIGHT LEG ANGIOGRAM WITH LASER;  Surgeon: Chacho Barbour MD;  Location: [facility] ;  Service: Vascular;  Laterality: Right;    CATARACT EXTRACTION Left     CERVICAL DISCECTOMY ANTERIOR      HYSTERECTOMY      LUMBAR DISCECTOMY      TONSILLECTOMY        General Information       Row Name 07/15/23 1702          Physical Therapy Time and Intention    Document Type therapy note (daily note)  -EM     Mode of Treatment individual therapy;physical therapy  -EM       Row Name 07/15/23 626          General Information    Existing Precautions/Restrictions fall  -EM               User Key  (r) = Recorded By, (t) = Taken By, (c) = Cosigned By      Initials Name Provider Type    EM Ginger Mcmahan PT Physical Therapist                   Mobility       Row Name 07/15/23 7072          Bed Mobility    Comment,  (Bed Mobility) not tested, up in chair  -EM       Row Name 07/15/23 1710          Sit-Stand Transfer    Sit-Stand [place] (Transfers) contact guard  -EM     Assistive Device (Sit-Stand Transfers) walker, front-wheeled  -EM       Row Name 07/15/23 1710          Gait/Stairs (Locomotion)    [place] Level (Gait) contact guard  -EM     Assistive Device (Gait) walker, front-wheeled  -EM     Distance in Feet (Gait) 25  -EM     Deviations/Abnormal Patterns (Gait) antalgic;stride length decreased;gait speed decreased  -EM     Bilateral Gait Deviations forward flexed posture  -EM               User Key  (r) = Recorded By, (t) = Taken By, (c) = Cosigned By      Initials Name Provider Type    EM Ginger Mcmahan, PT Physical Therapist                   Obj/Interventions    No documentation.                  Goals/Plan       Row Name 07/15/23 1720          Bed Mobility Goal 1 (PT)    Activity/Assistive Device (Bed Mobility Goal 1, PT) bed mobility activities, all  -EM     [place] Level/Cues Needed (Bed Mobility Goal 1, PT) supervision required  -EM     Time Frame (Bed Mobility Goal 1, PT) 1 week  -EM     Progress/Outcomes (Bed Mobility Goal 1, PT) goal ongoing  -EM       Row Name 07/15/23 1720          Transfer Goal 1 (PT)    Activity/Assistive Device (Transfer Goal 1, PT) sit-to-stand/stand-to-sit;walker, rolling  -EM     [place] Level/Cues Needed (Transfer Goal 1, PT) supervision required  -EM     Time Frame (Transfer Goal 1, PT) 1 week  -EM     Progress/Outcome (Transfer Goal 1, PT) goal ongoing  -EM       Row Name 07/15/23 1720          Gait Training Goal 1 (PT)    Activity/Assistive Device (Gait Training Goal 1, PT) gait (walking locomotion);walker, rolling  -EM     [place] Level (Gait Training Goal 1, PT) standby assist  -EM     Distance (Gait Training Goal 1, PT) 50  -EM     Time Frame (Gait Training Goal 1, PT) 1 week  -EM     Progress/Outcome (Gait Training Goal 1, PT) goal ongoing;goal  revised this date  -EM               User Key  (r) = Recorded By, (t) = Taken By, (c) = Cosigned By      Initials Name Provider Type    Ginger Mojica PT Physical Therapist                   Clinical Impression       Row Name 07/15/23 9725          Pain    Pretreatment Pain Rating 0/10 - no pain  -EM     Pre/Posttreatment Pain Comment pt denies pain when questioned  -EM       Row Name 07/15/23 1715          Plan of Care Review    Plan of Care Reviewed With patient  -EM     Outcome Evaluation Pt sitting up in chair, agreeable to therapy, no c/o pain. Patient performed sit to stand with CGA, ambulated short distance in room with rwx and CGA, denies pain but demonstrates slow, antalgic gait. PT will continue to follow for continued gait training and strengthening.  -EM       Row Name 07/15/23 1715          Positioning and Restraints    Pre-Treatment Position sitting in chair/recliner  -EM     Post Treatment Position chair  -EM     In Chair sitting;call light within reach;exit alarm on;with family/caregiver  -EM               User Key  (r) = Recorded By, (t) = Taken By, (c) = Cosigned By      Initials Name Provider Type    Ginger Mojica, PT Physical Therapist                   Outcome Measures       Row Name 07/15/23 6137          How much help from another person do you currently need...    Turning from your back to your side while in flat bed without using bedrails? 4  -EM     Moving from lying on back to sitting on the side of a flat bed without bedrails? 3  -EM     Moving to and from a bed to a chair (including a wheelchair)? 3  -EM     Standing up from a chair using your arms (e.g., wheelchair, bedside chair)? 3  -EM     Climbing 3-5 steps with a railing? 3  -EM     To walk in hospital room? 3  -EM     AM-PAC 6 Clicks Score (PT) 19  -EM     Highest level of mobility 6 --> Walked 10 steps or more  -EM               User Key  (r) = Recorded By, (t) = Taken By, (c) = Cosigned By      Initials Name  Provider Type    EM Ginger Mcmahan, PT Physical Therapist                                 Physical Therapy Education       Title: PT OT SLP Therapies (Done)       Topic: Physical Therapy (Done)       Point: Mobility training (Done)       Learning Progress Summary             Patient Acceptance, E, VU by EM at 7/15/2023 1718    Acceptance, E, VU,NR by EB at 7/13/2023 1533    Acceptance, E, VU,NR by EB at 7/12/2023 1605    Acceptance, E, VU,NR by EB at 7/11/2023 1128    Acceptance, E,TB, VU,DU by  at 7/7/2023 0954                         Point: Home exercise program (Done)       Learning Progress Summary             Patient Acceptance, E, VU,NR by EB at 7/13/2023 1533                         Point: Body mechanics (Done)       Learning Progress Summary             Patient Acceptance, E, VU,NR by EB at 7/13/2023 1533    Acceptance, E, VU,NR by EB at 7/12/2023 1605    Acceptance, E, VU,NR by EB at 7/11/2023 1128    Acceptance, E,TB, VU,DU by  at 7/7/2023 0954                         Point: Precautions (Done)       Learning Progress Summary             Patient Acceptance, E, VU,NR by EB at 7/13/2023 1533    Acceptance, E, VU,NR by EB at 7/11/2023 1128    Acceptance, E,TB, VU,DU by  at 7/7/2023 0954                                         User Key       Initials Effective Dates Name Provider Type Discipline    EM 06/16/21 -  Ginger Mcmahan PT Physical Therapist PT    EB 02/14/23 -  Ophelia Callejas PTA Physical Therapist Assistant PT     09/22/22 -  Raven Blevins, ELAINE Physical Therapist PT                  PT Recommendation and Plan     Plan of Care Reviewed With: patient  Outcome Evaluation: Pt sitting up in chair, agreeable to therapy, no c/o pain. Patient performed sit to stand with CGA, ambulated short distance in room with rwx and CGA, denies pain but demonstrates slow, antalgic gait. PT will continue to follow for continued gait training and strengthening.     Time Calculation:    PT Charges        Row Name 07/15/23 1721 07/15/23 1719          Time Calculation    Start Time -- 1634  -EM     Stop Time -- 1649  -EM     Time Calculation (min) -- 15 min  -EM     PT Received On -- 07/15/23  -EM     PT - Next Appointment -- 07/17/23  -EM     PT Goal Re-Cert Due Date 07/22/23  -EM --        Time Calculation- PT    Total Timed Code Minutes- PT -- 15 minute(s)  -EM               User Key  (r) = Recorded By, (t) = Taken By, (c) = Cosigned By      Initials Name Provider Type    EM Ginger Mcmahan, PT Physical Therapist                  Therapy Charges for Today       Code Description Service Date Service Provider Modifiers Qty    [id number]  PT THERAPEUTIC ACT EA 15 MIN 7/15/2023 Ginger Mcmahan PT GP 1            PT G-Codes  Outcome Measure Options: AM-PAC 6 Clicks Daily Activity (OT)  AM-PAC 6 Clicks Score (PT): 19  AM-PAC 6 Clicks Score (OT): 19       Ginger Mcmahan PT  7/15/2023

## 2023-07-15 NOTE — PROGRESS NOTES
"  Infectious Diseases Progress Note        [facility]  Los: 9 days  Patient Identification:  Name: Bessie Molina  Age: 89 y.o.  Sex: female  :  1932  MRN: [id number]         Primary Care Physician: Mireille La MD        Subjective: Denies any complaints.  Patient does not clearly remember having conversation with Dr. Barbour or myself yesterday.  But she is pleasant and wants to proceed with what ever the doctors recommend  Interval History: See consultation note  7/10/2023: She underwent:  Ultrasound-guided left common femoral artery percutaneous access.  Right lower extremity runoff arteriogram.  Right superficial femoral artery angioplasty and stent placement 6 mm self-expanding stent.  Right popliteal artery atherectomy with 1.7 mm Spectranetics catheter and 4 mm drug-coated balloon angioplasty.  Right tibioperoneal trunk atherectomy with 1.7 mm Spectranetics catheter and 3 mm angioplasty.  2023: After review of MRI right fifth toe amputation and debridement by vascular surgery service plan scheduled for surgery on 2023.  Objective:    Scheduled Meds:aspirin, 81 mg, Oral, Daily  cefTRIAXone, 1 g, Intravenous, Q24H  dilTIAZem CD, 180 mg, Oral, Q24H  famotidine, 20 mg, Oral, Daily  icosapent ethyl, 2 g, Oral, BID  insulin lispro, 2-7 Units, Subcutaneous, 4x Daily AC & at Bedtime  metoprolol tartrate, 25 mg, Oral, BID  pregabalin, 100 mg, Oral, Nightly      Continuous Infusions:       Vital signs in last 24 hours:  Temp:  [97.4 °F (36.3 °C)-97.6 °F (36.4 °C)] 97.5 °F (36.4 °C)  Heart Rate:  [79-87] 82  Resp:  [18-20] 18  BP: (136-169)/() 136/75    Intake/Output:    Intake/Output Summary (Last 24 hours) at 7/15/2023 1320  Last data filed at 7/15/2023 0500  Gross per 24 hour   Intake 300 ml   Output 1100 ml   Net -800 ml         Exam:  /75 (BP Location: Right arm)   Pulse 82   Temp 97.5 °F (36.4 °C) (Oral)   Resp 18   Ht 157.5 cm (62\")   Wt 64.3 kg (141 lb " 12.1 oz)   SpO2 100%   BMI 25.93 kg/m²   Patient is examined using the personal protective equipment as per guidelines from infection control for this particular patient as enacted.  Hand washing was performed before and after patient interaction.  HEENT: Remarkable for no acute lesions  Neck: Supple  Chest: Bilateral air entry decreased breath sounds at the bases  Heart: S1-S2 regular  Abdomen: Soft nontender  Extremity examination shows:    Data Review:    I reviewed the patient's new clinical results.  Results from last 7 days   Lab Units 07/15/23  0520 07/13/23  0530 07/12/23  0457 07/11/23  0505 07/10/23  0346 07/09/23  0426   WBC 10*3/mm3 8.37 12.51* 12.23* 11.78* 9.38 10.11   HEMOGLOBIN g/dL 13.0 11.1* 11.8* 13.1 11.4* 12.0   PLATELETS 10*3/mm3 272 222 229 296 247 253       Results from last 7 days   Lab Units 07/15/23  0520 07/14/23  0517 07/13/23  0530 07/12/23  0457 07/11/23  0505 07/10/23  0346 07/09/23  0426   SODIUM mmol/L 141 139 140 142 142 141 144   POTASSIUM mmol/L 4.4 3.9 4.3 4.5 4.6 3.6 4.6   CHLORIDE mmol/L 104 106 106 105 104 106 108*   CO2 mmol/L 25.0 23.4 25.3 29.0 25.8 23.4 26.0   BUN mg/dL 19 21 20 23 18 23 25*   CREATININE mg/dL 0.95 0.95 1.06* 1.12* 1.07* 1.18* 1.42*   CALCIUM mg/dL 9.6 9.2 9.0 9.4 9.8* 9.0 9.6   GLUCOSE mg/dL 140* 103* 99 113* 152* 116* 103*       Microbiology Results (last 10 days)       Procedure Component Value - Date/Time    MRSA Screen, PCR (Inpatient) - Swab, Nares [[id number]]  (Normal) Collected: 07/07/23 0921    Lab Status: Final result Specimen: Swab from Nares Updated: 07/07/23 1039     MRSA PCR No MRSA Detected    Narrative:      The negative predictive value of this diagnostic test is high and should only be used to consider de-escalating anti-MRSA therapy. A positive result may indicate colonization with MRSA and must be correlated clinically.    Blood Culture - Blood, Arm, Left [[id number]]  (Normal) Collected: 07/06/23 2908    Lab Status: Final result  Specimen: Blood from Arm, Left Updated: 07/11/23 1245     Blood Culture No growth at 5 days    Blood Culture - Blood, Arm, Right [[id number]]  (Normal) Collected: 07/06/23 1219    Lab Status: Final result Specimen: Blood from Arm, Right Updated: 07/11/23 1230     Blood Culture No growth at 5 days          XR Foot 3+ View Right    Result Date: 7/6/2023   As described.  This report was finalized on 7/6/2023 12:34 PM by Dr. Jorge Frye M.D.      CT Angio Abdominal Aorta Bilateral Iliofem Runoff    Result Date: 7/9/2023  1. Atherosclerotic disease of the abdominal aorta and bilateral lower extremities discussed in more detail above. 2. Low-density pancreatic lesion probably stable since the 12/14/2022 study. 3. Status post hysterectomy. 4. Small amount of high-attenuation material in the gallbladder may represent hyperdense bile or possibly some very tiny faintly opaque stones. No gallbladder inflammatory changes are seen.  Radiation dose reduction techniques were utilized, including automated exposure control and exposure modulation based on body size.  This report was finalized on 7/9/2023 2:02 PM by Dr. Sarkis Tobar M.D.      MRI Foot Right With & Without Contrast    Result Date: 7/13/2023  1. Soft tissue wound/ulcer at the lateral forefoot with small ulcer that approaches the cortex of the head of the 5th proximal phalanx. There is underlying bone marrow edema and enhancement within the distal shaft and head of the 5th proximal phalanx as well as within the fused 5th middle and distal phalanges consistent with osteomyelitis. Suspect septic arthritis at the 5th PIP joint. 2. Generalized forefoot soft tissue swelling with edematous dorsal subcutaneous fat consistent with cellulitis.  This report was finalized on 7/13/2023 8:31 AM by Dr. Daniel Edmond M.D.      Duplex venous lower extremity right    Addendum Date: 7/10/2023      Chronic right lower extremity superficial thrombophlebitis noted in the small  saphenous.   Right popliteal fossa fluid collection.   Normal right lower extremity venous duplex scan.        Assessment:    Cellulitis of right foot  1-ischemic/diabetic ulcer/cellulitis of the right foot due to peripheral vascular disease rendering antibiotic therapy unsuccessful with significant risk of toe/foot/limb loss-status post vascular intervention with improvement in circulation and no MRI evidence of acute osteomyelitis involving fifth proximal phalanx and distal phalanx as well as septic arthritis of the fifth PIP joint.  2-peripheral vascular disease  3-hypertension  4-atrial fibrillation  5-chronic kidney disease  6-diabetes mellitus  7-other diagnoses per primary team.   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

## 2023-07-16 LAB
ANION GAP SERPL CALCULATED.3IONS-SCNC: 12 MMOL/L (ref 5–15)
BASOPHILS # BLD AUTO: 0.02 10*3/MM3 (ref 0–0.2)
BASOPHILS NFR BLD AUTO: 0.1 % (ref 0–1.5)
BUN SERPL-MCNC: 27 MG/DL (ref 8–23)
BUN/CREAT SERPL: 23.1 (ref 7–25)
CALCIUM SPEC-SCNC: 9.5 MG/DL (ref 8.2–9.6)
CHLORIDE SERPL-SCNC: 103 MMOL/L (ref 98–107)
CO2 SERPL-SCNC: 25 MMOL/L (ref 22–29)
CREAT SERPL-MCNC: 1.17 MG/DL (ref 0.57–1)
DEPRECATED RDW RBC AUTO: 46.5 FL (ref 37–54)
EGFRCR SERPLBLD CKD-EPI 2021: 44.1 ML/MIN/1.73
EOSINOPHIL # BLD AUTO: 0 10*3/MM3 (ref 0–0.4)
EOSINOPHIL NFR BLD AUTO: 0 % (ref 0.3–6.2)
ERYTHROCYTE [DISTWIDTH] IN BLOOD BY AUTOMATED COUNT: 12.6 % (ref 12.3–15.4)
GLUCOSE BLDC GLUCOMTR-MCNC: 165 MG/DL (ref 70–130)
GLUCOSE BLDC GLUCOMTR-MCNC: 175 MG/DL (ref 70–130)
GLUCOSE BLDC GLUCOMTR-MCNC: 205 MG/DL (ref 70–130)
GLUCOSE BLDC GLUCOMTR-MCNC: 277 MG/DL (ref 70–130)
GLUCOSE SERPL-MCNC: 179 MG/DL (ref 65–99)
HCT VFR BLD AUTO: 36.3 % (ref 34–46.6)
HGB BLD-MCNC: 12.3 G/DL (ref 12–15.9)
IMM GRANULOCYTES # BLD AUTO: 0.12 10*3/MM3 (ref 0–0.05)
IMM GRANULOCYTES NFR BLD AUTO: 0.7 % (ref 0–0.5)
INR PPP: 1.12 (ref 0.9–1.1)
LYMPHOCYTES # BLD AUTO: 2.55 10*3/MM3 (ref 0.7–3.1)
LYMPHOCYTES NFR BLD AUTO: 15.1 % (ref 19.6–45.3)
MCH RBC QN AUTO: 33.7 PG (ref 26.6–33)
MCHC RBC AUTO-ENTMCNC: 33.9 G/DL (ref 31.5–35.7)
MCV RBC AUTO: 99.5 FL (ref 79–97)
MONOCYTES # BLD AUTO: 0.92 10*3/MM3 (ref 0.1–0.9)
MONOCYTES NFR BLD AUTO: 5.5 % (ref 5–12)
NEUTROPHILS NFR BLD AUTO: 13.25 10*3/MM3 (ref 1.7–7)
NEUTROPHILS NFR BLD AUTO: 78.6 % (ref 42.7–76)
NRBC BLD AUTO-RTO: 0 /100 WBC (ref 0–0.2)
PLATELET # BLD AUTO: 302 10*3/MM3 (ref 140–450)
PMV BLD AUTO: 9.6 FL (ref 6–12)
POTASSIUM SERPL-SCNC: 4.3 MMOL/L (ref 3.5–5.2)
PROTHROMBIN TIME: 14.5 SECONDS (ref 11.7–14.2)
RBC # BLD AUTO: 3.65 10*6/MM3 (ref 3.77–5.28)
SODIUM SERPL-SCNC: 140 MMOL/L (ref 136–145)
WBC NRBC COR # BLD: 16.86 10*3/MM3 (ref 3.4–10.8)

## 2023-07-16 PROCEDURE — 85610 PROTHROMBIN TIME: CPT | Performed by: SURGERY

## 2023-07-16 PROCEDURE — 82948 REAGENT STRIP/BLOOD GLUCOSE: CPT

## 2023-07-16 PROCEDURE — 80048 BASIC METABOLIC PNL TOTAL CA: CPT | Performed by: HOSPITALIST

## 2023-07-16 PROCEDURE — 25010000002 CEFTRIAXONE PER 250 MG: Performed by: SURGERY

## 2023-07-16 PROCEDURE — 63710000001 INSULIN LISPRO (HUMAN) PER 5 UNITS: Performed by: SURGERY

## 2023-07-16 PROCEDURE — 85025 COMPLETE CBC W/AUTO DIFF WBC: CPT | Performed by: HOSPITALIST

## 2023-07-16 RX ORDER — WARFARIN SODIUM 5 MG/1
5 TABLET ORAL
Status: COMPLETED | OUTPATIENT
Start: 2023-07-16 | End: 2023-07-16

## 2023-07-16 RX ADMIN — PREGABALIN 100 MG: 100 CAPSULE ORAL at 21:32

## 2023-07-16 RX ADMIN — ACETAMINOPHEN 650 MG: 325 TABLET, FILM COATED ORAL at 09:00

## 2023-07-16 RX ADMIN — ICOSAPENT ETHYL 2 G: 1000 CAPSULE ORAL at 09:00

## 2023-07-16 RX ADMIN — CEFTRIAXONE SODIUM 1 G: 1 INJECTION, POWDER, FOR SOLUTION INTRAMUSCULAR; INTRAVENOUS at 15:42

## 2023-07-16 RX ADMIN — ASPIRIN 81 MG: 81 TABLET, CHEWABLE ORAL at 09:00

## 2023-07-16 RX ADMIN — METOPROLOL TARTRATE 25 MG: 25 TABLET, FILM COATED ORAL at 21:32

## 2023-07-16 RX ADMIN — INSULIN LISPRO 2 UNITS: 100 INJECTION, SOLUTION INTRAVENOUS; SUBCUTANEOUS at 09:00

## 2023-07-16 RX ADMIN — INSULIN LISPRO 2 UNITS: 100 INJECTION, SOLUTION INTRAVENOUS; SUBCUTANEOUS at 21:33

## 2023-07-16 RX ADMIN — INSULIN GLARGINE-YFGN 10 UNITS: 100 INJECTION, SOLUTION SUBCUTANEOUS at 21:34

## 2023-07-16 RX ADMIN — ICOSAPENT ETHYL 2 G: 1000 CAPSULE ORAL at 21:33

## 2023-07-16 RX ADMIN — DILTIAZEM HYDROCHLORIDE 180 MG: 180 CAPSULE, COATED, EXTENDED RELEASE ORAL at 09:00

## 2023-07-16 RX ADMIN — INSULIN LISPRO 4 UNITS: 100 INJECTION, SOLUTION INTRAVENOUS; SUBCUTANEOUS at 12:20

## 2023-07-16 RX ADMIN — METOPROLOL TARTRATE 25 MG: 25 TABLET, FILM COATED ORAL at 09:00

## 2023-07-16 RX ADMIN — FAMOTIDINE 20 MG: 20 TABLET, FILM COATED ORAL at 09:00

## 2023-07-16 RX ADMIN — INSULIN LISPRO 3 UNITS: 100 INJECTION, SOLUTION INTRAVENOUS; SUBCUTANEOUS at 17:21

## 2023-07-16 RX ADMIN — WARFARIN SODIUM 5 MG: 5 TABLET ORAL at 17:21

## 2023-07-16 NOTE — PROGRESS NOTES
Name: Bessie Molina ADMIT: 2023   : 1932  PCP: Mireille La MD    MRN: [id number] LOS: 10 days   AGE/SEX: 89 y.o. female  ROOM: [address]    [place], [employer]    Chief Complaint   Patient presents with    Leg Pain     CC: Peripheral arterial disease and time follow-up.  Subjective     89 y.o. female resting comfortably.    Review of Systems    Objective     Scheduled Medications:   aspirin, 81 mg, Oral, Daily  cefTRIAXone, 1 g, Intravenous, Q24H  dilTIAZem CD, 180 mg, Oral, Q24H  famotidine, 20 mg, Oral, Daily  icosapent ethyl, 2 g, Oral, BID  insulin lispro, 2-7 Units, Subcutaneous, 4x Daily AC & at Bedtime  metoprolol tartrate, 25 mg, Oral, BID  pregabalin, 100 mg, Oral, Nightly        Active Infusions:       As Needed Medications:    acetaminophen    HYDROcodone-acetaminophen    Vital Signs  Vital Signs Patient Vitals for the past 24 hrs:   BP Temp Temp src Pulse Resp SpO2 Weight   23 0700 145/70 97.4 °F (36.3 °C) Oral 88 18 99 % --   23 0516 -- -- -- -- -- -- 64.6 kg (142 lb 6.7 oz)   07/15/23 2300 153/73 98.3 °F (36.8 °C) -- -- 18 -- --   07/15/23 1900 150/86 97.4 °F (36.3 °C) Oral -- 18 -- --   07/15/23 1500 126/54 97.5 °F (36.4 °C) Oral -- 18 -- --   07/15/23 1100 136/75 97.5 °F (36.4 °C) Oral 82 18 100 % --     I/O:  I/O last 3 completed shifts:  In: -   Out: 1800 [Urine:1800]    Physical Exam:  Physical Exam     Results Review:     CBC    Results from last 7 days   Lab Units 23  0622 07/15/23  0520 23  0530 23  0457 23  0505 07/10/23  0346   WBC 10*3/mm3 16.86* 8.37 12.51* 12.23* 11.78* 9.38   HEMOGLOBIN g/dL 12.3 13.0 11.1* 11.8* 13.1 11.4*   PLATELETS 10*3/mm3 302 272 222 229 296 247     BMP   Results from last 7 days   Lab Units 23  0622 07/15/23  0520 23  0517 23  0530 23  0457 23  0505 07/10/23  0346   SODIUM mmol/L 140 141 139 140 142 142 141   POTASSIUM mmol/L 4.3 4.4 3.9 4.3 4.5 4.6 3.6    CHLORIDE mmol/L 103 104 106 106 105 104 106   CO2 mmol/L 25.0 25.0 23.4 25.3 29.0 25.8 23.4   BUN mg/dL 27* 19 21 20 23 18 23   CREATININE mg/dL 1.17* 0.95 0.95 1.06* 1.12* 1.07* 1.18*   GLUCOSE mg/dL 179* 140* 103* 99 113* 152* 116*   MAGNESIUM mg/dL  --   --  2.0  --  1.9  --   --    PHOSPHORUS mg/dL  --   --  3.1 2.9 3.1  --   --      Cr Clearance Estimated Creatinine Clearance: 27.6 mL/min (A) (by C-G formula based on SCr of 1.17 mg/dL (H)).  Coag   Results from last 7 days   Lab Units 07/16/23  0622 07/15/23  0520 07/14/23  0517 07/13/23  0530 07/12/23  0457 07/11/23  0505 07/10/23  0346   INR  1.12* 1.15* 1.37* 1.45* 1.37* 1.22* 1.58*     HbA1C   Lab Results   Component Value Date    HGBA1C 7.30 (H) 07/07/2023    HGBA1C 7.20 (H) 12/14/2022    HGBA1C 8.20 (H) 12/21/2021     Blood Glucose   Glucose   Date/Time Value Ref Range Status   07/16/2023 0549 165 (H) 70 - 130 mg/dL Final     Comment:     Meter: [id number] : [id number] Teddy HER   07/15/2023 2053 254 (H) 70 - 130 mg/dL Final     Comment:     Meter: [id number] : [id number] Espinal Pavithra NA   07/15/2023 1703 204 (H) 70 - 130 mg/dL Final     Comment:     Meter: [id number] : [id number] Keith HER   07/15/2023 1144 323 (H) 70 - 130 mg/dL Final     Comment:     Meter: [id number] : [id number] Keith HER   07/15/2023 0621 147 (H) 70 - 130 mg/dL Final     Comment:     Meter: [id number] : [id number] Teddy HER   07/14/2023 2049 177 (H) 70 - 130 mg/dL Final     Comment:     Meter: [id number] : [id number] Teddy Arshad NA   07/14/2023 1127 129 70 - 130 mg/dL Final     Comment:     Meter: [id number] : [id number] Quinones Elaine ARDEN   07/14/2023 0607 105 70 - 130 mg/dL Final     Comment:     Meter: [id number] : [id number] Guy Landry ESTEFANIA     Infection     CMP   Results from last 7 days   Lab Units 07/16/23  0622 07/15/23  0520 07/14/23  0517 07/13/23  0530 07/12/23  0457 07/11/23  0505 07/10/23  0346    SODIUM mmol/L 140 141 139 140 142 142 141   POTASSIUM mmol/L 4.3 4.4 3.9 4.3 4.5 4.6 3.6   CHLORIDE mmol/L 103 104 106 106 105 104 106   CO2 mmol/L 25.0 25.0 23.4 25.3 29.0 25.8 23.4   BUN mg/dL 27* 19 21 20 23 18 23   CREATININE mg/dL 1.17* 0.95 0.95 1.06* 1.12* 1.07* 1.18*   GLUCOSE mg/dL 179* 140* 103* 99 113* 152* 116*   ALBUMIN g/dL  --   --  2.9* 2.9* 3.5  --   --        Assessment & Plan     Assessment & Plan      Cellulitis of right foot        89 y.o. female status post fifth toe amputation for osteomyelitis and septic arthritis.  Incision appears well today.  We will start simple dressing changes.  Good perfusion to the foot.  From a vascular surgical standpoint see no contraindications to restarting warfarin.  She is to follow-up with me in the office in 2 weeks.      Chacho Barbour MD  07/16/23  10:24 EDT    Please call my office with any question: [phone]    Active Hospital Problems    Diagnosis  POA    **Cellulitis of right foot [L03.115]  Yes      Resolved Hospital Problems   No resolved problems to display.

## 2023-07-16 NOTE — PROGRESS NOTES
Infectious Diseases Progress Note        [facility]  Los: 10 days  Patient Identification:  Name: Bessie Molina  Age: 89 y.o.  Sex: female  :  1932  MRN: [id number]         Primary Care Physician: Mireille La MD        Subjective: Denies any complaints.  Patient does not clearly remember having conversation with Dr. Barbour or myself yesterday.  But she is pleasant and wants to proceed with what ever the doctors recommend  Interval History: See consultation note  7/10/2023: She underwent:  Ultrasound-guided left common femoral artery percutaneous access.  Right lower extremity runoff arteriogram.  Right superficial femoral artery angioplasty and stent placement 6 mm self-expanding stent.  Right popliteal artery atherectomy with 1.7 mm Spectranetics catheter and 4 mm drug-coated balloon angioplasty.  Right tibioperoneal trunk atherectomy with 1.7 mm Spectranetics catheter and 3 mm angioplasty.  2023: After review of MRI right fifth toe amputation and debridement by vascular surgery service plan scheduled for surgery on 2023.  Objective:    Scheduled Meds:aspirin, 81 mg, Oral, Daily  cefTRIAXone, 1 g, Intravenous, Q24H  dilTIAZem CD, 180 mg, Oral, Q24H  famotidine, 20 mg, Oral, Daily  icosapent ethyl, 2 g, Oral, BID  insulin lispro, 2-7 Units, Subcutaneous, 4x Daily AC & at Bedtime  metoprolol tartrate, 25 mg, Oral, BID  pregabalin, 100 mg, Oral, Nightly  warfarin, 5 mg, Oral, Once      Continuous Infusions:Pharmacy to dose warfarin,         Vital signs in last 24 hours:  Temp:  [97.4 °F (36.3 °C)-98.3 °F (36.8 °C)] 97.4 °F (36.3 °C)  Heart Rate:  [68-88] 68  Resp:  [18] 18  BP: (126-153)/(54-86) 140/56    Intake/Output:    Intake/Output Summary (Last 24 hours) at 2023 1350  Last data filed at 2023 0634  Gross per 24 hour   Intake --   Output 700 ml   Net -700 ml           Exam:  /56 (BP Location: Right arm, Patient Position: Lying)   Pulse 68   Temp 97.4  "°F (36.3 °C) (Oral)   Resp 18   Ht 157.5 cm (62\")   Wt 64.6 kg (142 lb 6.7 oz)   SpO2 100%   BMI 26.05 kg/m²   Patient is examined using the personal protective equipment as per guidelines from infection control for this particular patient as enacted.  Hand washing was performed before and after patient interaction.  HEENT: Remarkable for no acute lesions  Neck: Supple  Chest: Bilateral air entry decreased breath sounds at the bases  Heart: S1-S2 regular  Abdomen: Soft nontender  Extremity examination shows:    Data Review:    I reviewed the patient's new clinical results.  Results from last 7 days   Lab Units 07/16/23  0622 07/15/23  0520 07/13/23  0530 07/12/23  0457 07/11/23  0505 07/10/23  0346   WBC 10*3/mm3 16.86* 8.37 12.51* 12.23* 11.78* 9.38   HEMOGLOBIN g/dL 12.3 13.0 11.1* 11.8* 13.1 11.4*   PLATELETS 10*3/mm3 302 272 222 229 296 247       Results from last 7 days   Lab Units 07/16/23  0622 07/15/23  0520 07/14/23  0517 07/13/23  0530 07/12/23  0457 07/11/23  0505 07/10/23  0346   SODIUM mmol/L 140 141 139 140 142 142 141   POTASSIUM mmol/L 4.3 4.4 3.9 4.3 4.5 4.6 3.6   CHLORIDE mmol/L 103 104 106 106 105 104 106   CO2 mmol/L 25.0 25.0 23.4 25.3 29.0 25.8 23.4   BUN mg/dL 27* 19 21 20 23 18 23   CREATININE mg/dL 1.17* 0.95 0.95 1.06* 1.12* 1.07* 1.18*   CALCIUM mg/dL 9.5 9.6 9.2 9.0 9.4 9.8* 9.0   GLUCOSE mg/dL 179* 140* 103* 99 113* 152* 116*       Microbiology Results (last 10 days)       Procedure Component Value - Date/Time    MRSA Screen, PCR (Inpatient) - Swab, Nares [[id number]]  (Normal) Collected: 07/07/23 0921    Lab Status: Final result Specimen: Swab from Nares Updated: 07/07/23 1039     MRSA PCR No MRSA Detected    Narrative:      The negative predictive value of this diagnostic test is high and should only be used to consider de-escalating anti-MRSA therapy. A positive result may indicate colonization with MRSA and must be correlated clinically.          XR Foot 3+ View Right    Result " Date: 7/6/2023   As described.  This report was finalized on 7/6/2023 12:34 PM by Dr. Jorge Frye M.D.      CT Angio Abdominal Aorta Bilateral Iliofem Runoff    Result Date: 7/9/2023  1. Atherosclerotic disease of the abdominal aorta and bilateral lower extremities discussed in more detail above. 2. Low-density pancreatic lesion probably stable since the 12/14/2022 study. 3. Status post hysterectomy. 4. Small amount of high-attenuation material in the gallbladder may represent hyperdense bile or possibly some very tiny faintly opaque stones. No gallbladder inflammatory changes are seen.  Radiation dose reduction techniques were utilized, including automated exposure control and exposure modulation based on body size.  This report was finalized on 7/9/2023 2:02 PM by Dr. Sarkis Tobar M.D.      MRI Foot Right With & Without Contrast    Result Date: 7/13/2023  1. Soft tissue wound/ulcer at the lateral forefoot with small ulcer that approaches the cortex of the head of the 5th proximal phalanx. There is underlying bone marrow edema and enhancement within the distal shaft and head of the 5th proximal phalanx as well as within the fused 5th middle and distal phalanges consistent with osteomyelitis. Suspect septic arthritis at the 5th PIP joint. 2. Generalized forefoot soft tissue swelling with edematous dorsal subcutaneous fat consistent with cellulitis.  This report was finalized on 7/13/2023 8:31 AM by Dr. Daniel Edmond M.D.      Duplex venous lower extremity right    Addendum Date: 7/10/2023      Chronic right lower extremity superficial thrombophlebitis noted in the small saphenous.   Right popliteal fossa fluid collection.   Normal right lower extremity venous duplex scan.        Assessment:    Cellulitis of right foot  1-ischemic/diabetic ulcer/cellulitis of the right foot due to peripheral vascular disease rendering antibiotic therapy unsuccessful with significant risk of toe/foot/limb loss-status  post vascular intervention with improvement in circulation and no MRI evidence of acute osteomyelitis involving fifth proximal phalanx and distal phalanx as well as septic arthritis of the fifth PIP joint.  2-peripheral vascular disease  3-hypertension  4-atrial fibrillation  5-chronic kidney disease  6-diabetes mellitus  7-other diagnoses per primary team.   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

## 2023-07-16 NOTE — PROGRESS NOTES
[facility]: Warfarin Dosing/Monitoring Consult    Bessie Molina is a 89 y.o. female, estimated creatinine clearance is 27.6 mL/min (A) (by C-G formula based on SCr of 1.17 mg/dL (H)). weighing 64.6 kg (142 lb 6.7 oz).    Results from last 7 days   Lab Units 07/16/23  0622 07/15/23  0520 07/14/23  0517 07/13/23  0530 07/12/23  0457 07/11/23  0505   INR  1.12* 1.15* 1.37* 1.45* 1.37* 1.22*   HEMOGLOBIN g/dL 12.3 13.0  --  11.1* 11.8* 13.1   HEMATOCRIT % 36.3 38.2  --  33.2* 35.8 39.2   PLATELETS 10*3/mm3 302 272  --  222 229 296     Prior to admission anticoagulation: Per med reconciliation, the patient's home warfarin regimen is 4.5 mg on Mon/Wed/Fri and 3 mg on all other days of the week.    Hospital Anticoagulation:  Consulting provider: Dr. Mcrae  Start date: 7/6/23  Indication: Atrial fibrillation  Target INR: 2 - 3  Expected duration: Lifelong  Bridge Therapy: No    Potential food or drug interactions:   Ceftriaxone (moderate-new med in hospital)-concurrent use may enhance the anticoagulant effect of warfarin by platelet inhibition which can lead to irreversible platelet dysfunction.      Education complete?/Date: No; plan for follow up TBD    Assessment/Plan:  Warfarin held since 7/13 for toe amputation. OK to resume today per vascular surgery.   Give warfarin 5mg x1 today. Likely can resume home regimen in next few days   Monitor for any signs or symptoms of bleeding.  Follow up daily INRs and dose adjustments. INR ordered daily with AM labs while inpatient.    Pharmacy will continue to follow until discharge or discontinuation of warfarin.     Nya Jackson, PharmD  07/16/23 10:53 EDT

## 2023-07-16 NOTE — PLAN OF CARE
Goal Outcome Evaluation:   Pt did well overnight; up in the chair for the first part of shift and was able to ambulate with assistance back to the bed; on 2L of O2; ace wrap dry; pulses dopplerable; had no c/o pain; had some confusion during shift and had to remind her of where she was; VSS.

## 2023-07-16 NOTE — PROGRESS NOTES
"Daily progress note    Primary care physician  Dr. JASSO    Chief complaint  Awake and alert and feeling better with no new complaints and making good progress.    History of present illness  89-year-old white female with history of chronic atrial fibrillation diabetes mellitus hypertension hyperlipidemia peripheral artery disease and dementia who lives at home with her daughter brought to the emergency room with right foot redness swelling pain for last 1 month which is treated by primary care physician with oral antibiotics without any improvement.  Patient has infection around right fifth toe with surrounding cellulitis admitted for management.  Patient remained awake and alert and denies any chest pain shortness of breath palpitation no fever chills abdominal pain nausea vomiting diarrhea.  Most of the history obtained from the daughter and son-in-law but patient also contributed.     REVIEW OF SYSTEMS  Unremarkable except generalized weakness     PHYSICAL EXAM   Blood pressure 140/56, pulse 68, temperature 97.4 °F (36.3 °C), temperature source Oral, resp. rate 18, height 157.5 cm (62\"), weight 64.6 kg (142 lb 6.7 oz), SpO2 100 %.    General: Awake and alert no acute distress.  HENT: NCAT, PERRL, Nares patent.  Eyes: no scleral icterus.  Neck: trachea midline, no ROM limitations.  CV: Irregularly irregular S1-S2  Respiratory: normal effort, CTAB.  Abdomen: soft, nondistended, nontender bowel sounds positive  Musculoskeletal: no deformity.  Neuro: alert, moves all extremities, follows commands.  Skin: warm, dry.  Right foot: Patient has ulceration on the lateral base of the right fifth toe, erythema and swelling extending into the dorsum of the foot, no erythema but swelling of the right calf, no palpable cords, negative Homans.     LAB RESULTS  Lab Results (last 24 hours)       Procedure Component Value Units Date/Time    POC Glucose Once [[id number]]  (Abnormal) Collected: 07/16/23 1142    Specimen: Blood " Updated: 07/16/23 1143     Glucose 277 mg/dL      Comment: Meter: [id number] : [id number] Keith HER       Basic Metabolic Panel [[id number]]  (Abnormal) Collected: 07/16/23 0622    Specimen: Blood Updated: 07/16/23 0712     Glucose 179 mg/dL      BUN 27 mg/dL      Creatinine 1.17 mg/dL      Sodium 140 mmol/L      Potassium 4.3 mmol/L      Chloride 103 mmol/L      CO2 25.0 mmol/L      Calcium 9.5 mg/dL      BUN/Creatinine Ratio 23.1     Anion Gap 12.0 mmol/L      eGFR 44.1 mL/min/1.73     Narrative:      GFR Normal >60  Chronic Kidney Disease <60  Kidney Failure <15    The GFR formula is only valid for adults with stable renal function between ages 18 and 70.    Protime-INR [[id number]]  (Abnormal) Collected: 07/16/23 0622    Specimen: Blood Updated: 07/16/23 0707     Protime 14.5 Seconds      INR 1.12    CBC & Differential [[id number]]  (Abnormal) Collected: 07/16/23 0622    Specimen: Blood Updated: 07/16/23 0701    Narrative:      The following orders were created for panel order CBC & Differential.  Procedure                               Abnormality         Status                     ---------                               -----------         ------                     CBC Auto Differential[[id number]]        Abnormal            Final result                 Please view results for these tests on the individual orders.    CBC Auto Differential [[id number]]  (Abnormal) Collected: 07/16/23 0622    Specimen: Blood Updated: 07/16/23 0701     WBC 16.86 10*3/mm3      RBC 3.65 10*6/mm3      Hemoglobin 12.3 g/dL      Hematocrit 36.3 %      MCV 99.5 fL      MCH 33.7 pg      MCHC 33.9 g/dL      RDW 12.6 %      RDW-SD 46.5 fl      MPV 9.6 fL      Platelets 302 10*3/mm3      Neutrophil % 78.6 %      Lymphocyte % 15.1 %      Monocyte % 5.5 %      Eosinophil % 0.0 %      Basophil % 0.1 %      Immature Grans % 0.7 %      Neutrophils, Absolute 13.25 10*3/mm3      Lymphocytes, Absolute 2.55 10*3/mm3      Monocytes, Absolute  0.92 10*3/mm3      Eosinophils, Absolute 0.00 10*3/mm3      Basophils, Absolute 0.02 10*3/mm3      Immature Grans, Absolute 0.12 10*3/mm3      nRBC 0.0 /100 WBC     POC Glucose Once [[id number]]  (Abnormal) Collected: 07/16/23 0549    Specimen: Blood Updated: 07/16/23 0550     Glucose 165 mg/dL      Comment: Meter: [id number] : [id number] [employer] NA       POC Glucose Once [[id number]]  (Abnormal) Collected: 07/15/23 2053    Specimen: Blood Updated: 07/15/23 2054     Glucose 254 mg/dL      Comment: Meter: [id number] : [id number] [employer] NA       POC Glucose Once [[id number]]  (Abnormal) Collected: 07/15/23 1703    Specimen: Blood Updated: 07/15/23 1705     Glucose 204 mg/dL      Comment: Meter: [id number] : [id number] Keith HER             Imaging Results (Last 24 Hours)       ** No results found for the last 24 hours. **           Interpretation Summary    Right Conclusion: The right YADIEL is severely reduced. Severe digital ischemia.    While the left ankle-brachial indices are within normal limits there is vessel incompressibility.  ABIs are likely falsely elevated.  Moderate digital ischemia noted.    Unable to determine level of disease due to vessel incompressibility but suspect multi level disease based upon waveforms.     Interpretation Summary    Right popliteal fossa fluid collection.    Normal right lower extremity venous duplex scan.      Current Facility-Administered Medications:     acetaminophen (TYLENOL) tablet 650 mg, 650 mg, Oral, Q4H PRN, Chacho Barbour MD, 650 mg at 07/16/23 0900    aspirin chewable tablet 81 mg, 81 mg, Oral, Daily, Chacho Barbour MD, 81 mg at 07/16/23 0900    cefTRIAXone (ROCEPHIN) 1 g in sodium chloride 0.9 % 100 mL IVPB-VTB, 1 g, Intravenous, Q24H, Chacho Barbour MD, Last Rate: 200 mL/hr at 07/15/23 1705, 1 g at 07/15/23 1705    dilTIAZem CD (CARDIZEM CD) 24 hr capsule 180 mg, 180 mg, Oral, Q24H, Chacho Barbour MD, 180 mg at 07/16/23  0900    famotidine (PEPCID) tablet 20 mg, 20 mg, Oral, Daily, Chacho Barbour MD, 20 mg at 07/16/23 0900    HYDROcodone-acetaminophen (NORCO) 5-325 MG per tablet 1 tablet, 1 tablet, Oral, Q6H PRN, Chacho Barbour MD    icosapent ethyl (VASCEPA) capsule 2 g, 2 g, Oral, BID, Chacho Barbour MD, 2 g at 07/16/23 0900    insulin lispro (HUMALOG/ADMELOG) injection 2-7 Units, 2-7 Units, Subcutaneous, 4x Daily AC & at Bedtime, Chacho Barbour MD, 4 Units at 07/16/23 1220    metoprolol tartrate (LOPRESSOR) tablet 25 mg, 25 mg, Oral, BID, Chacho Barbour MD, 25 mg at 07/16/23 0900    Pharmacy to dose warfarin, , Does not apply, Continuous PRN, Chacho Barbour MD    pregabalin (LYRICA) capsule 100 mg, 100 mg, Oral, Nightly, Chacho Barbour MD, 100 mg at 07/15/23 2108    warfarin (COUMADIN) tablet 5 mg, 5 mg, Oral, Once, Chacho Barbour MD     ASSESSMENT  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  Right foot infected wound with surrounding cellulitis   Status post right femoral artery angioplasty and stent  Right popliteal artery atherectomy and angioplasty and right tibioperoneal atherectomy and angioplasty  Peripheral artery disease  Diabetes mellitus  Hypertension  Hyperlipidemia  Chronic atrial fibrillation     PLAN  CPM  Postop care  Continue IV antibiotics   Continue anticoagulation  Vascular surgery consult appreciated  Continue home medications  Stress ulcer DVT prophylaxis  Supportive care  PT/OT  DNR  Discussed with family nursing staff and infectious disease  Discharge planning    DAISHA CARLSON MD    Copied text in this note has been reviewed and is accurate as of 07/16/23

## 2023-07-17 LAB
ANION GAP SERPL CALCULATED.3IONS-SCNC: 11.6 MMOL/L (ref 5–15)
BASOPHILS # BLD AUTO: 0.03 10*3/MM3 (ref 0–0.2)
BASOPHILS NFR BLD AUTO: 0.3 % (ref 0–1.5)
BUN SERPL-MCNC: 26 MG/DL (ref 8–23)
BUN/CREAT SERPL: 22 (ref 7–25)
CALCIUM SPEC-SCNC: 9.1 MG/DL (ref 8.2–9.6)
CHLORIDE SERPL-SCNC: 106 MMOL/L (ref 98–107)
CO2 SERPL-SCNC: 23.4 MMOL/L (ref 22–29)
CREAT SERPL-MCNC: 1.18 MG/DL (ref 0.57–1)
DEPRECATED RDW RBC AUTO: 46.6 FL (ref 37–54)
EGFRCR SERPLBLD CKD-EPI 2021: 43.7 ML/MIN/1.73
EOSINOPHIL # BLD AUTO: 0.11 10*3/MM3 (ref 0–0.4)
EOSINOPHIL NFR BLD AUTO: 0.9 % (ref 0.3–6.2)
ERYTHROCYTE [DISTWIDTH] IN BLOOD BY AUTOMATED COUNT: 12.5 % (ref 12.3–15.4)
GLUCOSE BLDC GLUCOMTR-MCNC: 115 MG/DL (ref 70–130)
GLUCOSE BLDC GLUCOMTR-MCNC: 178 MG/DL (ref 70–130)
GLUCOSE BLDC GLUCOMTR-MCNC: 202 MG/DL (ref 70–130)
GLUCOSE BLDC GLUCOMTR-MCNC: 94 MG/DL (ref 70–130)
GLUCOSE SERPL-MCNC: 102 MG/DL (ref 65–99)
HCT VFR BLD AUTO: 36.2 % (ref 34–46.6)
HGB BLD-MCNC: 12 G/DL (ref 12–15.9)
IMM GRANULOCYTES # BLD AUTO: 0.1 10*3/MM3 (ref 0–0.05)
IMM GRANULOCYTES NFR BLD AUTO: 0.8 % (ref 0–0.5)
INR PPP: 1.14 (ref 0.9–1.1)
LAB AP CASE REPORT: NORMAL
LYMPHOCYTES # BLD AUTO: 3.34 10*3/MM3 (ref 0.7–3.1)
LYMPHOCYTES NFR BLD AUTO: 28.1 % (ref 19.6–45.3)
MCH RBC QN AUTO: 33.3 PG (ref 26.6–33)
MCHC RBC AUTO-ENTMCNC: 33.1 G/DL (ref 31.5–35.7)
MCV RBC AUTO: 100.6 FL (ref 79–97)
MONOCYTES # BLD AUTO: 1.07 10*3/MM3 (ref 0.1–0.9)
MONOCYTES NFR BLD AUTO: 9 % (ref 5–12)
NEUTROPHILS NFR BLD AUTO: 60.9 % (ref 42.7–76)
NEUTROPHILS NFR BLD AUTO: 7.22 10*3/MM3 (ref 1.7–7)
NRBC BLD AUTO-RTO: 0 /100 WBC (ref 0–0.2)
PATH REPORT.FINAL DX SPEC: NORMAL
PATH REPORT.GROSS SPEC: NORMAL
PLATELET # BLD AUTO: 283 10*3/MM3 (ref 140–450)
PMV BLD AUTO: 9.5 FL (ref 6–12)
POTASSIUM SERPL-SCNC: 4.1 MMOL/L (ref 3.5–5.2)
PROTHROMBIN TIME: 14.7 SECONDS (ref 11.7–14.2)
RBC # BLD AUTO: 3.6 10*6/MM3 (ref 3.77–5.28)
SODIUM SERPL-SCNC: 141 MMOL/L (ref 136–145)
WBC NRBC COR # BLD: 11.87 10*3/MM3 (ref 3.4–10.8)

## 2023-07-17 PROCEDURE — 85610 PROTHROMBIN TIME: CPT | Performed by: SURGERY

## 2023-07-17 PROCEDURE — 85025 COMPLETE CBC W/AUTO DIFF WBC: CPT | Performed by: HOSPITALIST

## 2023-07-17 PROCEDURE — 80048 BASIC METABOLIC PNL TOTAL CA: CPT | Performed by: HOSPITALIST

## 2023-07-17 PROCEDURE — 82948 REAGENT STRIP/BLOOD GLUCOSE: CPT

## 2023-07-17 PROCEDURE — 25010000002 CEFTRIAXONE PER 250 MG: Performed by: SURGERY

## 2023-07-17 PROCEDURE — 63710000001 INSULIN LISPRO (HUMAN) PER 5 UNITS: Performed by: SURGERY

## 2023-07-17 PROCEDURE — 97530 THERAPEUTIC ACTIVITIES: CPT

## 2023-07-17 RX ORDER — WARFARIN SODIUM 5 MG/1
5 TABLET ORAL
Status: COMPLETED | OUTPATIENT
Start: 2023-07-17 | End: 2023-07-17

## 2023-07-17 RX ADMIN — METOPROLOL TARTRATE 25 MG: 25 TABLET, FILM COATED ORAL at 20:46

## 2023-07-17 RX ADMIN — ICOSAPENT ETHYL 2 G: 1000 CAPSULE ORAL at 08:42

## 2023-07-17 RX ADMIN — INSULIN LISPRO 3 UNITS: 100 INJECTION, SOLUTION INTRAVENOUS; SUBCUTANEOUS at 22:48

## 2023-07-17 RX ADMIN — INSULIN LISPRO 2 UNITS: 100 INJECTION, SOLUTION INTRAVENOUS; SUBCUTANEOUS at 11:47

## 2023-07-17 RX ADMIN — ASPIRIN 81 MG: 81 TABLET, CHEWABLE ORAL at 08:41

## 2023-07-17 RX ADMIN — DILTIAZEM HYDROCHLORIDE 180 MG: 180 CAPSULE, COATED, EXTENDED RELEASE ORAL at 08:41

## 2023-07-17 RX ADMIN — CEFTRIAXONE SODIUM 1 G: 1 INJECTION, POWDER, FOR SOLUTION INTRAMUSCULAR; INTRAVENOUS at 16:46

## 2023-07-17 RX ADMIN — ICOSAPENT ETHYL 2 G: 1000 CAPSULE ORAL at 20:49

## 2023-07-17 RX ADMIN — FAMOTIDINE 20 MG: 20 TABLET, FILM COATED ORAL at 08:41

## 2023-07-17 RX ADMIN — METOPROLOL TARTRATE 25 MG: 25 TABLET, FILM COATED ORAL at 08:41

## 2023-07-17 RX ADMIN — INSULIN GLARGINE-YFGN 10 UNITS: 100 INJECTION, SOLUTION SUBCUTANEOUS at 22:48

## 2023-07-17 RX ADMIN — WARFARIN SODIUM 5 MG: 5 TABLET ORAL at 17:59

## 2023-07-17 RX ADMIN — PREGABALIN 100 MG: 100 CAPSULE ORAL at 20:46

## 2023-07-17 NOTE — PROGRESS NOTES
Infectious Diseases Progress Note        [facility]  [place]: 11 days  Patient Identification:  Name: Bessie Molina  Age: 89 y.o.  Sex: female  :  1932  MRN: [id number]         Primary Care Physician: Mireille La MD        Subjective: Denies any complaints.  Patient does not clearly remember having conversation with Dr. Barbour or myself yesterday.  But she is pleasant and wants to proceed with what ever the doctors recommend  Interval History: See consultation note  7/10/2023: She underwent:  Ultrasound-guided left common femoral artery percutaneous access.  Right lower extremity runoff arteriogram.  Right superficial femoral artery angioplasty and stent placement 6 mm self-expanding stent.  Right popliteal artery atherectomy with 1.7 mm [employer] catheter and 4 mm drug-coated balloon angioplasty.  Right tibioperoneal trunk atherectomy with 1.7 mm [employer] catheter and 3 mm angioplasty.  2023: After review of MRI right fifth toe amputation and debridement by vascular surgery service plan scheduled for surgery on 2023.  Objective:    Scheduled Meds:aspirin, 81 mg, Oral, Daily  cefTRIAXone, 1 g, Intravenous, Q24H  dilTIAZem CD, 180 mg, Oral, Q24H  famotidine, 20 mg, Oral, Daily  icosapent ethyl, 2 g, Oral, BID  insulin glargine, 10 Units, Subcutaneous, Nightly  insulin lispro, 2-7 Units, Subcutaneous, 4x Daily AC & at Bedtime  metoprolol tartrate, 25 mg, Oral, BID  pregabalin, 100 mg, Oral, Nightly  warfarin, 5 mg, Oral, Once      Continuous Infusions:Pharmacy to dose warfarin,         Vital signs in last 24 hours:  Temp:  [97.2 °F (36.2 °C)-97.9 °F (36.6 °C)] 97.9 °F (36.6 °C)  Heart Rate:  [71-91] 71  Resp:  [16-20] 18  BP: (142-172)/(59-96) 144/96    Intake/Output:    Intake/Output Summary (Last 24 hours) at 2023 1511  Last data filed at 2023 1116  Gross per 24 hour   Intake 480 ml   Output 1500 ml   Net -1020 ml           Exam:  /96 (BP Location:  "Right arm, Patient Position: Lying)   Pulse 71   Temp 97.9 °F (36.6 °C) (Oral)   Resp 18   Ht 157.5 cm (62\")   Wt 64.6 kg (142 lb 6.7 oz)   SpO2 95%   BMI 26.05 kg/m²   Patient is examined using the personal protective equipment as per guidelines from infection control for this particular patient as enacted.  Hand washing was performed before and after patient interaction.  HEENT: Remarkable for no acute lesions  Neck: Supple  Chest: Bilateral air entry decreased breath sounds at the bases  Heart: S1-S2 regular  Abdomen: Soft nontender  Extremity examination shows:    Data Review:    I reviewed the patient's new clinical results.  Results from last 7 days   Lab Units 07/17/23  0456 07/16/23  0622 07/15/23  0520 07/13/23  0530 07/12/23  0457 07/11/23  0505   WBC 10*3/mm3 11.87* 16.86* 8.37 12.51* 12.23* 11.78*   HEMOGLOBIN g/dL 12.0 12.3 13.0 11.1* 11.8* 13.1   PLATELETS 10*3/mm3 283 302 272 222 229 296       Results from last 7 days   Lab Units 07/17/23  0456 07/16/23  0622 07/15/23  0520 07/14/23  0517 07/13/23  0530 07/12/23  0457 07/11/23  0505   SODIUM mmol/L 141 140 141 139 140 142 142   POTASSIUM mmol/L 4.1 4.3 4.4 3.9 4.3 4.5 4.6   CHLORIDE mmol/L 106 103 104 106 106 105 104   CO2 mmol/L 23.4 25.0 25.0 23.4 25.3 29.0 25.8   BUN mg/dL 26* 27* 19 21 20 23 18   CREATININE mg/dL 1.18* 1.17* 0.95 0.95 1.06* 1.12* 1.07*   CALCIUM mg/dL 9.1 9.5 9.6 9.2 9.0 9.4 9.8*   GLUCOSE mg/dL 102* 179* 140* 103* 99 113* 152*       Microbiology Results (last 10 days)       Procedure Component Value - Date/Time    Anaerobic Culture - Surgical Site, Toe, Right [[id number]]  (Normal) Collected: 07/14/23 1729    Lab Status: Preliminary result Specimen: Surgical Site from Toe, Right Updated: 07/17/23 0810     Anaerobic Culture No anaerobes isolated at 3 days          XR Foot 3+ View Right    Result Date: 7/6/2023   As described.  This report was finalized on 7/6/2023 12:34 PM by Dr. Jorge Frye M.D.      CT Angio " Abdominal Aorta Bilateral Iliofem Runoff    Result Date: 7/9/2023  1. Atherosclerotic disease of the abdominal aorta and bilateral lower extremities discussed in more detail above. 2. Low-density pancreatic lesion probably stable since the 12/14/2022 study. 3. Status post hysterectomy. 4. Small amount of high-attenuation material in the gallbladder may represent hyperdense bile or possibly some very tiny faintly opaque stones. No gallbladder inflammatory changes are seen.  Radiation dose reduction techniques were utilized, including automated exposure control and exposure modulation based on body size.  This report was finalized on 7/9/2023 2:02 PM by Dr. Sarkis Tobar M.D.      MRI Foot Right With & Without Contrast    Result Date: 7/13/2023  1. Soft tissue wound/ulcer at the lateral forefoot with small ulcer that approaches the cortex of the head of the 5th proximal phalanx. There is underlying bone marrow edema and enhancement within the distal shaft and head of the 5th proximal phalanx as well as within the fused 5th middle and distal phalanges consistent with osteomyelitis. Suspect septic arthritis at the 5th PIP joint. 2. Generalized forefoot soft tissue swelling with edematous dorsal subcutaneous fat consistent with cellulitis.  This report was finalized on 7/13/2023 8:31 AM by Dr. Daniel Edmond M.D.      Duplex venous lower extremity right    Addendum Date: 7/10/2023      Chronic right lower extremity superficial thrombophlebitis noted in the small saphenous.   Right popliteal fossa fluid collection.   Normal right lower extremity venous duplex scan.        Assessment:    Cellulitis of right foot  1-ischemic/diabetic ulcer/cellulitis of the right foot due to peripheral vascular disease rendering antibiotic therapy unsuccessful with significant risk of toe/foot/limb loss-status post vascular intervention with improvement in circulation and no MRI evidence of acute osteomyelitis involving fifth proximal  phalanx and distal phalanx as well as septic arthritis of the fifth PIP joint.  2-peripheral vascular disease  3-hypertension  4-atrial fibrillation  5-chronic kidney disease  6-diabetes mellitus  7-other diagnoses per primary team.   S/P Right fifth toe amputation with dermal reapproximation 7/14/23      Recommendations/Discussions:  Continue present antibiotic therapy    Change to PO minocycline 100mg BID for 10 days soon  Will follow  Thank you       Jordan Fermin MD  7/17/2023  15:11 EDT

## 2023-07-17 NOTE — CASE MANAGEMENT/SOCIAL WORK
Continued Stay Note  [facility]     Patient Name: Bessie Molina  MRN: [id number]  Today's Date: 7/17/2023    Admit Date: 7/6/2023    Plan: Home with [facility]   Discharge Plan       Row Name 07/17/23 1328       Plan    Plan Home with [facility]    Patient/Family in Agreement with Plan yes    Plan Comments Met with pt and daughter at bedside who confirm plan for pt to return home with daughter and [facility].  List of in home caregivers provided to pts daughter. No further needs identified.  CCP continues to follow.  LLUVIA Jacques RN                   Discharge Codes    No documentation.                 Expected Discharge Date and Time       Expected Discharge Date Expected Discharge Time    Jul 18, 2023               Temitope Jacques, RN

## 2023-07-17 NOTE — PLAN OF CARE
Goal Outcome Evaluation:  Plan of Care Reviewed With: patient        Progress: improving  Outcome Evaluation: Pt alert and orient to self. On room air. Pt with no c/o pain or signs/symptoms of pain. Dressing changed to RLE with betadine, gauze and ACE wrap. Plan for possible d/c tomorrow if finished with IV antibiotics.

## 2023-07-17 NOTE — THERAPY TREATMENT NOTE
Patient Name: Bessie Molina  : 1932    MRN: [id number]                              Today's Date: 2023       Admit Date: 2023    Visit Dx:     ICD-10-CM ICD-9-CM   1. Cellulitis of right foot  L03.115 682.7   2. Failure of outpatient treatment  Z78.9 V49.89   3. Leukocytosis, unspecified type  D72.829 288.60   4. ESR raised  R70.0 790.1   5. CRP elevated  R79.82 790.95   6. Hyperglycemia  R73.9 790.29   7. Chronic kidney disease, unspecified CKD stage  N18.9 585.9   8. Osteomyelitis  M86.9 730.20     Patient Active Problem List   Diagnosis    Atrial fibrillation, persistent    Chronic anticoagulation    Dementia    Abdominal pain    Acute respiratory failure with hypoxia    Pancreatic lesion    Chronic diastolic CHF (congestive heart failure)    Cellulitis of right foot     Past Medical History:   Diagnosis Date    Atrial fibrillation     CVA (cerebral vascular accident)     left temporal stroke    Hypertension      Past Surgical History:   Procedure Laterality Date    APPENDECTOMY      ARTERIOGRAM Bilateral     ATHRECTOMY ILIAC, FEMORAL, TIBIAL ARTERY Right 7/10/2023    Procedure: RIGHT LEG ANGIOGRAM WITH LASER;  Surgeon: Chacho Barbour MD;  Location: [facility] ;  Service: Vascular;  Laterality: Right;    CATARACT EXTRACTION Left     CERVICAL DISCECTOMY ANTERIOR      HYSTERECTOMY      LUMBAR DISCECTOMY      TONSILLECTOMY        General Information       Row Name 23 1439          Physical Therapy Time and Intention    Document Type therapy note (daily note)  -SM     Mode of Treatment individual therapy;physical therapy  -       Row Name 23 1439          General Information    Patient Profile Reviewed yes  -SM     Existing Precautions/Restrictions fall  -SM       Row Name 23 1439          Cognition    Orientation Status (Cognition) oriented x 3  -SM       Row Name 23 1439          Safety Issues, Functional Mobility    Safety Issues Affecting Function  (Mobility) awareness of need for assistance;problem-solving  -     Impairments Affecting Function (Mobility) endurance/activity tolerance;strength;balance;pain  -               User Key  (r) = Recorded By, (t) = Taken By, (c) = Cosigned By      Initials Name Provider Type     Marie Terrazas PT Physical Therapist                   Mobility       Row Name 07/17/23 1440          Bed Mobility    Bed Mobility supine-sit  -     Supine-Sit Collier (Bed Mobility) standby assist  -     Assistive Device (Bed Mobility) bed rails;head of bed elevated  -     Comment, (Bed Mobility) Patient UIC at end of session  -       Row Name 07/17/23 1440          Sit-Stand Transfer    Sit-Stand Collier (Transfers) contact guard;minimum assist (75% patient effort)  -     Assistive Device (Sit-Stand Transfers) walker, front-wheeled  -       Row Name 07/17/23 1440          Gait/Stairs (Locomotion)    Collier Level (Gait) contact guard  -     Assistive Device (Gait) walker, front-wheeled  -     Distance in Feet (Gait) 30ft  -     Deviations/Abnormal Patterns (Gait) antalgic;stride length decreased;gait speed decreased  -     Bilateral Gait Deviations forward flexed posture  -     Right Sided Gait Deviations weight shift ability decreased  -     Comment, (Gait/Stairs) Gait slow and mildly antalgic. No overt LOB noted. Distance limited by fatigue and pain in R LE.  -               User Key  (r) = Recorded By, (t) = Taken By, (c) = Cosigned By      Initials Name Provider Type     Marie Terrazas PT Physical Therapist                   Obj/Interventions       Row Name 07/17/23 1441          Balance    Balance Assessment sitting static balance;sitting dynamic balance;sit to stand dynamic balance;standing static balance;standing dynamic balance  -     Static Sitting Balance supervision  -     Dynamic Sitting Balance standby assist  -     Position, Sitting Balance sitting in chair  -      Sit to Stand Dynamic Balance minimal assist;contact guard  -SM     Static Standing Balance contact guard  -SM     Dynamic Standing Balance contact guard  -     Position/Device Used, Standing Balance supported  -     Balance Interventions sitting;standing;sit to stand;supported;static;dynamic  -               User Key  (r) = Recorded By, (t) = Taken By, (c) = Cosigned By      Initials Name Provider Type     Marie Terrazas, PT Physical Therapist                   Goals/Plan    No documentation.                  Clinical Impression       Row Name 07/17/23 1441          Pain    Pain Location - Side/Orientation Right  -     Pain Location - foot  -     Pre/Posttreatment Pain Comment Patient did not rate pain  -     Pain Intervention(s) Rest;Repositioned  -       Row Name 07/17/23 1441          Plan of Care Review    Plan of Care Reviewed With patient  -     Outcome Evaluation Patient seen for PT session this PM. Patient supine in bed upon arrival. Patient sat up to EOB with SBA this date. Patient performed STS from EOB with CGA-Jose David and VCs for hand placement. Patient ambulated 30ft with rwx and CGA. Gait slow and mildly antalgic. No overt LOB noted. Distance limited by fatigue and pain in R LE. Patient agreeable to sitting UI at end of session. Patient would continue to benefit from skilled PT intervention to address deficits in functional mobility and maximize safety and independence. Plan remains home with assist at d/c. PT will continue to monitor.  -       Row Name 07/17/23 1441          Vital Signs    Pre Patient Position Supine  -     Intra Patient Position Standing  -SM     Post Patient Position Sitting  -       Row Name 07/17/23 1441          Positioning and Restraints    Pre-Treatment Position in bed  -     Post Treatment Position chair  -     In Chair notified nsg;reclined;call light within reach;encouraged to call for assist;exit alarm on;with family/caregiver  -                User Key  (r) = Recorded By, (t) = Taken By, (c) = Cosigned By      Initials Name Provider Type    Marie Lepe PT Physical Therapist                   Outcome Measures       Row Name 07/17/23 1443          How much help from another person do you currently need...    Turning from your back to your side while in flat bed without using bedrails? 4  -SM     Moving from lying on back to sitting on the side of a flat bed without bedrails? 4  -SM     Moving to and from a bed to a chair (including a wheelchair)? 3  -SM     Standing up from a chair using your arms (e.g., wheelchair, bedside chair)? 3  -SM     Climbing 3-5 steps with a railing? 2  -SM     To walk in hospital room? 3  -SM     AM-PAC 6 Clicks Score (PT) 19  -SM     Highest level of mobility 6 --> Walked 10 steps or more  -       Row Name 07/17/23 1443          Functional Assessment    Outcome Measure Options AM-PAC 6 Clicks Basic Mobility (PT)  -               User Key  (r) = Recorded By, (t) = Taken By, (c) = Cosigned By      Initials Name Provider Type    Marie Lepe PT Physical Therapist                                 Physical Therapy Education       Title: PT OT SLP Therapies (Done)       Topic: Physical Therapy (Done)       Point: Mobility training (Done)       Learning Progress Summary             Patient Acceptance, E, VU by  at 7/17/2023 1444    Acceptance, E, VU by EM at 7/15/2023 1718    Acceptance, E, VU,NR by EB at 7/13/2023 1533    Acceptance, E, VU,NR by EB at 7/12/2023 1605    Acceptance, E, VU,NR by EB at 7/11/2023 1128    Acceptance, E,TB, VU,DU by  at 7/7/2023 0954                         Point: Home exercise program (Done)       Learning Progress Summary             Patient Acceptance, E, VU by SM at 7/17/2023 1444    Acceptance, E, VU,NR by EB at 7/13/2023 1533                         Point: Body mechanics (Done)       Learning Progress Summary             Patient Acceptance, E, VU by  at 7/17/2023 1444     Acceptance, E, VU,NR by EB at 7/13/2023 1533    Acceptance, E, VU,NR by EB at 7/12/2023 1605    Acceptance, E, VU,NR by  at 7/11/2023 1128    Acceptance, E,TB, VU,DU by  at 7/7/2023 0954                         Point: Precautions (Done)       Learning Progress Summary             Patient Acceptance, E, VU by  at 7/17/2023 1444    Acceptance, E, VU,NR by  at 7/13/2023 1533    Acceptance, E, VU,NR by EB at 7/11/2023 1128    Acceptance, E,TB, VU,DU by  at 7/7/2023 0954                                         User Key       Initials Effective Dates Name Provider Type Discipline    EM 06/16/21 -  Ginger Mcmahan, PT Physical Therapist PT     02/14/23 -  Ophelia Callejas PTA Physical Therapist Assistant PT     05/02/22 -  Marie Terrazas, PT Physical Therapist PT     09/22/22 -  Raven Blevins, PT Physical Therapist PT                  PT Recommendation and Plan     Plan of Care Reviewed With: patient  Outcome Evaluation: Patient seen for PT session this PM. Patient supine in bed upon arrival. Patient sat up to EOB with SBA this date. Patient performed STS from EOB with CGA-Jose David and VCs for hand placement. Patient ambulated 30ft with rwx and CGA. Gait slow and mildly antalgic. No overt LOB noted. Distance limited by fatigue and pain in R LE. Patient agreeable to sitting UIC at end of session. Patient would continue to benefit from skilled PT intervention to address deficits in functional mobility and maximize safety and independence. Plan remains home with assist at d/c. PT will continue to monitor.     Time Calculation:    PT Charges       Row Name 07/17/23 1444             Time Calculation    Start Time 1422  -      Stop Time 1438  -      Time Calculation (min) 16 min  -      PT Received On 07/17/23  -      PT - Next Appointment 07/18/23  -         Time Calculation- PT    Total Timed Code Minutes- PT 16 minute(s)  -         Timed Charges    25647 - PT Therapeutic Activity Minutes 16   -SM         Total Minutes    Timed Charges Total Minutes 16  -SM       Total Minutes 16  -SM                User Key  (r) = Recorded By, (t) = Taken By, (c) = Cosigned By      Initials Name Provider Type    Marie Lepe PT Physical Therapist                  Therapy Charges for Today       Code Description Service Date Service Provider Modifiers Qty    [id number]  PT THERAPEUTIC ACT EA 15 MIN 7/17/2023 Marie Terrazas PT GP 1            PT G-Codes  Outcome Measure Options: AM-PAC 6 Clicks Basic Mobility (PT)  AM-PAC 6 Clicks Score (PT): 19  AM-PAC 6 Clicks Score (OT): 19       Marie Terrazas PT  7/17/2023

## 2023-07-17 NOTE — PLAN OF CARE
Goal Outcome Evaluation:  Plan of Care Reviewed With: patient           Outcome Evaluation: Patient seen for PT session this PM. Patient supine in bed upon arrival. Patient sat up to EOB with SBA this date. Patient performed STS from EOB with CGA-Jose David and VCs for hand placement. Patient ambulated 30ft with rwx and CGA. Gait slow and mildly antalgic. No overt LOB noted. Distance limited by fatigue and pain in R LE. Patient agreeable to sitting UIC at end of session. Patient would continue to benefit from skilled PT intervention to address deficits in functional mobility and maximize safety and independence. Plan remains home with assist at d/c. PT will continue to monitor.

## 2023-07-17 NOTE — PROGRESS NOTES
[facility] Clinical Pharmacy Services: Warfarin Dosing/Monitoring Consult    Bessie Molina is a 89 y.o. female, estimated creatinine clearance is 27.4 mL/min (A) (by C-G formula based on SCr of 1.18 mg/dL (H)). weighing 64.6 kg (142 lb 6.7 oz).    Results from last 7 days   Lab Units 07/17/23  0456 07/16/23  0622 07/15/23  0520 07/14/23  0517 07/13/23  0530 07/12/23  0457   INR  1.14* 1.12* 1.15* 1.37* 1.45* 1.37*   HEMOGLOBIN g/dL 12.0 12.3 13.0  --  11.1* 11.8*   HEMATOCRIT % 36.2 36.3 38.2  --  33.2* 35.8   PLATELETS 10*3/mm3 283 302 272  --  222 229     Prior to admission anticoagulation: Per med reconciliation, the patient's home warfarin regimen is 4.5 mg on Mon/Wed/Fri and 3 mg on all other days of the week.    Hospital Anticoagulation:  Consulting provider: Dr. Mcrae  Start date: 7/6/23  Indication: Atrial fibrillation  Target INR: 2 - 3  Expected duration: Lifelong  Bridge Therapy: No    Potential food or drug interactions:   Ceftriaxone (moderate-new med in hospital)-concurrent use may enhance the anticoagulant effect of warfarin by platelet inhibition which can lead to irreversible platelet dysfunction.      Education complete?/Date: No; plan for follow up TBD    Assessment/Plan:  INR 1.14 this AM   Give another warfarin 5mg x1 today. Likely can resume home regimen in next few days   Monitor for any signs or symptoms of bleeding.  Follow up daily INRs and dose adjustments. INR ordered daily with AM labs while inpatient.    Pharmacy will continue to follow until discharge or discontinuation of warfarin.     Ange Jose, PharmD  07/17/23 11:41 EDT

## 2023-07-17 NOTE — PROGRESS NOTES
"Daily progress note    Primary care physician  Dr. JASSO    Chief complaint  Doing same with no new complaints and wants to go home with family support and home health    History of present illness  89-year-old white female with history of chronic atrial fibrillation diabetes mellitus hypertension hyperlipidemia peripheral artery disease and dementia who lives at home with her daughter brought to the emergency room with right foot redness swelling pain for last 1 month which is treated by primary care physician with oral antibiotics without any improvement.  Patient has infection around right fifth toe with surrounding cellulitis admitted for management.  Patient remained awake and alert and denies any chest pain shortness of breath palpitation no fever chills abdominal pain nausea vomiting diarrhea.  Most of the history obtained from the daughter and son-in-law but patient also contributed.     REVIEW OF SYSTEMS  Unremarkable except generalized weakness     PHYSICAL EXAM   Blood pressure 127/60, pulse 60, temperature 98.2 °F (36.8 °C), temperature source Oral, resp. rate 16, height 157.5 cm (62\"), weight 64.6 kg (142 lb 6.7 oz), SpO2 95 %.    General: Awake and alert no acute distress.  HENT: NCAT, PERRL, Nares patent.  Eyes: no scleral icterus.  Neck: trachea midline, no ROM limitations.  CV: Irregularly irregular S1-S2  Respiratory: normal effort, CTAB.  Abdomen: soft, nondistended, nontender bowel sounds positive  Musculoskeletal: no deformity.  Neuro: alert, moves all extremities, follows commands.  Skin: warm, dry.  Right foot: Patient has ulceration on the lateral base of the right fifth toe, erythema and swelling extending into the dorsum of the foot, no erythema but swelling of the right calf, no palpable cords, negative Homans.     LAB RESULTS  Lab Results (last 24 hours)       Procedure Component Value Units Date/Time    Tissue Pathology Exam [[id number]] Collected: 07/14/23 5730    Specimen: Tissue from " "Toe, Right; Tissue from Toe, Right Updated: 07/17/23 1207     Case Report --     Surgical Pathology Report                         Case: [id number]                                  Authorizing Provider:  Chacho Barbour MD       Collected:           07/14/2023 05:33 PM          Ordering Location:     [facility]  Received:            07/14/2023 10:14 PM                                 MAIN OR                                                                      Pathologist:           Lorrie Huddleston MD                                                    Specimens:   1) - Toe, Right, proximal toe margin. Evaluate osteomyelitis                                        2) - Toe, Right, Right fifth toe                                                            Final Diagnosis --     1. Bone, Right Toe, Excision:   A. Benign viable osseous tissue without evidence of osteomyelitis.    2. Fifth Toe, Right, Amputation:   A. Ulcer and focal acute osteomyelitis.   B. Benign skin, soft tissue and bone margins of amputation..   C. Peripheral vascular disease.    swm/delvis        Gross Description --     1. Toe, Right.  Received fresh and subsequently placed in formalin labeled \"proximal toe margin, evaluate for osteomyelitis\" is a 0.6 x 0.5 x 0.4 cm pink tan irregular calcified bone fragment submitted en toto as 1A following decalcification with decal stat.    2. Toe, Right.  Received in formalin labeled \"right fifth toe\" is a 3.2 x 2.2 x 1.5 cm right fifth toe.  There is an intact nail.  The skin is pink tan, wrinkled and displays a 1.2 x 0.9 cm gray tan well-circumscribed ulceration that is 0.4 cm from the skin and soft tissue margins.  The skin and soft tissue margin appear viable.  The bone margin is ragged and somewhat fragmented.  Viability cannot be determined grossly.  Sectioning through the underlying bone reveals white to yellow tan trabeculated cut surfaces with focal areas of possible hemorrhage.  " There is no necrosis grossly identified.  Representative sections are submitted as follows:  2A - skin and soft tissue margins submitted en face  2B - skin ulceration   2C - bone margin submitted following decalcification  2D - underlying bone submitted following decalcification     ks/uso/clarkm/jse       POC Glucose Once [[id number]]  (Abnormal) Collected: 07/17/23 1118    Specimen: Blood Updated: 07/17/23 1119     Glucose 178 mg/dL      Comment: Meter: [id number] : [id number] Bullock Neelam NA       POC Glucose Once [[id number]]  (Normal) Collected: 07/17/23 0812    Specimen: Blood Updated: 07/17/23 0813     Glucose 94 mg/dL      Comment: Meter: [id number] : [id number] Bullock Neelam NA       Anaerobic Culture - Surgical Site, Toe, Right [[id number]]  (Normal) Collected: 07/14/23 1729    Specimen: Surgical Site from Toe, Right Updated: 07/17/23 0810     Anaerobic Culture No anaerobes isolated at 3 days    Basic Metabolic Panel [[id number]]  (Abnormal) Collected: 07/17/23 0456    Specimen: Blood Updated: 07/17/23 0602     Glucose 102 mg/dL      BUN 26 mg/dL      Creatinine 1.18 mg/dL      Sodium 141 mmol/L      Potassium 4.1 mmol/L      Comment: Slight hemolysis detected by analyzer. Results may be affected.        Chloride 106 mmol/L      CO2 23.4 mmol/L      Calcium 9.1 mg/dL      BUN/Creatinine Ratio 22.0     Anion Gap 11.6 mmol/L      eGFR 43.7 mL/min/1.73     Narrative:      GFR Normal >60  Chronic Kidney Disease <60  Kidney Failure <15    The GFR formula is only valid for adults with stable renal function between ages 18 and 70.    Protime-INR [[id number]]  (Abnormal) Collected: 07/17/23 0456    Specimen: Blood Updated: 07/17/23 0542     Protime 14.7 Seconds      INR 1.14    CBC & Differential [[id number]]  (Abnormal) Collected: 07/17/23 0456    Specimen: Blood Updated: 07/17/23 0532    Narrative:      The following orders were created for panel order CBC & Differential.  Procedure                                Abnormality         Status                     ---------                               -----------         ------                     CBC Auto Differential[[id number]]        Abnormal            Final result                 Please view results for these tests on the individual orders.    CBC Auto Differential [[id number]]  (Abnormal) Collected: 07/17/23 0456    Specimen: Blood Updated: 07/17/23 0532     WBC 11.87 10*3/mm3      RBC 3.60 10*6/mm3      Hemoglobin 12.0 g/dL      Hematocrit 36.2 %      .6 fL      MCH 33.3 pg      MCHC 33.1 g/dL      RDW 12.5 %      RDW-SD 46.6 fl      MPV 9.5 fL      Platelets 283 10*3/mm3      Neutrophil % 60.9 %      Lymphocyte % 28.1 %      Monocyte % 9.0 %      Eosinophil % 0.9 %      Basophil % 0.3 %      Immature Grans % 0.8 %      Neutrophils, Absolute 7.22 10*3/mm3      Lymphocytes, Absolute 3.34 10*3/mm3      Monocytes, Absolute 1.07 10*3/mm3      Eosinophils, Absolute 0.11 10*3/mm3      Basophils, Absolute 0.03 10*3/mm3      Immature Grans, Absolute 0.10 10*3/mm3      nRBC 0.0 /100 WBC     POC Glucose Once [[id number]]  (Abnormal) Collected: 07/16/23 2025    Specimen: Blood Updated: 07/16/23 2028     Glucose 175 mg/dL      Comment: Meter: [id number] : [id number] Nagi HER       POC Glucose Once [[id number]]  (Abnormal) Collected: 07/16/23 1634    Specimen: Blood Updated: 07/16/23 1636     Glucose 205 mg/dL      Comment: Meter: [id number] : [id number] Keith HER             Imaging Results (Last 24 Hours)       ** No results found for the last 24 hours. **           Interpretation Summary    Right Conclusion: The right YADIEL is severely reduced. Severe digital ischemia.    While the left ankle-brachial indices are within normal limits there is vessel incompressibility.  ABIs are likely falsely elevated.  Moderate digital ischemia noted.    Unable to determine level of disease due to vessel incompressibility but suspect multi level disease based upon  waveforms.     Interpretation Summary    Right popliteal fossa fluid collection.    Normal right lower extremity venous duplex scan.      Current Facility-Administered Medications:     acetaminophen (TYLENOL) tablet 650 mg, 650 mg, Oral, Q4H PRN, Chacho Barbour MD, 650 mg at 07/16/23 0900    aspirin chewable tablet 81 mg, 81 mg, Oral, Daily, Chacho Barbour MD, 81 mg at 07/17/23 0841    cefTRIAXone (ROCEPHIN) 1 g in sodium chloride 0.9 % 100 mL IVPB-VTB, 1 g, Intravenous, Q24H, Chacho Barbour MD, Stopped at 07/16/23 1721    dilTIAZem CD (CARDIZEM CD) 24 hr capsule 180 mg, 180 mg, Oral, Q24H, Chacho Barbour MD, 180 mg at 07/17/23 0841    famotidine (PEPCID) tablet 20 mg, 20 mg, Oral, Daily, Chacho Barbour MD, 20 mg at 07/17/23 0841    HYDROcodone-acetaminophen (NORCO) 5-325 MG per tablet 1 tablet, 1 tablet, Oral, Q6H PRN, Chacho Barbour MD    icosapent ethyl (VASCEPA) capsule 2 g, 2 g, Oral, BID, Chacho Barbour MD, 2 g at 07/17/23 0842    insulin glargine (LANTUS, SEMGLEE) injection 10 Units, 10 Units, Subcutaneous, Nightly, Uziel Mcrae MD, 10 Units at 07/16/23 2134    insulin lispro (HUMALOG/ADMELOG) injection 2-7 Units, 2-7 Units, Subcutaneous, 4x Daily AC & at Bedtime, Chacho Barbour MD, 2 Units at 07/17/23 1147    metoprolol tartrate (LOPRESSOR) tablet 25 mg, 25 mg, Oral, BID, Chacho Barbour MD, 25 mg at 07/17/23 0841    Pharmacy to dose warfarin, , Does not apply, Continuous PRN, Chacho Barbour MD    pregabalin (LYRICA) capsule 100 mg, 100 mg, Oral, Nightly, Chacho Barbour MD, 100 mg at 07/16/23 2132    warfarin (COUMADIN) tablet 5 mg, 5 mg, Oral, Once, Chacho Barbour MD     ASSESSMENT  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  Right foot infected wound with surrounding cellulitis   Status post right femoral artery angioplasty and stent  Right popliteal artery atherectomy and angioplasty and right tibioperoneal atherectomy and angioplasty  Peripheral artery  disease  Diabetes mellitus  Hypertension  Hyperlipidemia  Chronic atrial fibrillation     PLAN  CPM  Postop care  Continue IV antibiotics per infectious disease  Continue anticoagulation  Vascular surgery consult appreciated  Continue home medications  Stress ulcer DVT prophylaxis  Supportive care  PT/OT  DNR  Discussed with family nursing staff and infectious disease  Discharge home with family support and home health once IV antibiotics completed    DAISHA CARLSON MD    Copied text in this note has been reviewed and is accurate as of 07/17/23

## 2023-07-17 NOTE — PLAN OF CARE
Goal Outcome Evaluation:      VSS ex/ Afib rhythm. Ax self, pleasantly confused w/ dementia. [place]. Ax2. RA. R foot dressing CDI, wrapped in kerlix, ace. Possibly D/c today.

## 2023-07-18 VITALS
RESPIRATION RATE: 19 BRPM | HEIGHT: 62 IN | WEIGHT: 134 LBS | SYSTOLIC BLOOD PRESSURE: 126 MMHG | TEMPERATURE: 97.7 F | BODY MASS INDEX: 24.66 KG/M2 | DIASTOLIC BLOOD PRESSURE: 63 MMHG | OXYGEN SATURATION: 100 % | HEART RATE: 70 BPM

## 2023-07-18 LAB
ANION GAP SERPL CALCULATED.3IONS-SCNC: 10.6 MMOL/L (ref 5–15)
BASOPHILS # BLD AUTO: 0.06 10*3/MM3 (ref 0–0.2)
BASOPHILS NFR BLD AUTO: 0.5 % (ref 0–1.5)
BUN SERPL-MCNC: 26 MG/DL (ref 8–23)
BUN/CREAT SERPL: 20 (ref 7–25)
CALCIUM SPEC-SCNC: 9.4 MG/DL (ref 8.2–9.6)
CHLORIDE SERPL-SCNC: 105 MMOL/L (ref 98–107)
CO2 SERPL-SCNC: 27.4 MMOL/L (ref 22–29)
CREAT SERPL-MCNC: 1.3 MG/DL (ref 0.57–1)
DEPRECATED RDW RBC AUTO: 48.2 FL (ref 37–54)
EGFRCR SERPLBLD CKD-EPI 2021: 38.9 ML/MIN/1.73
EOSINOPHIL # BLD AUTO: 0.26 10*3/MM3 (ref 0–0.4)
EOSINOPHIL NFR BLD AUTO: 2.2 % (ref 0.3–6.2)
ERYTHROCYTE [DISTWIDTH] IN BLOOD BY AUTOMATED COUNT: 13.1 % (ref 12.3–15.4)
GLUCOSE BLDC GLUCOMTR-MCNC: 153 MG/DL (ref 70–130)
GLUCOSE BLDC GLUCOMTR-MCNC: 156 MG/DL (ref 70–130)
GLUCOSE BLDC GLUCOMTR-MCNC: 77 MG/DL (ref 70–130)
GLUCOSE SERPL-MCNC: 77 MG/DL (ref 65–99)
HCT VFR BLD AUTO: 38.3 % (ref 34–46.6)
HGB BLD-MCNC: 13 G/DL (ref 12–15.9)
IMM GRANULOCYTES # BLD AUTO: 0.14 10*3/MM3 (ref 0–0.05)
IMM GRANULOCYTES NFR BLD AUTO: 1.2 % (ref 0–0.5)
INR PPP: 1.18 (ref 0.9–1.1)
LYMPHOCYTES # BLD AUTO: 4.54 10*3/MM3 (ref 0.7–3.1)
LYMPHOCYTES NFR BLD AUTO: 37.6 % (ref 19.6–45.3)
MCH RBC QN AUTO: 33.8 PG (ref 26.6–33)
MCHC RBC AUTO-ENTMCNC: 33.9 G/DL (ref 31.5–35.7)
MCV RBC AUTO: 99.5 FL (ref 79–97)
MONOCYTES # BLD AUTO: 1.33 10*3/MM3 (ref 0.1–0.9)
MONOCYTES NFR BLD AUTO: 11 % (ref 5–12)
NEUTROPHILS NFR BLD AUTO: 47.5 % (ref 42.7–76)
NEUTROPHILS NFR BLD AUTO: 5.73 10*3/MM3 (ref 1.7–7)
NRBC BLD AUTO-RTO: 0 /100 WBC (ref 0–0.2)
PLATELET # BLD AUTO: 302 10*3/MM3 (ref 140–450)
PMV BLD AUTO: 9.5 FL (ref 6–12)
POTASSIUM SERPL-SCNC: 3.9 MMOL/L (ref 3.5–5.2)
PROTHROMBIN TIME: 15.2 SECONDS (ref 11.7–14.2)
RBC # BLD AUTO: 3.85 10*6/MM3 (ref 3.77–5.28)
SODIUM SERPL-SCNC: 143 MMOL/L (ref 136–145)
WBC NRBC COR # BLD: 12.06 10*3/MM3 (ref 3.4–10.8)

## 2023-07-18 PROCEDURE — 85025 COMPLETE CBC W/AUTO DIFF WBC: CPT | Performed by: HOSPITALIST

## 2023-07-18 PROCEDURE — 97168 OT RE-EVAL EST PLAN CARE: CPT

## 2023-07-18 PROCEDURE — 25010000002 CEFTRIAXONE PER 250 MG: Performed by: SURGERY

## 2023-07-18 PROCEDURE — 85610 PROTHROMBIN TIME: CPT | Performed by: SURGERY

## 2023-07-18 PROCEDURE — 80048 BASIC METABOLIC PNL TOTAL CA: CPT | Performed by: HOSPITALIST

## 2023-07-18 PROCEDURE — 97530 THERAPEUTIC ACTIVITIES: CPT

## 2023-07-18 PROCEDURE — 82948 REAGENT STRIP/BLOOD GLUCOSE: CPT

## 2023-07-18 RX ORDER — MINOCYCLINE HYDROCHLORIDE 100 MG/1
100 CAPSULE ORAL 2 TIMES DAILY
Qty: 20 CAPSULE | Refills: 0 | Status: SHIPPED | OUTPATIENT
Start: 2023-07-18 | End: 2023-07-28

## 2023-07-18 RX ORDER — DILTIAZEM HYDROCHLORIDE 180 MG/1
180 CAPSULE, COATED, EXTENDED RELEASE ORAL
Qty: 30 CAPSULE | Refills: 1 | Status: SHIPPED | OUTPATIENT
Start: 2023-07-18 | End: 2023-09-16

## 2023-07-18 RX ORDER — LEVOTHYROXINE SODIUM 0.1 MG/1
100 TABLET ORAL
Qty: 30 TABLET | Refills: 0 | Status: SHIPPED | OUTPATIENT
Start: 2023-07-18 | End: 2023-08-17

## 2023-07-18 RX ORDER — WARFARIN SODIUM 7.5 MG/1
7.5 TABLET ORAL
Status: DISCONTINUED | OUTPATIENT
Start: 2023-07-18 | End: 2023-07-18

## 2023-07-18 RX ORDER — WARFARIN SODIUM 7.5 MG/1
7.5 TABLET ORAL
Status: COMPLETED | OUTPATIENT
Start: 2023-07-18 | End: 2023-07-18

## 2023-07-18 RX ADMIN — ICOSAPENT ETHYL 2 G: 1000 CAPSULE ORAL at 08:45

## 2023-07-18 RX ADMIN — METOPROLOL TARTRATE 25 MG: 25 TABLET, FILM COATED ORAL at 08:45

## 2023-07-18 RX ADMIN — ASPIRIN 81 MG: 81 TABLET, CHEWABLE ORAL at 08:45

## 2023-07-18 RX ADMIN — FAMOTIDINE 20 MG: 20 TABLET, FILM COATED ORAL at 08:45

## 2023-07-18 RX ADMIN — WARFARIN 7.5 MG: 7.5 TABLET ORAL at 16:31

## 2023-07-18 RX ADMIN — DILTIAZEM HYDROCHLORIDE 180 MG: 180 CAPSULE, COATED, EXTENDED RELEASE ORAL at 08:45

## 2023-07-18 RX ADMIN — CEFTRIAXONE SODIUM 1 G: 1 INJECTION, POWDER, FOR SOLUTION INTRAMUSCULAR; INTRAVENOUS at 15:47

## 2023-07-18 NOTE — PROGRESS NOTES
Enter Query Response Below      Query Response:         Unable to determine Electronically signed by Uziel Mcrae MD, 23, 3:59 PM EDT.       If applicable, please update the problem list.     Patient: Bessie Molina        : 1932  Account: [id number]           Admit Date:         How to Respond to this query:       a. Click New Note     b. Answer query within the yellow box.                c. Update the Problem List, if applicable.    ,    89 year old with Chronic Atrial fibrillation, Chronic diastolic HF, HTN, severe peripheral vascular disease, type 2 DM admitted  for right 5th toe Osteomyelitis with cellulitis. Patient takes ASA, Cardizem, Zetia, Lasix, Lopressor and Coumadin at home. No Echo this admission.  Echo 2022 showed an EF of 56-60%, calcification of the aortic valve and Moderate mitral valve regurgitation.  No Cardiology consult this admission.    Please further clarify the etiology of the Chronic diastolic heart failure as:    - Heart failure due to Hypertension  - Heart failure due to Chronic Atrial fibrillation  - Heart failure due to both HTN and Chronic atrial fibrillation  - Other_____________( please specify)  - Unable to determine    By submitting this query, we are merely seeking further clarification of documentation to accurately reflect all conditions that you are monitoring, evaluating, treating or that extend the hospitalization or utilize additional resources of care. Please utilize your independent clinical judgment when addressing the question(s) above.     This query and your response, once completed, will be entered into the legal medical record.    Sincerely,  Ryan Hitchcock RN CDIS  Clinical Documentation Integrity Program   [email or web address]

## 2023-07-18 NOTE — PROGRESS NOTES
Infectious Diseases Progress Note        [facility]  [place]: 12 days  Patient Identification:  Name: Bessie Molina  Age: 89 y.o.  Sex: female  :  1932  MRN: [id number]         Primary Care Physician: Mireille La MD        Subjective: Denies any complaints.  Patient does not clearly remember having conversation with Dr. Barbour or myself yesterday.  But she is pleasant and wants to proceed with what ever the doctors recommend  Interval History: See consultation note  7/10/2023: She underwent:  Ultrasound-guided left common femoral artery percutaneous access.  Right lower extremity runoff arteriogram.  Right superficial femoral artery angioplasty and stent placement 6 mm self-expanding stent.  Right popliteal artery atherectomy with 1.7 mm [employer]ran[employer] catheter and 4 mm drug-coated balloon angioplasty.  Right tibioperoneal trunk atherectomy with 1.7 mm [employer]ran[employer] catheter and 3 mm angioplasty.  2023: After review of MRI right fifth toe amputation and debridement by vascular surgery service plan scheduled for surgery on 2023.  Objective:    Scheduled Meds:aspirin, 81 mg, Oral, Daily  cefTRIAXone, 1 g, Intravenous, Q24H  dilTIAZem CD, 180 mg, Oral, Q24H  famotidine, 20 mg, Oral, Daily  icosapent ethyl, 2 g, Oral, BID  insulin glargine, 10 Units, Subcutaneous, Nightly  insulin lispro, 2-7 Units, Subcutaneous, 4x Daily AC & at Bedtime  metoprolol tartrate, 25 mg, Oral, BID  pregabalin, 100 mg, Oral, Nightly  warfarin, 7.5 mg, Oral, Once      Continuous Infusions:Pharmacy to dose warfarin,         Vital signs in last 24 hours:  Temp:  [97.5 °F (36.4 °C)-98.2 °F (36.8 °C)] 97.7 °F (36.5 °C)  Heart Rate:  [60-89] 70  Resp:  [16-19] 19  BP: (124-166)/(55-85) 136/55    Intake/Output:    Intake/Output Summary (Last 24 hours) at 2023 1327  Last data filed at 2023 0900  Gross per 24 hour   Intake 480 ml   Output 2400 ml   Net -1920 ml           Exam:  /55 (BP  "Location: Right arm, Patient Position: Sitting)   Pulse 70   Temp 97.7 °F (36.5 °C) (Oral)   Resp 19   Ht 157.5 cm (62\")   Wt 60.8 kg (134 lb)   SpO2 97%   BMI 24.51 kg/m²   Patient is examined using the personal protective equipment as per guidelines from infection control for this particular patient as enacted.  Hand washing was performed before and after patient interaction.  HEENT: Remarkable for no acute lesions  Neck: Supple  Chest: Bilateral air entry decreased breath sounds at the bases  Heart: S1-S2 regular  Abdomen: Soft nontender  Extremity examination shows:    Data Review:    I reviewed the patient's new clinical results.  Results from last 7 days   Lab Units 07/18/23  0459 07/17/23  0456 07/16/23  0622 07/15/23  0520 07/13/23  0530 07/12/23  0457   WBC 10*3/mm3 12.06* 11.87* 16.86* 8.37 12.51* 12.23*   HEMOGLOBIN g/dL 13.0 12.0 12.3 13.0 11.1* 11.8*   PLATELETS 10*3/mm3 302 283 302 272 222 229       Results from last 7 days   Lab Units 07/18/23  0459 07/17/23  0456 07/16/23  0622 07/15/23  0520 07/14/23  0517 07/13/23  0530 07/12/23  0457   SODIUM mmol/L 143 141 140 141 139 140 142   POTASSIUM mmol/L 3.9 4.1 4.3 4.4 3.9 4.3 4.5   CHLORIDE mmol/L 105 106 103 104 106 106 105   CO2 mmol/L 27.4 23.4 25.0 25.0 23.4 25.3 29.0   BUN mg/dL 26* 26* 27* 19 21 20 23   CREATININE mg/dL 1.30* 1.18* 1.17* 0.95 0.95 1.06* 1.12*   CALCIUM mg/dL 9.4 9.1 9.5 9.6 9.2 9.0 9.4   GLUCOSE mg/dL 77 102* 179* 140* 103* 99 113*       Microbiology Results (last 10 days)       Procedure Component Value - Date/Time    Anaerobic Culture - Surgical Site, Toe, Right [[id number]]  (Normal) Collected: 07/14/23 1729    Lab Status: Preliminary result Specimen: Surgical Site from Toe, Right Updated: 07/17/23 0810     Anaerobic Culture No anaerobes isolated at 3 days          XR Foot 3+ View Right    Result Date: 7/6/2023   As described.  This report was finalized on 7/6/2023 12:34 PM by Dr. Jorge Frye M.D.      CT Angio " Abdominal Aorta Bilateral Iliofem Runoff    Result Date: 7/9/2023  1. Atherosclerotic disease of the abdominal aorta and bilateral lower extremities discussed in more detail above. 2. Low-density pancreatic lesion probably stable since the 12/14/2022 study. 3. Status post hysterectomy. 4. Small amount of high-attenuation material in the gallbladder may represent hyperdense bile or possibly some very tiny faintly opaque stones. No gallbladder inflammatory changes are seen.  Radiation dose reduction techniques were utilized, including automated exposure control and exposure modulation based on body size.  This report was finalized on 7/9/2023 2:02 PM by Dr. Sarkis Tobar M.D.      MRI Foot Right With & Without Contrast    Result Date: 7/13/2023  1. Soft tissue wound/ulcer at the lateral forefoot with small ulcer that approaches the cortex of the head of the 5th proximal phalanx. There is underlying bone marrow edema and enhancement within the distal shaft and head of the 5th proximal phalanx as well as within the fused 5th middle and distal phalanges consistent with osteomyelitis. Suspect septic arthritis at the 5th PIP joint. 2. Generalized forefoot soft tissue swelling with edematous dorsal subcutaneous fat consistent with cellulitis.  This report was finalized on 7/13/2023 8:31 AM by Dr. Daniel Edmond M.D.      Duplex venous lower extremity right    Addendum Date: 7/10/2023      Chronic right lower extremity superficial thrombophlebitis noted in the small saphenous.   Right popliteal fossa fluid collection.   Normal right lower extremity venous duplex scan.        Assessment:    Cellulitis of right foot  1-ischemic/diabetic ulcer/cellulitis of the right foot due to peripheral vascular disease rendering antibiotic therapy unsuccessful with significant risk of toe/foot/limb loss-status post vascular intervention with improvement in circulation and no MRI evidence of acute osteomyelitis involving fifth proximal  phalanx and distal phalanx as well as septic arthritis of the fifth PIP joint.  2-peripheral vascular disease  3-hypertension  4-atrial fibrillation  5-chronic kidney disease  6-diabetes mellitus  7-other diagnoses per primary team.   S/P Right fifth toe amputation with dermal reapproximation 7/14/23      Recommendations/Discussions:  Continue present antibiotic therapy    Change to PO minocycline 100mg BID for 10 days soon  Will follow  Thank you       Jordan Fermin MD  7/18/2023  13:27 EDT

## 2023-07-18 NOTE — PROGRESS NOTES
"Daily progress note    Primary care physician  Dr. JASSO    Chief complaint  Doing same with no new complaints and wants to go home     History of present illness  89-year-old white female with history of chronic atrial fibrillation diabetes mellitus hypertension hyperlipidemia peripheral artery disease and dementia who lives at home with her daughter brought to the emergency room with right foot redness swelling pain for last 1 month which is treated by primary care physician with oral antibiotics without any improvement.  Patient has infection around right fifth toe with surrounding cellulitis admitted for management.  Patient remained awake and alert and denies any chest pain shortness of breath palpitation no fever chills abdominal pain nausea vomiting diarrhea.  Most of the history obtained from the daughter and son-in-law but patient also contributed.     REVIEW OF SYSTEMS  Unremarkable except generalized weakness     PHYSICAL EXAM   Blood pressure 136/55, pulse 70, temperature 97.7 °F (36.5 °C), temperature source Oral, resp. rate 19, height 157.5 cm (62\"), weight 60.8 kg (134 lb), SpO2 97 %.    General: Awake and alert no acute distress.  HENT: NCAT, PERRL, Nares patent.  Eyes: no scleral icterus.  Neck: trachea midline, no ROM limitations.  CV: Irregularly irregular S1-S2  Respiratory: normal effort, CTAB.  Abdomen: soft, nondistended, nontender bowel sounds positive  Musculoskeletal: no deformity.  Neuro: alert, moves all extremities, follows commands.  Skin: warm, dry.  Right foot: Patient has ulceration on the lateral base of the right fifth toe, erythema and swelling extending into the dorsum of the foot, no erythema but swelling of the right calf, no palpable cords, negative Homans.     LAB RESULTS  Lab Results (last 24 hours)       Procedure Component Value Units Date/Time    POC Glucose Once [[id number]]  (Abnormal) Collected: 07/18/23 1218    Specimen: Blood Updated: 07/18/23 1219     Glucose 156 " mg/dL      Comment: Meter: [id number] : [id number] Luz Marina HER       POC Glucose Once [[id number]]  (Abnormal) Collected: 07/18/23 0756    Specimen: Blood Updated: 07/18/23 0757     Glucose 153 mg/dL      Comment: Meter: [id number] : [id number] Luz Marina HER       Protime-INR [[id number]]  (Abnormal) Collected: 07/18/23 0459    Specimen: Blood Updated: 07/18/23 0641     Protime 15.2 Seconds      INR 1.18    POC Glucose Once [[id number]]  (Normal) Collected: 07/18/23 0630    Specimen: Blood Updated: 07/18/23 0632     Glucose 77 mg/dL      Comment: Meter: [id number] : [id number] Armin HER       Basic Metabolic Panel [[id number]]  (Abnormal) Collected: 07/18/23 0459    Specimen: Blood Updated: 07/18/23 0629     Glucose 77 mg/dL      BUN 26 mg/dL      Creatinine 1.30 mg/dL      Sodium 143 mmol/L      Potassium 3.9 mmol/L      Chloride 105 mmol/L      CO2 27.4 mmol/L      Calcium 9.4 mg/dL      BUN/Creatinine Ratio 20.0     Anion Gap 10.6 mmol/L      eGFR 38.9 mL/min/1.73     Narrative:      GFR Normal >60  Chronic Kidney Disease <60  Kidney Failure <15    The GFR formula is only valid for adults with stable renal function between ages 18 and 70.    CBC & Differential [[id number]]  (Abnormal) Collected: 07/18/23 0459    Specimen: Blood Updated: 07/18/23 0611    Narrative:      The following orders were created for panel order CBC & Differential.  Procedure                               Abnormality         Status                     ---------                               -----------         ------                     CBC Auto Differential[[id number]]        Abnormal            Final result                 Please view results for these tests on the individual orders.    CBC Auto Differential [[id number]]  (Abnormal) Collected: 07/18/23 0459    Specimen: Blood Updated: 07/18/23 0611     WBC 12.06 10*3/mm3      RBC 3.85 10*6/mm3      Hemoglobin 13.0 g/dL      Hematocrit 38.3 %      MCV 99.5 fL      MCH 33.8 pg       MCHC 33.9 g/dL      RDW 13.1 %      RDW-SD 48.2 fl      MPV 9.5 fL      Platelets 302 10*3/mm3      Neutrophil % 47.5 %      Lymphocyte % 37.6 %      Monocyte % 11.0 %      Eosinophil % 2.2 %      Basophil % 0.5 %      Immature Grans % 1.2 %      Neutrophils, Absolute 5.73 10*3/mm3      Lymphocytes, Absolute 4.54 10*3/mm3      Monocytes, Absolute 1.33 10*3/mm3      Eosinophils, Absolute 0.26 10*3/mm3      Basophils, Absolute 0.06 10*3/mm3      Immature Grans, Absolute 0.14 10*3/mm3      nRBC 0.0 /100 WBC     POC Glucose Once [[id number]]  (Abnormal) Collected: 07/17/23 2121    Specimen: Blood Updated: 07/17/23 2122     Glucose 202 mg/dL      Comment: Meter: [id number] : [id number] Armin Napier NA       POC Glucose Once [[id number]]  (Normal) Collected: 07/17/23 1659    Specimen: Blood Updated: 07/17/23 1701     Glucose 115 mg/dL      Comment: Meter: [id number] : [id number] Brooks HER             Imaging Results (Last 24 Hours)       ** No results found for the last 24 hours. **           Interpretation Summary    Right Conclusion: The right YADIEL is severely reduced. Severe digital ischemia.    While the left ankle-brachial indices are within normal limits there is vessel incompressibility.  ABIs are likely falsely elevated.  Moderate digital ischemia noted.    Unable to determine level of disease due to vessel incompressibility but suspect multi level disease based upon waveforms.     Interpretation Summary    Right popliteal fossa fluid collection.    Normal right lower extremity venous duplex scan.      Current Facility-Administered Medications:     acetaminophen (TYLENOL) tablet 650 mg, 650 mg, Oral, Q4H PRN, Chacho Barbour MD, 650 mg at 07/16/23 0900    aspirin chewable tablet 81 mg, 81 mg, Oral, Daily, Chacho Barbour MD, 81 mg at 07/18/23 0845    cefTRIAXone (ROCEPHIN) 1 g in sodium chloride 0.9 % 100 mL IVPB-VTB, 1 g, Intravenous, Q24H, Chacho Barbour MD, Last Rate: 200 mL/hr at 07/17/23  1646, 1 g at 07/17/23 1646    dilTIAZem CD (CARDIZEM CD) 24 hr capsule 180 mg, 180 mg, Oral, Q24H, Chacho Barbour MD, 180 mg at 07/18/23 0845    famotidine (PEPCID) tablet 20 mg, 20 mg, Oral, Daily, Chacho Barbour MD, 20 mg at 07/18/23 0845    HYDROcodone-acetaminophen (NORCO) 5-325 MG per tablet 1 tablet, 1 tablet, Oral, Q6H PRN, Chacho Barbour MD    icosapent ethyl (VASCEPA) capsule 2 g, 2 g, Oral, BID, Chacho Barbour MD, 2 g at 07/18/23 0845    insulin glargine (LANTUS, SEMGLEE) injection 10 Units, 10 Units, Subcutaneous, Nightly, Daisha Mcrae MD, 10 Units at 07/17/23 2248    insulin lispro (HUMALOG/ADMELOG) injection 2-7 Units, 2-7 Units, Subcutaneous, 4x Daily AC & at Bedtime, Chacho Barbour MD, 3 Units at 07/17/23 2248    metoprolol tartrate (LOPRESSOR) tablet 25 mg, 25 mg, Oral, BID, Chacho Barbour MD, 25 mg at 07/18/23 0845    Pharmacy to dose warfarin, , Does not apply, Continuous PRN, Chacho Barbour MD    pregabalin (LYRICA) capsule 100 mg, 100 mg, Oral, Nightly, Chacho Barbour MD, 100 mg at 07/17/23 2046    warfarin (COUMADIN) tablet 7.5 mg, 7.5 mg, Oral, Once, Daisha Mcrae MD     ASSESSMENT  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  Right foot infected wound with surrounding cellulitis   Status post right femoral artery angioplasty and stent  Right popliteal artery atherectomy and angioplasty and right tibioperoneal atherectomy and angioplasty  Peripheral artery disease  Diabetes mellitus  Hypertension  Hyperlipidemia  Chronic atrial fibrillation     PLAN  Discharge home on oral antibiotics with family support and home health  Discharge summary dictated    DAISHA MCREA MD    Copied text in this note has been reviewed and is accurate as of 07/18/23

## 2023-07-18 NOTE — DISCHARGE SUMMARY
Discharge summary    Date of admission 7/6/2023  Date of discharge 7/18/2023    Final diagnosis    Current Facility-Administered Medications:     aspirin chewable tablet 81 mg, 81 mg, Oral, Daily, Chacho Barbour MD, 81 mg at 07/18/23 0845    cefTRIAXone (ROCEPHIN) 1 g in sodium chloride 0.9 % 100 mL IVPB-VTB, 1 g, Intravenous, Q24H, Chacho Barbour MD, Last Rate: 200 mL/hr at 07/17/23 1646, 1 g at 07/17/23 1646    dilTIAZem CD (CARDIZEM CD) 24 hr capsule 180 mg, 180 mg, Oral, Q24H, Chacho Barbour MD, 180 mg at 07/18/23 0845    famotidine (PEPCID) tablet 20 mg, 20 mg, Oral, Daily, Chacho Barbour MD, 20 mg at 07/18/23 0845    icosapent ethyl (VASCEPA) capsule 2 g, 2 g, Oral, BID, Chacho Barbour MD, 2 g at 07/18/23 0845    insulin glargine (LANTUS, SEMGLEE) injection 10 Units, 10 Units, Subcutaneous, Nightly, Uziel Mcrae MD, 10 Units at 07/17/23 2248    insulin lispro (HUMALOG/ADMELOG) injection 2-7 Units, 2-7 Units, Subcutaneous, 4x Daily AC & at Bedtime, Chacho Babrour MD, 3 Units at 07/17/23 2248    metoprolol tartrate (LOPRESSOR) tablet 25 mg, 25 mg, Oral, BID, Chacho Barbour MD, 25 mg at 07/18/23 0845    Pharmacy to dose warfarin, , Does not apply, Continuous PRN, Chacho Barbour MD    pregabalin (LYRICA) capsule 100 mg, 100 mg, Oral, Nightly, Chacho Barbour MD, 100 mg at 07/17/23 2046    warfarin (COUMADIN) tablet 7.5 mg, 7.5 mg, Oral, Once, Uziel Mcrae MD     Consults obtained  Vascular surgery  Nephrology  Infectious disease    Procedures  Right superficial femoral artery angioplasty and stent placement 7/10/2023  Right popliteal artery atherectomy and balloon angioplasty 7/10/2023  Right tibioperoneal trunk atherectomy and angioplasty 7/10/2023  Right fifth toe amputation and right fifth toe proximal phalanx bone biopsy on 7/14/2023    Hospital course  89-year-old white female with history of atrial fibrillation diabetes hypertension hyperlipidemia peripheral artery disease and  dementia who lives at home with family admitted to emergency room with right foot redness swelling and pain.  Patient work-up revealed right foot infected wound with surrounding cellulitis secondary to peripheral artery disease admitted for management.  Patient admitted treated with IV fluid IV antibiotics and further evaluated by vascular surgery infectious disease.  Patient underwent revascularization of right lower extremity with some improvement and remained on IV antibiotics.  Patient work-up also revealed osteomyelitis of the right fifth toe and underwent amputation of the right fifth toe.  Postoperatively patient IV antibiotics continued and infectious disease recommended oral antibiotics for 10 more days on discharge.  Patient anticoagulation was held and restarted and will continue on discharge.  Patient is feeling better and wants to go home with family support and home health which is arranged.  Patient remained DNR throughout hospital course.    Discharge diet regular    Activity per PT OT    Medication as above    Follow-up with primary doctor in 1 week and follow-up with vascular surgery infectious disease and nephrology per the instructions and take medication as directed    DAISHA CARLSON MD

## 2023-07-18 NOTE — THERAPY RE-EVALUATION
Patient Name: Bessie Molina  : 1932    MRN: [id number]                              Today's Date: 2023       Admit Date: 2023    Visit Dx:     ICD-10-CM ICD-9-CM   1. Cellulitis of right foot  L03.115 682.7   2. Failure of outpatient treatment  Z78.9 V49.89   3. Leukocytosis, unspecified type  D72.829 288.60   4. ESR raised  R70.0 790.1   5. CRP elevated  R79.82 790.95   6. Hyperglycemia  R73.9 790.29   7. Chronic kidney disease, unspecified CKD stage  N18.9 585.9   8. Osteomyelitis  M86.9 730.20     Patient Active Problem List   Diagnosis    Atrial fibrillation, persistent    Chronic anticoagulation    Dementia    Abdominal pain    Acute respiratory failure with hypoxia    Pancreatic lesion    Chronic diastolic CHF (congestive heart failure)    Cellulitis of right foot     Past Medical History:   Diagnosis Date    Atrial fibrillation     CVA (cerebral vascular accident)     left temporal stroke    Hypertension      Past Surgical History:   Procedure Laterality Date    AMPUTATION DIGIT Right 2023    Procedure: RIGHT 5TH TOE AMPUTATION;  Surgeon: Chacho Barbour MD;  Location: [facility];  Service: Vascular;  Laterality: Right;    APPENDECTOMY      ARTERIOGRAM Bilateral     ATHRECTOMY ILIAC, FEMORAL, TIBIAL ARTERY Right 7/10/2023    Procedure: RIGHT LEG ANGIOGRAM WITH LASER;  Surgeon: Chacho Barbour MD;  Location: [facility] ;  Service: Vascular;  Laterality: Right;    CATARACT EXTRACTION Left     CERVICAL DISCECTOMY ANTERIOR      HYSTERECTOMY      LUMBAR DISCECTOMY      TONSILLECTOMY        General Information       Row Name 23 1214          OT Time and Intention    Document Type re-evaluation  -PP     Mode of Treatment occupational therapy;individual therapy  -PP       Row Name 23 1214          General Information    Patient Profile Reviewed yes  -PP     Prior Level of Function independent:;ADL's;all household mobility  -PP     Existing  Precautions/Restrictions fall  WBAT  -PP     Barriers to Rehab --  hearing deficit  -PP       Row Name 07/18/23 1214          Living Environment    People in Home child(veena), adult  -PP       Row Name 07/18/23 1214          Cognition    Orientation Status (Cognition) oriented x 3  -PP       Row Name 07/18/23 1214          Safety Issues, Functional Mobility    Impairments Affecting Function (Mobility) endurance/activity tolerance;strength;balance  -PP               User Key  (r) = Recorded By, (t) = Taken By, (c) = Cosigned By      Initials Name Provider Type    PP Sharonda Bueno, OT Occupational Therapist                     Mobility/ADL's       Row Name 07/18/23 1223          Bed Mobility    Bed Mobility supine-sit  -PP     Supine-Sit Wright (Bed Mobility) standby assist  -PP     Sit-Supine Wright (Bed Mobility) not tested  -PP     Assistive Device (Bed Mobility) bed rails;head of bed elevated  -PP     Comment, (Bed Mobility) Pt UIC at end of session  -PP       Row Name 07/18/23 1223          Transfers    Transfers sit-stand transfer;stand-sit transfer;bed-chair transfer  -PP     Comment, (Transfers) w/ AD  -PP       Row Name 07/18/23 1223          Bed-Chair Transfer    Bed-Chair Wright (Transfers) contact guard  -PP     Assistive Device (Bed-Chair Transfers) walker, front-wheeled  -PP     Comment, (Bed-Chair Transfer) VC for proper hand placement to lower self down to xfer surface.  -PP       Row Name 07/18/23 1223          Sit-Stand Transfer    Sit-Stand Wright (Transfers) standby assist  -PP     Assistive Device (Sit-Stand Transfers) walker, front-wheeled  -PP       Row Name 07/18/23 1223          Stand-Sit Transfer    Stand-Sit Wright (Transfers) standby assist  -PP     Assistive Device (Stand-Sit Transfers) walker, front-wheeled  -PP       Row Name 07/18/23 1223          Functional Mobility    Functional Mobility- Ind. Level contact guard assist  -PP     Functional Mobility-  Device walker, front-wheeled  -PP     Functional Mobility- Comment Pt ambulated from bed to recliner chair for short household distance.  -PP       Row Name 07/18/23 1223          Activities of Daily Living    BADL Assessment/Intervention grooming  -PP       Row Name 07/18/23 1223          Grooming Assessment/Training    [place] Level (Grooming) grooming skills;oral care regimen;wash face, hands;standby assist  -PP     Position (Grooming) supported sitting  -PP     Comment, (Grooming) Required VC for sequencing steps to initially get started. Pt also [place].  -PP               User Key  (r) = Recorded By, (t) = Taken By, (c) = Cosigned By      Initials Name Provider Type    PP Sharonda Bueno OT Occupational Therapist                   Obj/Interventions       Row Name 07/18/23 1230          Balance    Balance Assessment sitting static balance;sit to stand dynamic balance;standing dynamic balance;standing static balance  -PP     Static Sitting Balance supervision  -PP     Sit to Stand Dynamic Balance standby assist  -PP     Static Standing Balance contact guard  -PP     Dynamic Standing Balance contact guard  -PP     Position/Device Used, Standing Balance walker, front-wheeled  -PP     Balance Interventions sitting;standing;sit to stand;supported;dynamic;static  -PP     Comment, Balance Pt able to perform 3x STS from EOB SBA w/ Rwx  -PP               User Key  (r) = Recorded By, (t) = Taken By, (c) = Cosigned By      Initials Name Provider Type    PP Sharonda Bueno OT Occupational Therapist                   Goals/Plan       Row Name 07/18/23 1216          Transfer Goal 1 (OT)    Activity/Assistive Device (Transfer Goal 1, OT) toilet;shower chair  -PP     [place] Level/Cues Needed (Transfer Goal 1, OT) modified independence  -PP     Time Frame (Transfer Goal 1, OT) short term goal (STG);2 weeks  -PP     Progress/Outcome (Transfer Goal 1, OT) goal ongoing  -PP       Row Name 07/18/23 1216           Bathing Goal 1 (OT)    Activity/Device (Bathing Goal 1, OT) bathing skills, all  -PP     [place] Level/Cues Needed (Bathing Goal 1, OT) supervision required  -PP     Time Frame (Bathing Goal 1, OT) short term goal (STG);2 weeks  -PP     Progress/Outcomes (Bathing Goal 1, OT) goal ongoing  -PP       Row Name 07/18/23 1216          Dressing Goal 1 (OT)    Activity/Device (Dressing Goal 1, OT) dressing skills, all  -PP     [place]da/Cues Needed (Dressing Goal 1, OT) supervision required  -PP     Time Frame (Dressing Goal 1, OT) short term goal (STG);2 weeks  -PP     Progress/Outcome (Dressing Goal 1, OT) goal ongoing  -PP       Row Name 07/18/23 1216          Grooming Goal 1 (OT)    Activity/Device (Grooming Goal 1, OT) grooming skills, all  -PP     [place]da (Grooming Goal 1, OT) supervision required  -PP     Time Frame (Grooming Goal 1, OT) short term goal (STG);2 weeks  -PP     Strategies/Barriers (Grooming Goal 1, OT) in standing position  -PP     Progress/Outcome (Grooming Goal 1, OT) goal ongoing  -PP               User Key  (r) = Recorded By, (t) = Taken By, (c) = Cosigned By      Initials Name Provider Type    PP Sharonda Bueno, LEE Occupational Therapist                   Clinical Impression       Row Name 07/18/23 1241          Pain Assessment    Pretreatment Pain Rating 0/10 - no pain  -PP     Posttreatment Pain Rating 0/10 - no pain  -PP       Row Name 07/18/23 1241          Plan of Care Review    Plan of Care Reviewed With patient  -PP     Outcome Evaluation Pt re-eval this date d/t s/p R 5th toe amputation on 7/14. Pt was  SBA 3x STS from EOB. Pt agreeable to sitting UIC at end of session. SBA for bed mob from supine to sit EOB. Pt SBA for 3x STS from EOB using Rwx. She was CGA for ambulating xfer from bed to recliner chair. Pt SBA for G&H of oral care, washing face and hands, req VC for intiation and sequencing task segmentation initially. Pt will continue to benefit from skilled OT to  address deficits in poor act tolerance, functional mob, and DC plan remains home w/ assist from family. OT will continue to monitor.  -PP       Row Name 07/18/23 1241          Therapy Assessment/Plan (OT)    Therapy Frequency (OT) 5 times/wk  -PP       Row Name 07/18/23 1241          Therapy Plan Review/Discharge Plan (OT)    Anticipated Discharge Disposition (OT) home with assist;home  -PP       Row Name 07/18/23 1241          Vital Signs    O2 Delivery Pre Treatment room air  -PP       Row Name 07/18/23 1241          Positioning and Restraints    Pre-Treatment Position in bed  -PP     Post Treatment Position chair  -PP     In Chair notified nsg;reclined;call light within reach;encouraged to call for assist;exit alarm on  -PP               User Key  (r) = Recorded By, (t) = Taken By, (c) = Cosigned By      Initials Name Provider Type    Sharonda Welch, LEE Occupational Therapist                   Outcome Measures    No documentation.                   Occupational Therapy Education       Title: PT OT SLP Therapies (Done)       Topic: Occupational Therapy (Done)       Point: ADL training (Done)       Description:   Instruct learner(s) on proper safety adaptation and remediation techniques during self care or transfers.   Instruct in proper use of assistive devices.                  Learning Progress Summary             Patient Acceptance, E, VU by BERT at 7/18/2023 1252    Comment: Safe xfer techniques using Rwx and ADL retraining techniques.    Acceptance, E, VU,DU by JESSA at 7/7/2023 1033                         Point: Home exercise program (Done)       Description:   Instruct learner(s) on appropriate technique for monitoring, assisting and/or progressing therapeutic exercises/activities.                  Learning Progress Summary             Patient Acceptance, E, VU,DU by JESSA at 7/7/2023 1033                         Point: Precautions (Done)       Description:   Instruct learner(s) on prescribed precautions  during self-care and functional transfers.                  Learning Progress Summary             Patient Acceptance, E, VU by PP at 7/18/2023 1252    Comment: Safe xfer techniques using Rwx and ADL retraining techniques.    Acceptance, E,D, VU,DU by PP at 7/12/2023 1537    Comment: Pt ED in safe STS xfer technique from chair, using arms to push up from surface and to reach back to surface before sitting.   Family Acceptance, E,D, VU,DU by PP at 7/12/2023 1537    Comment: Pt ED in safe STS xfer technique from chair, using arms to push up from surface and to reach back to surface before sitting.                                         User Key       Initials Effective Dates Name Provider Type Discipline    [facility] 09/22/22 -  Jewell Acevedo, OT Occupational Therapist OT    PP 06/09/23 -  Sharonda Bueno OT Occupational Therapist OT                  OT Recommendation and Plan  Therapy Frequency (OT): 5 times/wk  Plan of Care Review  Plan of Care Reviewed With: patient  Progress: improving  Outcome Evaluation: Pt re-eval this date d/t s/p R 5th toe amputation on 7/14. Pt was  SBA 3x STS from EOB. Pt agreeable to sitting UIC at end of session. SBA for bed mob from supine to sit EOB. Pt SBA for 3x STS from EOB using Rwx. She was CGA for ambulating xfer from bed to recliner chair. Pt SBA for G&H of oral care, washing face and hands, req VC for intiation and sequencing task segmentation initially. Pt will continue to benefit from skilled OT to address deficits in poor act tolerance, functional mob, and DC plan remains home w/ assist from family. OT will continue to monitor.     Time Calculation:         Time Calculation- OT       Row Name 07/18/23 1219             Time Calculation- OT    OT Start Time 1007  -PP      OT Stop Time 1030  -PP      OT Time Calculation (min) 23 min  -PP      Total Timed Code Minutes- OT 18 minute(s)  -PP      OT Received On 07/18/23  -PP      OT - Next Appointment 07/19/23  -PP      OT Goal  Re-Cert Due Date 08/01/23  -PP         Timed Charges    44793 - OT Therapeutic Activity Minutes 18  -PP         Untimed Charges    OT Eval/Re-eval Minutes 5  -PP         Total Minutes    Timed Charges Total Minutes 18  -PP      Untimed Charges Total Minutes 5  -PP       Total Minutes 23  -PP                User Key  (r) = Recorded By, (t) = Taken By, (c) = Cosigned By      Initials Name Provider Type    PP Myles, Porshia, OT Occupational Therapist                  Therapy Charges for Today       Code Description Service Date Service Provider Modifiers Qty    03998131133  OT THERAPEUTIC ACT EA 15 MIN 7/18/2023 TrentonReal miguelia, OT GO 1    [id number]  OT RE-EVAL 2 7/18/2023 TrentonCarshia, OT GO 1                 Porshia Myles, OT  7/18/2023

## 2023-07-18 NOTE — PLAN OF CARE
Goal Outcome Evaluation:  Plan of Care Reviewed With: patient        Progress: improving  Outcome Evaluation: Pt re-eval this date d/t s/p R 5th toe amputation on 7/14. Pt was  SBA 3x STS from EOB. Pt agreeable to sitting UIC at end of session. SBA for bed mob from supine to sit EOB. Pt SBA for 3x STS from EOB using Rwx. She was CGA for ambulating xfer from bed to recliner chair. Pt SBA for G&H of oral care, washing face and hands, req VC for intiation and sequencing task segmentation initially. Pt will continue to benefit from skilled OT to address deficits in poor act tolerance, functional mob, and DC plan remains home w/ assist from family. OT will continue to monitor.      Anticipated Discharge Disposition (OT): home with assist, home

## 2023-07-18 NOTE — PLAN OF CARE
Goal Outcome Evaluation:  Plan of Care Reviewed With: patient        Progress: improving  Outcome Evaluation: VSS. Alert and oriented x1. No complaints of pain. On room air. Last dose of IV abx planned for today. Possible d/c home today. Will continue to provide supportive care.

## 2023-07-18 NOTE — PROGRESS NOTES
[facility] Clinical Pharmacy Services: Warfarin Dosing/Monitoring Consult    Bessie Molina is a 89 y.o. female, estimated creatinine clearance is 24.2 mL/min (A) (by C-G formula based on SCr of 1.3 mg/dL (H)). weighing 64.6 kg (142 lb 6.7 oz).    Results from last 7 days   Lab Units 07/18/23  0459 07/17/23  0456 07/16/23  0622 07/15/23  0520 07/14/23  0517 07/13/23  0530   INR  1.18* 1.14* 1.12* 1.15* 1.37* 1.45*   HEMOGLOBIN g/dL 13.0 12.0 12.3 13.0  --  11.1*   HEMATOCRIT % 38.3 36.2 36.3 38.2  --  33.2*   PLATELETS 10*3/mm3 302 283 302 272  --  222     Prior to admission anticoagulation: Per med reconciliation, the patient's home warfarin regimen is 4.5 mg on Mon/Wed/Fri and 3 mg on all other days of the week.    Hospital Anticoagulation:  Consulting provider: Dr. Mcrae  Start date: 7/6/23  Indication: Atrial fibrillation  Target INR: 2 - 3  Expected duration: Lifelong  Bridge Therapy: No    Potential food or drug interactions:   Ceftriaxone (moderate-new med in hospital)-concurrent use may enhance the anticoagulant effect of warfarin by platelet inhibition which can lead to irreversible platelet dysfunction.      Education complete?/Date: No; plan for follow up TBD    Assessment/Plan:  INR 1.18 this AM (not much movement from yesterday and level has been below target since being held for surgery) therefore will give a higher bolus dose of warfarin today.  Give warfarin 7.5 mg x1 today. Will probably take a few more days of bolus dosing to approach target.   Monitor for any signs or symptoms of bleeding.  Follow up daily INRs and dose adjustments. INR ordered daily with AM labs while inpatient.    Pharmacy will continue to follow until discharge or discontinuation of warfarin.     Nadeem Lopez [facility]  07/18/23 09:19 EDT

## 2023-07-19 LAB — BACTERIA SPEC ANAEROBE CULT: NORMAL

## 2023-07-19 NOTE — PROGRESS NOTES
Enter Query Response Below      Query Response:       Unable to determine Electronically signed by Uziel Mcrae MD, 23, 11:13 AM EDT.         If applicable, please update the problem list.     Patient: Bessie Molina        : 1932  Account: [id number]           Admit Date:         How to Respond to this query:       a. Click New Note     b. Answer query within the yellow box.                c. Update the Problem List, if applicable.    ,    89 year old with Chronic Atrial fibrillation, Chronic diastolic HF, HTN, severe peripheral vascular disease, type 2 DM admitted  for right 5th toe Osteomyelitis with cellulitis. Patient takes Glipizide at home.  Admit Glucose level was 256. Subsequent glucose levels were between .  Admit A1c was 7.30.  Patient underwent a right 5th toe amputation on .  Patient was treated with Rocephin (-), sliding scale insulin, Scheduled insulin.    Please further clarify the right 5th toe Cellulitis as:    - Due to Type 2 Diabetes Mellitus  - Unrelated to type 2 DM  - Other_____________( please specify)  - Unable to determine    By submitting this query, we are merely seeking further clarification of documentation to accurately reflect all conditions that you are monitoring, evaluating, treating or that extend the hospitalization or utilize additional resources of care. Please utilize your independent clinical judgment when addressing the question(s) above.     This query and your response, once completed, will be entered into the legal medical record.    Sincerely,  Ryan Hitchcock RN CDIS  Clinical Documentation Integrity Program   [email or web address]

## 2023-07-19 NOTE — CASE MANAGEMENT/SOCIAL WORK
Case Management Discharge Note      Final Note: Discharged home with [place]     Provided Post Acute Provider List?: N/A  Provided Post Acute Provider Quality & Resource List?: N/A    Selected Continued Care - Discharged on 7/18/2023 Admission date: 7/6/2023 - Discharge disposition: Home or Self Care      Destination    No services have been selected for the patient.                Durable Medical Equipment    No services have been selected for the patient.                Dialysis/Infusion    No services have been selected for the patient.                Home Medical Care       Service Provider Selected Services Address Phone Fax Patient Preferred    [facility] [address], [address], [place] [address] [phone] --              Therapy    No services have been selected for the patient.                Community Resources    No services have been selected for the patient.                Community & DME    No services have been selected for the patient.                    Transportation Services  Private: Car    Final Discharge Disposition Code: 06 - home with home health care

## 2023-08-01 NOTE — PROGRESS NOTES
Anticoagulation Clinic Progress Note    Anticoagulation Summary  As of 2022    INR goal:  2.0-3.0   TTR:  --   INR used for dosin.90 (2022)   Warfarin maintenance plan:  4.5 mg every day   Weekly warfarin total:  31.5 mg   Plan last modified:  Deidra Hay [facility] (2022)   Next INR check:  2022   Target end date:      Indications    Chronic anticoagulation [Z79.01]             Anticoagulation Episode Summary     INR check location:      Preferred lab:      Send INR reminders to:   [facility]    Comments:  Daughter is going to reach out to Dr. Hendricks, lives in [place] 1/2 of the time and her previous PCP was managing. If have not heard back by , please call. Planned on seeing on -      Anticoagulation Care Providers     Provider Role Specialty Phone number    Jag Farias MD Referring Cardiology [phone]          Clinic Interview:  Patient Findings     Negatives:  Signs/symptoms of thrombosis, Signs/symptoms of bleeding,   Laboratory test error suspected, Change in health, Change in alcohol use,   Change in activity, Upcoming invasive procedure, Emergency department   visit, Upcoming dental procedure, Missed doses, Extra doses, Change in   medications, Change in diet/appetite, Hospital admission, Bruising, Other   complaints      Clinical Outcomes     Negatives:  Major bleeding event, Thromboembolic event,   Anticoagulation-related hospital admission, Anticoagulation-related ED   visit, Anticoagulation-related fatality        INR History:  Anticoagulation Monitoring 2022   INR 1.90   INR Date 2022   INR Goal 2.0-3.0   Last Week Total 31.5 mg   Next Week Total 31.5 mg   Sun 4.5 mg   Mon 4.5 mg   Tue 4.5 mg   Wed 4.5 mg   Thu 4.5 mg   Fri 4.5 mg   Sat 4.5 mg   Visit Report -       Plan:  1. INR is Subtherapeutic today- see above in Anticoagulation Summary.   Will instruct Bessie Molina to Continue their warfarin regimen- see above in  [FreeTextEntry1] : 80 year old female with past medical history of HTN, HLD, ischemic cardiomyopathy/chronic  systolic HFrEF ( LVIDD 29% 1/2023), rheumatoid arthritis, and giant cell arteritis ( loss of vision 2019 left eye),  MI in 2021 for which she had an angiogram at [facility] that demonstrated significant CAD ( as per family record)  but was not revascularized , felt not amenable to PCI . Her LVEF was reportedly 40-45% at that time with TTE 1/17/23 LVEF 29%, severe MR, mod severe TR, severe pHTN. S/P PCI LAD and revascularized with TOÑA 2/10/23 with RHC- RA 3, PW 11. CI 3.7, SVR 1000, PVR VARGAS 1.8 ACC/AHA Stage C, NYHA Class III symptoms Appears compensated and normotensive but with chronic dry cough for approx 1 year and unsure if related to Entresto Anticoagulation Summary.    - Spoke with the patients daughter (Tuyet) who reported the patient still lives in [place] part time and is typically managed by her PCP there. However, the PCP had left and she is now being referred to a new provider (Dr. Hendricks) but has not established care with INR monitoring. She reports the INR has typically been relatively stable. Recommended to continue dose and have INR checked within 2 weeks. Have not scheduled in our clinic due to considering on transferring to our management versus being managed by Dr. Hendricks. She does report PCP requested labs on February 15th. Will continue to follow up if have not heard back by the end of next week to make sure INR is being managed appropriately.   2. Follow up in 2 weeks either with PCP or the medication management clinic  3. Pt has agreed to only be called if INR out of range. They have been instructed to call if any changes in medications, doses, concerns, etc. Patient expresses understanding and has no further questions at this time.    Deidra Hay [facility]

## 2023-09-25 RX ORDER — DILTIAZEM HYDROCHLORIDE 180 MG/1
CAPSULE, COATED, EXTENDED RELEASE ORAL
Qty: 30 CAPSULE | Refills: 11 | Status: SHIPPED | OUTPATIENT
Start: 2023-09-25

## 2024-01-01 NOTE — NURSING NOTE
CWON note: consult received for stable callous to left lateral foot, photo reviewed under media tab and appropriate orders placed. Please re-consult for any additional needs.    Resident

## (undated) DEVICE — TBG PENCL TELESCP MEGADYNE SMOKE EVAC 10FT

## (undated) DEVICE — GOWN,REINF,POLY,SIRUS,BRTH SLV,XLNG/XXL: Brand: MEDLINE

## (undated) DEVICE — BNDG ELAS ELITE V/CLOSE 6IN 5YD LF STRL

## (undated) DEVICE — CATH GUIDE SOFTVU SELECT/V HT OMNI .038 5F 65CM

## (undated) DEVICE — SUT VIC 4/0 SH 27IN J415H

## (undated) DEVICE — PERCLOSE™ PROSTYLE™ SUTURE-MEDIATED CLOSURE AND REPAIR SYSTEM: Brand: PERCLOSE™ PROSTYLE™

## (undated) DEVICE — DESTINATION PERIPHERAL GUIDING SHEATH: Brand: DESTINATION

## (undated) DEVICE — Device

## (undated) DEVICE — RADIFOCUS GLIDEWIRE: Brand: GLIDEWIRE

## (undated) DEVICE — GLV SURG BIOGEL LTX PF 8 1/2

## (undated) DEVICE — RADIFOCUS GLIDEWIRE ADVANTAGE GUIDEWIRE: Brand: GLIDEWIRE ADVANTAGE

## (undated) DEVICE — INFLATION DEVICE: Brand: ENCORE™ 26

## (undated) DEVICE — PTA BALLOON DILATATION CATHETER: Brand: STERLING® SL

## (undated) DEVICE — PK ANGIO CERBRL RAD 40

## (undated) DEVICE — TRAP FLD MINIVAC MEGADYNE 100ML

## (undated) DEVICE — SYR LL 3CC

## (undated) DEVICE — WIPE INST MEROCEL

## (undated) DEVICE — PATIENT RETURN ELECTRODE, SINGLE-USE, CONTACT QUALITY MONITORING, ADULT, WITH 9FT CORD, FOR PATIENTS WEIGING OVER 33LBS. (15KG): Brand: MEGADYNE

## (undated) DEVICE — PK ORTHO MINOR 40

## (undated) DEVICE — ANTIBACTERIAL UNDYED BRAIDED (POLYGLACTIN 910), SYNTHETIC ABSORBABLE SUTURE: Brand: COATED VICRYL

## (undated) DEVICE — TOTAL TRAY, 16FR 10ML SIL FOLEY, URN: Brand: MEDLINE

## (undated) DEVICE — RADIFOCUS TORQUE DEVICE MULTI-TORQUE VISE: Brand: RADIFOCUS TORQUE DEVICE

## (undated) DEVICE — SHLD ANGIO 2LAYR CIR FEN

## (undated) DEVICE — NDL HYPO PRECISIONGLIDE REG 25G 1 1/2

## (undated) DEVICE — TOWEL,OR,DSP,ST,BLUE,STD,4/PK,20PK/CS: Brand: MEDLINE

## (undated) DEVICE — TURBO-ELITE™ LASER ATHERECTOMY CATHETER: Brand: TURBO-ELITE™

## (undated) DEVICE — CVR PROB 96IN LF STRL

## (undated) DEVICE — COVER,LIGHT HANDLE,FLX,2/PK: Brand: MEDLINE INDUSTRIES, INC.

## (undated) DEVICE — SPNG GZ WOVN 4X4IN 12PLY 10/BX STRL

## (undated) DEVICE — SYRINGE KIT,PACKAGED,,150FT,MK 7(ANGIO-ARTERION, 150ML SYR KIT W/QFT,MC)(60729385): Brand: MEDRAD® MARK 7 ARTERION DISPOSABLE SYRINGE 150 ML WITH QUICK FILL TUBE

## (undated) DEVICE — NAVICROSS SUPPORT CATHETER: Brand: NAVICROSS

## (undated) DEVICE — SUT PROLN 3/0 SH D/A 36IN 8522H

## (undated) DEVICE — EQUIPMENT COVER BAG TYPE 48” X 36” (122CM X 91CM): Brand: EQUIPMENT COVER BAG TYPE

## (undated) DEVICE — UNDERGLV SURG BIOGEL INDICAT PI SZ8.5 BLU

## (undated) DEVICE — PREMIUM WET SKIN PREP TRAY: Brand: MEDLINE INDUSTRIES, INC.

## (undated) DEVICE — SOL NACL 0.9PCT 1000ML

## (undated) DEVICE — BANDAGE,GAUZE,BULKEE II,4.5"X4.1YD,STRL: Brand: MEDLINE

## (undated) DEVICE — GAUZE,SPONGE,FLUFF,6"X6.75",STRL,10/TRAY: Brand: MEDLINE

## (undated) DEVICE — PTA BALLOON DILATATION CATHETER: Brand: MUSTANG™